# Patient Record
Sex: FEMALE | Race: WHITE | Employment: OTHER | URBAN - METROPOLITAN AREA
[De-identification: names, ages, dates, MRNs, and addresses within clinical notes are randomized per-mention and may not be internally consistent; named-entity substitution may affect disease eponyms.]

---

## 2017-05-04 PROBLEM — E87.20 LACTIC ACIDOSIS: Status: ACTIVE | Noted: 2017-05-04

## 2017-05-04 PROBLEM — I99.8 ISCHEMIC FOOT: Status: ACTIVE | Noted: 2017-05-04

## 2017-05-04 PROBLEM — D53.9 MACROCYTIC ANEMIA: Status: ACTIVE | Noted: 2017-05-04

## 2017-05-22 PROBLEM — I75.022 BLUE TOE SYNDROME OF LEFT LOWER EXTREMITY (HCC): Chronic | Status: ACTIVE | Noted: 2017-05-22

## 2017-05-29 PROBLEM — I70.229 CRITICAL LOWER LIMB ISCHEMIA (HCC): Chronic | Status: ACTIVE | Noted: 2017-05-29

## 2018-09-13 ENCOUNTER — HOSPITAL ENCOUNTER (OUTPATIENT)
Dept: ULTRASOUND IMAGING | Age: 68
Discharge: HOME | End: 2018-09-15
Payer: MEDICARE

## 2018-09-13 DIAGNOSIS — M79.661 RIGHT CALF PAIN: ICD-10-CM

## 2018-09-13 PROCEDURE — 93971 EXTREMITY STUDY: CPT

## 2018-11-02 DIAGNOSIS — Z79.01 LONG TERM CURRENT USE OF ANTICOAGULANT: Primary | ICD-10-CM

## 2018-11-05 ENCOUNTER — HOSPITAL ENCOUNTER (OUTPATIENT)
Dept: PHARMACY | Age: 68
Setting detail: THERAPIES SERIES
Discharge: HOME | End: 2018-11-05
Payer: MEDICARE

## 2018-11-05 ENCOUNTER — HOSPITAL ENCOUNTER (OUTPATIENT)
Age: 68
Discharge: HOME | End: 2018-11-05
Payer: MEDICARE

## 2018-11-05 VITALS
WEIGHT: 111.4 LBS | BODY MASS INDEX: 16.94 KG/M2 | HEART RATE: 66 BPM | DIASTOLIC BLOOD PRESSURE: 76 MMHG | SYSTOLIC BLOOD PRESSURE: 149 MMHG

## 2018-11-05 DIAGNOSIS — Z86.718 HISTORY OF DEEP VEIN THROMBOSIS: ICD-10-CM

## 2018-11-05 DIAGNOSIS — Z79.01 LONG TERM CURRENT USE OF ANTICOAGULANT: ICD-10-CM

## 2018-11-05 LAB
INR BLD: 2.3
PROTHROMBIN TIME: 25.7 SEC (ref 9.3–12.4)

## 2018-11-05 PROCEDURE — 85610 PROTHROMBIN TIME: CPT

## 2018-11-05 PROCEDURE — 99212 OFFICE O/P EST SF 10 MIN: CPT

## 2018-11-05 PROCEDURE — 36415 COLL VENOUS BLD VENIPUNCTURE: CPT

## 2018-11-05 RX ORDER — WARFARIN SODIUM 2 MG/1
2 TABLET ORAL
COMMUNITY
End: 2019-07-01 | Stop reason: SDUPTHER

## 2018-11-05 NOTE — PROGRESS NOTES
[address] [facility], [place] Patient Initial Visit    Patient Findings     Positives:   Change in alcohol use (SHE HAS A FEW DRINKS PER DAY (2-3)), Change in medications (MED REC COMPLETED), Change in diet/appetite (SHE DOES LIKE SOME HIGH VITAMIN K FOODS, DISCUSSED BEING CONSISTENT)    Negatives:   Signs/symptoms of thrombosis, Signs/symptoms of bleeding, Laboratory test error suspected, Change in health, Change in activity, Upcoming invasive procedure, Emergency department visit, Upcoming dental procedure, Missed doses, Extra doses, Hospital admission, Bruising, Other complaints         Patient  reports that she quit smoking about 13 years ago. Her smoking use included Cigarettes. She smoked 3.00 packs per day. She has never used smokeless tobacco.     Assessment/Plan: This is patient's first visit to the [place] [address] [facility] (Medication Management). The patient was provided with all standard clinic warfarin education (MOA, s/s bleeding, INR monitoring, compliance, drug/food/substance interactions, clinic procedures). Orval Dubin is on warfarin for DVT, with a goal INR of 2-3. Patient has been on warfarin since mid-October this year; previously she was on Eliquis for one month but stopped d/t cost.  Last INR was about 10 days ago (value unknown); she has been on warfarin 2 mg daily since then. INR is therapeutic at 2.3, goal 2-3. Warfarin Dose: same (2 mg daily)  Follow Up: 1 week    Patient understands dosing directions and information discussed. Dosing schedule and follow up appointment given to patient. Patient acknowledges working in consult agreement with pharmacist as referred by his/her physician.     Alisa Moss PharmD 11/5/2018 3:40 PM

## 2018-11-12 ENCOUNTER — TELEPHONE (OUTPATIENT)
Dept: PHARMACY | Age: 68
End: 2018-11-12

## 2018-11-12 DIAGNOSIS — Z86.718 HISTORY OF DEEP VEIN THROMBOSIS: ICD-10-CM

## 2018-11-19 ENCOUNTER — HOSPITAL ENCOUNTER (OUTPATIENT)
Dept: PHARMACY | Age: 68
Setting detail: THERAPIES SERIES
Discharge: HOME | End: 2018-11-19
Payer: MEDICARE

## 2018-11-19 VITALS
BODY MASS INDEX: 16.6 KG/M2 | DIASTOLIC BLOOD PRESSURE: 65 MMHG | WEIGHT: 109.2 LBS | HEART RATE: 71 BPM | SYSTOLIC BLOOD PRESSURE: 100 MMHG

## 2018-11-19 DIAGNOSIS — Z86.718 HISTORY OF DEEP VEIN THROMBOSIS: ICD-10-CM

## 2018-11-19 LAB — INTERNATIONAL NORMALIZATION RATIO, POC: 3.9

## 2018-11-19 PROCEDURE — 85610 PROTHROMBIN TIME: CPT

## 2018-11-19 PROCEDURE — 99211 OFF/OP EST MAY X REQ PHY/QHP: CPT

## 2018-11-26 LAB — INR BLD: 4.5

## 2018-11-28 ENCOUNTER — TELEPHONE (OUTPATIENT)
Dept: PHARMACY | Age: 68
End: 2018-11-28

## 2018-11-28 DIAGNOSIS — Z86.718 HISTORY OF DEEP VEIN THROMBOSIS: ICD-10-CM

## 2018-11-28 NOTE — TELEPHONE ENCOUNTER
Called Pat back and went over her dosing of Warfarin for 11-28-18 to 12-2-18. Patient understood.   Electronically signed by Anusha Rai on 11/28/2018 at 12:00 PM

## 2018-11-28 NOTE — TELEPHONE ENCOUNTER
Received a call from Teofilo Robertson stating she had been in to see her primary care physician Dr. Amber Easley, on Monday who did an INR and the result was 4.5. Dr. Amber Easley told Teofilo Robertson not to take any warfarin on Monday and to call in today for dosing of warfarin. We need to call Pat back at [phone] with dosing instructions.

## 2018-12-03 ENCOUNTER — HOSPITAL ENCOUNTER (OUTPATIENT)
Dept: PHARMACY | Age: 68
Setting detail: THERAPIES SERIES
Discharge: HOME | End: 2018-12-03
Payer: MEDICARE

## 2018-12-03 VITALS
DIASTOLIC BLOOD PRESSURE: 73 MMHG | BODY MASS INDEX: 16.48 KG/M2 | WEIGHT: 108.4 LBS | SYSTOLIC BLOOD PRESSURE: 108 MMHG | HEART RATE: 69 BPM

## 2018-12-03 DIAGNOSIS — Z86.718 HISTORY OF DEEP VEIN THROMBOSIS: ICD-10-CM

## 2018-12-03 LAB — INTERNATIONAL NORMALIZATION RATIO, POC: 2.6

## 2018-12-03 PROCEDURE — 85610 PROTHROMBIN TIME: CPT

## 2018-12-03 PROCEDURE — 99211 OFF/OP EST MAY X REQ PHY/QHP: CPT

## 2018-12-03 NOTE — PROGRESS NOTES
[facility]    Patient Findings     Positives:   Change in health (DIARRHEA NOW RESOLVED, ABOUT A WEEK AGO), Change in alcohol use (HAVING 3 ALCOHOLIC MIXED DRINKS DAILY), Change in diet/appetite (APPETITE REMAINS DECREASED)    Negatives:   Signs/symptoms of thrombosis, Signs/symptoms of bleeding, Laboratory test error suspected, Change in activity, Upcoming invasive procedure, Emergency department visit, Upcoming dental procedure, Missed doses, Extra doses, Change in medications, Hospital admission, Bruising, Other complaints    Comments:   PCP ON DEC. 21ST           Patient  reports that she quit smoking about 13 years ago. Her smoking use included Cigarettes. She smoked 3.00 packs per day. She has never used smokeless tobacco.     Assessment/Plan:  Warfarin indication: Hx of DVT      INR today is therapeutic at 2.6, goal is 2-3. Warfarin Dose: WARFARIN DOSE WAS MODIFIED OVER THE PAST WEEK (9 MG TOTAL) D/T INR OF 4.5 ON 11/26 @ PCP OFFICE; SHE WAS ON 13 MG PER WEEK WHEN INR WAS 4.5. THAT INR WAS HIGHER THAN EXPECTED MOST LIKELY D/T MULTIPLE DAYS OF DIARRHEA. I WILL HAVE HER TAKE WARFARIN 1 MG ON TUES/THUR/SAT.; 2 MG ALL OTHER DAYS (11 MG PER WEEK)    Follow Up: 2 weeks    Patient understands dosing directions and information discussed. Dosing schedule and follow up appointment given to patient. Patient acknowledges working in consult agreement with pharmacist as referred by his/her physician.     Jean Claude Valenzuela PharmD 12/3/2018 2:28 PM

## 2018-12-17 ENCOUNTER — TELEPHONE (OUTPATIENT)
Dept: PHARMACY | Age: 68
End: 2018-12-17

## 2018-12-27 ENCOUNTER — HOSPITAL ENCOUNTER (OUTPATIENT)
Dept: PHARMACY | Age: 68
Setting detail: THERAPIES SERIES
Discharge: HOME | End: 2018-12-27
Payer: MEDICARE

## 2018-12-27 VITALS — HEART RATE: 97 BPM | DIASTOLIC BLOOD PRESSURE: 84 MMHG | SYSTOLIC BLOOD PRESSURE: 136 MMHG

## 2018-12-27 DIAGNOSIS — Z86.718 HISTORY OF DEEP VEIN THROMBOSIS: ICD-10-CM

## 2018-12-27 LAB — INTERNATIONAL NORMALIZATION RATIO, POC: 2.3

## 2018-12-27 PROCEDURE — 85610 PROTHROMBIN TIME: CPT

## 2018-12-27 PROCEDURE — 99211 OFF/OP EST MAY X REQ PHY/QHP: CPT

## 2019-01-07 LAB
CHOLESTEROL, TOTAL: 117 MG/DL
CHOLESTEROL/HDL RATIO: 2.2
HDLC SERPL-MCNC: 54 MG/DL (ref 35–70)
LDL CHOLESTEROL CALCULATED: 45 MG/DL (ref 0–160)
TRIGL SERPL-MCNC: 97 MG/DL
VLDLC SERPL CALC-MCNC: 63 MG/DL

## 2019-01-10 ENCOUNTER — APPOINTMENT (OUTPATIENT)
Dept: GENERAL RADIOLOGY | Age: 69
DRG: 432 | End: 2019-01-10
Payer: MEDICARE

## 2019-01-10 ENCOUNTER — APPOINTMENT (OUTPATIENT)
Dept: CT IMAGING | Age: 69
DRG: 432 | End: 2019-01-10
Payer: MEDICARE

## 2019-01-10 ENCOUNTER — HOSPITAL ENCOUNTER (INPATIENT)
Age: 69
LOS: 8 days | Discharge: HOME HEALTH | DRG: 432 | End: 2019-01-18
Attending: EMERGENCY MEDICINE | Admitting: INTERNAL MEDICINE
Payer: MEDICARE

## 2019-01-10 DIAGNOSIS — D64.9 SYMPTOMATIC ANEMIA: Primary | ICD-10-CM

## 2019-01-10 PROBLEM — K70.30 ALCOHOLIC CIRRHOSIS (HCC): Status: ACTIVE | Noted: 2019-01-10

## 2019-01-10 PROBLEM — I73.9 PVD (PERIPHERAL VASCULAR DISEASE) WITH CLAUDICATION (HCC): Status: ACTIVE | Noted: 2019-01-10

## 2019-01-10 PROBLEM — M67.88 PERONEAL TENDINOSIS: Status: ACTIVE | Noted: 2019-01-10

## 2019-01-10 PROBLEM — I82.503 CHRONIC DEEP VEIN THROMBOSIS (DVT) OF BOTH LOWER EXTREMITIES (HCC): Status: ACTIVE | Noted: 2019-01-10

## 2019-01-10 PROBLEM — E87.1 HYPONATREMIA: Status: ACTIVE | Noted: 2019-01-10

## 2019-01-10 LAB
ABO/RH: NORMAL
ABO/RH: NORMAL
ALBUMIN SERPL-MCNC: 3.6 G/DL (ref 3.5–5.2)
ALP BLD-CCNC: 92 U/L (ref 35–104)
ALT SERPL-CCNC: 16 U/L (ref 0–32)
ANION GAP SERPL CALCULATED.3IONS-SCNC: 14 MMOL/L (ref 7–16)
ANISOCYTOSIS: ABNORMAL
ANTIBODY SCREEN: NORMAL
AST SERPL-CCNC: 55 U/L (ref 0–31)
BASOPHILS ABSOLUTE: 0.02 E9/L (ref 0–0.2)
BASOPHILS RELATIVE PERCENT: 0.4 % (ref 0–2)
BILIRUB SERPL-MCNC: 1.3 MG/DL (ref 0–1.2)
BUN BLDV-MCNC: 6 MG/DL (ref 8–23)
CALCIUM SERPL-MCNC: 8.8 MG/DL (ref 8.6–10.2)
CHLORIDE BLD-SCNC: 95 MMOL/L (ref 98–107)
CHP ED QC CHECK: YES
CO2: 23 MMOL/L (ref 22–29)
CREAT SERPL-MCNC: 0.7 MG/DL (ref 0.5–1)
EOSINOPHILS ABSOLUTE: 0.01 E9/L (ref 0.05–0.5)
EOSINOPHILS RELATIVE PERCENT: 0.2 % (ref 0–6)
ETHANOL: <10 MG/DL (ref 0–0.08)
GFR AFRICAN AMERICAN: >60
GFR NON-AFRICAN AMERICAN: >60 ML/MIN/1.73
GLUCOSE BLD-MCNC: 109 MG/DL (ref 74–99)
HCT VFR BLD CALC: 22.4 % (ref 34–48)
HEMOCCULT STL QL: NORMAL
HEMOGLOBIN: 7.8 G/DL (ref 11.5–15.5)
IMMATURE GRANULOCYTES #: 0.03 E9/L
IMMATURE GRANULOCYTES %: 0.6 % (ref 0–5)
INR BLD: 2.3
LIPASE: 55 U/L (ref 13–60)
LYMPHOCYTES ABSOLUTE: 1.33 E9/L (ref 1.5–4)
LYMPHOCYTES RELATIVE PERCENT: 27.1 % (ref 20–42)
MAGNESIUM: 1.4 MG/DL (ref 1.6–2.6)
MCH RBC QN AUTO: 39 PG (ref 26–35)
MCHC RBC AUTO-ENTMCNC: 34.8 % (ref 32–34.5)
MCV RBC AUTO: 112 FL (ref 80–99.9)
MONOCYTES ABSOLUTE: 0.3 E9/L (ref 0.1–0.95)
MONOCYTES RELATIVE PERCENT: 6.1 % (ref 2–12)
NEUTROPHILS ABSOLUTE: 3.21 E9/L (ref 1.8–7.3)
NEUTROPHILS RELATIVE PERCENT: 65.6 % (ref 43–80)
OVALOCYTES: ABNORMAL
PDW BLD-RTO: 20.7 FL (ref 11.5–15)
PLATELET # BLD: 366 E9/L (ref 130–450)
PMV BLD AUTO: 9.5 FL (ref 7–12)
POIKILOCYTES: ABNORMAL
POTASSIUM SERPL-SCNC: 3.4 MMOL/L (ref 3.5–5)
PROTHROMBIN TIME: 26 SEC (ref 9.3–12.4)
RBC # BLD: 2 E12/L (ref 3.5–5.5)
SODIUM BLD-SCNC: 132 MMOL/L (ref 132–146)
TEAR DROP CELLS: ABNORMAL
TOTAL PROTEIN: 6.1 G/DL (ref 6.4–8.3)
WBC # BLD: 4.9 E9/L (ref 4.5–11.5)

## 2019-01-10 PROCEDURE — 85025 COMPLETE CBC W/AUTO DIFF WBC: CPT

## 2019-01-10 PROCEDURE — 6370000000 HC RX 637 (ALT 250 FOR IP): Performed by: INTERNAL MEDICINE

## 2019-01-10 PROCEDURE — 2580000003 HC RX 258: Performed by: RADIOLOGY

## 2019-01-10 PROCEDURE — 83735 ASSAY OF MAGNESIUM: CPT

## 2019-01-10 PROCEDURE — 36415 COLL VENOUS BLD VENIPUNCTURE: CPT

## 2019-01-10 PROCEDURE — 6360000002 HC RX W HCPCS: Performed by: EMERGENCY MEDICINE

## 2019-01-10 PROCEDURE — 96374 THER/PROPH/DIAG INJ IV PUSH: CPT

## 2019-01-10 PROCEDURE — 86901 BLOOD TYPING SEROLOGIC RH(D): CPT

## 2019-01-10 PROCEDURE — 83690 ASSAY OF LIPASE: CPT

## 2019-01-10 PROCEDURE — 71045 X-RAY EXAM CHEST 1 VIEW: CPT

## 2019-01-10 PROCEDURE — 74177 CT ABD & PELVIS W/CONTRAST: CPT

## 2019-01-10 PROCEDURE — 86900 BLOOD TYPING SEROLOGIC ABO: CPT

## 2019-01-10 PROCEDURE — C9113 INJ PANTOPRAZOLE SODIUM, VIA: HCPCS | Performed by: EMERGENCY MEDICINE

## 2019-01-10 PROCEDURE — G0480 DRUG TEST DEF 1-7 CLASSES: HCPCS

## 2019-01-10 PROCEDURE — 6360000002 HC RX W HCPCS: Performed by: INTERNAL MEDICINE

## 2019-01-10 PROCEDURE — 99285 EMERGENCY DEPT VISIT HI MDM: CPT

## 2019-01-10 PROCEDURE — 93005 ELECTROCARDIOGRAM TRACING: CPT | Performed by: INTERNAL MEDICINE

## 2019-01-10 PROCEDURE — 6360000004 HC RX CONTRAST MEDICATION: Performed by: RADIOLOGY

## 2019-01-10 PROCEDURE — 99223 1ST HOSP IP/OBS HIGH 75: CPT | Performed by: INTERNAL MEDICINE

## 2019-01-10 PROCEDURE — 86850 RBC ANTIBODY SCREEN: CPT

## 2019-01-10 PROCEDURE — 71260 CT THORAX DX C+: CPT

## 2019-01-10 PROCEDURE — 85610 PROTHROMBIN TIME: CPT

## 2019-01-10 PROCEDURE — 80053 COMPREHEN METABOLIC PANEL: CPT

## 2019-01-10 PROCEDURE — 2580000003 HC RX 258: Performed by: EMERGENCY MEDICINE

## 2019-01-10 PROCEDURE — 2580000003 HC RX 258: Performed by: INTERNAL MEDICINE

## 2019-01-10 PROCEDURE — 1200000000 HC SEMI PRIVATE

## 2019-01-10 RX ORDER — FOLIC ACID 1 MG/1
1 TABLET ORAL DAILY
Status: DISCONTINUED | OUTPATIENT
Start: 2019-01-10 | End: 2019-01-18 | Stop reason: HOSPADM

## 2019-01-10 RX ORDER — BRIMONIDINE TARTRATE, TIMOLOL MALEATE 2; 5 MG/ML; MG/ML
1 SOLUTION/ DROPS OPHTHALMIC EVERY 12 HOURS
Status: DISCONTINUED | OUTPATIENT
Start: 2019-01-10 | End: 2019-01-10 | Stop reason: CLARIF

## 2019-01-10 RX ORDER — WARFARIN SODIUM 1 MG/1
1 TABLET ORAL DAILY
Status: DISCONTINUED | OUTPATIENT
Start: 2019-01-10 | End: 2019-01-18 | Stop reason: HOSPADM

## 2019-01-10 RX ORDER — POTASSIUM CHLORIDE 20 MEQ/1
40 TABLET, EXTENDED RELEASE ORAL DAILY
Status: DISCONTINUED | OUTPATIENT
Start: 2019-01-10 | End: 2019-01-18 | Stop reason: HOSPADM

## 2019-01-10 RX ORDER — 0.9 % SODIUM CHLORIDE 0.9 %
1000 INTRAVENOUS SOLUTION INTRAVENOUS ONCE
Status: COMPLETED | OUTPATIENT
Start: 2019-01-10 | End: 2019-01-10

## 2019-01-10 RX ORDER — THIAMINE MONONITRATE (VIT B1) 100 MG
100 TABLET ORAL DAILY
Status: DISCONTINUED | OUTPATIENT
Start: 2019-01-11 | End: 2019-01-18 | Stop reason: HOSPADM

## 2019-01-10 RX ORDER — SODIUM CHLORIDE 0.9 % (FLUSH) 0.9 %
10 SYRINGE (ML) INJECTION 2 TIMES DAILY
Status: DISCONTINUED | OUTPATIENT
Start: 2019-01-10 | End: 2019-01-18 | Stop reason: HOSPADM

## 2019-01-10 RX ORDER — WARFARIN SODIUM 1 MG/1
1 TABLET ORAL
COMMUNITY
End: 2019-01-28

## 2019-01-10 RX ORDER — ESCITALOPRAM OXALATE 10 MG/1
20 TABLET ORAL EVERY MORNING
Status: DISCONTINUED | OUTPATIENT
Start: 2019-01-11 | End: 2019-01-10

## 2019-01-10 RX ORDER — SODIUM CHLORIDE 9 MG/ML
INJECTION, SOLUTION INTRAVENOUS ONCE
Status: COMPLETED | OUTPATIENT
Start: 2019-01-10 | End: 2019-01-10

## 2019-01-10 RX ORDER — TIMOLOL MALEATE 5 MG/ML
1 SOLUTION/ DROPS OPHTHALMIC EVERY 12 HOURS
Status: DISCONTINUED | OUTPATIENT
Start: 2019-01-10 | End: 2019-01-16 | Stop reason: SDUPTHER

## 2019-01-10 RX ORDER — WARFARIN SODIUM 1 MG/1
1 TABLET ORAL
Status: DISCONTINUED | OUTPATIENT
Start: 2019-01-11 | End: 2019-01-18 | Stop reason: HOSPADM

## 2019-01-10 RX ORDER — MAGNESIUM SULFATE IN WATER 40 MG/ML
4 INJECTION, SOLUTION INTRAVENOUS ONCE
Status: COMPLETED | OUTPATIENT
Start: 2019-01-10 | End: 2019-01-11

## 2019-01-10 RX ORDER — 0.9 % SODIUM CHLORIDE 0.9 %
500 INTRAVENOUS SOLUTION INTRAVENOUS ONCE
Status: DISCONTINUED | OUTPATIENT
Start: 2019-01-11 | End: 2019-01-11

## 2019-01-10 RX ORDER — PANTOPRAZOLE SODIUM 40 MG/10ML
40 INJECTION, POWDER, LYOPHILIZED, FOR SOLUTION INTRAVENOUS ONCE
Status: COMPLETED | OUTPATIENT
Start: 2019-01-10 | End: 2019-01-10

## 2019-01-10 RX ORDER — LORAZEPAM 1 MG/1
1 TABLET ORAL
Status: DISCONTINUED | OUTPATIENT
Start: 2019-01-10 | End: 2019-01-18 | Stop reason: HOSPADM

## 2019-01-10 RX ORDER — BRIMONIDINE TARTRATE 2 MG/ML
1 SOLUTION/ DROPS OPHTHALMIC EVERY 12 HOURS
Status: DISCONTINUED | OUTPATIENT
Start: 2019-01-10 | End: 2019-01-16 | Stop reason: SDUPTHER

## 2019-01-10 RX ORDER — SIMVASTATIN 5 MG
5 TABLET ORAL NIGHTLY
Status: DISCONTINUED | OUTPATIENT
Start: 2019-01-10 | End: 2019-01-18 | Stop reason: HOSPADM

## 2019-01-10 RX ORDER — LEVOTHYROXINE SODIUM 0.03 MG/1
25 TABLET ORAL EVERY MORNING
Status: DISCONTINUED | OUTPATIENT
Start: 2019-01-11 | End: 2019-01-18 | Stop reason: HOSPADM

## 2019-01-10 RX ORDER — THIAMINE HYDROCHLORIDE 100 MG/ML
100 INJECTION, SOLUTION INTRAMUSCULAR; INTRAVENOUS ONCE
Status: COMPLETED | OUTPATIENT
Start: 2019-01-10 | End: 2019-01-10

## 2019-01-10 RX ADMIN — THIAMINE HYDROCHLORIDE 100 MG: 100 INJECTION, SOLUTION INTRAMUSCULAR; INTRAVENOUS at 23:20

## 2019-01-10 RX ADMIN — SODIUM CHLORIDE: 9 INJECTION, SOLUTION INTRAVENOUS at 22:07

## 2019-01-10 RX ADMIN — MAGNESIUM SULFATE HEPTAHYDRATE 4 G: 40 INJECTION, SOLUTION INTRAVENOUS at 23:17

## 2019-01-10 RX ADMIN — IOPAMIDOL 110 ML: 755 INJECTION, SOLUTION INTRAVENOUS at 15:49

## 2019-01-10 RX ADMIN — SODIUM CHLORIDE 1000 ML: 9 INJECTION, SOLUTION INTRAVENOUS at 12:23

## 2019-01-10 RX ADMIN — TIMOLOL MALEATE 1 DROP: 5 SOLUTION/ DROPS OPHTHALMIC at 23:17

## 2019-01-10 RX ADMIN — PANTOPRAZOLE SODIUM 40 MG: 40 INJECTION, POWDER, FOR SOLUTION INTRAVENOUS at 16:07

## 2019-01-10 RX ADMIN — WARFARIN SODIUM 1 MG: 1 TABLET ORAL at 23:17

## 2019-01-10 RX ADMIN — POTASSIUM CHLORIDE 40 MEQ: 20 TABLET, EXTENDED RELEASE ORAL at 23:17

## 2019-01-10 RX ADMIN — SIMVASTATIN 5 MG: 5 TABLET, FILM COATED ORAL at 23:17

## 2019-01-10 RX ADMIN — Medication 10 ML: at 22:07

## 2019-01-10 RX ADMIN — FOLIC ACID 1 MG: 1 TABLET ORAL at 23:20

## 2019-01-10 ASSESSMENT — PAIN DESCRIPTION - ORIENTATION: ORIENTATION: RIGHT;LEFT

## 2019-01-10 ASSESSMENT — PAIN SCALES - GENERAL
PAINLEVEL_OUTOF10: 0
PAINLEVEL_OUTOF10: 0
PAINLEVEL_OUTOF10: 7
PAINLEVEL_OUTOF10: 0

## 2019-01-10 ASSESSMENT — PAIN DESCRIPTION - LOCATION: LOCATION: LEG

## 2019-01-10 ASSESSMENT — PAIN DESCRIPTION - PAIN TYPE: TYPE: ACUTE PAIN

## 2019-01-11 ENCOUNTER — APPOINTMENT (OUTPATIENT)
Dept: INTERVENTIONAL RADIOLOGY/VASCULAR | Age: 69
DRG: 432 | End: 2019-01-11
Payer: MEDICARE

## 2019-01-11 LAB
ALBUMIN SERPL-MCNC: 2.8 G/DL (ref 3.5–5.2)
ALP BLD-CCNC: 71 U/L (ref 35–104)
ALT SERPL-CCNC: 13 U/L (ref 0–32)
ANION GAP SERPL CALCULATED.3IONS-SCNC: 10 MMOL/L (ref 7–16)
ANISOCYTOSIS: ABNORMAL
ANISOCYTOSIS: ABNORMAL
AST SERPL-CCNC: 41 U/L (ref 0–31)
BASOPHILS ABSOLUTE: 0.02 E9/L (ref 0–0.2)
BASOPHILS ABSOLUTE: 0.03 E9/L (ref 0–0.2)
BASOPHILS RELATIVE PERCENT: 0.4 % (ref 0–2)
BASOPHILS RELATIVE PERCENT: 0.7 % (ref 0–2)
BILIRUB SERPL-MCNC: 0.8 MG/DL (ref 0–1.2)
BLOOD BANK DISPENSE STATUS: NORMAL
BLOOD BANK PRODUCT CODE: NORMAL
BPU ID: NORMAL
BUN BLDV-MCNC: 4 MG/DL (ref 8–23)
CALCIUM SERPL-MCNC: 7.9 MG/DL (ref 8.6–10.2)
CHLORIDE BLD-SCNC: 105 MMOL/L (ref 98–107)
CO2: 19 MMOL/L (ref 22–29)
CREAT SERPL-MCNC: 0.7 MG/DL (ref 0.5–1)
D DIMER: 392 NG/ML DDU
DESCRIPTION BLOOD BANK: NORMAL
EKG ATRIAL RATE: 65 BPM
EKG ATRIAL RATE: 70 BPM
EKG P AXIS: 50 DEGREES
EKG P AXIS: 73 DEGREES
EKG P-R INTERVAL: 144 MS
EKG P-R INTERVAL: 154 MS
EKG Q-T INTERVAL: 496 MS
EKG Q-T INTERVAL: 500 MS
EKG QRS DURATION: 74 MS
EKG QRS DURATION: 74 MS
EKG QTC CALCULATION (BAZETT): 515 MS
EKG QTC CALCULATION (BAZETT): 540 MS
EKG R AXIS: 44 DEGREES
EKG R AXIS: 45 DEGREES
EKG T AXIS: 43 DEGREES
EKG T AXIS: 55 DEGREES
EKG VENTRICULAR RATE: 65 BPM
EKG VENTRICULAR RATE: 70 BPM
EOSINOPHILS ABSOLUTE: 0.02 E9/L (ref 0.05–0.5)
EOSINOPHILS ABSOLUTE: 0.04 E9/L (ref 0.05–0.5)
EOSINOPHILS RELATIVE PERCENT: 0.5 % (ref 0–6)
EOSINOPHILS RELATIVE PERCENT: 0.8 % (ref 0–6)
FERRITIN: 1055 NG/ML
FIBRINOGEN: 303 MG/DL (ref 225–540)
FOLATE: >20 NG/ML (ref 4.8–24.2)
GFR AFRICAN AMERICAN: >60
GFR NON-AFRICAN AMERICAN: >60 ML/MIN/1.73
GLUCOSE BLD-MCNC: 79 MG/DL (ref 74–99)
HAPTOGLOBIN: 47 MG/DL (ref 30–200)
HCT VFR BLD CALC: 19 % (ref 34–48)
HCT VFR BLD CALC: 20.7 % (ref 34–48)
HEMOGLOBIN: 6.5 G/DL (ref 11.5–15.5)
HEMOGLOBIN: 7.2 G/DL (ref 11.5–15.5)
HYPOCHROMIA: ABNORMAL
IMMATURE GRANULOCYTES #: 0.01 E9/L
IMMATURE GRANULOCYTES #: 0.02 E9/L
IMMATURE GRANULOCYTES %: 0.2 % (ref 0–5)
IMMATURE GRANULOCYTES %: 0.4 % (ref 0–5)
IMMATURE RETIC FRACT: 20.9 % (ref 3–15.9)
INR BLD: 2
IRON: 41 MCG/DL (ref 37–145)
LACTATE DEHYDROGENASE: 192 U/L (ref 135–214)
LYMPHOCYTES ABSOLUTE: 1.56 E9/L (ref 1.5–4)
LYMPHOCYTES ABSOLUTE: 2.03 E9/L (ref 1.5–4)
LYMPHOCYTES RELATIVE PERCENT: 37.1 % (ref 20–42)
LYMPHOCYTES RELATIVE PERCENT: 41.9 % (ref 20–42)
MCH RBC QN AUTO: 39.3 PG (ref 26–35)
MCH RBC QN AUTO: 39.4 PG (ref 26–35)
MCHC RBC AUTO-ENTMCNC: 33.6 % (ref 32–34.5)
MCHC RBC AUTO-ENTMCNC: 34.2 % (ref 32–34.5)
MCV RBC AUTO: 115.2 FL (ref 80–99.9)
MCV RBC AUTO: 116.9 FL (ref 80–99.9)
MONOCYTES ABSOLUTE: 0.26 E9/L (ref 0.1–0.95)
MONOCYTES ABSOLUTE: 0.31 E9/L (ref 0.1–0.95)
MONOCYTES RELATIVE PERCENT: 6.2 % (ref 2–12)
MONOCYTES RELATIVE PERCENT: 6.4 % (ref 2–12)
NEUTROPHILS ABSOLUTE: 2.33 E9/L (ref 1.8–7.3)
NEUTROPHILS ABSOLUTE: 2.43 E9/L (ref 1.8–7.3)
NEUTROPHILS RELATIVE PERCENT: 50.1 % (ref 43–80)
NEUTROPHILS RELATIVE PERCENT: 55.3 % (ref 43–80)
OSMOLALITY URINE: 418 MOSM/KG (ref 300–900)
OVALOCYTES: ABNORMAL
PATHOLOGIST REVIEW: NORMAL
PDW BLD-RTO: 21.1 FL (ref 11.5–15)
PDW BLD-RTO: 21.2 FL (ref 11.5–15)
PLATELET # BLD: 346 E9/L (ref 130–450)
PLATELET # BLD: 429 E9/L (ref 130–450)
PMV BLD AUTO: 9.4 FL (ref 7–12)
PMV BLD AUTO: 9.4 FL (ref 7–12)
POIKILOCYTES: ABNORMAL
POIKILOCYTES: ABNORMAL
POLYCHROMASIA: ABNORMAL
POLYCHROMASIA: ABNORMAL
POTASSIUM SERPL-SCNC: 3.8 MMOL/L (ref 3.5–5)
PROTHROMBIN TIME: 22.3 SEC (ref 9.3–12.4)
RBC # BLD: 1.65 E12/L (ref 3.5–5.5)
RBC # BLD: 1.83 E12/L (ref 3.5–5.5)
RETIC HGB EQUIVALENT: 46.6 PG (ref 28.2–36.6)
RETICULOCYTE ABSOLUTE COUNT: 0.07 E12/L
RETICULOCYTE COUNT PCT: 3.7 % (ref 0.4–1.9)
SODIUM BLD-SCNC: 134 MMOL/L (ref 132–146)
TEAR DROP CELLS: ABNORMAL
TEAR DROP CELLS: ABNORMAL
TOTAL PROTEIN: 4.8 G/DL (ref 6.4–8.3)
TRANSFERRIN: 119 MG/DL (ref 200–360)
TSH SERPL DL<=0.05 MIU/L-ACNC: 3.71 UIU/ML (ref 0.27–4.2)
VITAMIN B-12: 439 PG/ML (ref 211–946)
WBC # BLD: 4.2 E9/L (ref 4.5–11.5)
WBC # BLD: 4.9 E9/L (ref 4.5–11.5)

## 2019-01-11 PROCEDURE — 93010 ELECTROCARDIOGRAM REPORT: CPT | Performed by: INTERNAL MEDICINE

## 2019-01-11 PROCEDURE — 82746 ASSAY OF FOLIC ACID SERUM: CPT

## 2019-01-11 PROCEDURE — 6370000000 HC RX 637 (ALT 250 FOR IP): Performed by: CLINICAL NURSE SPECIALIST

## 2019-01-11 PROCEDURE — 36415 COLL VENOUS BLD VENIPUNCTURE: CPT

## 2019-01-11 PROCEDURE — 2580000003 HC RX 258: Performed by: INTERNAL MEDICINE

## 2019-01-11 PROCEDURE — 86022 PLATELET ANTIBODIES: CPT

## 2019-01-11 PROCEDURE — 36430 TRANSFUSION BLD/BLD COMPNT: CPT

## 2019-01-11 PROCEDURE — 93922 UPR/L XTREMITY ART 2 LEVELS: CPT

## 2019-01-11 PROCEDURE — 85025 COMPLETE CBC W/AUTO DIFF WBC: CPT

## 2019-01-11 PROCEDURE — 83615 LACTATE (LD) (LDH) ENZYME: CPT

## 2019-01-11 PROCEDURE — 82607 VITAMIN B-12: CPT

## 2019-01-11 PROCEDURE — 2580000003 HC RX 258: Performed by: RADIOLOGY

## 2019-01-11 PROCEDURE — 85610 PROTHROMBIN TIME: CPT

## 2019-01-11 PROCEDURE — 84466 ASSAY OF TRANSFERRIN: CPT

## 2019-01-11 PROCEDURE — 99221 1ST HOSP IP/OBS SF/LOW 40: CPT | Performed by: SURGERY

## 2019-01-11 PROCEDURE — 1200000000 HC SEMI PRIVATE

## 2019-01-11 PROCEDURE — 80053 COMPREHEN METABOLIC PANEL: CPT

## 2019-01-11 PROCEDURE — 85378 FIBRIN DEGRADE SEMIQUANT: CPT

## 2019-01-11 PROCEDURE — 84443 ASSAY THYROID STIM HORMONE: CPT

## 2019-01-11 PROCEDURE — 2580000003 HC RX 258: Performed by: HOSPITALIST

## 2019-01-11 PROCEDURE — 86923 COMPATIBILITY TEST ELECTRIC: CPT

## 2019-01-11 PROCEDURE — 83010 ASSAY OF HAPTOGLOBIN QUANT: CPT

## 2019-01-11 PROCEDURE — 85384 FIBRINOGEN ACTIVITY: CPT

## 2019-01-11 PROCEDURE — 82728 ASSAY OF FERRITIN: CPT

## 2019-01-11 PROCEDURE — 83540 ASSAY OF IRON: CPT

## 2019-01-11 PROCEDURE — 93005 ELECTROCARDIOGRAM TRACING: CPT | Performed by: INTERNAL MEDICINE

## 2019-01-11 PROCEDURE — 85045 AUTOMATED RETICULOCYTE COUNT: CPT

## 2019-01-11 PROCEDURE — 6370000000 HC RX 637 (ALT 250 FOR IP): Performed by: INTERNAL MEDICINE

## 2019-01-11 PROCEDURE — P9016 RBC LEUKOCYTES REDUCED: HCPCS

## 2019-01-11 PROCEDURE — 83935 ASSAY OF URINE OSMOLALITY: CPT

## 2019-01-11 PROCEDURE — 99233 SBSQ HOSP IP/OBS HIGH 50: CPT | Performed by: HOSPITALIST

## 2019-01-11 RX ORDER — 0.9 % SODIUM CHLORIDE 0.9 %
250 INTRAVENOUS SOLUTION INTRAVENOUS ONCE
Status: COMPLETED | OUTPATIENT
Start: 2019-01-11 | End: 2019-01-12

## 2019-01-11 RX ORDER — PANTOPRAZOLE SODIUM 40 MG/1
40 TABLET, DELAYED RELEASE ORAL
Status: DISCONTINUED | OUTPATIENT
Start: 2019-01-11 | End: 2019-01-18 | Stop reason: HOSPADM

## 2019-01-11 RX ORDER — 0.9 % SODIUM CHLORIDE 0.9 %
1000 INTRAVENOUS SOLUTION INTRAVENOUS ONCE
Status: COMPLETED | OUTPATIENT
Start: 2019-01-11 | End: 2019-01-11

## 2019-01-11 RX ADMIN — Medication 10 ML: at 20:36

## 2019-01-11 RX ADMIN — BRIMONIDINE TARTRATE 1 DROP: 2 SOLUTION OPHTHALMIC at 20:36

## 2019-01-11 RX ADMIN — Medication 100 MG: at 08:37

## 2019-01-11 RX ADMIN — PANTOPRAZOLE SODIUM 40 MG: 40 TABLET, DELAYED RELEASE ORAL at 18:06

## 2019-01-11 RX ADMIN — WARFARIN SODIUM 2 MG: 1 TABLET ORAL at 18:06

## 2019-01-11 RX ADMIN — FOLIC ACID 1 MG: 1 TABLET ORAL at 08:37

## 2019-01-11 RX ADMIN — LEVOTHYROXINE SODIUM 25 MCG: 25 TABLET ORAL at 08:37

## 2019-01-11 RX ADMIN — BRIMONIDINE TARTRATE 1 DROP: 2 SOLUTION OPHTHALMIC at 08:36

## 2019-01-11 RX ADMIN — SODIUM CHLORIDE 250 ML: 9 INJECTION, SOLUTION INTRAVENOUS at 19:26

## 2019-01-11 RX ADMIN — SIMVASTATIN 5 MG: 5 TABLET, FILM COATED ORAL at 20:36

## 2019-01-11 RX ADMIN — Medication 10 ML: at 08:41

## 2019-01-11 RX ADMIN — POTASSIUM CHLORIDE 40 MEQ: 20 TABLET, EXTENDED RELEASE ORAL at 08:37

## 2019-01-11 RX ADMIN — SODIUM CHLORIDE 1000 ML: 9 INJECTION, SOLUTION INTRAVENOUS at 03:52

## 2019-01-11 ASSESSMENT — PAIN SCALES - GENERAL
PAINLEVEL_OUTOF10: 0

## 2019-01-12 ENCOUNTER — ANESTHESIA (OUTPATIENT)
Dept: ENDOSCOPY | Age: 69
DRG: 432 | End: 2019-01-12
Payer: MEDICARE

## 2019-01-12 ENCOUNTER — ANESTHESIA EVENT (OUTPATIENT)
Dept: ENDOSCOPY | Age: 69
DRG: 432 | End: 2019-01-12
Payer: MEDICARE

## 2019-01-12 LAB
ALBUMIN SERPL-MCNC: 2.9 G/DL (ref 3.5–5.2)
ALP BLD-CCNC: 79 U/L (ref 35–104)
ALT SERPL-CCNC: 13 U/L (ref 0–32)
ANION GAP SERPL CALCULATED.3IONS-SCNC: 9 MMOL/L (ref 7–16)
ANISOCYTOSIS: ABNORMAL
AST SERPL-CCNC: 45 U/L (ref 0–31)
BASOPHILS ABSOLUTE: 0.03 E9/L (ref 0–0.2)
BASOPHILS RELATIVE PERCENT: 0.7 % (ref 0–2)
BILIRUB SERPL-MCNC: 1.3 MG/DL (ref 0–1.2)
BUN BLDV-MCNC: 4 MG/DL (ref 8–23)
CALCIUM SERPL-MCNC: 8.3 MG/DL (ref 8.6–10.2)
CEA: 3.1 NG/ML (ref 0–5.2)
CHLORIDE BLD-SCNC: 102 MMOL/L (ref 98–107)
CO2: 18 MMOL/L (ref 22–29)
CREAT SERPL-MCNC: 0.7 MG/DL (ref 0.5–1)
EOSINOPHILS ABSOLUTE: 0.04 E9/L (ref 0.05–0.5)
EOSINOPHILS RELATIVE PERCENT: 0.9 % (ref 0–6)
GFR AFRICAN AMERICAN: >60
GFR NON-AFRICAN AMERICAN: >60 ML/MIN/1.73
GLUCOSE BLD-MCNC: 67 MG/DL (ref 74–99)
HCT VFR BLD CALC: 26.2 % (ref 34–48)
HEMOGLOBIN: 8.7 G/DL (ref 11.5–15.5)
HYPOCHROMIA: ABNORMAL
IMMATURE GRANULOCYTES #: 0.03 E9/L
IMMATURE GRANULOCYTES %: 0.7 % (ref 0–5)
INR BLD: 2.1
LYMPHOCYTES ABSOLUTE: 1.56 E9/L (ref 1.5–4)
LYMPHOCYTES RELATIVE PERCENT: 34.2 % (ref 20–42)
MCH RBC QN AUTO: 36.1 PG (ref 26–35)
MCHC RBC AUTO-ENTMCNC: 33.2 % (ref 32–34.5)
MCV RBC AUTO: 108.7 FL (ref 80–99.9)
MONOCYTES ABSOLUTE: 0.32 E9/L (ref 0.1–0.95)
MONOCYTES RELATIVE PERCENT: 7 % (ref 2–12)
NEUTROPHILS ABSOLUTE: 2.58 E9/L (ref 1.8–7.3)
NEUTROPHILS RELATIVE PERCENT: 56.5 % (ref 43–80)
PDW BLD-RTO: 23.9 FL (ref 11.5–15)
PLATELET # BLD: 370 E9/L (ref 130–450)
PMV BLD AUTO: 9.2 FL (ref 7–12)
POIKILOCYTES: ABNORMAL
POLYCHROMASIA: ABNORMAL
POTASSIUM SERPL-SCNC: 4.4 MMOL/L (ref 3.5–5)
PROTHROMBIN TIME: 23.8 SEC (ref 9.3–12.4)
RBC # BLD: 2.41 E12/L (ref 3.5–5.5)
SODIUM BLD-SCNC: 129 MMOL/L (ref 132–146)
T4 TOTAL: 7.6 MCG/DL (ref 4.5–11.7)
TEAR DROP CELLS: ABNORMAL
TOTAL PROTEIN: 5.1 G/DL (ref 6.4–8.3)
TSH SERPL DL<=0.05 MIU/L-ACNC: 4 UIU/ML (ref 0.27–4.2)
WBC # BLD: 4.6 E9/L (ref 4.5–11.5)

## 2019-01-12 PROCEDURE — 84443 ASSAY THYROID STIM HORMONE: CPT

## 2019-01-12 PROCEDURE — 36415 COLL VENOUS BLD VENIPUNCTURE: CPT

## 2019-01-12 PROCEDURE — 86038 ANTINUCLEAR ANTIBODIES: CPT

## 2019-01-12 PROCEDURE — 6370000000 HC RX 637 (ALT 250 FOR IP): Performed by: CLINICAL NURSE SPECIALIST

## 2019-01-12 PROCEDURE — 85025 COMPLETE CBC W/AUTO DIFF WBC: CPT

## 2019-01-12 PROCEDURE — 84436 ASSAY OF TOTAL THYROXINE: CPT

## 2019-01-12 PROCEDURE — 85610 PROTHROMBIN TIME: CPT

## 2019-01-12 PROCEDURE — 1200000000 HC SEMI PRIVATE

## 2019-01-12 PROCEDURE — 86255 FLUORESCENT ANTIBODY SCREEN: CPT

## 2019-01-12 PROCEDURE — 80053 COMPREHEN METABOLIC PANEL: CPT

## 2019-01-12 PROCEDURE — 82784 ASSAY IGA/IGD/IGG/IGM EACH: CPT

## 2019-01-12 PROCEDURE — 2580000003 HC RX 258: Performed by: RADIOLOGY

## 2019-01-12 PROCEDURE — 99232 SBSQ HOSP IP/OBS MODERATE 35: CPT | Performed by: INTERNAL MEDICINE

## 2019-01-12 PROCEDURE — 80074 ACUTE HEPATITIS PANEL: CPT

## 2019-01-12 PROCEDURE — 82105 ALPHA-FETOPROTEIN SERUM: CPT

## 2019-01-12 PROCEDURE — 6370000000 HC RX 637 (ALT 250 FOR IP): Performed by: INTERNAL MEDICINE

## 2019-01-12 PROCEDURE — 82378 CARCINOEMBRYONIC ANTIGEN: CPT

## 2019-01-12 PROCEDURE — 82787 IGG 1 2 3 OR 4 EACH: CPT

## 2019-01-12 PROCEDURE — 82103 ALPHA-1-ANTITRYPSIN TOTAL: CPT

## 2019-01-12 PROCEDURE — 93005 ELECTROCARDIOGRAM TRACING: CPT | Performed by: HOSPITALIST

## 2019-01-12 RX ADMIN — PANTOPRAZOLE SODIUM 40 MG: 40 TABLET, DELAYED RELEASE ORAL at 06:27

## 2019-01-12 RX ADMIN — Medication 100 MG: at 10:15

## 2019-01-12 RX ADMIN — WARFARIN SODIUM 1 MG: 1 TABLET ORAL at 17:33

## 2019-01-12 RX ADMIN — BRIMONIDINE TARTRATE 1 DROP: 2 SOLUTION OPHTHALMIC at 10:18

## 2019-01-12 RX ADMIN — Medication 10 ML: at 10:00

## 2019-01-12 RX ADMIN — LEVOTHYROXINE SODIUM 25 MCG: 25 TABLET ORAL at 06:27

## 2019-01-12 RX ADMIN — POTASSIUM CHLORIDE 40 MEQ: 20 TABLET, EXTENDED RELEASE ORAL at 10:00

## 2019-01-12 RX ADMIN — FOLIC ACID 1 MG: 1 TABLET ORAL at 10:00

## 2019-01-12 RX ADMIN — Medication 10 ML: at 20:27

## 2019-01-12 RX ADMIN — BRIMONIDINE TARTRATE 1 DROP: 2 SOLUTION OPHTHALMIC at 20:26

## 2019-01-12 RX ADMIN — PANTOPRAZOLE SODIUM 40 MG: 40 TABLET, DELAYED RELEASE ORAL at 15:55

## 2019-01-12 RX ADMIN — SIMVASTATIN 5 MG: 5 TABLET, FILM COATED ORAL at 20:26

## 2019-01-12 ASSESSMENT — PAIN SCALES - GENERAL
PAINLEVEL_OUTOF10: 0

## 2019-01-13 LAB
ALBUMIN SERPL-MCNC: 2.6 G/DL (ref 3.5–5.2)
ALP BLD-CCNC: 78 U/L (ref 35–104)
ALT SERPL-CCNC: 14 U/L (ref 0–32)
ANION GAP SERPL CALCULATED.3IONS-SCNC: 9 MMOL/L (ref 7–16)
ANISOCYTOSIS: ABNORMAL
AST SERPL-CCNC: 41 U/L (ref 0–31)
BASOPHILS ABSOLUTE: 0.01 E9/L (ref 0–0.2)
BASOPHILS RELATIVE PERCENT: 0.2 % (ref 0–2)
BILIRUB SERPL-MCNC: 0.8 MG/DL (ref 0–1.2)
BUN BLDV-MCNC: 5 MG/DL (ref 8–23)
BURR CELLS: ABNORMAL
CALCIUM SERPL-MCNC: 8.3 MG/DL (ref 8.6–10.2)
CHLORIDE BLD-SCNC: 101 MMOL/L (ref 98–107)
CO2: 20 MMOL/L (ref 22–29)
CREAT SERPL-MCNC: 0.9 MG/DL (ref 0.5–1)
EOSINOPHILS ABSOLUTE: 0.07 E9/L (ref 0.05–0.5)
EOSINOPHILS RELATIVE PERCENT: 1.1 % (ref 0–6)
GFR AFRICAN AMERICAN: >60
GFR NON-AFRICAN AMERICAN: >60 ML/MIN/1.73
GLUCOSE BLD-MCNC: 85 MG/DL (ref 74–99)
HCT VFR BLD CALC: 25.4 % (ref 34–48)
HEMOGLOBIN: 8.7 G/DL (ref 11.5–15.5)
HYPOCHROMIA: ABNORMAL
IMMATURE GRANULOCYTES #: 0.04 E9/L
IMMATURE GRANULOCYTES %: 0.6 % (ref 0–5)
INR BLD: 2.6
IRON SATURATION: 54 % (ref 15–50)
IRON: 83 MCG/DL (ref 37–145)
LYMPHOCYTES ABSOLUTE: 2.94 E9/L (ref 1.5–4)
LYMPHOCYTES RELATIVE PERCENT: 47.3 % (ref 20–42)
MCH RBC QN AUTO: 37.3 PG (ref 26–35)
MCHC RBC AUTO-ENTMCNC: 34.3 % (ref 32–34.5)
MCV RBC AUTO: 109 FL (ref 80–99.9)
MONOCYTES ABSOLUTE: 0.47 E9/L (ref 0.1–0.95)
MONOCYTES RELATIVE PERCENT: 7.6 % (ref 2–12)
NEUTROPHILS ABSOLUTE: 2.69 E9/L (ref 1.8–7.3)
NEUTROPHILS RELATIVE PERCENT: 43.2 % (ref 43–80)
OVALOCYTES: ABNORMAL
PDW BLD-RTO: 23.7 FL (ref 11.5–15)
PLATELET # BLD: 354 E9/L (ref 130–450)
PMV BLD AUTO: 9.2 FL (ref 7–12)
POIKILOCYTES: ABNORMAL
POTASSIUM SERPL-SCNC: 4.9 MMOL/L (ref 3.5–5)
PROTHROMBIN TIME: 29 SEC (ref 9.3–12.4)
RBC # BLD: 2.33 E12/L (ref 3.5–5.5)
SODIUM BLD-SCNC: 130 MMOL/L (ref 132–146)
TEAR DROP CELLS: ABNORMAL
TOTAL IRON BINDING CAPACITY: 154 MCG/DL (ref 250–450)
TOTAL PROTEIN: 4.7 G/DL (ref 6.4–8.3)
VACUOLATED NEUTROPHILS: ABNORMAL
WBC # BLD: 6.2 E9/L (ref 4.5–11.5)

## 2019-01-13 PROCEDURE — 36415 COLL VENOUS BLD VENIPUNCTURE: CPT

## 2019-01-13 PROCEDURE — 1200000000 HC SEMI PRIVATE

## 2019-01-13 PROCEDURE — 6370000000 HC RX 637 (ALT 250 FOR IP): Performed by: INTERNAL MEDICINE

## 2019-01-13 PROCEDURE — 85610 PROTHROMBIN TIME: CPT

## 2019-01-13 PROCEDURE — 6370000000 HC RX 637 (ALT 250 FOR IP): Performed by: NURSE PRACTITIONER

## 2019-01-13 PROCEDURE — 83550 IRON BINDING TEST: CPT

## 2019-01-13 PROCEDURE — 80053 COMPREHEN METABOLIC PANEL: CPT

## 2019-01-13 PROCEDURE — 85025 COMPLETE CBC W/AUTO DIFF WBC: CPT

## 2019-01-13 PROCEDURE — 2580000003 HC RX 258: Performed by: RADIOLOGY

## 2019-01-13 PROCEDURE — 83540 ASSAY OF IRON: CPT

## 2019-01-13 PROCEDURE — 6370000000 HC RX 637 (ALT 250 FOR IP): Performed by: CLINICAL NURSE SPECIALIST

## 2019-01-13 PROCEDURE — 99232 SBSQ HOSP IP/OBS MODERATE 35: CPT | Performed by: INTERNAL MEDICINE

## 2019-01-13 RX ORDER — PHYTONADIONE 5 MG/1
5 TABLET ORAL ONCE
Status: COMPLETED | OUTPATIENT
Start: 2019-01-13 | End: 2019-01-13

## 2019-01-13 RX ADMIN — Medication 10 ML: at 21:27

## 2019-01-13 RX ADMIN — Medication 10 ML: at 23:55

## 2019-01-13 RX ADMIN — Medication 10 ML: at 08:39

## 2019-01-13 RX ADMIN — SIMVASTATIN 5 MG: 5 TABLET, FILM COATED ORAL at 21:27

## 2019-01-13 RX ADMIN — BRIMONIDINE TARTRATE 1 DROP: 2 SOLUTION OPHTHALMIC at 08:39

## 2019-01-13 RX ADMIN — FOLIC ACID 1 MG: 1 TABLET ORAL at 08:38

## 2019-01-13 RX ADMIN — TIMOLOL MALEATE 1 DROP: 5 SOLUTION/ DROPS OPHTHALMIC at 21:27

## 2019-01-13 RX ADMIN — LEVOTHYROXINE SODIUM 25 MCG: 25 TABLET ORAL at 06:18

## 2019-01-13 RX ADMIN — Medication 100 MG: at 08:38

## 2019-01-13 RX ADMIN — PANTOPRAZOLE SODIUM 40 MG: 40 TABLET, DELAYED RELEASE ORAL at 06:18

## 2019-01-13 RX ADMIN — PANTOPRAZOLE SODIUM 40 MG: 40 TABLET, DELAYED RELEASE ORAL at 18:04

## 2019-01-13 RX ADMIN — POTASSIUM CHLORIDE 40 MEQ: 20 TABLET, EXTENDED RELEASE ORAL at 08:38

## 2019-01-13 RX ADMIN — PHYTONADIONE 5 MG: 5 TABLET ORAL at 14:56

## 2019-01-13 ASSESSMENT — PAIN SCALES - GENERAL
PAINLEVEL_OUTOF10: 0
PAINLEVEL_OUTOF10: 0

## 2019-01-14 VITALS
OXYGEN SATURATION: 100 % | DIASTOLIC BLOOD PRESSURE: 58 MMHG | SYSTOLIC BLOOD PRESSURE: 105 MMHG | RESPIRATION RATE: 14 BRPM

## 2019-01-14 LAB
ALBUMIN SERPL-MCNC: 3.1 G/DL (ref 3.5–5.2)
ALP BLD-CCNC: 89 U/L (ref 35–104)
ALT SERPL-CCNC: 18 U/L (ref 0–32)
ANION GAP SERPL CALCULATED.3IONS-SCNC: 9 MMOL/L (ref 7–16)
ANISOCYTOSIS: ABNORMAL
ANTI-MITOCHONDRIAL AB, IFA: NEGATIVE
ANTI-NUCLEAR ANTIBODY (ANA): NEGATIVE
AST SERPL-CCNC: 58 U/L (ref 0–31)
BASOPHILS ABSOLUTE: 0.02 E9/L (ref 0–0.2)
BASOPHILS RELATIVE PERCENT: 0.3 % (ref 0–2)
BILIRUB SERPL-MCNC: 1.2 MG/DL (ref 0–1.2)
BUN BLDV-MCNC: 6 MG/DL (ref 8–23)
BURR CELLS: ABNORMAL
CALCIUM SERPL-MCNC: 8.7 MG/DL (ref 8.6–10.2)
CHLORIDE BLD-SCNC: 99 MMOL/L (ref 98–107)
CO2: 23 MMOL/L (ref 22–29)
CREAT SERPL-MCNC: 0.7 MG/DL (ref 0.5–1)
EKG ATRIAL RATE: 64 BPM
EKG P AXIS: 65 DEGREES
EKG P-R INTERVAL: 152 MS
EKG Q-T INTERVAL: 456 MS
EKG QRS DURATION: 76 MS
EKG QTC CALCULATION (BAZETT): 470 MS
EKG R AXIS: 30 DEGREES
EKG T AXIS: 46 DEGREES
EKG VENTRICULAR RATE: 64 BPM
EOSINOPHILS ABSOLUTE: 0.08 E9/L (ref 0.05–0.5)
EOSINOPHILS RELATIVE PERCENT: 1.3 % (ref 0–6)
GFR AFRICAN AMERICAN: >60
GFR NON-AFRICAN AMERICAN: >60 ML/MIN/1.73
GLUCOSE BLD-MCNC: 83 MG/DL (ref 74–99)
HAV IGM SER IA-ACNC: NORMAL
HCT VFR BLD CALC: 28.4 % (ref 34–48)
HEMOGLOBIN: 9.7 G/DL (ref 11.5–15.5)
HEPATITIS B CORE IGM ANTIBODY: NORMAL
HEPATITIS B SURFACE ANTIGEN INTERPRETATION: NORMAL
HEPATITIS C ANTIBODY INTERPRETATION: NORMAL
HYPOCHROMIA: ABNORMAL
IGG: 758 MG/DL (ref 700–1600)
IGM: 241 MG/DL (ref 40–230)
IMMATURE GRANULOCYTES #: 0.03 E9/L
IMMATURE GRANULOCYTES %: 0.5 % (ref 0–5)
INR BLD: 1.5
LYMPHOCYTES ABSOLUTE: 2.28 E9/L (ref 1.5–4)
LYMPHOCYTES RELATIVE PERCENT: 37.6 % (ref 20–42)
MCH RBC QN AUTO: 37.7 PG (ref 26–35)
MCHC RBC AUTO-ENTMCNC: 34.2 % (ref 32–34.5)
MCV RBC AUTO: 110.5 FL (ref 80–99.9)
MONOCYTES ABSOLUTE: 0.49 E9/L (ref 0.1–0.95)
MONOCYTES RELATIVE PERCENT: 8.1 % (ref 2–12)
NEUTROPHILS ABSOLUTE: 3.16 E9/L (ref 1.8–7.3)
NEUTROPHILS RELATIVE PERCENT: 52.2 % (ref 43–80)
OVALOCYTES: ABNORMAL
PDW BLD-RTO: 22.9 FL (ref 11.5–15)
PLATELET # BLD: 419 E9/L (ref 130–450)
PMV BLD AUTO: 9.8 FL (ref 7–12)
POIKILOCYTES: ABNORMAL
POLYCHROMASIA: ABNORMAL
POTASSIUM SERPL-SCNC: 4.7 MMOL/L (ref 3.5–5)
PROTHROMBIN TIME: 16.3 SEC (ref 9.3–12.4)
RBC # BLD: 2.57 E12/L (ref 3.5–5.5)
SCHISTOCYTES: ABNORMAL
SMOOTH MUSCLE ANTIBODY: NEGATIVE
SODIUM BLD-SCNC: 131 MMOL/L (ref 132–146)
TEAR DROP CELLS: ABNORMAL
TOTAL PROTEIN: 5.4 G/DL (ref 6.4–8.3)
VACUOLATED NEUTROPHILS: ABNORMAL
WBC # BLD: 6.1 E9/L (ref 4.5–11.5)

## 2019-01-14 PROCEDURE — 36415 COLL VENOUS BLD VENIPUNCTURE: CPT

## 2019-01-14 PROCEDURE — 6370000000 HC RX 637 (ALT 250 FOR IP): Performed by: INTERNAL MEDICINE

## 2019-01-14 PROCEDURE — 7100000010 HC PHASE II RECOVERY - FIRST 15 MIN: Performed by: INTERNAL MEDICINE

## 2019-01-14 PROCEDURE — 93010 ELECTROCARDIOGRAM REPORT: CPT | Performed by: INTERNAL MEDICINE

## 2019-01-14 PROCEDURE — 88305 TISSUE EXAM BY PATHOLOGIST: CPT

## 2019-01-14 PROCEDURE — 2580000003 HC RX 258: Performed by: NURSE ANESTHETIST, CERTIFIED REGISTERED

## 2019-01-14 PROCEDURE — 3700000000 HC ANESTHESIA ATTENDED CARE: Performed by: INTERNAL MEDICINE

## 2019-01-14 PROCEDURE — 6370000000 HC RX 637 (ALT 250 FOR IP): Performed by: CLINICAL NURSE SPECIALIST

## 2019-01-14 PROCEDURE — 3609012400 HC EGD TRANSORAL BIOPSY SINGLE/MULTIPLE: Performed by: INTERNAL MEDICINE

## 2019-01-14 PROCEDURE — 0DB98ZX EXCISION OF DUODENUM, VIA NATURAL OR ARTIFICIAL OPENING ENDOSCOPIC, DIAGNOSTIC: ICD-10-PCS | Performed by: INTERNAL MEDICINE

## 2019-01-14 PROCEDURE — 80053 COMPREHEN METABOLIC PANEL: CPT

## 2019-01-14 PROCEDURE — 85610 PROTHROMBIN TIME: CPT

## 2019-01-14 PROCEDURE — 99232 SBSQ HOSP IP/OBS MODERATE 35: CPT | Performed by: INTERNAL MEDICINE

## 2019-01-14 PROCEDURE — 87081 CULTURE SCREEN ONLY: CPT

## 2019-01-14 PROCEDURE — 1200000000 HC SEMI PRIVATE

## 2019-01-14 PROCEDURE — 0DB68ZX EXCISION OF STOMACH, VIA NATURAL OR ARTIFICIAL OPENING ENDOSCOPIC, DIAGNOSTIC: ICD-10-PCS | Performed by: INTERNAL MEDICINE

## 2019-01-14 PROCEDURE — 6360000002 HC RX W HCPCS: Performed by: NURSE ANESTHETIST, CERTIFIED REGISTERED

## 2019-01-14 PROCEDURE — 2580000003 HC RX 258: Performed by: RADIOLOGY

## 2019-01-14 PROCEDURE — 2709999900 HC NON-CHARGEABLE SUPPLY: Performed by: INTERNAL MEDICINE

## 2019-01-14 PROCEDURE — 85025 COMPLETE CBC W/AUTO DIFF WBC: CPT

## 2019-01-14 PROCEDURE — 7100000011 HC PHASE II RECOVERY - ADDTL 15 MIN: Performed by: INTERNAL MEDICINE

## 2019-01-14 RX ORDER — PROPOFOL 10 MG/ML
INJECTION, EMULSION INTRAVENOUS PRN
Status: DISCONTINUED | OUTPATIENT
Start: 2019-01-14 | End: 2019-01-14 | Stop reason: SDUPTHER

## 2019-01-14 RX ORDER — SODIUM CHLORIDE 9 MG/ML
INJECTION, SOLUTION INTRAVENOUS CONTINUOUS PRN
Status: DISCONTINUED | OUTPATIENT
Start: 2019-01-14 | End: 2019-01-14 | Stop reason: SDUPTHER

## 2019-01-14 RX ADMIN — Medication 10 ML: at 20:45

## 2019-01-14 RX ADMIN — Medication 100 MG: at 15:07

## 2019-01-14 RX ADMIN — BRIMONIDINE TARTRATE 1 DROP: 2 SOLUTION OPHTHALMIC at 10:17

## 2019-01-14 RX ADMIN — SIMVASTATIN 5 MG: 5 TABLET, FILM COATED ORAL at 20:45

## 2019-01-14 RX ADMIN — TIMOLOL MALEATE 1 DROP: 5 SOLUTION/ DROPS OPHTHALMIC at 20:45

## 2019-01-14 RX ADMIN — PROPOFOL 80 MG: 10 INJECTION, EMULSION INTRAVENOUS at 13:33

## 2019-01-14 RX ADMIN — Medication 10 ML: at 10:17

## 2019-01-14 RX ADMIN — WARFARIN SODIUM 1 MG: 1 TABLET ORAL at 18:36

## 2019-01-14 RX ADMIN — WARFARIN SODIUM 1 MG: 1 TABLET ORAL at 18:37

## 2019-01-14 RX ADMIN — SODIUM CHLORIDE: 9 INJECTION, SOLUTION INTRAVENOUS at 13:30

## 2019-01-14 RX ADMIN — FOLIC ACID 1 MG: 1 TABLET ORAL at 15:06

## 2019-01-14 RX ADMIN — LEVOTHYROXINE SODIUM 25 MCG: 25 TABLET ORAL at 06:09

## 2019-01-14 RX ADMIN — POTASSIUM CHLORIDE 40 MEQ: 20 TABLET, EXTENDED RELEASE ORAL at 15:07

## 2019-01-14 RX ADMIN — PANTOPRAZOLE SODIUM 40 MG: 40 TABLET, DELAYED RELEASE ORAL at 15:07

## 2019-01-14 ASSESSMENT — PAIN SCALES - GENERAL
PAINLEVEL_OUTOF10: 0

## 2019-01-15 LAB
AFP-TUMOR MARKER: 4 NG/ML (ref 0–9)
ALBUMIN SERPL-MCNC: 2.9 G/DL (ref 3.5–5.2)
ALP BLD-CCNC: 90 U/L (ref 35–104)
ALPHA-1 ANTITRYPSIN: 122 MG/DL (ref 90–200)
ALT SERPL-CCNC: 18 U/L (ref 0–32)
ANION GAP SERPL CALCULATED.3IONS-SCNC: 9 MMOL/L (ref 7–16)
ANISOCYTOSIS: ABNORMAL
AST SERPL-CCNC: 67 U/L (ref 0–31)
BASOPHILS ABSOLUTE: 0.02 E9/L (ref 0–0.2)
BASOPHILS RELATIVE PERCENT: 0.4 % (ref 0–2)
BILIRUB SERPL-MCNC: 1.2 MG/DL (ref 0–1.2)
BUN BLDV-MCNC: 5 MG/DL (ref 8–23)
CALCIUM SERPL-MCNC: 8.5 MG/DL (ref 8.6–10.2)
CHLORIDE BLD-SCNC: 95 MMOL/L (ref 98–107)
CLOTEST: NORMAL
CO2: 23 MMOL/L (ref 22–29)
CREAT SERPL-MCNC: 0.7 MG/DL (ref 0.5–1)
EOSINOPHILS ABSOLUTE: 0.06 E9/L (ref 0.05–0.5)
EOSINOPHILS RELATIVE PERCENT: 1.2 % (ref 0–6)
GFR AFRICAN AMERICAN: >60
GFR NON-AFRICAN AMERICAN: >60 ML/MIN/1.73
GLUCOSE BLD-MCNC: 76 MG/DL (ref 74–99)
HCT VFR BLD CALC: 26.8 % (ref 34–48)
HEMOGLOBIN: 9.1 G/DL (ref 11.5–15.5)
IMMATURE GRANULOCYTES #: 0.03 E9/L
IMMATURE GRANULOCYTES %: 0.6 % (ref 0–5)
INR BLD: 1.1
LYMPHOCYTES ABSOLUTE: 2.03 E9/L (ref 1.5–4)
LYMPHOCYTES RELATIVE PERCENT: 39.9 % (ref 20–42)
MCH RBC QN AUTO: 37.3 PG (ref 26–35)
MCHC RBC AUTO-ENTMCNC: 34 % (ref 32–34.5)
MCV RBC AUTO: 109.8 FL (ref 80–99.9)
MONOCYTES ABSOLUTE: 0.46 E9/L (ref 0.1–0.95)
MONOCYTES RELATIVE PERCENT: 9 % (ref 2–12)
NEUTROPHILS ABSOLUTE: 2.49 E9/L (ref 1.8–7.3)
NEUTROPHILS RELATIVE PERCENT: 48.9 % (ref 43–80)
PDW BLD-RTO: 22.5 FL (ref 11.5–15)
PLATELET # BLD: 362 E9/L (ref 130–450)
PMV BLD AUTO: 9.6 FL (ref 7–12)
POIKILOCYTES: ABNORMAL
POTASSIUM SERPL-SCNC: 4.8 MMOL/L (ref 3.5–5)
PROTHROMBIN TIME: 12.6 SEC (ref 9.3–12.4)
RBC # BLD: 2.44 E12/L (ref 3.5–5.5)
SCHISTOCYTES: ABNORMAL
SODIUM BLD-SCNC: 127 MMOL/L (ref 132–146)
TEAR DROP CELLS: ABNORMAL
TOTAL PROTEIN: 5.1 G/DL (ref 6.4–8.3)
WBC # BLD: 5.1 E9/L (ref 4.5–11.5)

## 2019-01-15 PROCEDURE — 36415 COLL VENOUS BLD VENIPUNCTURE: CPT

## 2019-01-15 PROCEDURE — 2580000003 HC RX 258: Performed by: RADIOLOGY

## 2019-01-15 PROCEDURE — 99232 SBSQ HOSP IP/OBS MODERATE 35: CPT | Performed by: INTERNAL MEDICINE

## 2019-01-15 PROCEDURE — 6370000000 HC RX 637 (ALT 250 FOR IP): Performed by: CLINICAL NURSE SPECIALIST

## 2019-01-15 PROCEDURE — 6360000002 HC RX W HCPCS: Performed by: INTERNAL MEDICINE

## 2019-01-15 PROCEDURE — 97161 PT EVAL LOW COMPLEX 20 MIN: CPT

## 2019-01-15 PROCEDURE — 6370000000 HC RX 637 (ALT 250 FOR IP): Performed by: INTERNAL MEDICINE

## 2019-01-15 PROCEDURE — 80053 COMPREHEN METABOLIC PANEL: CPT

## 2019-01-15 PROCEDURE — 85610 PROTHROMBIN TIME: CPT

## 2019-01-15 PROCEDURE — 1200000000 HC SEMI PRIVATE

## 2019-01-15 PROCEDURE — 97165 OT EVAL LOW COMPLEX 30 MIN: CPT

## 2019-01-15 PROCEDURE — 85025 COMPLETE CBC W/AUTO DIFF WBC: CPT

## 2019-01-15 RX ORDER — FUROSEMIDE 20 MG/1
20 TABLET ORAL DAILY
Status: DISCONTINUED | OUTPATIENT
Start: 2019-01-16 | End: 2019-01-18 | Stop reason: HOSPADM

## 2019-01-15 RX ORDER — FUROSEMIDE 10 MG/ML
20 INJECTION INTRAMUSCULAR; INTRAVENOUS ONCE
Status: COMPLETED | OUTPATIENT
Start: 2019-01-15 | End: 2019-01-15

## 2019-01-15 RX ADMIN — FOLIC ACID 1 MG: 1 TABLET ORAL at 09:25

## 2019-01-15 RX ADMIN — TIMOLOL MALEATE 1 DROP: 5 SOLUTION/ DROPS OPHTHALMIC at 09:25

## 2019-01-15 RX ADMIN — Medication 100 MG: at 09:25

## 2019-01-15 RX ADMIN — WARFARIN SODIUM 1 MG: 1 TABLET ORAL at 18:10

## 2019-01-15 RX ADMIN — Medication 10 ML: at 09:26

## 2019-01-15 RX ADMIN — POTASSIUM CHLORIDE 40 MEQ: 20 TABLET, EXTENDED RELEASE ORAL at 09:25

## 2019-01-15 RX ADMIN — Medication 10 ML: at 20:15

## 2019-01-15 RX ADMIN — FUROSEMIDE 20 MG: 10 INJECTION, SOLUTION INTRAVENOUS at 14:21

## 2019-01-15 RX ADMIN — SIMVASTATIN 5 MG: 5 TABLET, FILM COATED ORAL at 20:15

## 2019-01-15 RX ADMIN — TIMOLOL MALEATE 1 DROP: 5 SOLUTION/ DROPS OPHTHALMIC at 20:15

## 2019-01-15 RX ADMIN — LEVOTHYROXINE SODIUM 25 MCG: 25 TABLET ORAL at 06:16

## 2019-01-15 RX ADMIN — PANTOPRAZOLE SODIUM 40 MG: 40 TABLET, DELAYED RELEASE ORAL at 16:29

## 2019-01-15 RX ADMIN — PANTOPRAZOLE SODIUM 40 MG: 40 TABLET, DELAYED RELEASE ORAL at 06:15

## 2019-01-15 ASSESSMENT — PAIN SCALES - GENERAL
PAINLEVEL_OUTOF10: 0
PAINLEVEL_OUTOF10: 0

## 2019-01-16 LAB
ALBUMIN SERPL-MCNC: 2.8 G/DL (ref 3.5–5.2)
ALP BLD-CCNC: 90 U/L (ref 35–104)
ALT SERPL-CCNC: 20 U/L (ref 0–32)
ANION GAP SERPL CALCULATED.3IONS-SCNC: 11 MMOL/L (ref 7–16)
ANISOCYTOSIS: ABNORMAL
AST SERPL-CCNC: 66 U/L (ref 0–31)
BASOPHILS ABSOLUTE: 0.03 E9/L (ref 0–0.2)
BASOPHILS RELATIVE PERCENT: 0.6 % (ref 0–2)
BILIRUB SERPL-MCNC: 0.9 MG/DL (ref 0–1.2)
BUN BLDV-MCNC: 7 MG/DL (ref 8–23)
CALCIUM SERPL-MCNC: 8.5 MG/DL (ref 8.6–10.2)
CHLORIDE BLD-SCNC: 92 MMOL/L (ref 98–107)
CO2: 24 MMOL/L (ref 22–29)
CREAT SERPL-MCNC: 0.8 MG/DL (ref 0.5–1)
EOSINOPHILS ABSOLUTE: 0.04 E9/L (ref 0.05–0.5)
EOSINOPHILS RELATIVE PERCENT: 0.8 % (ref 0–6)
GFR AFRICAN AMERICAN: >60
GFR NON-AFRICAN AMERICAN: >60 ML/MIN/1.73
GLUCOSE BLD-MCNC: 91 MG/DL (ref 74–99)
HCT VFR BLD CALC: 23.5 % (ref 34–48)
HEMOGLOBIN: 8.3 G/DL (ref 11.5–15.5)
HEPARIN PF4 ANTIBODY: 0.11 OD
HYPOCHROMIA: ABNORMAL
IGG 1: 398 MG/DL (ref 240–1118)
IGG 2: 296 MG/DL (ref 124–549)
IGG 3: 75 MG/DL (ref 21–134)
IGG 4: 33 MG/DL (ref 1–123)
IMMATURE GRANULOCYTES #: 0.04 E9/L
IMMATURE GRANULOCYTES %: 0.8 % (ref 0–5)
INR BLD: 1.2
LYMPHOCYTES ABSOLUTE: 2.42 E9/L (ref 1.5–4)
LYMPHOCYTES RELATIVE PERCENT: 45.6 % (ref 20–42)
MCH RBC QN AUTO: 37.6 PG (ref 26–35)
MCHC RBC AUTO-ENTMCNC: 35.3 % (ref 32–34.5)
MCV RBC AUTO: 106.3 FL (ref 80–99.9)
MONOCYTES ABSOLUTE: 0.56 E9/L (ref 0.1–0.95)
MONOCYTES RELATIVE PERCENT: 10.5 % (ref 2–12)
NEUTROPHILS ABSOLUTE: 2.22 E9/L (ref 1.8–7.3)
NEUTROPHILS RELATIVE PERCENT: 41.7 % (ref 43–80)
PDW BLD-RTO: 21.7 FL (ref 11.5–15)
PLATELET # BLD: 270 E9/L (ref 130–450)
PMV BLD AUTO: 9.7 FL (ref 7–12)
POTASSIUM SERPL-SCNC: 4.1 MMOL/L (ref 3.5–5)
PROTHROMBIN TIME: 13.4 SEC (ref 9.3–12.4)
RBC # BLD: 2.21 E12/L (ref 3.5–5.5)
SODIUM BLD-SCNC: 127 MMOL/L (ref 132–146)
TOTAL PROTEIN: 5 G/DL (ref 6.4–8.3)
WBC # BLD: 5.3 E9/L (ref 4.5–11.5)

## 2019-01-16 PROCEDURE — 85025 COMPLETE CBC W/AUTO DIFF WBC: CPT

## 2019-01-16 PROCEDURE — 97110 THERAPEUTIC EXERCISES: CPT

## 2019-01-16 PROCEDURE — 1200000000 HC SEMI PRIVATE

## 2019-01-16 PROCEDURE — 85610 PROTHROMBIN TIME: CPT

## 2019-01-16 PROCEDURE — 6370000000 HC RX 637 (ALT 250 FOR IP): Performed by: INTERNAL MEDICINE

## 2019-01-16 PROCEDURE — 36415 COLL VENOUS BLD VENIPUNCTURE: CPT

## 2019-01-16 PROCEDURE — 6370000000 HC RX 637 (ALT 250 FOR IP): Performed by: CLINICAL NURSE SPECIALIST

## 2019-01-16 PROCEDURE — 2580000003 HC RX 258: Performed by: RADIOLOGY

## 2019-01-16 PROCEDURE — 80053 COMPREHEN METABOLIC PANEL: CPT

## 2019-01-16 PROCEDURE — 99232 SBSQ HOSP IP/OBS MODERATE 35: CPT | Performed by: INTERNAL MEDICINE

## 2019-01-16 RX ORDER — ESCITALOPRAM OXALATE 10 MG/1
20 TABLET ORAL DAILY
Status: DISCONTINUED | OUTPATIENT
Start: 2019-01-16 | End: 2019-01-18 | Stop reason: HOSPADM

## 2019-01-16 RX ORDER — TIMOLOL MALEATE 5 MG/ML
1 SOLUTION/ DROPS OPHTHALMIC EVERY 12 HOURS
Status: DISCONTINUED | OUTPATIENT
Start: 2019-01-16 | End: 2019-01-18 | Stop reason: HOSPADM

## 2019-01-16 RX ORDER — BRIMONIDINE TARTRATE 2 MG/ML
1 SOLUTION/ DROPS OPHTHALMIC EVERY 12 HOURS
Status: DISCONTINUED | OUTPATIENT
Start: 2019-01-16 | End: 2019-01-18 | Stop reason: HOSPADM

## 2019-01-16 RX ADMIN — POTASSIUM CHLORIDE 40 MEQ: 20 TABLET, EXTENDED RELEASE ORAL at 09:08

## 2019-01-16 RX ADMIN — PANTOPRAZOLE SODIUM 40 MG: 40 TABLET, DELAYED RELEASE ORAL at 16:53

## 2019-01-16 RX ADMIN — BRIMONIDINE TARTRATE 1 DROP: 2 SOLUTION OPHTHALMIC at 09:10

## 2019-01-16 RX ADMIN — ESCITALOPRAM OXALATE 20 MG: 10 TABLET, FILM COATED ORAL at 09:15

## 2019-01-16 RX ADMIN — Medication 10 ML: at 09:18

## 2019-01-16 RX ADMIN — FUROSEMIDE 20 MG: 20 TABLET ORAL at 09:08

## 2019-01-16 RX ADMIN — WARFARIN SODIUM 1 MG: 1 TABLET ORAL at 17:59

## 2019-01-16 RX ADMIN — SIMVASTATIN 5 MG: 5 TABLET, FILM COATED ORAL at 20:19

## 2019-01-16 RX ADMIN — TIMOLOL MALEATE 1 DROP: 5 SOLUTION OPHTHALMIC at 20:19

## 2019-01-16 RX ADMIN — Medication 10 ML: at 20:19

## 2019-01-16 RX ADMIN — Medication 100 MG: at 09:08

## 2019-01-16 RX ADMIN — FOLIC ACID 1 MG: 1 TABLET ORAL at 09:08

## 2019-01-16 RX ADMIN — LEVOTHYROXINE SODIUM 25 MCG: 25 TABLET ORAL at 05:50

## 2019-01-16 RX ADMIN — WARFARIN SODIUM 1 MG: 1 TABLET ORAL at 17:58

## 2019-01-16 RX ADMIN — BRIMONIDINE TARTRATE 1 DROP: 2 SOLUTION OPHTHALMIC at 20:19

## 2019-01-16 RX ADMIN — PANTOPRAZOLE SODIUM 40 MG: 40 TABLET, DELAYED RELEASE ORAL at 05:50

## 2019-01-16 ASSESSMENT — PAIN SCALES - GENERAL
PAINLEVEL_OUTOF10: 0
PAINLEVEL_OUTOF10: 0

## 2019-01-17 PROBLEM — E43 SEVERE PROTEIN-CALORIE MALNUTRITION (HCC): Chronic | Status: ACTIVE | Noted: 2019-01-17

## 2019-01-17 LAB
ABO/RH: NORMAL
ALBUMIN SERPL-MCNC: 2.7 G/DL (ref 3.5–5.2)
ALP BLD-CCNC: 80 U/L (ref 35–104)
ALT SERPL-CCNC: 19 U/L (ref 0–32)
ANION GAP SERPL CALCULATED.3IONS-SCNC: 8 MMOL/L (ref 7–16)
ANISOCYTOSIS: ABNORMAL
ANTIBODY SCREEN: NORMAL
AST SERPL-CCNC: 65 U/L (ref 0–31)
BASOPHILS ABSOLUTE: 0.02 E9/L (ref 0–0.2)
BASOPHILS RELATIVE PERCENT: 0.4 % (ref 0–2)
BILIRUB SERPL-MCNC: 0.5 MG/DL (ref 0–1.2)
BLOOD BANK DISPENSE STATUS: NORMAL
BLOOD BANK PRODUCT CODE: NORMAL
BPU ID: NORMAL
BUN BLDV-MCNC: 7 MG/DL (ref 8–23)
CALCIUM SERPL-MCNC: 8.1 MG/DL (ref 8.6–10.2)
CHLORIDE BLD-SCNC: 96 MMOL/L (ref 98–107)
CO2: 26 MMOL/L (ref 22–29)
CREAT SERPL-MCNC: 0.8 MG/DL (ref 0.5–1)
DESCRIPTION BLOOD BANK: NORMAL
EOSINOPHILS ABSOLUTE: 0.03 E9/L (ref 0.05–0.5)
EOSINOPHILS RELATIVE PERCENT: 0.6 % (ref 0–6)
GFR AFRICAN AMERICAN: >60
GFR NON-AFRICAN AMERICAN: >60 ML/MIN/1.73
GLUCOSE BLD-MCNC: 85 MG/DL (ref 74–99)
HCT VFR BLD CALC: 21.9 % (ref 34–48)
HEMOGLOBIN: 7.6 G/DL (ref 11.5–15.5)
HYPOCHROMIA: ABNORMAL
IMMATURE GRANULOCYTES #: 0.02 E9/L
IMMATURE GRANULOCYTES %: 0.4 % (ref 0–5)
INR BLD: 1.2
LYMPHOCYTES ABSOLUTE: 2.16 E9/L (ref 1.5–4)
LYMPHOCYTES RELATIVE PERCENT: 43.5 % (ref 20–42)
MCH RBC QN AUTO: 37.8 PG (ref 26–35)
MCHC RBC AUTO-ENTMCNC: 34.7 % (ref 32–34.5)
MCV RBC AUTO: 109 FL (ref 80–99.9)
MONOCYTES ABSOLUTE: 0.51 E9/L (ref 0.1–0.95)
MONOCYTES RELATIVE PERCENT: 10.3 % (ref 2–12)
NEUTROPHILS ABSOLUTE: 2.22 E9/L (ref 1.8–7.3)
NEUTROPHILS RELATIVE PERCENT: 44.8 % (ref 43–80)
OVALOCYTES: ABNORMAL
PDW BLD-RTO: 21.9 FL (ref 11.5–15)
PLATELET # BLD: 271 E9/L (ref 130–450)
PMV BLD AUTO: 9.5 FL (ref 7–12)
POIKILOCYTES: ABNORMAL
POTASSIUM SERPL-SCNC: 4.1 MMOL/L (ref 3.5–5)
PROTHROMBIN TIME: 13.6 SEC (ref 9.3–12.4)
RBC # BLD: 2.01 E12/L (ref 3.5–5.5)
SODIUM BLD-SCNC: 130 MMOL/L (ref 132–146)
TOTAL PROTEIN: 4.6 G/DL (ref 6.4–8.3)
WBC # BLD: 5 E9/L (ref 4.5–11.5)

## 2019-01-17 PROCEDURE — 80053 COMPREHEN METABOLIC PANEL: CPT

## 2019-01-17 PROCEDURE — P9016 RBC LEUKOCYTES REDUCED: HCPCS

## 2019-01-17 PROCEDURE — 86900 BLOOD TYPING SEROLOGIC ABO: CPT

## 2019-01-17 PROCEDURE — 2580000003 HC RX 258: Performed by: RADIOLOGY

## 2019-01-17 PROCEDURE — 2580000003 HC RX 258: Performed by: INTERNAL MEDICINE

## 2019-01-17 PROCEDURE — 85610 PROTHROMBIN TIME: CPT

## 2019-01-17 PROCEDURE — 1200000000 HC SEMI PRIVATE

## 2019-01-17 PROCEDURE — 85025 COMPLETE CBC W/AUTO DIFF WBC: CPT

## 2019-01-17 PROCEDURE — 36415 COLL VENOUS BLD VENIPUNCTURE: CPT

## 2019-01-17 PROCEDURE — 86923 COMPATIBILITY TEST ELECTRIC: CPT

## 2019-01-17 PROCEDURE — 86850 RBC ANTIBODY SCREEN: CPT

## 2019-01-17 PROCEDURE — 99232 SBSQ HOSP IP/OBS MODERATE 35: CPT | Performed by: INTERNAL MEDICINE

## 2019-01-17 PROCEDURE — 6370000000 HC RX 637 (ALT 250 FOR IP): Performed by: INTERNAL MEDICINE

## 2019-01-17 PROCEDURE — 86901 BLOOD TYPING SEROLOGIC RH(D): CPT

## 2019-01-17 PROCEDURE — 97110 THERAPEUTIC EXERCISES: CPT

## 2019-01-17 PROCEDURE — 6370000000 HC RX 637 (ALT 250 FOR IP): Performed by: CLINICAL NURSE SPECIALIST

## 2019-01-17 RX ORDER — 0.9 % SODIUM CHLORIDE 0.9 %
250 INTRAVENOUS SOLUTION INTRAVENOUS ONCE
Status: COMPLETED | OUTPATIENT
Start: 2019-01-17 | End: 2019-01-17

## 2019-01-17 RX ADMIN — LEVOTHYROXINE SODIUM 25 MCG: 25 TABLET ORAL at 05:56

## 2019-01-17 RX ADMIN — SIMVASTATIN 5 MG: 5 TABLET, FILM COATED ORAL at 20:11

## 2019-01-17 RX ADMIN — BRIMONIDINE TARTRATE 1 DROP: 2 SOLUTION OPHTHALMIC at 20:11

## 2019-01-17 RX ADMIN — PANTOPRAZOLE SODIUM 40 MG: 40 TABLET, DELAYED RELEASE ORAL at 16:59

## 2019-01-17 RX ADMIN — BRIMONIDINE TARTRATE 1 DROP: 2 SOLUTION OPHTHALMIC at 08:44

## 2019-01-17 RX ADMIN — FUROSEMIDE 20 MG: 20 TABLET ORAL at 08:44

## 2019-01-17 RX ADMIN — FOLIC ACID 1 MG: 1 TABLET ORAL at 08:44

## 2019-01-17 RX ADMIN — TIMOLOL MALEATE 1 DROP: 5 SOLUTION OPHTHALMIC at 20:11

## 2019-01-17 RX ADMIN — TIMOLOL MALEATE 1 DROP: 5 SOLUTION OPHTHALMIC at 08:44

## 2019-01-17 RX ADMIN — POTASSIUM CHLORIDE 40 MEQ: 20 TABLET, EXTENDED RELEASE ORAL at 08:44

## 2019-01-17 RX ADMIN — ESCITALOPRAM OXALATE 20 MG: 10 TABLET, FILM COATED ORAL at 08:44

## 2019-01-17 RX ADMIN — WARFARIN SODIUM 1 MG: 1 TABLET ORAL at 17:51

## 2019-01-17 RX ADMIN — Medication 100 MG: at 08:44

## 2019-01-17 RX ADMIN — Medication 10 ML: at 08:44

## 2019-01-17 RX ADMIN — BISACODYL 10 MG: 5 TABLET, COATED ORAL at 10:36

## 2019-01-17 RX ADMIN — SODIUM CHLORIDE 250 ML: 9 INJECTION, SOLUTION INTRAVENOUS at 14:07

## 2019-01-17 RX ADMIN — PANTOPRAZOLE SODIUM 40 MG: 40 TABLET, DELAYED RELEASE ORAL at 05:56

## 2019-01-17 RX ADMIN — Medication 10 ML: at 12:04

## 2019-01-17 RX ADMIN — Medication 10 ML: at 20:11

## 2019-01-17 ASSESSMENT — PAIN DESCRIPTION - LOCATION: LOCATION: ABDOMEN

## 2019-01-17 ASSESSMENT — PAIN DESCRIPTION - ONSET: ONSET: ON-GOING

## 2019-01-17 ASSESSMENT — PAIN DESCRIPTION - PROGRESSION: CLINICAL_PROGRESSION: NOT CHANGED

## 2019-01-17 ASSESSMENT — PAIN SCALES - GENERAL
PAINLEVEL_OUTOF10: 0
PAINLEVEL_OUTOF10: 2
PAINLEVEL_OUTOF10: 0

## 2019-01-17 ASSESSMENT — PAIN DESCRIPTION - FREQUENCY: FREQUENCY: CONTINUOUS

## 2019-01-17 ASSESSMENT — PAIN - FUNCTIONAL ASSESSMENT: PAIN_FUNCTIONAL_ASSESSMENT: ACTIVITIES ARE NOT PREVENTED

## 2019-01-17 ASSESSMENT — PAIN DESCRIPTION - PAIN TYPE: TYPE: ACUTE PAIN

## 2019-01-18 VITALS
WEIGHT: 108 LBS | HEIGHT: 69 IN | HEART RATE: 69 BPM | RESPIRATION RATE: 18 BRPM | OXYGEN SATURATION: 99 % | DIASTOLIC BLOOD PRESSURE: 78 MMHG | TEMPERATURE: 99.4 F | BODY MASS INDEX: 16 KG/M2 | SYSTOLIC BLOOD PRESSURE: 128 MMHG

## 2019-01-18 LAB
EKG ATRIAL RATE: 75 BPM
EKG P AXIS: 75 DEGREES
EKG P-R INTERVAL: 134 MS
EKG Q-T INTERVAL: 470 MS
EKG QRS DURATION: 74 MS
EKG QTC CALCULATION (BAZETT): 524 MS
EKG R AXIS: 46 DEGREES
EKG T AXIS: 40 DEGREES
EKG VENTRICULAR RATE: 75 BPM
INR BLD: 1.2
PROTHROMBIN TIME: 13.1 SEC (ref 9.3–12.4)

## 2019-01-18 PROCEDURE — 99239 HOSP IP/OBS DSCHRG MGMT >30: CPT | Performed by: INTERNAL MEDICINE

## 2019-01-18 PROCEDURE — 6370000000 HC RX 637 (ALT 250 FOR IP): Performed by: CLINICAL NURSE SPECIALIST

## 2019-01-18 PROCEDURE — 97530 THERAPEUTIC ACTIVITIES: CPT

## 2019-01-18 PROCEDURE — 36415 COLL VENOUS BLD VENIPUNCTURE: CPT

## 2019-01-18 PROCEDURE — 2580000003 HC RX 258: Performed by: RADIOLOGY

## 2019-01-18 PROCEDURE — 6370000000 HC RX 637 (ALT 250 FOR IP): Performed by: INTERNAL MEDICINE

## 2019-01-18 PROCEDURE — 6360000002 HC RX W HCPCS: Performed by: INTERNAL MEDICINE

## 2019-01-18 PROCEDURE — 85610 PROTHROMBIN TIME: CPT

## 2019-01-18 RX ORDER — FUROSEMIDE 20 MG/1
20 TABLET ORAL DAILY
Qty: 30 TABLET | Refills: 0 | Status: ON HOLD | OUTPATIENT
Start: 2019-01-19 | End: 2019-04-22 | Stop reason: HOSPADM

## 2019-01-18 RX ORDER — LANOLIN ALCOHOL/MO/W.PET/CERES
100 CREAM (GRAM) TOPICAL DAILY
Qty: 30 TABLET | Refills: 0 | Status: SHIPPED | OUTPATIENT
Start: 2019-01-19 | End: 2019-02-28

## 2019-01-18 RX ORDER — POTASSIUM CHLORIDE 20 MEQ/1
40 TABLET, EXTENDED RELEASE ORAL DAILY
Qty: 60 TABLET | Refills: 0 | Status: SHIPPED | OUTPATIENT
Start: 2019-01-19 | End: 2019-07-01 | Stop reason: SDUPTHER

## 2019-01-18 RX ORDER — FOLIC ACID 1 MG/1
1 TABLET ORAL DAILY
Qty: 30 TABLET | Refills: 0 | Status: SHIPPED | OUTPATIENT
Start: 2019-01-19 | End: 2019-02-28

## 2019-01-18 RX ADMIN — ESCITALOPRAM OXALATE 20 MG: 10 TABLET, FILM COATED ORAL at 09:02

## 2019-01-18 RX ADMIN — FUROSEMIDE 20 MG: 20 TABLET ORAL at 09:02

## 2019-01-18 RX ADMIN — LEVOTHYROXINE SODIUM 25 MCG: 25 TABLET ORAL at 06:35

## 2019-01-18 RX ADMIN — TIMOLOL MALEATE 1 DROP: 5 SOLUTION OPHTHALMIC at 09:02

## 2019-01-18 RX ADMIN — BRIMONIDINE TARTRATE 1 DROP: 2 SOLUTION OPHTHALMIC at 09:02

## 2019-01-18 RX ADMIN — POTASSIUM CHLORIDE 40 MEQ: 20 TABLET, EXTENDED RELEASE ORAL at 09:02

## 2019-01-18 RX ADMIN — ENOXAPARIN SODIUM 70 MG: 80 INJECTION SUBCUTANEOUS at 13:49

## 2019-01-18 RX ADMIN — Medication 100 MG: at 09:02

## 2019-01-18 RX ADMIN — FOLIC ACID 1 MG: 1 TABLET ORAL at 09:02

## 2019-01-18 RX ADMIN — Medication 10 ML: at 08:25

## 2019-01-18 RX ADMIN — PANTOPRAZOLE SODIUM 40 MG: 40 TABLET, DELAYED RELEASE ORAL at 06:35

## 2019-01-18 ASSESSMENT — PAIN DESCRIPTION - PAIN TYPE: TYPE: ACUTE PAIN

## 2019-01-18 ASSESSMENT — PAIN DESCRIPTION - DESCRIPTORS: DESCRIPTORS: ACHING;DISCOMFORT;SORE

## 2019-01-18 ASSESSMENT — PAIN DESCRIPTION - ONSET: ONSET: ON-GOING

## 2019-01-18 ASSESSMENT — PAIN DESCRIPTION - LOCATION: LOCATION: ABDOMEN

## 2019-01-18 ASSESSMENT — PAIN DESCRIPTION - PROGRESSION: CLINICAL_PROGRESSION: NOT CHANGED

## 2019-01-18 ASSESSMENT — PAIN DESCRIPTION - ORIENTATION: ORIENTATION: LOWER;MID

## 2019-01-18 ASSESSMENT — PAIN SCALES - GENERAL
PAINLEVEL_OUTOF10: 0
PAINLEVEL_OUTOF10: 2

## 2019-01-18 ASSESSMENT — PAIN - FUNCTIONAL ASSESSMENT: PAIN_FUNCTIONAL_ASSESSMENT: ACTIVITIES ARE NOT PREVENTED

## 2019-01-18 ASSESSMENT — PAIN DESCRIPTION - FREQUENCY: FREQUENCY: INTERMITTENT

## 2019-01-21 ENCOUNTER — HOSPITAL ENCOUNTER (OUTPATIENT)
Dept: PHARMACY | Age: 69
Setting detail: THERAPIES SERIES
Discharge: HOME | End: 2019-01-21
Payer: MEDICARE

## 2019-01-21 DIAGNOSIS — Z86.718 HISTORY OF DEEP VEIN THROMBOSIS: ICD-10-CM

## 2019-01-21 LAB — INR BLD: 1.1

## 2019-01-23 ENCOUNTER — HOSPITAL ENCOUNTER (OUTPATIENT)
Dept: PHARMACY | Age: 69
Setting detail: THERAPIES SERIES
Discharge: HOME | End: 2019-01-23
Payer: MEDICARE

## 2019-01-23 DIAGNOSIS — Z86.718 HISTORY OF DEEP VEIN THROMBOSIS: ICD-10-CM

## 2019-01-23 LAB — INR BLD: 1.1

## 2019-01-25 ENCOUNTER — HOSPITAL ENCOUNTER (OUTPATIENT)
Dept: PHARMACY | Age: 69
Setting detail: THERAPIES SERIES
Discharge: HOME | End: 2019-01-25
Payer: MEDICARE

## 2019-01-25 DIAGNOSIS — Z86.718 HISTORY OF DEEP VEIN THROMBOSIS: ICD-10-CM

## 2019-01-25 LAB — INR BLD: 1.5

## 2019-01-28 ENCOUNTER — HOSPITAL ENCOUNTER (OUTPATIENT)
Dept: PHARMACY | Age: 69
Setting detail: THERAPIES SERIES
Discharge: HOME | End: 2019-01-28
Payer: MEDICARE

## 2019-01-28 DIAGNOSIS — Z86.718 HISTORY OF DEEP VEIN THROMBOSIS: ICD-10-CM

## 2019-01-28 LAB — INR BLD: 2.2

## 2019-01-31 ENCOUNTER — HOSPITAL ENCOUNTER (OUTPATIENT)
Dept: PHARMACY | Age: 69
Setting detail: THERAPIES SERIES
Discharge: HOME | End: 2019-01-31
Payer: MEDICARE

## 2019-01-31 DIAGNOSIS — Z86.718 HISTORY OF DEEP VEIN THROMBOSIS: ICD-10-CM

## 2019-01-31 LAB — INR BLD: 2.9

## 2019-02-06 ENCOUNTER — HOSPITAL ENCOUNTER (OUTPATIENT)
Dept: PHARMACY | Age: 69
Setting detail: THERAPIES SERIES
Discharge: HOME | End: 2019-02-06
Payer: MEDICARE

## 2019-02-06 DIAGNOSIS — Z86.718 HISTORY OF DEEP VEIN THROMBOSIS: ICD-10-CM

## 2019-02-06 LAB — INR BLD: 2.1

## 2019-02-15 ENCOUNTER — HOSPITAL ENCOUNTER (OUTPATIENT)
Dept: PHARMACY | Age: 69
Setting detail: THERAPIES SERIES
Discharge: HOME | End: 2019-02-15
Payer: MEDICARE

## 2019-02-15 VITALS
SYSTOLIC BLOOD PRESSURE: 138 MMHG | BODY MASS INDEX: 14.15 KG/M2 | WEIGHT: 95.8 LBS | HEART RATE: 99 BPM | DIASTOLIC BLOOD PRESSURE: 94 MMHG

## 2019-02-15 DIAGNOSIS — Z86.718 HISTORY OF DEEP VEIN THROMBOSIS: ICD-10-CM

## 2019-02-15 LAB — INTERNATIONAL NORMALIZATION RATIO, POC: 2.6

## 2019-02-15 PROCEDURE — 99211 OFF/OP EST MAY X REQ PHY/QHP: CPT

## 2019-02-15 PROCEDURE — 85610 PROTHROMBIN TIME: CPT

## 2019-02-28 ENCOUNTER — HOSPITAL ENCOUNTER (OUTPATIENT)
Dept: PHARMACY | Age: 69
Setting detail: THERAPIES SERIES
Discharge: HOME | End: 2019-02-28
Payer: MEDICARE

## 2019-02-28 VITALS
WEIGHT: 93 LBS | DIASTOLIC BLOOD PRESSURE: 86 MMHG | BODY MASS INDEX: 13.73 KG/M2 | SYSTOLIC BLOOD PRESSURE: 132 MMHG | HEART RATE: 85 BPM

## 2019-02-28 DIAGNOSIS — Z86.718 HISTORY OF DEEP VEIN THROMBOSIS: ICD-10-CM

## 2019-02-28 LAB — INTERNATIONAL NORMALIZATION RATIO, POC: 2.8

## 2019-02-28 PROCEDURE — 99211 OFF/OP EST MAY X REQ PHY/QHP: CPT

## 2019-02-28 PROCEDURE — 85610 PROTHROMBIN TIME: CPT

## 2019-03-04 ENCOUNTER — OFFICE VISIT (OUTPATIENT)
Dept: VASCULAR SURGERY | Age: 69
End: 2019-03-04
Payer: MEDICARE

## 2019-03-04 DIAGNOSIS — I82.511 CHRONIC DEEP VEIN THROMBOSIS (DVT) OF FEMORAL VEIN OF RIGHT LOWER EXTREMITY (HCC): ICD-10-CM

## 2019-03-04 DIAGNOSIS — I73.9 PVD (PERIPHERAL VASCULAR DISEASE) WITH CLAUDICATION (HCC): Primary | ICD-10-CM

## 2019-03-04 DIAGNOSIS — I75.022 BLUE TOE SYNDROME OF LEFT LOWER EXTREMITY (HCC): ICD-10-CM

## 2019-03-04 PROCEDURE — 1123F ACP DISCUSS/DSCN MKR DOCD: CPT | Performed by: SURGERY

## 2019-03-04 PROCEDURE — G8427 DOCREV CUR MEDS BY ELIG CLIN: HCPCS | Performed by: SURGERY

## 2019-03-04 PROCEDURE — 1036F TOBACCO NON-USER: CPT | Performed by: SURGERY

## 2019-03-04 PROCEDURE — 1090F PRES/ABSN URINE INCON ASSESS: CPT | Performed by: SURGERY

## 2019-03-04 PROCEDURE — G8484 FLU IMMUNIZE NO ADMIN: HCPCS | Performed by: SURGERY

## 2019-03-04 PROCEDURE — 3017F COLORECTAL CA SCREEN DOC REV: CPT | Performed by: SURGERY

## 2019-03-04 PROCEDURE — G8598 ASA/ANTIPLAT THER USED: HCPCS | Performed by: SURGERY

## 2019-03-04 PROCEDURE — G8419 CALC BMI OUT NRM PARAM NOF/U: HCPCS | Performed by: SURGERY

## 2019-03-04 PROCEDURE — G8400 PT W/DXA NO RESULTS DOC: HCPCS | Performed by: SURGERY

## 2019-03-04 PROCEDURE — 99213 OFFICE O/P EST LOW 20 MIN: CPT | Performed by: SURGERY

## 2019-03-04 PROCEDURE — 4040F PNEUMOC VAC/ADMIN/RCVD: CPT | Performed by: SURGERY

## 2019-03-20 ENCOUNTER — HOSPITAL ENCOUNTER (OUTPATIENT)
Dept: PHARMACY | Age: 69
Setting detail: THERAPIES SERIES
Discharge: HOME | End: 2019-03-20
Payer: MEDICARE

## 2019-03-20 VITALS
WEIGHT: 97.8 LBS | HEART RATE: 77 BPM | SYSTOLIC BLOOD PRESSURE: 129 MMHG | DIASTOLIC BLOOD PRESSURE: 83 MMHG | BODY MASS INDEX: 14.44 KG/M2

## 2019-03-20 DIAGNOSIS — Z86.718 HISTORY OF DEEP VEIN THROMBOSIS: ICD-10-CM

## 2019-03-20 LAB — INR BLD: 2.2

## 2019-03-20 PROCEDURE — 99211 OFF/OP EST MAY X REQ PHY/QHP: CPT

## 2019-03-20 PROCEDURE — 85610 PROTHROMBIN TIME: CPT

## 2019-04-17 ENCOUNTER — APPOINTMENT (OUTPATIENT)
Dept: ULTRASOUND IMAGING | Age: 69
DRG: 640 | End: 2019-04-17
Payer: MEDICARE

## 2019-04-17 ENCOUNTER — APPOINTMENT (OUTPATIENT)
Dept: GENERAL RADIOLOGY | Age: 69
DRG: 640 | End: 2019-04-17
Payer: MEDICARE

## 2019-04-17 ENCOUNTER — HOSPITAL ENCOUNTER (INPATIENT)
Age: 69
LOS: 6 days | Discharge: NURSING, REHAB OR LONG-TERM CARE | DRG: 640 | End: 2019-04-23
Attending: EMERGENCY MEDICINE | Admitting: INTERNAL MEDICINE
Payer: MEDICARE

## 2019-04-17 DIAGNOSIS — R62.7 FAILURE TO THRIVE IN ADULT: Primary | ICD-10-CM

## 2019-04-17 DIAGNOSIS — E83.42 HYPOMAGNESEMIA: ICD-10-CM

## 2019-04-17 DIAGNOSIS — E87.1 HYPONATREMIA: ICD-10-CM

## 2019-04-17 DIAGNOSIS — Z65.8 SOCIAL DISCORD: ICD-10-CM

## 2019-04-17 DIAGNOSIS — E87.6 HYPOKALEMIA: ICD-10-CM

## 2019-04-17 DIAGNOSIS — R53.1 GENERALIZED WEAKNESS: ICD-10-CM

## 2019-04-17 LAB
ALBUMIN SERPL-MCNC: 2.6 G/DL (ref 3.5–5.2)
ALP BLD-CCNC: 107 U/L (ref 35–104)
ALT SERPL-CCNC: 22 U/L (ref 0–32)
ANION GAP SERPL CALCULATED.3IONS-SCNC: 13 MMOL/L (ref 7–16)
ANISOCYTOSIS: ABNORMAL
APTT: 46 SEC (ref 24.5–35.1)
AST SERPL-CCNC: 54 U/L (ref 0–31)
BACTERIA: ABNORMAL /HPF
BASOPHILS ABSOLUTE: 0.07 E9/L (ref 0–0.2)
BASOPHILS RELATIVE PERCENT: 1 % (ref 0–2)
BILIRUB SERPL-MCNC: 1.7 MG/DL (ref 0–1.2)
BILIRUBIN URINE: ABNORMAL
BLOOD, URINE: NEGATIVE
BUN BLDV-MCNC: 9 MG/DL (ref 8–23)
CALCIUM SERPL-MCNC: 7.7 MG/DL (ref 8.6–10.2)
CHLORIDE BLD-SCNC: 94 MMOL/L (ref 98–107)
CLARITY: CLEAR
CO2: 20 MMOL/L (ref 22–29)
COLOR: ABNORMAL
CREAT SERPL-MCNC: 0.9 MG/DL (ref 0.5–1)
EOSINOPHILS ABSOLUTE: 0 E9/L (ref 0.05–0.5)
EOSINOPHILS RELATIVE PERCENT: 0 % (ref 0–6)
EPITHELIAL CELLS, UA: ABNORMAL /HPF
GFR AFRICAN AMERICAN: >60
GFR NON-AFRICAN AMERICAN: >60 ML/MIN/1.73
GLUCOSE BLD-MCNC: 90 MG/DL (ref 74–99)
GLUCOSE URINE: NEGATIVE MG/DL
HCT VFR BLD CALC: 28.9 % (ref 34–48)
HEMOGLOBIN: 10.2 G/DL (ref 11.5–15.5)
INR BLD: 3.6
KETONES, URINE: NEGATIVE MG/DL
LACTIC ACID: 1.8 MMOL/L (ref 0.5–2.2)
LACTIC ACID: 2.9 MMOL/L (ref 0.5–2.2)
LEUKOCYTE ESTERASE, URINE: NEGATIVE
LYMPHOCYTES ABSOLUTE: 2.61 E9/L (ref 1.5–4)
LYMPHOCYTES RELATIVE PERCENT: 39 % (ref 20–42)
MAGNESIUM: 1.2 MG/DL (ref 1.6–2.6)
MCH RBC QN AUTO: 35.1 PG (ref 26–35)
MCHC RBC AUTO-ENTMCNC: 35.3 % (ref 32–34.5)
MCV RBC AUTO: 99.3 FL (ref 80–99.9)
METAMYELOCYTES RELATIVE PERCENT: 1 % (ref 0–1)
MONOCYTES ABSOLUTE: 0.4 E9/L (ref 0.1–0.95)
MONOCYTES RELATIVE PERCENT: 6 % (ref 2–12)
NEUTROPHILS ABSOLUTE: 3.62 E9/L (ref 1.8–7.3)
NEUTROPHILS RELATIVE PERCENT: 53 % (ref 43–80)
NITRITE, URINE: NEGATIVE
PDW BLD-RTO: 17.8 FL (ref 11.5–15)
PH UA: 6.5 (ref 5–9)
PLATELET # BLD: 94 E9/L (ref 130–450)
PLATELET CONFIRMATION: NORMAL
PMV BLD AUTO: 13.1 FL (ref 7–12)
POTASSIUM REFLEX MAGNESIUM: 3 MMOL/L (ref 3.5–5)
PRO-BNP: 1225 PG/ML (ref 0–125)
PROTEIN UA: NEGATIVE MG/DL
PROTHROMBIN TIME: 40.9 SEC (ref 9.3–12.4)
RBC # BLD: 2.91 E12/L (ref 3.5–5.5)
RBC UA: ABNORMAL /HPF (ref 0–2)
SMUDGE CELLS: ABNORMAL
SODIUM BLD-SCNC: 127 MMOL/L (ref 132–146)
SPECIFIC GRAVITY UA: <=1.005 (ref 1–1.03)
TOTAL CK: 147 U/L (ref 20–180)
TOTAL PROTEIN: 5.2 G/DL (ref 6.4–8.3)
TROPONIN: <0.01 NG/ML (ref 0–0.03)
UROBILINOGEN, URINE: >=8 E.U./DL
WBC # BLD: 6.7 E9/L (ref 4.5–11.5)
WBC UA: ABNORMAL /HPF (ref 0–5)

## 2019-04-17 PROCEDURE — 87798 DETECT AGENT NOS DNA AMP: CPT

## 2019-04-17 PROCEDURE — 87633 RESP VIRUS 12-25 TARGETS: CPT

## 2019-04-17 PROCEDURE — 87486 CHLMYD PNEUM DNA AMP PROBE: CPT

## 2019-04-17 PROCEDURE — 84484 ASSAY OF TROPONIN QUANT: CPT

## 2019-04-17 PROCEDURE — 83880 ASSAY OF NATRIURETIC PEPTIDE: CPT

## 2019-04-17 PROCEDURE — 2580000003 HC RX 258: Performed by: INTERNAL MEDICINE

## 2019-04-17 PROCEDURE — 2060000000 HC ICU INTERMEDIATE R&B

## 2019-04-17 PROCEDURE — 99285 EMERGENCY DEPT VISIT HI MDM: CPT

## 2019-04-17 PROCEDURE — 85730 THROMBOPLASTIN TIME PARTIAL: CPT

## 2019-04-17 PROCEDURE — 83605 ASSAY OF LACTIC ACID: CPT

## 2019-04-17 PROCEDURE — 80053 COMPREHEN METABOLIC PANEL: CPT

## 2019-04-17 PROCEDURE — 71045 X-RAY EXAM CHEST 1 VIEW: CPT

## 2019-04-17 PROCEDURE — 85025 COMPLETE CBC W/AUTO DIFF WBC: CPT

## 2019-04-17 PROCEDURE — 83735 ASSAY OF MAGNESIUM: CPT

## 2019-04-17 PROCEDURE — 6360000002 HC RX W HCPCS: Performed by: EMERGENCY MEDICINE

## 2019-04-17 PROCEDURE — 94761 N-INVAS EAR/PLS OXIMETRY MLT: CPT

## 2019-04-17 PROCEDURE — 2580000003 HC RX 258: Performed by: EMERGENCY MEDICINE

## 2019-04-17 PROCEDURE — 82550 ASSAY OF CK (CPK): CPT

## 2019-04-17 PROCEDURE — 93970 EXTREMITY STUDY: CPT

## 2019-04-17 PROCEDURE — 85610 PROTHROMBIN TIME: CPT

## 2019-04-17 PROCEDURE — 96365 THER/PROPH/DIAG IV INF INIT: CPT

## 2019-04-17 PROCEDURE — 87581 M.PNEUMON DNA AMP PROBE: CPT

## 2019-04-17 PROCEDURE — 6370000000 HC RX 637 (ALT 250 FOR IP): Performed by: EMERGENCY MEDICINE

## 2019-04-17 PROCEDURE — 93005 ELECTROCARDIOGRAM TRACING: CPT | Performed by: EMERGENCY MEDICINE

## 2019-04-17 PROCEDURE — 51702 INSERT TEMP BLADDER CATH: CPT

## 2019-04-17 PROCEDURE — 81001 URINALYSIS AUTO W/SCOPE: CPT

## 2019-04-17 RX ORDER — SODIUM CHLORIDE 0.9 % (FLUSH) 0.9 %
10 SYRINGE (ML) INJECTION EVERY 12 HOURS SCHEDULED
Status: DISCONTINUED | OUTPATIENT
Start: 2019-04-17 | End: 2019-04-18 | Stop reason: SDUPTHER

## 2019-04-17 RX ORDER — SODIUM CHLORIDE 0.9 % (FLUSH) 0.9 %
10 SYRINGE (ML) INJECTION PRN
Status: DISCONTINUED | OUTPATIENT
Start: 2019-04-17 | End: 2019-04-18 | Stop reason: SDUPTHER

## 2019-04-17 RX ORDER — ACETAMINOPHEN 325 MG/1
650 TABLET ORAL EVERY 4 HOURS PRN
Status: DISCONTINUED | OUTPATIENT
Start: 2019-04-17 | End: 2019-04-23 | Stop reason: HOSPADM

## 2019-04-17 RX ORDER — POTASSIUM CHLORIDE 20 MEQ/1
40 TABLET, EXTENDED RELEASE ORAL ONCE
Status: COMPLETED | OUTPATIENT
Start: 2019-04-17 | End: 2019-04-17

## 2019-04-17 RX ORDER — MAGNESIUM SULFATE IN WATER 40 MG/ML
2 INJECTION, SOLUTION INTRAVENOUS ONCE
Status: COMPLETED | OUTPATIENT
Start: 2019-04-17 | End: 2019-04-17

## 2019-04-17 RX ORDER — 0.9 % SODIUM CHLORIDE 0.9 %
1000 INTRAVENOUS SOLUTION INTRAVENOUS ONCE
Status: COMPLETED | OUTPATIENT
Start: 2019-04-17 | End: 2019-04-17

## 2019-04-17 RX ADMIN — Medication 10 ML: at 23:00

## 2019-04-17 RX ADMIN — MAGNESIUM SULFATE HEPTAHYDRATE 2 G: 40 INJECTION, SOLUTION INTRAVENOUS at 18:23

## 2019-04-17 RX ADMIN — SODIUM CHLORIDE 1000 ML: 9 INJECTION, SOLUTION INTRAVENOUS at 16:42

## 2019-04-17 RX ADMIN — POTASSIUM CHLORIDE 40 MEQ: 20 TABLET, EXTENDED RELEASE ORAL at 20:09

## 2019-04-17 ASSESSMENT — ENCOUNTER SYMPTOMS
ABDOMINAL PAIN: 0
CONSTIPATION: 0
COUGH: 0
VOMITING: 0
SORE THROAT: 0
SHORTNESS OF BREATH: 0
NAUSEA: 0
COLOR CHANGE: 0
RHINORRHEA: 1
BACK PAIN: 0
DIARRHEA: 0
BLOOD IN STOOL: 0

## 2019-04-17 NOTE — ED NOTES
Patient to [address] [facility] on cart.       Lovely Chacon RN  04/17/19 [id number]

## 2019-04-17 NOTE — ED NOTES
Bed: 17  Expected date:   Expected time:   Means of arrival:   Comments:  ems     Malik Chan RN  04/17/19 [id number]

## 2019-04-18 PROBLEM — E43 SEVERE PROTEIN-CALORIE MALNUTRITION (HCC): Chronic | Status: ACTIVE | Noted: 2019-04-18

## 2019-04-18 LAB
ALBUMIN SERPL-MCNC: 3 G/DL (ref 3.5–5.2)
ALP BLD-CCNC: 130 U/L (ref 35–104)
ALT SERPL-CCNC: 25 U/L (ref 0–32)
ANION GAP SERPL CALCULATED.3IONS-SCNC: 12 MMOL/L (ref 7–16)
AST SERPL-CCNC: 63 U/L (ref 0–31)
BASOPHILS ABSOLUTE: 0.03 E9/L (ref 0–0.2)
BASOPHILS RELATIVE PERCENT: 0.4 % (ref 0–2)
BILIRUB SERPL-MCNC: 2.2 MG/DL (ref 0–1.2)
BILIRUBIN DIRECT: 1.1 MG/DL (ref 0–0.3)
BILIRUBIN, INDIRECT: 1.1 MG/DL (ref 0–1)
BUN BLDV-MCNC: 9 MG/DL (ref 8–23)
C-REACTIVE PROTEIN: 2.4 MG/DL (ref 0–0.4)
CALCIUM SERPL-MCNC: 8.4 MG/DL (ref 8.6–10.2)
CHLORIDE BLD-SCNC: 95 MMOL/L (ref 98–107)
CO2: 24 MMOL/L (ref 22–29)
CREAT SERPL-MCNC: 0.9 MG/DL (ref 0.5–1)
EOSINOPHILS ABSOLUTE: 0.02 E9/L (ref 0.05–0.5)
EOSINOPHILS RELATIVE PERCENT: 0.3 % (ref 0–6)
FERRITIN: 1250 NG/ML
FILM ARRAY ADENOVIRUS: NORMAL
FILM ARRAY BORDETELLA PERTUSSIS: NORMAL
FILM ARRAY CHLAMYDOPHILIA PNEUMONIAE: NORMAL
FILM ARRAY CORONAVIRUS 229E: NORMAL
FILM ARRAY CORONAVIRUS HKU1: NORMAL
FILM ARRAY CORONAVIRUS NL63: NORMAL
FILM ARRAY CORONAVIRUS OC43: NORMAL
FILM ARRAY INFLUENZA A VIRUS 09H1: NORMAL
FILM ARRAY INFLUENZA A VIRUS H1: NORMAL
FILM ARRAY INFLUENZA A VIRUS H3: NORMAL
FILM ARRAY INFLUENZA A VIRUS: NORMAL
FILM ARRAY INFLUENZA B: NORMAL
FILM ARRAY METAPNEUMOVIRUS: NORMAL
FILM ARRAY MYCOPLASMA PNEUMONIAE: NORMAL
FILM ARRAY PARAINFLUENZA VIRUS 1: NORMAL
FILM ARRAY PARAINFLUENZA VIRUS 2: NORMAL
FILM ARRAY PARAINFLUENZA VIRUS 3: NORMAL
FILM ARRAY PARAINFLUENZA VIRUS 4: NORMAL
FILM ARRAY RESPIRATORY SYNCITIAL VIRUS: NORMAL
FILM ARRAY RHINOVIRUS/ENTEROVIRUS: NORMAL
FOLATE: 3.1 NG/ML (ref 4.8–24.2)
GFR AFRICAN AMERICAN: >60
GFR NON-AFRICAN AMERICAN: >60 ML/MIN/1.73
GLUCOSE BLD-MCNC: 83 MG/DL (ref 74–99)
HCT VFR BLD CALC: 31 % (ref 34–48)
HEMOGLOBIN: 10.9 G/DL (ref 11.5–15.5)
IMMATURE GRANULOCYTES #: 0.08 E9/L
IMMATURE GRANULOCYTES %: 1.1 % (ref 0–5)
INR BLD: 2.7
IRON SATURATION: 105 % (ref 15–50)
IRON: 124 MCG/DL (ref 37–145)
LYMPHOCYTES ABSOLUTE: 2.16 E9/L (ref 1.5–4)
LYMPHOCYTES RELATIVE PERCENT: 29.2 % (ref 20–42)
MAGNESIUM: 1.8 MG/DL (ref 1.6–2.6)
MCH RBC QN AUTO: 34.6 PG (ref 26–35)
MCHC RBC AUTO-ENTMCNC: 35.2 % (ref 32–34.5)
MCV RBC AUTO: 98.4 FL (ref 80–99.9)
MONOCYTES ABSOLUTE: 0.53 E9/L (ref 0.1–0.95)
MONOCYTES RELATIVE PERCENT: 7.2 % (ref 2–12)
NEUTROPHILS ABSOLUTE: 4.58 E9/L (ref 1.8–7.3)
NEUTROPHILS RELATIVE PERCENT: 61.8 % (ref 43–80)
PDW BLD-RTO: 17.7 FL (ref 11.5–15)
PLATELET # BLD: 324 E9/L (ref 130–450)
PMV BLD AUTO: 9.4 FL (ref 7–12)
POTASSIUM SERPL-SCNC: 3.7 MMOL/L (ref 3.5–5)
PROTHROMBIN TIME: 29.7 SEC (ref 9.3–12.4)
RBC # BLD: 3.15 E12/L (ref 3.5–5.5)
SEDIMENTATION RATE, ERYTHROCYTE: 14 MM/HR (ref 0–20)
SODIUM BLD-SCNC: 131 MMOL/L (ref 132–146)
T4 FREE: 1.45 NG/DL (ref 0.93–1.7)
TOTAL IRON BINDING CAPACITY: 118 MCG/DL (ref 250–450)
TOTAL PROTEIN: 6.2 G/DL (ref 6.4–8.3)
TSH SERPL DL<=0.05 MIU/L-ACNC: 2.47 UIU/ML (ref 0.27–4.2)
VITAMIN B-12: 470 PG/ML (ref 211–946)
WBC # BLD: 7.4 E9/L (ref 4.5–11.5)

## 2019-04-18 PROCEDURE — 83540 ASSAY OF IRON: CPT

## 2019-04-18 PROCEDURE — 97535 SELF CARE MNGMENT TRAINING: CPT

## 2019-04-18 PROCEDURE — 86140 C-REACTIVE PROTEIN: CPT

## 2019-04-18 PROCEDURE — 36415 COLL VENOUS BLD VENIPUNCTURE: CPT

## 2019-04-18 PROCEDURE — 85610 PROTHROMBIN TIME: CPT

## 2019-04-18 PROCEDURE — 83550 IRON BINDING TEST: CPT

## 2019-04-18 PROCEDURE — 97161 PT EVAL LOW COMPLEX 20 MIN: CPT

## 2019-04-18 PROCEDURE — 83735 ASSAY OF MAGNESIUM: CPT

## 2019-04-18 PROCEDURE — 6370000000 HC RX 637 (ALT 250 FOR IP): Performed by: INTERNAL MEDICINE

## 2019-04-18 PROCEDURE — 2060000000 HC ICU INTERMEDIATE R&B

## 2019-04-18 PROCEDURE — 80074 ACUTE HEPATITIS PANEL: CPT

## 2019-04-18 PROCEDURE — 99223 1ST HOSP IP/OBS HIGH 75: CPT | Performed by: INTERNAL MEDICINE

## 2019-04-18 PROCEDURE — 97165 OT EVAL LOW COMPLEX 30 MIN: CPT

## 2019-04-18 PROCEDURE — 85025 COMPLETE CBC W/AUTO DIFF WBC: CPT

## 2019-04-18 PROCEDURE — 80048 BASIC METABOLIC PNL TOTAL CA: CPT

## 2019-04-18 PROCEDURE — 80076 HEPATIC FUNCTION PANEL: CPT

## 2019-04-18 PROCEDURE — 82607 VITAMIN B-12: CPT

## 2019-04-18 PROCEDURE — 82746 ASSAY OF FOLIC ACID SERUM: CPT

## 2019-04-18 PROCEDURE — 82728 ASSAY OF FERRITIN: CPT

## 2019-04-18 PROCEDURE — 84439 ASSAY OF FREE THYROXINE: CPT

## 2019-04-18 PROCEDURE — 84443 ASSAY THYROID STIM HORMONE: CPT

## 2019-04-18 PROCEDURE — 2580000003 HC RX 258: Performed by: INTERNAL MEDICINE

## 2019-04-18 PROCEDURE — 97530 THERAPEUTIC ACTIVITIES: CPT

## 2019-04-18 PROCEDURE — 85651 RBC SED RATE NONAUTOMATED: CPT

## 2019-04-18 RX ORDER — FAMOTIDINE 20 MG/1
20 TABLET, FILM COATED ORAL 2 TIMES DAILY
Status: DISCONTINUED | OUTPATIENT
Start: 2019-04-18 | End: 2019-04-18 | Stop reason: DRUGHIGH

## 2019-04-18 RX ORDER — TIMOLOL MALEATE 5 MG/ML
1 SOLUTION/ DROPS OPHTHALMIC 2 TIMES DAILY
Status: DISCONTINUED | OUTPATIENT
Start: 2019-04-18 | End: 2019-04-23 | Stop reason: HOSPADM

## 2019-04-18 RX ORDER — SODIUM CHLORIDE 0.9 % (FLUSH) 0.9 %
10 SYRINGE (ML) INJECTION EVERY 12 HOURS SCHEDULED
Status: DISCONTINUED | OUTPATIENT
Start: 2019-04-18 | End: 2019-04-23 | Stop reason: HOSPADM

## 2019-04-18 RX ORDER — 0.9 % SODIUM CHLORIDE 0.9 %
250 INTRAVENOUS SOLUTION INTRAVENOUS ONCE
Status: COMPLETED | OUTPATIENT
Start: 2019-04-19 | End: 2019-04-19

## 2019-04-18 RX ORDER — ONDANSETRON 2 MG/ML
4 INJECTION INTRAMUSCULAR; INTRAVENOUS EVERY 6 HOURS PRN
Status: DISCONTINUED | OUTPATIENT
Start: 2019-04-18 | End: 2019-04-18 | Stop reason: ALTCHOICE

## 2019-04-18 RX ORDER — FAMOTIDINE 20 MG/1
20 TABLET, FILM COATED ORAL DAILY
Status: DISCONTINUED | OUTPATIENT
Start: 2019-04-18 | End: 2019-04-23 | Stop reason: HOSPADM

## 2019-04-18 RX ORDER — LEVOTHYROXINE SODIUM 0.03 MG/1
25 TABLET ORAL EVERY MORNING
Status: DISCONTINUED | OUTPATIENT
Start: 2019-04-18 | End: 2019-04-23 | Stop reason: HOSPADM

## 2019-04-18 RX ORDER — WARFARIN SODIUM 2 MG/1
2 TABLET ORAL
Status: DISCONTINUED | OUTPATIENT
Start: 2019-04-18 | End: 2019-04-18 | Stop reason: DRUGHIGH

## 2019-04-18 RX ORDER — SIMVASTATIN 5 MG
5 TABLET ORAL NIGHTLY
Status: DISCONTINUED | OUTPATIENT
Start: 2019-04-18 | End: 2019-04-23 | Stop reason: HOSPADM

## 2019-04-18 RX ORDER — SODIUM CHLORIDE 9 MG/ML
INJECTION, SOLUTION INTRAVENOUS CONTINUOUS
Status: DISCONTINUED | OUTPATIENT
Start: 2019-04-19 | End: 2019-04-20

## 2019-04-18 RX ORDER — BRIMONIDINE TARTRATE, TIMOLOL MALEATE 2; 5 MG/ML; MG/ML
1 SOLUTION/ DROPS OPHTHALMIC EVERY 12 HOURS
Status: DISCONTINUED | OUTPATIENT
Start: 2019-04-18 | End: 2019-04-18 | Stop reason: CLARIF

## 2019-04-18 RX ORDER — ESCITALOPRAM OXALATE 10 MG/1
20 TABLET ORAL EVERY MORNING
Status: DISCONTINUED | OUTPATIENT
Start: 2019-04-18 | End: 2019-04-23 | Stop reason: HOSPADM

## 2019-04-18 RX ORDER — SODIUM CHLORIDE 0.9 % (FLUSH) 0.9 %
10 SYRINGE (ML) INJECTION PRN
Status: DISCONTINUED | OUTPATIENT
Start: 2019-04-18 | End: 2019-04-23 | Stop reason: HOSPADM

## 2019-04-18 RX ORDER — WARFARIN SODIUM 2 MG/1
2 TABLET ORAL DAILY
Status: DISCONTINUED | OUTPATIENT
Start: 2019-04-18 | End: 2019-04-19

## 2019-04-18 RX ORDER — BRIMONIDINE TARTRATE 2 MG/ML
1 SOLUTION/ DROPS OPHTHALMIC 2 TIMES DAILY
Status: DISCONTINUED | OUTPATIENT
Start: 2019-04-18 | End: 2019-04-23 | Stop reason: HOSPADM

## 2019-04-18 RX ADMIN — LEVOTHYROXINE SODIUM 25 MCG: 25 TABLET ORAL at 08:52

## 2019-04-18 RX ADMIN — Medication 10 ML: at 08:52

## 2019-04-18 RX ADMIN — SIMVASTATIN 5 MG: 5 TABLET, FILM COATED ORAL at 20:54

## 2019-04-18 RX ADMIN — WARFARIN SODIUM 2 MG: 2 TABLET ORAL at 17:42

## 2019-04-18 RX ADMIN — TIMOLOL MALEATE 1 DROP: 5 SOLUTION OPHTHALMIC at 20:54

## 2019-04-18 RX ADMIN — Medication 10 ML: at 20:54

## 2019-04-18 RX ADMIN — ESCITALOPRAM OXALATE 20 MG: 10 TABLET, FILM COATED ORAL at 08:52

## 2019-04-18 RX ADMIN — FAMOTIDINE 20 MG: 20 TABLET ORAL at 08:52

## 2019-04-18 RX ADMIN — BRIMONIDINE TARTRATE 1 DROP: 2 SOLUTION OPHTHALMIC at 08:52

## 2019-04-18 RX ADMIN — BRIMONIDINE TARTRATE 1 DROP: 2 SOLUTION OPHTHALMIC at 20:54

## 2019-04-18 RX ADMIN — TIMOLOL MALEATE 1 DROP: 5 SOLUTION OPHTHALMIC at 08:52

## 2019-04-18 ASSESSMENT — PAIN SCALES - GENERAL
PAINLEVEL_OUTOF10: 0
PAINLEVEL_OUTOF10: 0

## 2019-04-18 NOTE — PROGRESS NOTES
Ezio Blanco,    Your patient is on a medication that requires a renal dose adjustment. Renal Function Assessment:    Date Body Weight IBW Adj. Body Weight Scr CrCl Dialysis status   4/18/2019 48.3 kg 66.2 kg   0.9 46 ml/min         Pharmacy has renally dose-adjusted the following medication(s):    Date Medication Original Dosing Regimen New Dosing Regimen   4/18/2019 Famotidine 20 mg twice daily 20 mg daily           These changes were made per protocol according to the [facility] Renal Function-Based Dose Adjustments Policy    *Please note this dose may need readjusted if your patient's renal function significantly improves. Please contact pharmacy with any questions regarding these changes.

## 2019-04-18 NOTE — CARE COORDINATION
Social Work/Discharge Planning:  Social Work consult noted. Met with patient and completed initial assessment. Explained Social Work role and discussed transition of care/discharge planning. Patient lives alone in a one story house with four steps to enter. PTA patient uses a front wheeled walker. Patient has a shower chair. Patient has a home health care history with [facility]. Patient is agreeable to snf if needed, but stated she does not want any home health care if needed at discharge. Patient stated her friend Brandt Pal assist with her laundry and grocery shopping. Patient requested to talk with a Psychiatrist or Psychologist regarding her depression and not eating. Patient states she does not see a Psychiatrist or Psychologist in the community.  offered to provide patient with contact information to make an initial appointment in the community and she declined information. Notified charge nurse of patient request to see a Psychiatrist.  Will continue to follow and assist with discharge planning.   Electronically signed by RUBÉN Nova on 4/18/2019 at 3:03 PM

## 2019-04-18 NOTE — ED NOTES
Assumed care of patient. Pt lying in bed in no apparent distress. No visitors at bedside.      Elizabet Lvoell RN  04/17/19 2004

## 2019-04-18 NOTE — ED NOTES
ADY faxed to 4S ; receipt confirmed with [facility] and fax confirmation.      Seun Nogueira RN  04/17/19 2019

## 2019-04-18 NOTE — PROGRESS NOTES
4/18/2019  12:15 PM      Nutrition Assessment    Type and Reason for Visit: Initial, Consult    Nutrition Recommendations:  Continue current diet and initiated Clear ONS BID    Nutrition Assessment: Pt meets criteria for severe malnutrition AEB <50% needs met >7d, subcutaneous fat/muscle wasting. Note 7% unintentional wt loss in the last year. Pt agreeable to Ensure Clear but declined other Ensure (\"I gave that to my  and can't stand the smell\")  Pt hx heavy ETOH consumption likely contributing to compromised status. Malnutrition Assessment:  · Malnutrition Status: Meets the criteria for severe malnutrition  · Context: Chronic illness  · Findings of the 6 clinical characteristics of malnutrition (Minimum of 2 out of 6 clinical characteristics is required to make the diagnosis of moderate or severe Protein Calorie Malnutrition based on AND/ASPEN Guidelines):  1. Energy Intake-Less than or equal to 50% of estimated energy requirement, Greater than or equal to 7 days    2. Weight Loss-No significant weight loss,    3. Fat Loss-Severe subcutaneous fat loss, Triceps  4. Muscle Loss-Severe muscle mass loss, Clavicles (pectoralis and deltoids), Calf (gastrocnemius)  5. Fluid Accumulation-No significant fluid accumulation,    6.   Strength-Not measured    Nutrition Risk Level: High    Nutrient Needs:  · Estimated Daily Total Kcal:  (MSJ x 1.25-1.35)  · Estimated Daily Protein (g): 60-70 (1.2-1.4 g/kg CBW)  · Estimated Daily Total Fluid (ml/day):  (1 ml/erlinda)    Nutrition Diagnosis:   · Problem: Severe malnutrition, In context of chronic illness  · Etiology: related to Insufficient energy/nutrient consumption     Signs and symptoms:  as evidenced by Diet history of poor intake, BMI, Severe loss of subcutaneous fat, Severe muscle loss, Lab values    Objective Information:  · Nutrition-Focused Physical Findings: frail/underwt appearance, weak, poor appetite but improving today per pt, +BS, -I/O  · Wound Type: (reddened buttocks)  · Current Nutrition Therapies:  · Oral Diet Orders: General(no caffeine)   · Oral Diet intake: 1-25%(PTA very poor intake-too weak to get anything-only drinking Sprite)  · Anthropometric Measures:  · Ht: 5' 9\" (175.3 cm)   · Current Body Wt: 106 lb (48.1 kg)(4/18 bedwt)  · Admission Body Wt: 103 lb (46.7 kg)(first wt this admit)  · Usual Body Wt: 111 lb (50.3 kg)(per EMR hx x 6 months)  · % Weight Change:  ,  7% loss in 6 months   · Ideal Body Wt: 145 lb (65.8 kg), % Ideal Body 73%  · BMI Classification: BMI <18.5 Underweight    Nutrition Interventions:   Continue current diet, Start ONS  Continued Inpatient Monitoring, Education Initiated, Coordination of Care    Nutrition Evaluation:   · Evaluation: Goals set   · Goals: PO at least 50-75% at meals and ONS, stable dry wt    · Monitoring: Meal Intake, Supplement Intake, Skin Integrity, I&O, Mental Status/Confusion, Weight, Pertinent Labs, Monitor Hemodynamic Status      Electronically signed by Dahlia Bailey RD, CNSC, LD on 4/18/19 at 12:15 PM    Contact Number: [phone]

## 2019-04-18 NOTE — H&P
[facility] Hospitalist Group     History and Physical      CHIEF COMPLAINT: Severe weakness for several days. History of Present Illness: The pt is a 80 y.o. female with a history of alcoholism for several years, hypothyroidism, depression and DVT last year for which she is on warfarin [address], who has been becoming progressively weaker the last few months. Pt has been trying to cut down EtOH consumption. Her last drink was on Sun 4/14. Pt says that in the last 2-3 days, she became extremely weak and was unable to get off the couch. Her meds and beverages such as Sprite were apparently within reach and she continued to take them. Because she was unable to get up, she ended up urinating while seated on the couch. Her friend found her on the couch in a filthy condition, unable to get up, and called EMS. Pt was brought to the ER. Informant(s) for H&P: Patient.     REVIEW OF SYSTEMS:  Constitutional: positive for chills and poor oral intake, weight loss, negative for fevers and sweats  Eyes: negative  Ears, nose, mouth, throat, and face: positive for sneezing, nasal congestion and sore throat and non-productive mild cough starting Sunday, 4/14/19, negative for ear drainage, earaches, epistaxis, hoarseness and tinnitus  Respiratory: negative for dyspnea on exertion, hemoptysis, pleurisy/chest pain, shortness of breath, sputum, stridor and wheezing  Cardiovascular: positive for B/L LE edema, negative for chest pain, exertional chest pressure/discomfort, irregular heart beat, near-syncope, orthopnea, palpitations, paroxysmal nocturnal dyspnea and syncope  Gastrointestinal: positive for nausea intermittently, negative for abdominal pain, constipation, diarrhea, dysphagia, melena, reflux symptoms, vomiting and blood in stool; pt denied noticing any abdominal distention  Genitourinary: positive for urinary incontinence because she had been too weak to get off the couch the last 2 days, negative for dysuria, hematuria and pyuria  Musculoskeletal: positive for myalgias along with sneezing, nasal congestion and sore throat Sunday, 4/14/19; severe progressive generalized muscle weakness over the last 2-3 months  Neurological: positive for mild tremor  Behavioral/Psych: negative for hallucinations  Endocrine: positive for cold intolerance    Past Medical History:   Diagnosis Date    Alcoholism ([facility]     heavy alcohol consumption since ~1528-1806 till early 2019    Anemia 05/2017    [facility] 5/22/2017    Cataract     Critical lower limb ischemia 5/29/2017    Depression \    DVT (deep venous thrombosis) ([facility] 09/13/2018    R leg    Glaucoma     H/O diarrhea     History of blood transfusion 05/2017    Hypothyroidism     Macular degeneration     Murmur     PONV (postoperative nausea and vomiting)     Sciatica      Past Surgical History:   Procedure Laterality Date    DENTAL SURGERY  2007    teeth extraction    UPPER GASTROINTESTINAL ENDOSCOPY N/A 1/14/2019    EGD BIOPSY performed by Jairo Love MD at [facility]       Medications Prior to Admission:    Medications Prior to Admission: warfarin (COUMADIN) 2 MG tablet, 2 mg Take as directed by Mya 34. See encounters for most recent dosing history. brimonidine-timolol (COMBIGAN) 0.2-0.5 % ophthalmic solution, Place 1 drop into both eyes every 12 hours  simvastatin (ZOCOR) 5 MG tablet, Take 1 tablet by mouth nightly  escitalopram (LEXAPRO) 20 MG tablet, Take 20 mg by mouth every morning   levothyroxine (SYNTHROID) 25 MCG tablet, Take 25 mcg by mouth every morning   furosemide (LASIX) 20 MG tablet, Take 1 tablet by mouth daily  potassium chloride (KLOR-CON M) 20 MEQ extended release tablet, Take 2 tablets by mouth daily    Allergies: Other; Pcn [penicillins]; and Erythromycin    Social History:    reports that she quit smoking about 13 years ago.  Her smoking use included cigarettes. She started smoking about 47 years ago. She smoked 3.00 packs per day. She has never used smokeless tobacco. She reports that she drinks alcohol. She reports that she does not use drugs. Cigarettes: up to 3 PPD x 34 years, quit about 13 yrs ago. EtOH: heavy consumption pattern last 5-7 years, tapered down in the last 2 months; last of EtOH consumption was around [facility]. Single. No contact with family. Pt's  has been checking on her intermittently and trying to help her when needed. Lives by herself. Minimal infrequent contact with brother. No contact with sister. 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 Family History:   family history includes Diabetes in her father and sister. Father had DM and  of MI and stroke. Mother  of uncertain cause in her early 80s. Sibs: Brother has fatty liver. Sister has DM. Children: none. PHYSICAL EXAM:    Vitals:  /77   Pulse 93   Temp 98.4 °F (36.9 °C) (Oral)   Resp 18   Ht 5' 9\" (1.753 m)   Wt 106 lb 8 oz (48.3 kg)   SpO2 98%   BMI 15.73 kg/m²     At my exam, P 84/m, normal volume, regular; Tele: SR; Resp unlabored. General condition: Middle aged slim to emaciated appearing female lying comfortably in bed in no acute distress. HEENT: Atraumatic. Pupils equal, round, reactive to light. No pallor, icterus or cataract. No conjunctival injection. No nasal congestion or discharge. No pharyngeal erythema or pus points. Faucial aperture: Mallampati 3. Dentures noted. Neck:  Supple. Normal RoM. No JVD, hepato-jugular reflux, lymphadenopathy, thyroid enlargement, tracheal deviation, bruit. Chest: Symmetric. Equal AE b/l with distant vesicular breath sounds. No rales. No rhonchi. Heart: Distant, muffled, regular heart sounds. No murmur. No rub or gallop.   Abdomen: Probable mild abdominal distention though there was no definite free fluid and no shifting dullness; non-obese; soft; non-tender; no palpable organomegaly or mass; bowel sounds present and normal. No CVAT. Extremities: There is B/L lower extremity edema 2+ to 3+, extending from the feet up to the knees. There is mild B/L calf tenderness. There is faint erythema over the dorsum of the feet on both sides. No clubbing, cyanosis. Feet dry and warm. Skin: Warm and dry, no open lesions or rash. Neuro: Pt is awake, alert and oriented to time, place and person. Speech and cognition normal. Cranial nerves intact with no focal deficits. Motor strength 5/5 in UEs and LEs b/l. BJs symmetric and normal. Light touch sense at the toes normal b/l. Breast: deferred  Rectal: deferred  Genitalia:  Deferred      LABS:  Recent Labs     04/17/19  1647   *   K 3.0*   CL 94*   CO2 20*   BUN 9   CREATININE 0.9   GLUCOSE 90   CALCIUM 7.7*   eGFR non-aa >60. AG 13.    Recent Labs     04/17/19  1647   WBC 6.7   RBC 2.91*   HGB 10.2*   HCT 28.9*   MCV 99.3   MCH 35.1*   MCHC 35.3*   RDW 17.8*   PLT 94*   MPV 13.1*   Diff: N 53, L 39, M 6, E 0, B 1%. Recent Labs     04/17/19  1647   GLUCOSE 90      Ref. Range 4/17/2019 16:47   Total CK Latest Ref Range: 20 - 180 U/L 147   Pro-BNP Latest Ref Range: 0 - 125 pg/mL 1,225 (H)   Troponin Latest Ref Range: 0.00 - 0.03 ng/mL <0.01      Ref. Range 4/17/2019 16:47 4/17/2019 19:05   Lactic Acid Latest Ref Range: 0.5 - 2.2 mmol/L 2.9 (H) 1.8      Ref. Range 4/17/2019 16:47   Albumin Latest Ref Range: 3.5 - 5.2 g/dL 2.6 (L)   Alk Phos Latest Ref Range: 35 - 104 U/L 107 (H)   ALT Latest Ref Range: 0 - 32 U/L 22   AST Latest Ref Range: 0 - 31 U/L 54 (H)   Bilirubin Latest Ref Range: 0.0 - 1.2 mg/dL 1.7 (H)   Total Protein Latest Ref Range: 6.4 - 8.3 g/dL 5.2 (L)      Ref. Range 4/17/2019 16:47   Prothrombin Time Latest Ref Range: 9.3 - 12.4 sec 40.9 (H)   INR Unknown 3.6   aPTT Latest Ref Range: 24.5 - 35.1 sec 46.0 (H)     UA:   Ref.  Range 4/17/2019 16:47   Color, UA Latest Ref Range: Straw/Yellow  LEONIDAS (A)   Clarity, UA Latest Ref Range: Clear  Clear   Glucose, UA Latest Ref Range: Negative mg/dL Negative   Bilirubin, Urine Latest Ref Range: Negative  SMALL (A)   Ketones, Urine Latest Ref Range: Negative mg/dL Negative   Specific Gravity, UA Latest Ref Range: 1.005 - 1.030  <=1.005   Blood, Urine Latest Ref Range: Negative  Negative   pH, UA Latest Ref Range: 5.0 - 9.0  6.5   Protein, UA Latest Ref Range: Negative mg/dL Negative   Urobilinogen, Urine Latest Ref Range: <2.0 E.U./dL >=8.0 (A)   Nitrite, Urine Latest Ref Range: Negative  Negative   Leukocyte Esterase, Urine Latest Ref Range: Negative  Negative   WBC, UA Latest Ref Range: 0 - 5 /HPF NONE   RBC, UA Latest Ref Range: 0 - 2 /HPF NONE   Epi Cells Latest Units: /HPF RARE   Bacteria, UA Latest Units: /HPF NONE       Radiology:   Xr Chest Portable  Result Date: 2019  Patient MRN: [id number] : 1950 Age:  80 years Gender: Female Order Date: 2019 4:30 PM Exam: XR CHEST PORTABLE Number of Images: 1 view Indication:   weakness weakness Comparison: Prior chest radiograph from 01/10/2019 is available Findings: The lungs are clear. There is hyperaeration of lungs suggesting of chronic obstructive pulmonary disease. There is no evidence of pulmonary infiltrate or pleural effusion. The pulmonary vascularity is unremarkable. The cardiac, hilar and mediastinal silhouettes are satisfactory. The cardiac shadow has a transverse lie. There is uncoiling atherosclerotic change of thoracic aorta. The bony thorax demonstrates no gross abnormality. Chronic obstructive pulmonary disease No evidence of airspace consolidation or pulmonary venous congestion.      Us Dup Lower Extremities Bilateral Venous  Result Date: 2019  Patient MRN:  [id number] : 1950 Age: 80 years Gender: Female Order Date:  2019 4:30 PM EXAM: US DUP LOWER EXTREMITIES BILATERAL VENOUS NUMBER OF IMAGES:  40 INDICATION:  swelling, pain; hx of DVT Rule out DVT swelling, pain; hx of DVT COMPARISON: None TECHNIQUE:Multiple gray scale and color Doppler images were obtained of the deep venous system of the bilateral  lower extremity. Doppler wave forms and augmentation were performed. FINDINGS: There is patency of the bilateral external iliac veins. There is normal compressibility, phasic flow pattern, and augmentation demonstrated for the bilateral common femoral, superficial femoral, and popliteal veins. There is patency of the veins in the calf. The augmentation of the right superficial femoral vein and popliteal vein and tibioperoneal trunk demonstrate flow seen. PATENT DEEP VENOUS SYSTEM OF THE BILATERAL LOWER EXTREMITY. NO EVIDENCE FOR DVT. The study is limited in the right superficial femoral vein distally popliteal vein and tibioperoneal trunk vein. EKG 4/17/2019 1828 hrs: Normal sinus rhythm, 93/m. Septal infarct , age undetermined. Abnormal ECG. When compared with ECG of 12-JAN-2019 07:56, Septal infarct is now present; ST now depressed in Lateral leads; Nonspecific T wave abnormality now evident in Inferior leads; T wave inversion now evident in Lateral leads; QT has lengthened. ASSESSMENT:      Principal Problem:   Hyponatremia  Active Problems:   Generalized weakness   Flu syndrome   Elevated lactic acid level   Hypokalemia   Hypomagnesemia   Alcohol abuse   Peripheral edema   Protein-calorie malnutrition   Borderline elevated liver enzymes - possible early stage alcoholic cirrhosis   Anemia / thrombocytopenia / pancytopenia   H/o DVT - on warfarin OAC   Hypothyroidism   Hyperlipidemia   Depression   Lower Extremity PAD      PLAN:    · Pt has sx of flu syndrome with elevated lactic acid level and hyponatremia, hypokalemia and hypomagnesemia. · Pt was given a small bolus of  mL in the ER. Before giving additional IVF, check Na level again. If hyponatremia worsens, consider Nephrology consultation.   · Hypokalemia and hypomagnesemia were also initially addressed in the ER. At this time, check K+ and Mg++ levels and consider further replacement. · Elevated lactic acid level at presentation may have been d/t recent flu syndrome though VS have been ok other than for HR in the 90s, vs chronic alcohol abuse. Lactic acid level has improved following IVF. Hold off additional IVF at present in view of hyponatremia. · F/u respiratory pathogens DFA panel test.  · F/u temp, wbc, c/s results, crp, esr. · Generalized weakness: PT / OT eval and tx. Soc Svcs to look into SNF / NH care needs after this hospitalization. · Alcohol abuse: Pt reported reduced consumption lately. Counseled to quit completely. Pt may benefit from alcohol rehab program.  · Peripheral edema: Likely d/t hypoalbuminemia - follow-up clinically. · F/u LFTs. · Anemia w/u labs - B12 / folate and d/t high RDW also check ferritin, iron, tibc. F/u Hg. · H/o DVT: Continue warfarin OAC per PT INR. · Hypothyroidism: Check TFTs to r/o contribution to hyponatremia. Continue levothyroxine. · HLD: Continue statin. · Depression: Continue escitalopram.  · LE PAD: Appears to be stable presently. · DVT prophylaxis - On warfarin OAC. · GI bleed prophylaxis - Pepcid. Pt was examined with the help of her night nurse [facility].   Please note that over 50 minutes was spent in evaluating the patient, review of records and results, discussion with staff, etc.    Electronically signed by Delia Nageotte, [address] Hospitalist Service at [facility]

## 2019-04-18 NOTE — PROGRESS NOTES
Occupational Therapy  OCCUPATIONAL THERAPY INITIAL EVALUATION      Date:2019  Patient Name: Agustina Greenberg  MRN: [id number]  : 1950  Room: [address] [place] [address]    Evaluating OT: Jabari Ruiz OTR/L [id number]    AM-PAC Daily Activity Raw Score: 15/24    Recommended Adaptive Equipment: TBD    Diagnosis: hyponatremia. Pt presents to ED from home with generalized weakness. Pt stayed on her couch for several days unable to get up covered in feces and urine. Pertinent Medical History: limb ischemia, macular degeneration, DVT   Precautions:  Falls     Home Living: Pt lives alone per EMS in deplorable condition in a single story home with 3+1 steps on entry. Bathroom setup: tub/shower     Prior Level of Function: Mod I with ADLs, friend stops in daily; completed functional mobility no AD pt reports recently has been using the [employer] where she can in her home and use of wc for community distances. Driving: No    Pain Level: no reported pain    Cognition: A&O: .     Problem solving:  Fair   Judgement/safety:  Fair     Functional Assessment:   Initial Eval Status  Date: 19 Treatment session:  Short Term Goals  Treatment frequency: 2-5x/wk PRN x1-3 wks   Feeding Set up     Grooming SBA  Set up   UB Dressing Min A  Donning/doffing hospital gown  Set up   LB Dressing Max A  Donning socks seated at EOB  Min A    Bathing Mod A  Min A   Toileting Max A  Incontinent of BM upon standing at EOB  Min A   Bed Mobility  Supine to sit: Mod A     Functional Transfers STS: Max A  Strong posterior lean with max cues to correct at EOB  Min A   Functional Mobility Mod A with [employer]  Small steps over to [employer] A during ADLs   Balance Sitting: fair    Standing: poor plus     Activity Tolerance Poor  standing olivia x5-6 min with fair  balance during self care tasks   B UE strength 3+/5  4/5     ADL comments: Pt is limited in completion of self care tasks due to weakness, fatigue, balance deficits and decreased safety awareness during functional tasks. Strength ROM Additional Info:    RUE  3+/5 WFL good  and FMC/dexterity noted during ADL tasks     LUE 3+/5 WFL good  and FMC/dexterity noted during ADL tasks         Hearing: WFL  Vision: WFL   Sensation:  No c/o numbness or tingling   Tone: WFL   Edema: none                             Comments/Treatment: Patient educated on adapted techniques for completion of ADL, safe functional transfers and mobility. Patient required cues for follow through with proper hand/foot placement, pacing, safety, and technique in bed mobility, functional transfers, functional mobility, LB dressing and post toileting care in preparation for maximum independence in all self care tasks. Eval Complexity: Low    Assessment of current deficits   Functional mobility [x]  ADLs [x] Strength [x]  Cognition []  Functional transfers  [x] IADLs [x] Safety Awareness [x]  Endurance [x]  Fine Motor Coordination [] Balance [x] Vision/perception [] Sensation []   Gross Motor Coordination [] ROM [] Delirium []                  Motor Control []    Plan of Care:   ADL retraining [x]   Equipment needs [x]   Neuromuscular re-education [x] Energy Conservation Techniques [x]  Functional Transfer training [x] Patient and/or Family Education [x]  Functional Mobility training [x]  Environmental Modifications [x]  Cognitive re-training []   Compensatory techniques for ADLs [x]  Splinting Needs []   Positioning to improve overall function [x]   Therapeutic Activity [x]  Therapeutic Exercise  [x]  Visual/Perceptual: []    Delirium prevention/treatment  []   Other:  []    Rehab Potential: Good for established goals     Patient / Family Goal: To get better. Patient and/or family were instructed on functional diagnosis, prognosis/goals and OT plan of care. Pt verbalized good understanding. Upon arrival, patient supine in bed.  At end of session, patient seated in chair with call light and phone within reach, all lines and tubes intact. Pt would benefit from continued skilled OT to increase safety and independence with completion of ADL/IADL tasks for functional independence and quality of life. Bed/chair alarm: ON.      Low Evaluation + 10 timed treatment minutes  Tx Time in: 1420  Tx Time out: 1430    Evaluation time includes thorough review of current medical information, gathering information on past medical history/social history and prior level of function, completion of standardized testing/informal observation of tasks, assessment of data, and development of POC/Goals    Torres Bryson OTR/L  [id number]

## 2019-04-18 NOTE — PLAN OF CARE
Problem: Inadequate oral food/beverage intake (NI-2.1)  Goal: Food and/or Nutrient Delivery  Continue current diet and initiated ONS to augment suboptimal PO at meals at times. Description  Individualized approach for food/nutrient provision.   Outcome: Not Met This Shift

## 2019-04-18 NOTE — PATIENT CARE CONFERENCE
[facility] Quality Flow/Interdisciplinary Rounds Progress Note        Quality Flow Rounds held on April 18, 2019    Disciplines Attending:  Bedside Nurse, ,  and Nursing Unit Leadership    Geeta Guido was admitted on 4/17/2019  3:59 PM    Anticipated Discharge Date:  Expected Discharge Date: 04/20/19    Disposition:    Ba Score:  Ba Scale Score: 18    Readmission Score:         Discussed patient goal for the day, patient clinical progression, and barriers to discharge.   The following Goal(s) of the Day/Commitment(s) have been identified:  Continue to monitor electrolytes, SS to assist with safe discharge planning      Magnolia Mejía  April 18, 2019 Adam Morelos MD   [facility]    [address] [place] [id number] [phone]                  Prescription approved per [facility] Refill Protocol.  Diane Dumont RN

## 2019-04-19 LAB
ALBUMIN SERPL-MCNC: 1.8 G/DL (ref 3.5–5.2)
ALP BLD-CCNC: 77 U/L (ref 35–104)
ALT SERPL-CCNC: 15 U/L (ref 0–32)
ANION GAP SERPL CALCULATED.3IONS-SCNC: 6 MMOL/L (ref 7–16)
AST SERPL-CCNC: 37 U/L (ref 0–31)
BASOPHILS ABSOLUTE: 0.03 E9/L (ref 0–0.2)
BASOPHILS RELATIVE PERCENT: 0.4 % (ref 0–2)
BILIRUB SERPL-MCNC: 1.2 MG/DL (ref 0–1.2)
BILIRUBIN DIRECT: 0.6 MG/DL (ref 0–0.3)
BILIRUBIN, INDIRECT: 0.6 MG/DL (ref 0–1)
BUN BLDV-MCNC: 12 MG/DL (ref 8–23)
CALCIUM SERPL-MCNC: 7.3 MG/DL (ref 8.6–10.2)
CHLORIDE BLD-SCNC: 97 MMOL/L (ref 98–107)
CO2: 21 MMOL/L (ref 22–29)
CREAT SERPL-MCNC: 0.9 MG/DL (ref 0.5–1)
EKG ATRIAL RATE: 93 BPM
EKG P AXIS: 91 DEGREES
EKG P-R INTERVAL: 120 MS
EKG Q-T INTERVAL: 428 MS
EKG QRS DURATION: 76 MS
EKG QTC CALCULATION (BAZETT): 532 MS
EKG R AXIS: 56 DEGREES
EKG T AXIS: 122 DEGREES
EKG VENTRICULAR RATE: 93 BPM
EOSINOPHILS ABSOLUTE: 0.03 E9/L (ref 0.05–0.5)
EOSINOPHILS RELATIVE PERCENT: 0.4 % (ref 0–6)
GFR AFRICAN AMERICAN: >60
GFR NON-AFRICAN AMERICAN: >60 ML/MIN/1.73
GLUCOSE BLD-MCNC: 87 MG/DL (ref 74–99)
HAV IGM SER IA-ACNC: NORMAL
HCT VFR BLD CALC: 22.6 % (ref 34–48)
HEMOGLOBIN: 8 G/DL (ref 11.5–15.5)
HEPATITIS B CORE IGM ANTIBODY: NORMAL
HEPATITIS B SURFACE ANTIGEN INTERPRETATION: NORMAL
HEPATITIS C ANTIBODY INTERPRETATION: NORMAL
IMMATURE GRANULOCYTES #: 0.04 E9/L
IMMATURE GRANULOCYTES %: 0.5 % (ref 0–5)
INR BLD: 2.8
LYMPHOCYTES ABSOLUTE: 2.76 E9/L (ref 1.5–4)
LYMPHOCYTES RELATIVE PERCENT: 37.3 % (ref 20–42)
MAGNESIUM: 1.5 MG/DL (ref 1.6–2.6)
MCH RBC QN AUTO: 34.8 PG (ref 26–35)
MCHC RBC AUTO-ENTMCNC: 35.4 % (ref 32–34.5)
MCV RBC AUTO: 98.3 FL (ref 80–99.9)
MONOCYTES ABSOLUTE: 0.5 E9/L (ref 0.1–0.95)
MONOCYTES RELATIVE PERCENT: 6.8 % (ref 2–12)
NEUTROPHILS ABSOLUTE: 4.03 E9/L (ref 1.8–7.3)
NEUTROPHILS RELATIVE PERCENT: 54.6 % (ref 43–80)
PDW BLD-RTO: 17.5 FL (ref 11.5–15)
PLATELET # BLD: 247 E9/L (ref 130–450)
PMV BLD AUTO: 9.8 FL (ref 7–12)
POTASSIUM SERPL-SCNC: 3.2 MMOL/L (ref 3.5–5)
PROTHROMBIN TIME: 31.8 SEC (ref 9.3–12.4)
RBC # BLD: 2.3 E12/L (ref 3.5–5.5)
SODIUM BLD-SCNC: 124 MMOL/L (ref 132–146)
TOTAL PROTEIN: 4.1 G/DL (ref 6.4–8.3)
WBC # BLD: 7.4 E9/L (ref 4.5–11.5)

## 2019-04-19 PROCEDURE — 6370000000 HC RX 637 (ALT 250 FOR IP): Performed by: PHYSICIAN ASSISTANT

## 2019-04-19 PROCEDURE — 99232 SBSQ HOSP IP/OBS MODERATE 35: CPT | Performed by: INTERNAL MEDICINE

## 2019-04-19 PROCEDURE — APPSS30 APP SPLIT SHARED TIME 16-30 MINUTES: Performed by: PHYSICIAN ASSISTANT

## 2019-04-19 PROCEDURE — 2060000000 HC ICU INTERMEDIATE R&B

## 2019-04-19 PROCEDURE — 6370000000 HC RX 637 (ALT 250 FOR IP): Performed by: INTERNAL MEDICINE

## 2019-04-19 PROCEDURE — 80048 BASIC METABOLIC PNL TOTAL CA: CPT

## 2019-04-19 PROCEDURE — 2580000003 HC RX 258: Performed by: INTERNAL MEDICINE

## 2019-04-19 PROCEDURE — 6360000002 HC RX W HCPCS: Performed by: PHYSICIAN ASSISTANT

## 2019-04-19 PROCEDURE — 85025 COMPLETE CBC W/AUTO DIFF WBC: CPT

## 2019-04-19 PROCEDURE — 85610 PROTHROMBIN TIME: CPT

## 2019-04-19 PROCEDURE — 80076 HEPATIC FUNCTION PANEL: CPT

## 2019-04-19 PROCEDURE — 83735 ASSAY OF MAGNESIUM: CPT

## 2019-04-19 PROCEDURE — 36415 COLL VENOUS BLD VENIPUNCTURE: CPT

## 2019-04-19 RX ORDER — POTASSIUM CHLORIDE 20 MEQ/1
40 TABLET, EXTENDED RELEASE ORAL ONCE
Status: COMPLETED | OUTPATIENT
Start: 2019-04-19 | End: 2019-04-19

## 2019-04-19 RX ORDER — WARFARIN SODIUM 2 MG/1
2 TABLET ORAL
Status: COMPLETED | OUTPATIENT
Start: 2019-04-19 | End: 2019-04-19

## 2019-04-19 RX ORDER — MAGNESIUM SULFATE IN WATER 40 MG/ML
2 INJECTION, SOLUTION INTRAVENOUS ONCE
Status: COMPLETED | OUTPATIENT
Start: 2019-04-19 | End: 2019-04-19

## 2019-04-19 RX ADMIN — Medication 10 ML: at 21:08

## 2019-04-19 RX ADMIN — ESCITALOPRAM OXALATE 20 MG: 10 TABLET, FILM COATED ORAL at 08:56

## 2019-04-19 RX ADMIN — MAGNESIUM SULFATE HEPTAHYDRATE 2 G: 40 INJECTION, SOLUTION INTRAVENOUS at 17:22

## 2019-04-19 RX ADMIN — ACETAMINOPHEN 650 MG: 325 TABLET ORAL at 10:09

## 2019-04-19 RX ADMIN — LEVOTHYROXINE SODIUM 25 MCG: 25 TABLET ORAL at 06:48

## 2019-04-19 RX ADMIN — BRIMONIDINE TARTRATE 1 DROP: 2 SOLUTION OPHTHALMIC at 21:08

## 2019-04-19 RX ADMIN — MICONAZOLE NITRATE: 2 OINTMENT TOPICAL at 21:08

## 2019-04-19 RX ADMIN — SODIUM CHLORIDE 250 ML: 9 INJECTION, SOLUTION INTRAVENOUS at 00:04

## 2019-04-19 RX ADMIN — BRIMONIDINE TARTRATE 1 DROP: 2 SOLUTION OPHTHALMIC at 08:56

## 2019-04-19 RX ADMIN — TIMOLOL MALEATE 1 DROP: 5 SOLUTION OPHTHALMIC at 08:56

## 2019-04-19 RX ADMIN — POTASSIUM CHLORIDE 40 MEQ: 20 TABLET, EXTENDED RELEASE ORAL at 17:22

## 2019-04-19 RX ADMIN — MICONAZOLE NITRATE: 2 OINTMENT TOPICAL at 14:19

## 2019-04-19 RX ADMIN — SIMVASTATIN 5 MG: 5 TABLET, FILM COATED ORAL at 21:08

## 2019-04-19 RX ADMIN — TIMOLOL MALEATE 1 DROP: 5 SOLUTION OPHTHALMIC at 21:08

## 2019-04-19 RX ADMIN — Medication 10 ML: at 08:56

## 2019-04-19 RX ADMIN — WARFARIN SODIUM 2 MG: 2 TABLET ORAL at 17:22

## 2019-04-19 RX ADMIN — FAMOTIDINE 20 MG: 20 TABLET ORAL at 08:56

## 2019-04-19 ASSESSMENT — PAIN SCALES - GENERAL
PAINLEVEL_OUTOF10: 2
PAINLEVEL_OUTOF10: 0
PAINLEVEL_OUTOF10: 0
PAINLEVEL_OUTOF10: 4

## 2019-04-19 ASSESSMENT — PAIN DESCRIPTION - ONSET: ONSET: ON-GOING

## 2019-04-19 ASSESSMENT — PAIN DESCRIPTION - PAIN TYPE: TYPE: CHRONIC PAIN

## 2019-04-19 ASSESSMENT — PAIN DESCRIPTION - PROGRESSION
CLINICAL_PROGRESSION: NOT CHANGED
CLINICAL_PROGRESSION: NOT CHANGED

## 2019-04-19 ASSESSMENT — PAIN DESCRIPTION - DESCRIPTORS: DESCRIPTORS: SHARP

## 2019-04-19 ASSESSMENT — PAIN DESCRIPTION - FREQUENCY: FREQUENCY: INTERMITTENT

## 2019-04-19 ASSESSMENT — PAIN DESCRIPTION - LOCATION: LOCATION: FOOT

## 2019-04-19 NOTE — PATIENT CARE CONFERENCE
[facility] Quality Flow/Interdisciplinary Rounds Progress Note        Quality Flow Rounds held on April 19, 2019    Disciplines Attending:  Bedside Nurse, ,  and Nursing Unit Leadership    Neymar Soto was admitted on 4/17/2019  3:59 PM    Anticipated Discharge Date:  Expected Discharge Date: 04/20/19    Disposition:    Ba Score:  Ba Scale Score: 12    Readmission Risk              Risk of Unplanned Readmission:        16           Discussed patient goal for the day, patient clinical progression, and barriers to discharge.   The following Goal(s) of the Day/Commitment(s) have been identified:  Monitor labs, intake, and output, continue IV fluids, for possible psych consult, increase activity, discharge planning      Urmila Cooper  April 19, 2019

## 2019-04-19 NOTE — PROGRESS NOTES
Diarrhea/Dehydration (Increases)  History of stroke    Other: __________________  Other:___________________     Vitamin K or Blood product  Administration Date                    TSH:    Lab Results   Component Value Date    TSH 2.470 04/18/2019        Hepatic Function Panel:                            Lab Results   Component Value Date    ALKPHOS 77 04/19/2019    ALT 15 04/19/2019    AST 37 04/19/2019    PROT 4.1 04/19/2019    BILITOT 1.2 04/19/2019    BILIDIR 0.6 04/19/2019    IBILI 0.6 04/19/2019    LABALBU 1.8 04/19/2019       Date Warfarin Dose INR Heparin or LMWH HBG/HCT PLT Comment   4/19 2 mg 2.8 X 8.0/22.6 247                                          Assessment and Plan:  · Meg Badillo is a 80 yof admitted to the hospital on 4/17 for severe protein-calore malnutrition after coming to the ED for generalized weakness  · Pt is anticoagulated on warfarin for history of DVT. She currently follows at the [facility] anticoagulation clinic. Her most recent visit was on 3/20 with an INR of 2.2, instructed to continue schedule of 2 mg daily. When she was admitted to hospital on 4/17 her INR was supratherapeutic at 3.6, and has remained therapeutic after admission: 3.7->2.7->2.8.   · Warfarin 2 mg x 1 tonight  · Daily PT/INR until the INR is stable within the therapeutic range  · Pharmacist will follow and monitor/adjust dosing as necessary    Thank you for this consult,    Fely Garay, PharmD, BCPS 4/19/2019 4:42 PM   [phone]

## 2019-04-19 NOTE — CARE COORDINATION
Social Work/Discharge Planning:  Met with patient and discussed AMPAC score indicating need for snf. Patient agreeable to snf rehab and this worker provided patient with snf choice list to review. Will continue to follow.   Electronically signed by RUBÉN Jmi on 4/19/2019 at 12:23 PM

## 2019-04-19 NOTE — PROGRESS NOTES
4/19/2019  2:07 PM      Nutrition Consult    Type and Reason for Visit: Consult(Ba Consult per Wound care and wounds)    Nutrition Recommendations: Continue current diet and Ensure Clear ONS    Nutrition Assessment:   Pt meets criteria for severe malnutrition AEB <50% needs met >7d, subcutaneous fat/muscle wasting. Note 7% unintentional wt loss in the last year. Pt agreeable to Ensure Clear but declined other Ensure (\"I gave that to my  and can't stand the smell\")  Pt hx heavy ETOH consumption likely contributing to compromised status.     Malnutrition Assessment:  · Malnutrition Status: Meets the criteria for severe malnutrition    Nutrition Risk Level   Risk Level: High    Nutrition Diagnosis:   · Problem: Severe malnutrition, In context of chronic illness  · Etiology: Insufficient energy/nutrient consumption    Signs and symptoms: Diet history of poor intake, BMI, Severe loss of subcutaneous fat, Severe muscle loss, Lab values    Nutrition Intervention:  Food and/or Delivery: Continue diet, Continue ONS  Nutrition Education/Counseling/Coordination of Care:  Continued Inpatient Monitoring, Education Initiated, Coordination of Care      Electronically signed by Alyx العلي RD, CNSC, LD on 4/19/19 at 2:07 PM    Contact Number: [phone]

## 2019-04-19 NOTE — PROGRESS NOTES
no masses or organomegaly  Extremities: no cyanosis, no clubbing and no edema      Recent Labs     04/17/19  1647 04/18/19  0752 04/19/19  0357   * 131* 124*   K 3.0* 3.7 3.2*   CL 94* 95* 97*   CO2 20* 24 21*   BUN 9 9 12   CREATININE 0.9 0.9 0.9   GLUCOSE 90 83 87   CALCIUM 7.7* 8.4* 7.3*       Recent Labs     04/17/19  1647 04/18/19  0752 04/19/19  0357   WBC 6.7 7.4 7.4   RBC 2.91* 3.15* 2.30*   HGB 10.2* 10.9* 8.0*   HCT 28.9* 31.0* 22.6*   MCV 99.3 98.4 98.3   MCH 35.1* 34.6 34.8   MCHC 35.3* 35.2* 35.4*   RDW 17.8* 17.7* 17.5*   PLT 94* 324 247   MPV 13.1* 9.4 9.8       Radiology:   US DUP LOWER EXTREMITIES BILATERAL VENOUS   Final Result   PATENT DEEP VENOUS SYSTEM OF THE BILATERAL LOWER   EXTREMITY. NO EVIDENCE FOR DVT. The study is limited in the right superficial femoral vein distally   popliteal vein and tibioperoneal trunk vein. XR CHEST PORTABLE   Final Result      Chronic obstructive pulmonary disease      No evidence of airspace consolidation or pulmonary venous congestion. Assessment:    Active Problems:    Hyponatremia    Severe protein-calorie malnutrition (HCC)  Resolved Problems:    * No resolved hospital problems. *      Plan:  1. Hypokalemia  - Will give 40mEq PO     2. Hyponatremia   - Will place on fluid restriction     3. Hypomagnesemia  - Will give 2G Mg    4. Depression   - Will consult Psych  - Dr Monica Pelletier had a lengthy discussion with pt regarding her addiction   - continue Lexapro    5. Hypothyroidism  - continue Levothyroxine    6. Hyperlipidemia   - Continue Crestor     7.  H/o DVT   - INR 2.6 today  - Will consult pharmacy for management         Electronically signed by Garrett Farmer PA-C on 4/19/2019 at 4:06 PM

## 2019-04-19 NOTE — FLOWSHEET NOTE
Inpatient Wound Care    Admit Date: 4/17/2019  3:59 PM    Reason for consult:  Buttocks    Significant history:  Admitted with hyponatremia. History includes: DVT, ischemia lower limb, glaucoma. Wound history:  POA    Findings:       04/19/19 1331   Wound 04/18/19 Buttocks Right; Inner   Date First Assessed: 04/18/19   Present on Hospital Admission: Yes  Location: Buttocks  Wound Location Orientation: Right; Inner   Wound Image    Wound Pressure Stage  2   Wound Cleansed   (bath wipe)   Wound Length (cm) 8 cm   Wound Width (cm) 12 cm   Wound Depth (cm) 0.2 cm   Wound Surface Area (cm^2) 96 cm^2   Change in Wound Size % (l*w) -4700   Wound Volume (cm^3) 19.2 cm^3   Wound Assessment Painful;Red   Drainage Amount Small   Drainage Description Serous   Odor Mild   Conchis-wound Assessment Denuded   Wound 04/19/19 Heel Right   Date First Assessed/Time First Assessed: 04/19/19 1336   Present on Hospital Admission: Yes  Location: Heel  Wound Location Orientation: Right   Wound Pressure Stage  1   Wound Length (cm) 3 cm   Wound Width (cm) 3 cm   Wound Surface Area (cm^2) 9 cm^2   Wound Assessment Red   Wound 04/19/19 Heel Left   Date First Assessed/Time First Assessed: 04/19/19 1337   Present on Hospital Admission: Yes  Location: Heel  Wound Location Orientation: Left   Wound Pressure Stage  1   Wound Length (cm) 3 cm   Wound Width (cm) 3 cm   Wound Surface Area (cm^2) 9 cm^2   Wound Assessment Red     Impression:  Stage 2 pressure injury on R buttock with diffuse open areas. . Entire area measures 8 x 12 cm. Stage 1 pressure injury R heel and stage 1 pressure injury  L heel. Fungal dermatitis in groin and gluteal crease. Interventions in place:  Pt cleansed of incontinent stool. Complains of buttock pain and juan heel pain. Pt states that she was down on her couch for 3 days. Plan: Aloe Vesta to buttocks and perineum, SOS precautions, Low air loss mattress.      Jaden Felix 4/19/2019 1:38 PM

## 2019-04-20 LAB
ANION GAP SERPL CALCULATED.3IONS-SCNC: 10 MMOL/L (ref 7–16)
BASOPHILS ABSOLUTE: 0.03 E9/L (ref 0–0.2)
BASOPHILS RELATIVE PERCENT: 0.4 % (ref 0–2)
BUN BLDV-MCNC: 11 MG/DL (ref 8–23)
CALCIUM SERPL-MCNC: 7.7 MG/DL (ref 8.6–10.2)
CHLORIDE BLD-SCNC: 96 MMOL/L (ref 98–107)
CHLORIDE URINE RANDOM: 40 MMOL/L
CO2: 20 MMOL/L (ref 22–29)
CREAT SERPL-MCNC: 0.9 MG/DL (ref 0.5–1)
CREATININE URINE: 116 MG/DL (ref 29–226)
EOSINOPHILS ABSOLUTE: 0.03 E9/L (ref 0.05–0.5)
EOSINOPHILS RELATIVE PERCENT: 0.4 % (ref 0–6)
GFR AFRICAN AMERICAN: >60
GFR NON-AFRICAN AMERICAN: >60 ML/MIN/1.73
GLUCOSE BLD-MCNC: 106 MG/DL (ref 74–99)
HCT VFR BLD CALC: 25.3 % (ref 34–48)
HEMOGLOBIN: 8.9 G/DL (ref 11.5–15.5)
IMMATURE GRANULOCYTES #: 0.05 E9/L
IMMATURE GRANULOCYTES %: 0.6 % (ref 0–5)
INR BLD: 2.8
LYMPHOCYTES ABSOLUTE: 2.11 E9/L (ref 1.5–4)
LYMPHOCYTES RELATIVE PERCENT: 25.4 % (ref 20–42)
MAGNESIUM: 1.8 MG/DL (ref 1.6–2.6)
MCH RBC QN AUTO: 35.2 PG (ref 26–35)
MCHC RBC AUTO-ENTMCNC: 35.2 % (ref 32–34.5)
MCV RBC AUTO: 100 FL (ref 80–99.9)
MONOCYTES ABSOLUTE: 0.61 E9/L (ref 0.1–0.95)
MONOCYTES RELATIVE PERCENT: 7.3 % (ref 2–12)
NEUTROPHILS ABSOLUTE: 5.47 E9/L (ref 1.8–7.3)
NEUTROPHILS RELATIVE PERCENT: 65.9 % (ref 43–80)
OSMOLALITY URINE: 416 MOSM/KG (ref 300–900)
OSMOLALITY: 264 MOSM/KG (ref 285–310)
PDW BLD-RTO: 17.8 FL (ref 11.5–15)
PLATELET # BLD: 303 E9/L (ref 130–450)
PMV BLD AUTO: 9.8 FL (ref 7–12)
POTASSIUM SERPL-SCNC: 4.2 MMOL/L (ref 3.5–5)
POTASSIUM, UR: 27.2 MMOL/L
PROTHROMBIN TIME: 32 SEC (ref 9.3–12.4)
RBC # BLD: 2.53 E12/L (ref 3.5–5.5)
SODIUM BLD-SCNC: 126 MMOL/L (ref 132–146)
SODIUM URINE: 28 MMOL/L
T4 FREE: 1.28 NG/DL (ref 0.93–1.7)
URIC ACID, SERUM: 3.5 MG/DL (ref 2.4–5.7)
WBC # BLD: 8.3 E9/L (ref 4.5–11.5)

## 2019-04-20 PROCEDURE — 84300 ASSAY OF URINE SODIUM: CPT

## 2019-04-20 PROCEDURE — 6370000000 HC RX 637 (ALT 250 FOR IP): Performed by: INTERNAL MEDICINE

## 2019-04-20 PROCEDURE — 85610 PROTHROMBIN TIME: CPT

## 2019-04-20 PROCEDURE — 36415 COLL VENOUS BLD VENIPUNCTURE: CPT

## 2019-04-20 PROCEDURE — 2060000000 HC ICU INTERMEDIATE R&B

## 2019-04-20 PROCEDURE — 6370000000 HC RX 637 (ALT 250 FOR IP): Performed by: PHYSICIAN ASSISTANT

## 2019-04-20 PROCEDURE — 83735 ASSAY OF MAGNESIUM: CPT

## 2019-04-20 PROCEDURE — 80048 BASIC METABOLIC PNL TOTAL CA: CPT

## 2019-04-20 PROCEDURE — 99232 SBSQ HOSP IP/OBS MODERATE 35: CPT | Performed by: INTERNAL MEDICINE

## 2019-04-20 PROCEDURE — 2580000003 HC RX 258: Performed by: INTERNAL MEDICINE

## 2019-04-20 PROCEDURE — 83930 ASSAY OF BLOOD OSMOLALITY: CPT

## 2019-04-20 PROCEDURE — 84439 ASSAY OF FREE THYROXINE: CPT

## 2019-04-20 PROCEDURE — 6370000000 HC RX 637 (ALT 250 FOR IP): Performed by: PSYCHIATRY & NEUROLOGY

## 2019-04-20 PROCEDURE — APPSS30 APP SPLIT SHARED TIME 16-30 MINUTES: Performed by: NURSE PRACTITIONER

## 2019-04-20 PROCEDURE — 99221 1ST HOSP IP/OBS SF/LOW 40: CPT | Performed by: PSYCHIATRY & NEUROLOGY

## 2019-04-20 PROCEDURE — 82570 ASSAY OF URINE CREATININE: CPT

## 2019-04-20 PROCEDURE — 83935 ASSAY OF URINE OSMOLALITY: CPT

## 2019-04-20 PROCEDURE — 84550 ASSAY OF BLOOD/URIC ACID: CPT

## 2019-04-20 PROCEDURE — 82436 ASSAY OF URINE CHLORIDE: CPT

## 2019-04-20 PROCEDURE — 84133 ASSAY OF URINE POTASSIUM: CPT

## 2019-04-20 PROCEDURE — 85025 COMPLETE CBC W/AUTO DIFF WBC: CPT

## 2019-04-20 RX ORDER — SODIUM CHLORIDE 1000 MG
1 TABLET, SOLUBLE MISCELLANEOUS
Status: DISCONTINUED | OUTPATIENT
Start: 2019-04-20 | End: 2019-04-23 | Stop reason: HOSPADM

## 2019-04-20 RX ORDER — MIRTAZAPINE 15 MG/1
15 TABLET, FILM COATED ORAL NIGHTLY
Status: DISCONTINUED | OUTPATIENT
Start: 2019-04-20 | End: 2019-04-23 | Stop reason: HOSPADM

## 2019-04-20 RX ORDER — WARFARIN SODIUM 2 MG/1
2 TABLET ORAL
Status: COMPLETED | OUTPATIENT
Start: 2019-04-20 | End: 2019-04-20

## 2019-04-20 RX ADMIN — ACETAMINOPHEN 650 MG: 325 TABLET ORAL at 14:39

## 2019-04-20 RX ADMIN — Medication 10 ML: at 21:39

## 2019-04-20 RX ADMIN — WARFARIN SODIUM 2 MG: 2 TABLET ORAL at 18:09

## 2019-04-20 RX ADMIN — MICONAZOLE NITRATE: 2 OINTMENT TOPICAL at 21:39

## 2019-04-20 RX ADMIN — TIMOLOL MALEATE 1 DROP: 5 SOLUTION OPHTHALMIC at 08:45

## 2019-04-20 RX ADMIN — MICONAZOLE NITRATE: 2 OINTMENT TOPICAL at 08:45

## 2019-04-20 RX ADMIN — MIRTAZAPINE 15 MG: 15 TABLET, FILM COATED ORAL at 21:39

## 2019-04-20 RX ADMIN — TIMOLOL MALEATE 1 DROP: 5 SOLUTION OPHTHALMIC at 21:39

## 2019-04-20 RX ADMIN — ACETAMINOPHEN 650 MG: 325 TABLET ORAL at 08:54

## 2019-04-20 RX ADMIN — Medication 10 ML: at 08:45

## 2019-04-20 RX ADMIN — SODIUM CHLORIDE TAB 1 GM 1 G: 1 TAB at 18:09

## 2019-04-20 RX ADMIN — LEVOTHYROXINE SODIUM 25 MCG: 25 TABLET ORAL at 04:35

## 2019-04-20 RX ADMIN — ESCITALOPRAM OXALATE 20 MG: 10 TABLET, FILM COATED ORAL at 08:44

## 2019-04-20 RX ADMIN — BRIMONIDINE TARTRATE 1 DROP: 2 SOLUTION OPHTHALMIC at 08:45

## 2019-04-20 RX ADMIN — FAMOTIDINE 20 MG: 20 TABLET ORAL at 08:44

## 2019-04-20 RX ADMIN — BRIMONIDINE TARTRATE 1 DROP: 2 SOLUTION OPHTHALMIC at 21:39

## 2019-04-20 RX ADMIN — SIMVASTATIN 5 MG: 5 TABLET, FILM COATED ORAL at 21:39

## 2019-04-20 ASSESSMENT — PAIN - FUNCTIONAL ASSESSMENT
PAIN_FUNCTIONAL_ASSESSMENT: ACTIVITIES ARE NOT PREVENTED
PAIN_FUNCTIONAL_ASSESSMENT: ACTIVITIES ARE NOT PREVENTED

## 2019-04-20 ASSESSMENT — PAIN DESCRIPTION - PAIN TYPE
TYPE: ACUTE PAIN
TYPE: ACUTE PAIN

## 2019-04-20 ASSESSMENT — PAIN DESCRIPTION - ORIENTATION
ORIENTATION: RIGHT;LEFT
ORIENTATION: RIGHT;LEFT

## 2019-04-20 ASSESSMENT — PAIN DESCRIPTION - DESCRIPTORS
DESCRIPTORS: DISCOMFORT
DESCRIPTORS: DISCOMFORT

## 2019-04-20 ASSESSMENT — PAIN SCALES - GENERAL
PAINLEVEL_OUTOF10: 6
PAINLEVEL_OUTOF10: 0
PAINLEVEL_OUTOF10: 6
PAINLEVEL_OUTOF10: 0
PAINLEVEL_OUTOF10: 0

## 2019-04-20 ASSESSMENT — PAIN DESCRIPTION - FREQUENCY
FREQUENCY: INTERMITTENT
FREQUENCY: INTERMITTENT

## 2019-04-20 ASSESSMENT — PAIN DESCRIPTION - LOCATION
LOCATION: ANKLE
LOCATION: ANKLE

## 2019-04-20 NOTE — CONSULTS
PSYCHIATRY ATTENDING CONSULT    REASON FOR CONSULT:  Depression    REQUESTING PHYSICIAN:  Dr. Braden Hills: \"I was having trouble walking. \"    HISTORY OF PRESENT ILLNESS:  David Cifuentes is a 80 y.o. female with history of alcoholism and depression who was admitted on 4/17/19 due to weakness/failure to thrive. Chart reviewed. Note patient on Lexapro 20 mg daily. Blood alcohol level was negative. Notes indicate patient will be going to nursing facility upon discharge from here. Currently patient is alert and cooperative. Lonnie Lara reports she has been depressed the last several months with poor appetite. Denies suicidal ideations. No homicidal ideations. No rianna or psychosis. Patient agreeable with Remeron augmentation. Patient downplays concerns for alcohol abuse and says she has not drank in three weeks. PAST PSYCHIATRIC HISTORY:  No admissions or suicide attempts. Has been on Lexapro for many years.     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 ([address] 5/22/2017    Cataract     Critical lower limb ischemia 5/29/2017    Depression \    D[facility]deep venous thrombosis[facility] 09/13/2018    R leg    Glaucoma     H/O diarrhea     History of blood transfusion 05/2017    Hypothyroidism     Macular degeneration     Murmur     PONV (postoperative nausea and vomiting)     Sciatica      MEDICAL ROS: General - negative, neurological - negative, ENT - negative, CV - negative, pulmonary - negative, GI - negative, dermatologic - negative, rheumatologic - negative, musculoskeletal - negative,  - negative, hematologic - negative    PAST SURGICAL HISTORY:       Procedure Laterality Date    DENTAL SURGERY  2007    teeth extraction    UPPER GASTROINTESTINAL ENDOSCOPY N/A 1/14/2019    EGD BIOPSY performed by Damir Tay MD at [address] [place] [address] TOOTH EXTRACTION       MEDICATIONS: Current Facility-Administered Medications: miconazole nitrate 2 % ointment, , Topical, BID  escitalopram (LEXAPRO) tablet 20 mg, 20 mg, Oral, QAM  levothyroxine (SYNTHROID) tablet 25 mcg, 25 mcg, Oral, QAM  simvastatin (ZOCOR) tablet 5 mg, 5 mg, Oral, Nightly  sodium chloride flush 0.9 % injection 10 mL, 10 mL, Intravenous, 2 times per day  sodium chloride flush 0.9 % injection 10 mL, 10 mL, Intravenous, PRN  magnesium hydroxide (MILK OF MAGNESIA) 400 MG/5ML suspension 30 mL, 30 mL, Oral, Daily PRN  famotidine (PEPCID) tablet 20 mg, 20 mg, Oral, Daily  warfarin (COUMADIN) daily dosing (placeholder), , Other, RX Placeholder  trimethobenzamide (TIGAN) injection 200 mg, 200 mg, Intramuscular, Q6H PRN  brimonidine (ALPHAGAN) 0.2 % ophthalmic solution 1 drop, 1 drop, Both Eyes, BID **AND** timolol (TIMOPTIC) 0.5 % ophthalmic solution 1 drop, 1 drop, Both Eyes, BID  0.9 % sodium chloride infusion, , Intravenous, Continuous  acetaminophen (TYLENOL) tablet 650 mg, 650 mg, Oral, Q4H PRN    ALLERGIES:  Other; Pcn [penicillins]; and Erythromycin    FAMILY PSYCHIATRIC HISTORY: Denies. SOCIAL HISTORY: Patient lives alone at home. She has no family around. Main support system is friend Jovany Arreola. 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 She has never been . No children. SUBSTANCE ABUSE HISTORY:  reports that she quit smoking about 13 years ago. Her smoking use included cigarettes. She started smoking about 47 years ago. She smoked 3.00 packs per day. She has never used smokeless tobacco. She reports that she drinks alcohol. She reports that she does not use drugs. VITALS:   Vitals:    04/20/19 0747   BP: 96/62   Pulse: 76   Resp: 16   Temp: 98.3 °F (36.8 °C)   SpO2: 96%     MENTAL STATUS EXAMINATION  White female appears much older than age. Very emaciated. Decreased activity, strength, tone. Gait not assessed. Fair eye contact. Mood depressed.  Affect congruent. Speech clear. Thoughts organized. Content void of SI, HI, paranoia, delusions, hallucinations. Orientation, concentration, recent and remote memory are grossly intact. Fund of knowledge fair. Language use fair. Insight and judgment fair. ASSESSMENT:  Depressive Disorder    PLAN & RECOMMENDATIONS: Add Remeron 15 mg at bedtime. No indication for psychiatric admission. OK to transfer to nursing facility from my standpoint.      Bety Estevez M.D. 4/20/2019 11:47 AM

## 2019-04-20 NOTE — PROGRESS NOTES
[facility] Progress Note    Admitting Date and Time: 4/17/2019  3:59 PM  Admit Dx: Hyponatremia [E87.1]  Hyponatremia [E87.1]    Subjective:  Patient is being followed for Hyponatremia [E87.1]  Hyponatremia [E87.1]     Patient awake, alert, sitting up in bed eating lunch in no acute distress  Reporting feeling better \"since she has been eating\"  Biggest c/o is weakness  Denies fevers or chills  Oz sob  Denies pain  Planning for [facility] at discharge- she has not given choices to social work yet       ROS: denies fever, chills, cp, sob, n/v, HA unless stated above.       mirtazapine  15 mg Oral Nightly    miconazole nitrate   Topical BID    escitalopram  20 mg Oral QAM    levothyroxine  25 mcg Oral QAM    simvastatin  5 mg Oral Nightly    sodium chloride flush  10 mL Intravenous 2 times per day    famotidine  20 mg Oral Daily    warfarin (COUMADIN) daily dosing (placeholder)   Other RX Placeholder    brimonidine  1 drop Both Eyes BID    And    timolol  1 drop Both Eyes BID       sodium chloride flush 10 mL PRN   magnesium hydroxide 30 mL Daily PRN   trimethobenzamide 200 mg Q6H PRN   acetaminophen 650 mg Q4H PRN        Objective:    BP 96/62   Pulse 76   Temp 98.3 °F (36.8 °C) (Oral)   Resp 16   Ht 5' 9\" (1.753 m)   Wt 111 lb 3.2 oz (50.4 kg)   SpO2 96%   BMI 16.42 kg/m²   General Appearance: alert and oriented to person, place and time   Skin: warm and dry  Head: normocephalic and atraumatic  Neck: neck supple and non tender without mass   Pulmonary/Chest: diminished throughout to auscultation   Cardiovascular: normal rate, normal S1 and S2 and no carotid bruits  Abdomen: soft, non-tender, non-distended, normal bowel sounds, no masses or organomegaly  Extremities: no cyanosis, no clubbing and no edema  Neurologic: speech normal       Recent Labs     04/18/19  0752 04/19/19  0357 04/20/19  0445   * 124* 126*   K 3.7 3.2* 4.2   CL 95* 97* 96*   CO2 24 21* 20*   BUN 9 12 11

## 2019-04-20 NOTE — CARE COORDINATION
Social Work/Discharge Planning:  Met with patient and confirmed she prefers [employer].  will make referral on Monday. Will continue to follow.   Electronically signed by RUBÉN Khoury on 4/20/2019 at 3:20 PM

## 2019-04-20 NOTE — DISCHARGE INSTR - COC
Continuity of Care Form    Patient Name: Elsa Aldridge   :  1950  MRN:  [id number]    Admit date:  2019  Discharge date:  19      Code Status Order: Full Code   Advance Directives:   Advance Care Flowsheet Documentation     Date/Time Healthcare Directive Type of Healthcare Directive Copy in [address] Agent's Name Healthcare Agent's Phone Number    [phone]  No, patient does not have an advance directive for healthcare treatment -- -- -- -- --          Admitting Physician:  Rockland Mortimer, DO  PCP: Veronica Renae DO    Discharging Nurse: [facility] Unit/Room#: 8327/6799-W  Discharging Unit Phone Number: [phone]    Emergency Contact:   Extended Emergency Contact Information  Primary Emergency Contact: Carolyne Hong[address] Phone: [phone]  Mobile Phone: [phone]  Relation: Brother/Sister  Secondary Emergency Contact: Katina Gipson   [address] Phone: [phone]  Mobile Phone: [phone]  Relation: Brother/Sister    Past Surgical History:  Past Surgical History:   Procedure Laterality Date    DENTAL SURGERY  2007    teeth extraction    UPPER GASTROINTESTINAL ENDOSCOPY N/A 2019    EGD BIOPSY performed by Kartik Vera MD at [address]         Immunization History: There is no immunization history on file for this patient.     Active Problems:  Patient Active Problem List   Diagnosis Code    Glaucoma H40.9    Macular degeneration H35.30    Depression F32.9    Hypothyroidism E03.9    Macrocytic anemia D53.9    Lactic acidosis E87.2    Atherosclerosis of abdominal aorta (HCC) I70.0    Blue toe syndrome of left lower extremity (HCC) I75.022    Critical lower limb ischemia I99.8    History of deep vein thrombosis Z86.718    Symptomatic anemia I93.2    Alcoholic cirrhosis (HCC) Y19.40    Peroneal tendinosis M76.70    PVD (peripheral vascular disease) with claudication (HCC) I73.9    Chronic deep vein thrombosis (DVT) of both lower extremities (HCC) I82.503    Hyponatremia E87.1    Acidosis E87.2    Hepatic cirrhosis (HCC) K74.60    Severe protein-calorie malnutrition (HCC) E43    Chronic deep vein thrombosis (DVT) of femoral vein of right lower extremity (HCC) I82.511    Failure to thrive in adult R62.7       Isolation/Infection:   Isolation          No Isolation            Nurse Assessment:  Last Vital Signs: BP (!) 104/55   Pulse 75   Temp 98.2 °F (36.8 °C) (Oral)   Resp 16   Ht 5' 9\" (1.753 m)   Wt 111 lb 3.2 oz (50.4 kg)   SpO2 97%   BMI 16.42 kg/m²     Last documented pain score (0-10 scale): Pain Level: 6  Last Weight:   Wt Readings from Last 1 Encounters:   04/20/19 111 lb 3.2 oz (50.4 kg)     Mental Status:  oriented and alert    IV Access:  - None    Nursing Mobility/ADLs:  Walking   Assisted  Transfer  Assisted  Bathing  Assisted  Dressing  Assisted  Toileting  Assisted  Feeding  Independent  Med Admin  Assisted  Med Delivery   whole    Wound Care Documentation and Therapy:  Wound 04/18/19 Buttocks Right; Inner (Active)   Wound Image   4/19/2019  1:31 PM   Wound Pressure Stage  2 4/19/2019  1:31 PM   Wound Length (cm) 8 cm 4/19/2019  1:31 PM   Wound Width (cm) 12 cm 4/19/2019  1:31 PM   Wound Depth (cm) 0.2 cm 4/19/2019  1:31 PM   Wound Surface Area (cm^2) 96 cm^2 4/19/2019  1:31 PM   Change in Wound Size % (l*w) -4700 4/19/2019  1:31 PM   Wound Volume (cm^3) 19.2 cm^3 4/19/2019  1:31 PM   Wound Assessment Red;Painful 4/20/2019  1:00 AM   Drainage Amount Small 4/19/2019  9:15 PM   Drainage Description Clear 4/19/2019  9:15 PM   Odor Strong 4/20/2019  1:00 AM   Conchis-wound Assessment Excoriated;Red 4/20/2019  1:00 AM   Number of days: 2       Wound 04/18/19 Buttocks Right (Active)   Wound Length (cm) 1 cm 4/18/2019  7:15 PM   Wound Width (cm) 0.5 cm 4/18/2019  7:15 PM   Wound Surface Area (cm^2) 0.5 cm^2 4/18/2019  7:15 PM Wound Assessment Painful;Red 4/20/2019  1:00 AM   Drainage Amount Scant 4/19/2019  9:15 PM   Drainage Description Clear 4/19/2019  9:15 PM   Odor Strong 4/20/2019  1:00 AM   Conchis-wound Assessment Red;Painful 4/20/2019  1:00 AM   Number of days: 2       Wound 04/19/19 Heel Right (Active)   Wound Pressure Stage  1 4/19/2019  1:31 PM   Wound Length (cm) 3 cm 4/19/2019  1:31 PM   Wound Width (cm) 3 cm 4/19/2019  1:31 PM   Wound Surface Area (cm^2) 9 cm^2 4/19/2019  1:31 PM   Wound Assessment Red 4/20/2019  1:00 AM   Number of days: 1       Wound 04/19/19 Heel Left (Active)   Wound Pressure Stage  1 4/19/2019  1:31 PM   Wound Length (cm) 3 cm 4/19/2019  1:31 PM   Wound Width (cm) 3 cm 4/19/2019  1:31 PM   Wound Surface Area (cm^2) 9 cm^2 4/19/2019  1:31 PM   Wound Assessment Red 4/20/2019  1:00 AM   Number of days: 1        Elimination:  Continence:   · Bowel: incontinent at times  · Bladder: incontinent at times  Urinary Catheter: None   Colostomy/Ileostomy/Ileal Conduit: No       Date of Last BM: 4/22/19    Intake/Output Summary (Last 24 hours) at 4/20/2019 1523  Last data filed at 4/20/2019 1451  Gross per 24 hour   Intake 740 ml   Output 775 ml   Net -35 ml     I/O last 3 completed shifts: In: 740 [P.O.:740]  Out: [address] [Urine:775]    Safety Concerns: At Risk for Falls    Impairments/Disabilities:      None    Nutrition Therapy:  Current Nutrition Therapy:   - Oral Diet:  General and no caffeine  - Oral Nutrition Supplement:  Frozen Oral  twice a day    Routes of Feeding: Oral  Liquids: Thin Liquids  Daily Fluid Restriction: yes - amount 1200  Last Modified Barium Swallow with Video (Video Swallowing Test): not done    Treatments at the Time of Hospital Discharge:   Respiratory Treatments:   Oxygen Therapy:  is not on home oxygen therapy.   Ventilator:    - No ventilator support    Rehab Therapies: Physical Therapy and Occupational Therapy  Weight Bearing Status/Restrictions: No weight bearing restirctions  Other Medical Equipment (for information only, NOT a DME order):  walker, bedside commode and hospital bed-- low air loss  Other Treatments: Do BMP/CBC/PO4/Mg++ q Mon and Thurs for 2 weeks and fax results [phone] with the wt and BP    Patient's personal belongings (please select all that are sent with patient):  clothing, cell phone,     RN SIGNATURE:  Electronically signed by Salome Daniels RN on 4/23/2019 at 3:06 PM      CASE MANAGEMENT/SOCIAL WORK SECTION    Inpatient Status Date:  4/17/2019    Readmission Risk Assessment Score:  Readmission Risk              Risk of Unplanned Readmission:        17           Discharging to Facility/ Agency   · Name: [facility]  · Address: [address] [place] [address]  · Phone: [phone]  · Fax: [phone]    Dialysis Facility (if applicable)   · Name:  · Address:  · Dialysis Schedule:  · Phone:  · Fax:    / signature: Electronically signed by RUBÉN Samson on 4/20/19 at 3:23 PM    PHYSICIAN SECTION    Prognosis: Good    Condition at Discharge: Stable    Rehab Potential (if transferring to Rehab): Good    Recommended Labs or Other Treatments After Discharge: INR    Physician Certification: I certify the above information and transfer of David Cifuentes  is necessary for the continuing treatment of the diagnosis listed and that she requires [facility] for less 30 days.      Update Admission H&P: No change in H&P    PHYSICIAN SIGNATURE:  Electronically signed by Claire Kohler MD on 4/23/19 at 2:47 PM

## 2019-04-20 NOTE — CONSULTS
Nephrology Consult Note  Patient's Name: Casa Briones  2:09 PM  4/20/2019    Nephrologist: Arnold Stewart    Reason for Consult:  Hyponatremia  Requesting Physician:  Dr. Jennifer Vo    Chief Complaint:   Weakness    History Obtained From:  patient and past medical records    History of Present Ilness:    Casa Briones is a 80 y.o. female with prior history of recurrent hyponatremia  As per the labs in the computer. Pt has a psychiatric Hx and is maintained as an outpt on mirtazaine and lexapro. She presented to the ED with leg weakness and was sent in by a neighbor when she was unable to get off her couch. Per the ED notes she was covered in urine and there was feces in the house. She states she has no appetite and is not eating well at home. She does drink ETOH but has not had a drink for the last 3 weeks. In the Ed Na+ 127 vini to 131 and then back to 124 and 126 today. Past Medical History:   Diagnosis Date    Alcoholism [facility])     heavy alcohol consumption since ~1321-7556 till early 2019    Anemia 05/2017    Blue toe syndrome of left lower extremity ([address] 5/22/2017    Cataract     Critical lower limb ischemia 5/29/2017    Depression \    [facility] 09/13/2018    R leg    Glaucoma     H/O diarrhea     History of blood transfusion 05/2017    Hypothyroidism     Macular degeneration     Murmur     PONV (postoperative nausea and vomiting)     Sciatica        Past Surgical History:   Procedure Laterality Date    DENTAL SURGERY  2007    teeth extraction    UPPER GASTROINTESTINAL ENDOSCOPY N/A 1/14/2019    EGD BIOPSY performed by Narayan Ewing MD at [facility] EXTRACTION         Family History   Problem Relation Age of Onset    Diabetes Father     Diabetes Sister         reports that she quit smoking about 13 years ago. Her smoking use included cigarettes. She started smoking about 47 years ago. She smoked 3.00 packs per day.  She has never used smokeless tobacco. She reports that she drinks alcohol. She reports that she does not use drugs. Allergies: Other; Pcn [penicillins]; and Erythromycin    Current Medications:      mirtazapine (REMERON) tablet 15 mg Nightly   warfarin (COUMADIN) tablet 2 mg Once   miconazole nitrate 2 % ointment BID   escitalopram (LEXAPRO) tablet 20 mg QAM   levothyroxine (SYNTHROID) tablet 25 mcg QAM   simvastatin (ZOCOR) tablet 5 mg Nightly   sodium chloride flush 0.9 % injection 10 mL 2 times per day   sodium chloride flush 0.9 % injection 10 mL PRN   magnesium hydroxide (MILK OF MAGNESIA) 400 MG/5ML suspension 30 mL Daily PRN   famotidine (PEPCID) tablet 20 mg Daily   warfarin (COUMADIN) daily dosing (placeholder) RX Placeholder   trimethobenzamide (TIGAN) injection 200 mg Q6H PRN   brimonidine (ALPHAGAN) 0.2 % ophthalmic solution 1 drop BID   And    timolol (TIMOPTIC) 0.5 % ophthalmic solution 1 drop BID   0.9 % sodium chloride infusion Continuous   acetaminophen (TYLENOL) tablet 650 mg Q4H PRN       Review of Systems:   Pertinent items are noted in HPI. Remainder of a complete review of systems is (-) other than stated in the HPI    Physical exam:   Constitutional:  Elderly cachetic female in NAD  Vitals:   VITALS:  BP 96/62   Pulse 76   Temp 98.3 °F (36.8 °C) (Oral)   Resp 16   Ht 5' 9\" (1.753 m)   Wt 111 lb 3.2 oz (50.4 kg)   SpO2 96%   BMI 16.42 kg/m²   24HR INTAKE/OUTPUT:    Intake/Output Summary (Last 24 hours) at 4/20/2019 1411  Last data filed at 4/20/2019 0747  Gross per 24 hour   Intake 380 ml   Output 800 ml   Net -420 ml     URINARY CATHETER OUTPUT (Candelario):  Urethral Catheter Non-latex 16 fr-Output (mL): 300 mL  DRAIN/TUBE OUTPUT:     VENT SETTINGS:     Additional Respiratory  Assessments  Pulse: 76  Resp: 16  SpO2: 96 %    Skin: no rash, turgor poor  Heent:  eomi, mmm, nc,at  Neck: no bruits or jvd noted  Cardiovascular: PMI not lat displaced S1, S2 without m/r/g  Respiratory: CTA B without w/r/r  Abdomen:  +bs, soft, nt, nd  Ext: (-) bilat lower extremity edema  Psychiatric: mood and affect flat, cr nr 2-12 grossly intact      Data:   Labs:  CBC:   Lab Results   Component Value Date    WBC 8.3 04/20/2019    RBC 2.53 04/20/2019    HGB 8.9 04/20/2019    HCT 25.3 04/20/2019    .0 04/20/2019    MCH 35.2 04/20/2019    MCHC 35.2 04/20/2019    RDW 17.8 04/20/2019     04/20/2019    MPV 9.8 04/20/2019     CBC with Differential:    Lab Results   Component Value Date    WBC 8.3 04/20/2019    RBC 2.53 04/20/2019    HGB 8.9 04/20/2019    HCT 25.3 04/20/2019     04/20/2019    .0 04/20/2019    MCH 35.2 04/20/2019    MCHC 35.2 04/20/2019    RDW 17.8 04/20/2019    NRBC 0.0 05/07/2017    METASPCT 1.0 04/17/2019    LYMPHOPCT 25.4 04/20/2019    MONOPCT 7.3 04/20/2019    BASOPCT 0.4 04/20/2019    MONOSABS 0.61 04/20/2019    LYMPHSABS 2.11 04/20/2019    EOSABS 0.03 04/20/2019    BASOSABS 0.03 04/20/2019     Hemoglobin/Hematocrit:    Lab Results   Component Value Date    HGB 8.9 04/20/2019    HCT 25.3 04/20/2019     CMP:    Lab Results   Component Value Date     04/20/2019    K 4.2 04/20/2019    K 3.0 04/17/2019    CL 96 04/20/2019    CO2 20 04/20/2019    BUN 11 04/20/2019    CREATININE 0.9 04/20/2019    GFRAA >60 04/20/2019    LABGLOM >60 04/20/2019    GLUCOSE 106 04/20/2019    PROT 4.1 04/19/2019    LABALBU 1.8 04/19/2019    CALCIUM 7.7 04/20/2019    BILITOT 1.2 04/19/2019    ALKPHOS 77 04/19/2019    AST 37 04/19/2019    ALT 15 04/19/2019     BMP:    Lab Results   Component Value Date     04/20/2019    K 4.2 04/20/2019    K 3.0 04/17/2019    CL 96 04/20/2019    CO2 20 04/20/2019    BUN 11 04/20/2019    LABALBU 1.8 04/19/2019    CREATININE 0.9 04/20/2019    CALCIUM 7.7 04/20/2019    GFRAA >60 04/20/2019    LABGLOM >60 04/20/2019    GLUCOSE 106 04/20/2019     Sodium:    Lab Results   Component Value Date     04/20/2019     BUN/Creatinine:    Lab Results   Component Value Date BUN 11 04/20/2019    CREATININE 0.9 04/20/2019     Hepatic Function Panel:    Lab Results   Component Value Date    ALKPHOS 77 04/19/2019    ALT 15 04/19/2019    AST 37 04/19/2019    PROT 4.1 04/19/2019    BILITOT 1.2 04/19/2019    BILIDIR 0.6 04/19/2019    IBILI 0.6 04/19/2019    LABALBU 1.8 04/19/2019     Albumin:    Lab Results   Component Value Date    LABALBU 1.8 04/19/2019     Calcium:    Lab Results   Component Value Date    CALCIUM 7.7 04/20/2019     Ionized Calcium:  No results found for: IONCA  Magnesium:    Lab Results   Component Value Date    MG 1.8 04/20/2019     Phosphorus:    Lab Results   Component Value Date    PHOS 4.7 05/05/2017     LDH:    Lab Results   Component Value Date     01/11/2019     Uric Acid:  No results found for: Diana Shaikh  PT/INR:    Lab Results   Component Value Date    PROTIME 32.0 04/20/2019    INR 2.8 04/20/2019     PTT:    Lab Results   Component Value Date    APTT 46.0 04/17/2019   [APTT}  Troponin:    Lab Results   Component Value Date    TROPONINI <0.01 04/17/2019     U/A:    Lab Results   Component Value Date    COLORU LEONIDAS 04/17/2019    PROTEINU Negative 04/17/2019    PHUR 6.5 04/17/2019    WBCUA NONE 04/17/2019    RBCUA NONE 04/17/2019    BACTERIA NONE 04/17/2019    CLARITYU Clear 04/17/2019    SPECGRAV <=1.005 04/17/2019    LEUKOCYTESUR Negative 04/17/2019    UROBILINOGEN >=8.0 04/17/2019    BILIRUBINUR SMALL 04/17/2019    BLOODU Negative 04/17/2019    GLUCOSEU Negative 04/17/2019     ABG:    Lab Results   Component Value Date    PH 7.426 05/05/2017    PCO2 34.0 05/05/2017    PO2 111.3 05/05/2017    HCO3 21.9 05/05/2017    BE -2.2 05/05/2017    O2SAT 98.0 05/05/2017     HgBA1c:  No results found for: LABA1C  Microalbumen/Creatinine ratio:  No components found for: RUCREAT  FLP:    Lab Results   Component Value Date    TRIG 142 05/05/2017    HDL 49 05/05/2017    LDLCALC 88 05/05/2017    LABVLDL 28 05/05/2017     TSH:    Lab Results   Component Value Date TSH 2.470 04/18/2019     VITAMIN B12: No components found for: B12  FOLATE:    Lab Results   Component Value Date    FOLATE 3.1 04/18/2019     Iron Saturation:  No components found for: PERCENTFE  FERRITIN:    Lab Results   Component Value Date    FERRITIN 1,250 04/18/2019     AMYLASE:  No results found for: AMYLASE  LIPASE:    Lab Results   Component Value Date    LIPASE 55 01/10/2019     Urine Toxicology:  No components found for: KULDEEP Borrero     Imaging:  CXR results:  4/17/2019 4:30 PM   Exam: XR CHEST PORTABLE   Number of Images: 1 view   Indication:   weakness    weakness    Comparison: Prior chest radiograph from 01/10/2019 is available       Findings:       The lungs are clear. There is hyperaeration of lungs suggesting of   chronic obstructive pulmonary disease. There is no evidence of   pulmonary infiltrate or pleural effusion.  The pulmonary vascularity   is unremarkable.  The cardiac, hilar and mediastinal silhouettes are   satisfactory. The cardiac shadow has a transverse lie. There is   uncoiling atherosclerotic change of thoracic aorta.  The bony thorax   demonstrates no gross abnormality.               Impression       Chronic obstructive pulmonary disease       No evidence of airspace consolidation or pulmonary venous congestion. 1/10/2019 1:45 PM   Exam: CT CHEST W CONTRAST   Number of Images: 458 views   Indication:   weight loss     Comparison: CTA 5/6/2017       Findings:        Scans are obtained from thoracic inlet through domes of diaphragms   during infusion of contrast.       Mediastinal windows demonstrate no mediastinal mass or adenopathy. No   pleural or pericardial fluid identified.       Pulmonary parenchymal windows demonstrate changes of significant COPD   with bullous changes bilateral upper lobes. No consolidating   infiltrate or mass identified. Tiny nodule noted on the previous exam   is again identified unchanged.  No new nodules are evident.           Impression   Changes of significant COPD; no acute process or interim   change         1/10/2019 1:45 PM   EXAM: CT ABDOMEN PELVIS W IV CONTRAST   NUMBER OF IMAGES \ views:  466   INDICATION:  weight loss    COMPARISON: None       TECHNIQUE: Axial computerized tomography sections of the abdomen and   pelvis with sagittal and coronal MPR reconstructions were obtained   from the top of the diaphragm to the pelvis. 110 mL Isovue-370   intravenously injected.       Low-dose CT  acquisition technique included one of following options;   1 . Automated exposure control, 2. Adjustment of MA and or KV   according to patient's size or 3. Use of iterative reconstruction.       FINDINGS:       THORACIC BASE: Lungs appear somewhat emphysematous. LIVER: There is fatty infiltration with areas of probable sparing in   the right lobe liver has a somewhat cirrhotic morphology with the left   lobe appearing more prominent than the right. BILIARY: Several gallstones present. Bile ducts normal caliber. PANCREAS: Unremarkable. SPLEEN: Unremarkable. ADRENALS:  Unremarkable. KIDNEYS:  Unremarkable. GI: No evidence of free air or bowel obstruction. The appendix is not   visualized. The right colon is relatively nondistended but does appear   rather thick walled and there is some mild stranding in the vicinity   of the hepatic flexure raising the question of underlying colitis. Consider colonoscopic correlation. PELVIS: Urinary bladder unremarkable. MSK: No acute osseous findings. OTHER: There is notable narrowing at the origin of the celiac artery   may relate to a karol defect. SMA widely patent. ANETA patent.               Impression   Findings somewhat suggestive of underlying colitis especially   involving the right colon. See discussion above. Fatty liver with   areas of sparing especially in the right lobe. Possible hepatic   cirrhosis. Gallstones.        Assessment  1-Hyponatremia

## 2019-04-20 NOTE — PROGRESS NOTES
Diarrhea/Dehydration (Increases)  History of stroke    Other: __________________  Other:___________________     Vitamin K or Blood product  Administration Date                    TSH:    Lab Results   Component Value Date    TSH 2.470 04/18/2019        Hepatic Function Panel:                            Lab Results   Component Value Date    ALKPHOS 77 04/19/2019    ALT 15 04/19/2019    AST 37 04/19/2019    PROT 4.1 04/19/2019    BILITOT 1.2 04/19/2019    BILIDIR 0.6 04/19/2019    IBILI 0.6 04/19/2019    LABALBU 1.8 04/19/2019       Date Warfarin Dose INR Heparin or LMWH HBG/HCT PLT Comment   4/19 2 mg 2.8 X 8.0/22.6 247    4/20 2 mg 2.8 X 8.9/25.3 303                                 Assessment and Plan:  · Phil Paez is a 80 yof admitted to the hospital on 4/17 for severe protein-calore malnutrition after coming to the ED for generalized weakness  · Pt is anticoagulated on warfarin for history of DVT. She currently follows at the [facility] anticoagulation clinic. Her most recent visit was on 3/20 with an INR of 2.2, instructed to continue schedule of 2 mg daily. When she was admitted to hospital on 4/17 her INR was supratherapeutic at 3.6, and has remained therapeutic after admission: 3.7->2.7->2.8.   · INR today = 2.8  · Warfarin 2 mg x 1 tonight  · Daily PT/INR until the INR is stable within the therapeutic range  · Pharmacist will follow and monitor/adjust dosing as necessary    Thank you for this consult,    Alis Womack, PharmD, BCPS 4/20/2019 1:21 PM   [phone]

## 2019-04-21 LAB
ALBUMIN SERPL-MCNC: 2.5 G/DL (ref 3.5–5.2)
ALP BLD-CCNC: 129 U/L (ref 35–104)
ALT SERPL-CCNC: 28 U/L (ref 0–32)
ANION GAP SERPL CALCULATED.3IONS-SCNC: 9 MMOL/L (ref 7–16)
AST SERPL-CCNC: 80 U/L (ref 0–31)
BILIRUB SERPL-MCNC: 0.7 MG/DL (ref 0–1.2)
BUN BLDV-MCNC: 12 MG/DL (ref 8–23)
CALCIUM IONIZED: 1.27 MMOL/L (ref 1.15–1.33)
CALCIUM SERPL-MCNC: 8.6 MG/DL (ref 8.6–10.2)
CHLORIDE BLD-SCNC: 98 MMOL/L (ref 98–107)
CO2: 23 MMOL/L (ref 22–29)
CORTISOL TOTAL: 8.09 MCG/DL (ref 2.68–18.4)
CREAT SERPL-MCNC: 1.1 MG/DL (ref 0.5–1)
GFR AFRICAN AMERICAN: 60
GFR NON-AFRICAN AMERICAN: 49 ML/MIN/1.73
GLUCOSE BLD-MCNC: 84 MG/DL (ref 74–99)
HCT VFR BLD CALC: 27.1 % (ref 34–48)
HEMOGLOBIN: 9.5 G/DL (ref 11.5–15.5)
INR BLD: 2.9
MAGNESIUM: 2 MG/DL (ref 1.6–2.6)
MCH RBC QN AUTO: 35.3 PG (ref 26–35)
MCHC RBC AUTO-ENTMCNC: 35.1 % (ref 32–34.5)
MCV RBC AUTO: 100.7 FL (ref 80–99.9)
PARATHYROID HORMONE INTACT: 18 PG/ML (ref 15–65)
PDW BLD-RTO: 17.9 FL (ref 11.5–15)
PHOSPHORUS: 3.4 MG/DL (ref 2.5–4.5)
PLATELET # BLD: 329 E9/L (ref 130–450)
PMV BLD AUTO: 9.5 FL (ref 7–12)
POTASSIUM SERPL-SCNC: 4.4 MMOL/L (ref 3.5–5)
PROTHROMBIN TIME: 33.1 SEC (ref 9.3–12.4)
RBC # BLD: 2.69 E12/L (ref 3.5–5.5)
SODIUM BLD-SCNC: 130 MMOL/L (ref 132–146)
VITAMIN D 25-HYDROXY: 29 NG/ML (ref 30–100)
WBC # BLD: 9.1 E9/L (ref 4.5–11.5)

## 2019-04-21 PROCEDURE — 82533 TOTAL CORTISOL: CPT

## 2019-04-21 PROCEDURE — 85610 PROTHROMBIN TIME: CPT

## 2019-04-21 PROCEDURE — 86334 IMMUNOFIX E-PHORESIS SERUM: CPT

## 2019-04-21 PROCEDURE — 84165 PROTEIN E-PHORESIS SERUM: CPT

## 2019-04-21 PROCEDURE — 85027 COMPLETE CBC AUTOMATED: CPT

## 2019-04-21 PROCEDURE — 36415 COLL VENOUS BLD VENIPUNCTURE: CPT

## 2019-04-21 PROCEDURE — 6370000000 HC RX 637 (ALT 250 FOR IP): Performed by: PHYSICIAN ASSISTANT

## 2019-04-21 PROCEDURE — 83735 ASSAY OF MAGNESIUM: CPT

## 2019-04-21 PROCEDURE — 6370000000 HC RX 637 (ALT 250 FOR IP): Performed by: INTERNAL MEDICINE

## 2019-04-21 PROCEDURE — 84166 PROTEIN E-PHORESIS/URINE/CSF: CPT

## 2019-04-21 PROCEDURE — 84100 ASSAY OF PHOSPHORUS: CPT

## 2019-04-21 PROCEDURE — APPSS30 APP SPLIT SHARED TIME 16-30 MINUTES: Performed by: PHYSICIAN ASSISTANT

## 2019-04-21 PROCEDURE — 80053 COMPREHEN METABOLIC PANEL: CPT

## 2019-04-21 PROCEDURE — 2580000003 HC RX 258: Performed by: INTERNAL MEDICINE

## 2019-04-21 PROCEDURE — 83970 ASSAY OF PARATHORMONE: CPT

## 2019-04-21 PROCEDURE — 6370000000 HC RX 637 (ALT 250 FOR IP): Performed by: PSYCHIATRY & NEUROLOGY

## 2019-04-21 PROCEDURE — 99232 SBSQ HOSP IP/OBS MODERATE 35: CPT | Performed by: INTERNAL MEDICINE

## 2019-04-21 PROCEDURE — 82330 ASSAY OF CALCIUM: CPT

## 2019-04-21 PROCEDURE — 82306 VITAMIN D 25 HYDROXY: CPT

## 2019-04-21 PROCEDURE — 2060000000 HC ICU INTERMEDIATE R&B

## 2019-04-21 RX ORDER — WARFARIN SODIUM 2 MG/1
2 TABLET ORAL
Status: COMPLETED | OUTPATIENT
Start: 2019-04-21 | End: 2019-04-21

## 2019-04-21 RX ADMIN — Medication 10 ML: at 08:45

## 2019-04-21 RX ADMIN — BRIMONIDINE TARTRATE 1 DROP: 2 SOLUTION OPHTHALMIC at 20:24

## 2019-04-21 RX ADMIN — TIMOLOL MALEATE 1 DROP: 5 SOLUTION OPHTHALMIC at 08:45

## 2019-04-21 RX ADMIN — BRIMONIDINE TARTRATE 1 DROP: 2 SOLUTION OPHTHALMIC at 08:45

## 2019-04-21 RX ADMIN — MIRTAZAPINE 15 MG: 15 TABLET, FILM COATED ORAL at 20:25

## 2019-04-21 RX ADMIN — VITAMIN D, TAB 1000IU (100/BT) 2000 UNITS: 25 TAB at 11:48

## 2019-04-21 RX ADMIN — SODIUM CHLORIDE TAB 1 GM 1 G: 1 TAB at 08:44

## 2019-04-21 RX ADMIN — SODIUM CHLORIDE TAB 1 GM 1 G: 1 TAB at 11:48

## 2019-04-21 RX ADMIN — MICONAZOLE NITRATE: 2 OINTMENT TOPICAL at 08:45

## 2019-04-21 RX ADMIN — SODIUM CHLORIDE TAB 1 GM 1 G: 1 TAB at 16:56

## 2019-04-21 RX ADMIN — TIMOLOL MALEATE 1 DROP: 5 SOLUTION OPHTHALMIC at 20:24

## 2019-04-21 RX ADMIN — WARFARIN SODIUM 2 MG: 2 TABLET ORAL at 16:56

## 2019-04-21 RX ADMIN — FAMOTIDINE 20 MG: 20 TABLET ORAL at 08:44

## 2019-04-21 RX ADMIN — ESCITALOPRAM OXALATE 20 MG: 10 TABLET, FILM COATED ORAL at 08:44

## 2019-04-21 RX ADMIN — SIMVASTATIN 5 MG: 5 TABLET, FILM COATED ORAL at 20:25

## 2019-04-21 RX ADMIN — MICONAZOLE NITRATE: 2 OINTMENT TOPICAL at 20:25

## 2019-04-21 RX ADMIN — ACETAMINOPHEN 650 MG: 325 TABLET ORAL at 08:44

## 2019-04-21 RX ADMIN — LEVOTHYROXINE SODIUM 25 MCG: 25 TABLET ORAL at 06:35

## 2019-04-21 RX ADMIN — Medication 10 ML: at 20:24

## 2019-04-21 ASSESSMENT — PAIN DESCRIPTION - PROGRESSION: CLINICAL_PROGRESSION: GRADUALLY WORSENING

## 2019-04-21 ASSESSMENT — PAIN SCALES - GENERAL
PAINLEVEL_OUTOF10: 0
PAINLEVEL_OUTOF10: 5
PAINLEVEL_OUTOF10: 0

## 2019-04-21 ASSESSMENT — PAIN DESCRIPTION - PAIN TYPE: TYPE: ACUTE PAIN

## 2019-04-21 ASSESSMENT — PAIN DESCRIPTION - FREQUENCY: FREQUENCY: INTERMITTENT

## 2019-04-21 ASSESSMENT — PAIN DESCRIPTION - DESCRIPTORS: DESCRIPTORS: DISCOMFORT;SHARP

## 2019-04-21 ASSESSMENT — PAIN DESCRIPTION - LOCATION: LOCATION: ANKLE

## 2019-04-21 ASSESSMENT — PAIN DESCRIPTION - ONSET: ONSET: UNABLE TO TELL

## 2019-04-21 ASSESSMENT — PAIN - FUNCTIONAL ASSESSMENT: PAIN_FUNCTIONAL_ASSESSMENT: PREVENTS OR INTERFERES SOME ACTIVE ACTIVITIES AND ADLS

## 2019-04-21 ASSESSMENT — PAIN DESCRIPTION - ORIENTATION: ORIENTATION: LEFT;RIGHT

## 2019-04-21 NOTE — PROGRESS NOTES
Dahiana Dhaliwal Hospitalist   Progress Note    Admitting Date and Time: 4/17/2019  3:59 PM  Admit Dx: Hyponatremia [E87.1]  Hyponatremia [E87.1]    Subjective:    Patient was admitted with Hyponatremia [E87.1]  Hyponatremia [E87.1]. Patient states that she is feeling OK today. She states that she has no interest in stopping drinking. She states that she is eating ok but has not been up out of bed. She does report a dry cough. She states that she has no other complaints. Per RN: no acute events overnight     ROS: denies fever, chills, cp, sob, n/v, HA unless stated above.      vitamin D  2,000 Units Oral Daily    warfarin  2 mg Oral Once    mirtazapine  15 mg Oral Nightly    sodium chloride  1 g Oral TID WC    miconazole nitrate   Topical BID    escitalopram  20 mg Oral QAM    levothyroxine  25 mcg Oral QAM    simvastatin  5 mg Oral Nightly    sodium chloride flush  10 mL Intravenous 2 times per day    famotidine  20 mg Oral Daily    warfarin (COUMADIN) daily dosing (placeholder)   Other RX Placeholder    brimonidine  1 drop Both Eyes BID    And    timolol  1 drop Both Eyes BID       sodium chloride flush 10 mL PRN   magnesium hydroxide 30 mL Daily PRN   trimethobenzamide 200 mg Q6H PRN   acetaminophen 650 mg Q4H PRN        Objective:    /71   Pulse 75   Temp 98.7 °F (37.1 °C) (Oral)   Resp 16   Ht 5' 9\" (1.753 m)   Wt 105 lb 8 oz (47.9 kg)   SpO2 96%   BMI 15.58 kg/m²   General Appearance: alert and oriented to person, place and time, in no acute distress and cachectic  Skin: warm and dry, no rash or erythema  Head: normocephalic and atraumatic  Eyes: extraocular eye movements intact and crusting present on juan eyes, no conjunctival erythema   ENT: hearing grossly normal bilaterally  Neck: neck supple and non tender without mass and no thyromegaly   Pulmonary/Chest: clear to auscultation bilaterally- no wheezes, rales or rhonchi, normal air movement, no respiratory distress  Cardiovascular: normal rate, regular rhythm, normal S1 and S2, no murmurs, no gallops and no JVD  Abdomen: soft, non-tender, non-distended, normal bowel sounds, no masses or organomegaly  Extremities: no cyanosis, no clubbing and no edema      Recent Labs     04/19/19  0357 04/20/19  0445 04/21/19  0350   * 126* 130*   K 3.2* 4.2 4.4   CL 97* 96* 98   CO2 21* 20* 23   BUN 12 11 12   CREATININE 0.9 0.9 1.1*   GLUCOSE 87 106* 84   CALCIUM 7.3* 7.7* 8.6       Recent Labs     04/19/19  0357 04/20/19 0445 04/21/19  0350   WBC 7.4 8.3 9.1   RBC 2.30* 2.53* 2.69*   HGB 8.0* 8.9* 9.5*   HCT 22.6* 25.3* 27.1*   MCV 98.3 100.0* 100.7*   MCH 34.8 35.2* 35.3*   MCHC 35.4* 35.2* 35.1*   RDW 17.5* 17.8* 17.9*    303 329   MPV 9.8 9.8 9.5         Radiology:   US DUP LOWER EXTREMITIES BILATERAL VENOUS   Final Result   PATENT DEEP VENOUS SYSTEM OF THE BILATERAL LOWER   EXTREMITY. NO EVIDENCE FOR DVT. The study is limited in the right superficial femoral vein distally   popliteal vein and tibioperoneal trunk vein. XR CHEST PORTABLE   Final Result      Chronic obstructive pulmonary disease      No evidence of airspace consolidation or pulmonary venous congestion. Assessment:    Active Problems:    Hyponatremia    Severe protein-calorie malnutrition (HCC)    Failure to thrive in adult  Resolved Problems:    * No resolved hospital problems. *      Plan:  1. Hypokalemia  - K 4.4 today, continue to monitor      2. Hyponatremia   - Improving at 130 today   - On 1200 ml fluid restriction      3. Hypomagnesemia  - Resolved, will continue to monitor      4. Depression   - Psych started Remeron, continue Lexapro     5. Hypothyroidism  - continue Levothyroxine     6. Hyperlipidemia   - Continue Crestor      7. H/o DVT   - INR 2.9 today  - Pharmacy following for dosing     8.  Macrocytic anemia   - Improved today at 9.5  - Nephrology ordered SPEP and UPEP  - await further recommendations     Dispo: Pt to go to [facility], social work will do referral on Monday      Electronically signed by Jaylin Swanson PA-C on 4/21/2019 at 1:31 PM

## 2019-04-21 NOTE — PROGRESS NOTES
Pharmacy Consultation Note  (Warfarin Dosing and Monitoring)    Initial consult date: 4/19/19  Consulting physician: Gurinder Wayne PA-C    Allergies: Other; Pcn [penicillins]; and Erythromycin    80 y.o. female    Ht Readings from Last 1 Encounters:   04/17/19 5' 9\" (1.753 m)     Wt Readings from Last 1 Encounters:   04/21/19 105 lb 8 oz (47.9 kg)         Warfarin Indication Target   INR Range Home Dose  (if applicable) Diet/Feeding Tube   (Enteral feeds, nutritional drinks and increased Vitamin K in diet can decrease INR)   DVT 2.0-3.0 2 mg daily General diet       x Home Med? Meds Increasing INR x Home Med?  Meds Decreasing INR     Allopurinol    Azathioprine     Amiodarone/Propafenone/Dronedarone   Carbamazepine     Androgens   Cholestyramine     Chemotherapy (BBW: Capecitabine)   Estrogen     Ciprofloxacin/Levofloxacin   Nafcillin/Dicloxacillin     Clarithromycin/Erythromycin/Azithromycin   Barbiturates      Fluconazole/Itraconazole/Voriconazole/Ketoconazole   Phenytoin (Variable)     Metronidazole   Rifampin     Phenytoin (Variable)   Steroids (Variable/Dose Dependent)   X Yes  Statins/Fenofibrate/Gemfibrozil   Sucralfate     Steroids (Variable/Dose Dependent)   Other:     Sulfamethoxazole/Trimethoprim        Tramadol         Other:      Comments regarding medication interactions:      x Diseases Affecting INR x Increased Bleeding Risk    CHF Exacerbation (Increases)  History GI Bleed/PUD    Liver Disease (Increases)  Chronic NSAID Use    Thyroid: Hyper (Increases)  Hypo (Decreases)  Chronic ASA/Antiplatelet Use (Clopidogrel/ Dipyridamole/Prasugrel/Ticagrelor)      Malignancy (Increases)  Abnormal Renal Function (dialysis, renal transplant, SCr ? 2.3 mg/dL)    X History of EtOH Abuse: Acute (Increases)   Chronic (Decreases) X Liver Function (cirrhosis, bilirubin >2x ULN with AST/ALT/AP >3x ULN)    Fever (Increases) X Age > 65 years    Acute infection (Increases)  Hypertension/Uncontrolled BP Diarrhea/Dehydration (Increases)  History of stroke    Other: __________________  Other:___________________     Vitamin K or Blood product  Administration Date                    TSH:    Lab Results   Component Value Date    TSH 2.470 04/18/2019        Hepatic Function Panel:                            Lab Results   Component Value Date    ALKPHOS 129 04/21/2019    ALT 28 04/21/2019    AST 80 04/21/2019    PROT 4.1 04/19/2019    BILITOT 0.7 04/21/2019    BILIDIR 0.6 04/19/2019    IBILI 0.6 04/19/2019    LABALBU 2.5 04/21/2019       Date Warfarin Dose INR Heparin or LMWH HBG/HCT PLT Comment   4/19 2 mg 2.8 X 8.0/22.6 247    4/20 2 mg 2.8 X 8.9/25.3 303    4/21 2 mg 2.9 X 9.5/27.1 329                        Assessment and Plan:  · Sol Menjivar is a 80 yof admitted to the hospital on 4/17 for severe protein-calore malnutrition after coming to the ED for generalized weakness  · Pt is anticoagulated on warfarin for history of DVT. She currently follows at the [facility]agulation clinic. Her most recent visit was on 3/20 with an INR of 2.2, instructed to continue schedule of 2 mg daily. When she was admitted to hospital on 4/17 her INR was supratherapeutic at 3.6, and has remained therapeutic after admission: 3.7->2.7->2.8.   · INR today = 2.9  · Warfarin 2 mg x 1 tonight  · Daily PT/INR until the INR is stable within the therapeutic range  · Pharmacist will follow and monitor/adjust dosing as necessary    Thank you for this consult,    Renita Tapia PharmD, BCPS 4/21/2019 11:43 AM   [phone]

## 2019-04-21 NOTE — PROGRESS NOTES
Nephrology Consult Note  Patient's Name: Shailesh Jhaveri  10:27 AM  4/21/2019    Nephrologist: Ute April    Reason for Consult:  Hyponatremia  Requesting Physician:  Dr. Mayi Clark    Chief Complaint:   Weakness    History Obtained From:  patient and past medical records    History of Present Ilness:    Shailesh Jhaveri is a 80 y.o. female with prior history of recurrent hyponatremia  As per the labs in the computer. Pt has a psychiatric Hx and is maintained as an outpt on mirtazaine and lexapro. She presented to the ED with leg weakness and was sent in by a neighbor when she was unable to get off her couch. Per the ED notes she was covered in urine and there was feces in the house. She states she has no appetite and is not eating well at home. She does drink ETOH but has not had a drink for the last 3 weeks. In the Ed Na+ 127 vini to 131 and then back to 124 and 126 today. 4/21/19: Pt awake alert and appropriate-sitting up in bed eating breakfast    Past Medical History:   Diagnosis Date    Alcoholism ([address]     heavy alcohol consumption since ~4025-0415 till early 2019    Anemia 05/2017    Blue toe syndrome of left lower extremity ([address] 5/22/2017    Cataract     Critical lower limb ischemia 5/29/2017    Depression \    DVT [facility]deep venous thrombosis) [facility]) 09/13/2018    R leg    Glaucoma     H/O diarrhea     History of blood transfusion 05/2017    Hypothyroidism     Macular degeneration     Murmur     PONV (postoperative nausea and vomiting)     Sciatica        Past Surgical History:   Procedure Laterality Date    DENTAL SURGERY  2007    teeth extraction    UPPER GASTROINTESTINAL ENDOSCOPY N/A 1/14/2019    EGD BIOPSY performed by Arnoldo Ni MD at [facility] 38 EXTRACTION         Family History   Problem Relation Age of Onset    Diabetes Father     Diabetes Sister         reports that she quit smoking about 13 years ago. Her smoking use included cigarettes.  She started smoking about 47 years ago. She smoked 3.00 packs per day. She has never used smokeless tobacco. She reports that she drinks alcohol. She reports that she does not use drugs. Allergies: Other; Pcn [penicillins]; and Erythromycin    Current Medications:      mirtazapine (REMERON) tablet 15 mg Nightly   sodium chloride tablet 1 g TID WC   miconazole nitrate 2 % ointment BID   escitalopram (LEXAPRO) tablet 20 mg QAM   levothyroxine (SYNTHROID) tablet 25 mcg QAM   simvastatin (ZOCOR) tablet 5 mg Nightly   sodium chloride flush 0.9 % injection 10 mL 2 times per day   sodium chloride flush 0.9 % injection 10 mL PRN   magnesium hydroxide (MILK OF MAGNESIA) 400 MG/5ML suspension 30 mL Daily PRN   famotidine (PEPCID) tablet 20 mg Daily   warfarin (COUMADIN) daily dosing (placeholder) RX Placeholder   trimethobenzamide (TIGAN) injection 200 mg Q6H PRN   brimonidine (ALPHAGAN) 0.2 % ophthalmic solution 1 drop BID   And    timolol (TIMOPTIC) 0.5 % ophthalmic solution 1 drop BID   acetaminophen (TYLENOL) tablet 650 mg Q4H PRN       Review of Systems:   Pertinent items are noted in HPI. Remainder of a complete review of systems is (-) other than stated in the HPI    Physical exam:   Constitutional:  Elderly cachetic female in NAD  Vitals:   VITALS:  /71   Pulse 75   Temp 98.7 °F (37.1 °C) (Oral)   Resp 16   Ht 5' 9\" (1.753 m)   Wt 105 lb 8 oz (47.9 kg)   SpO2 96%   BMI 15.58 kg/m²   24HR INTAKE/OUTPUT:      Intake/Output Summary (Last 24 hours) at 4/21/2019 1027  Last data filed at 4/21/2019 0503  Gross per 24 hour   Intake 360 ml   Output 950 ml   Net -590 ml     URINARY CATHETER OUTPUT ([place]):  Urethral Catheter Non-latex 16 fr-Output (mL): 450 mL  DRAIN/TUBE OUTPUT:     VENT SETTINGS:     Additional Respiratory  Assessments  Pulse: 75  Resp: 16  SpO2: 96 %    Skin: no rash, turgor poor  Heent:  eomi, mmm, nc,at  Neck: no bruits or jvd noted  Cardiovascular: PMI not lat displaced S1, S2 without m/r/g  Respiratory: CTA B without w/r/r  Abdomen:  +bs, soft, nt, nd  Ext: (-) bilat lower extremity edema  Psychiatric: mood and affect flat, cr nr 2-12 grossly intact      Data:   Labs:  CBC:   Lab Results   Component Value Date    WBC 9.1 04/21/2019    RBC 2.69 04/21/2019    HGB 9.5 04/21/2019    HCT 27.1 04/21/2019    .7 04/21/2019    MCH 35.3 04/21/2019    MCHC 35.1 04/21/2019    RDW 17.9 04/21/2019     04/21/2019    MPV 9.5 04/21/2019     CBC with Differential:    Lab Results   Component Value Date    WBC 9.1 04/21/2019    RBC 2.69 04/21/2019    HGB 9.5 04/21/2019    HCT 27.1 04/21/2019     04/21/2019    .7 04/21/2019    MCH 35.3 04/21/2019    MCHC 35.1 04/21/2019    RDW 17.9 04/21/2019    NRBC 0.0 05/07/2017    METASPCT 1.0 04/17/2019    LYMPHOPCT 25.4 04/20/2019    MONOPCT 7.3 04/20/2019    BASOPCT 0.4 04/20/2019    MONOSABS 0.61 04/20/2019    LYMPHSABS 2.11 04/20/2019    EOSABS 0.03 04/20/2019    BASOSABS 0.03 04/20/2019     Hemoglobin/Hematocrit:    Lab Results   Component Value Date    HGB 9.5 04/21/2019    HCT 27.1 04/21/2019     CMP:    Lab Results   Component Value Date     04/21/2019    K 4.4 04/21/2019    K 3.0 04/17/2019    CL 98 04/21/2019    CO2 23 04/21/2019    BUN 12 04/21/2019    CREATININE 1.1 04/21/2019    GFRAA 60 04/21/2019    LABGLOM 49 04/21/2019    GLUCOSE 84 04/21/2019    PROT 4.1 04/19/2019    LABALBU 2.5 04/21/2019    CALCIUM 8.6 04/21/2019    BILITOT 0.7 04/21/2019    ALKPHOS 129 04/21/2019    AST 80 04/21/2019    ALT 28 04/21/2019     BMP:    Lab Results   Component Value Date     04/21/2019    K 4.4 04/21/2019    K 3.0 04/17/2019    CL 98 04/21/2019    CO2 23 04/21/2019    BUN 12 04/21/2019    LABALBU 2.5 04/21/2019    CREATININE 1.1 04/21/2019    CALCIUM 8.6 04/21/2019    GFRAA 60 04/21/2019    LABGLOM 49 04/21/2019    GLUCOSE 84 04/21/2019     Sodium:    Lab Results   Component Value Date     04/21/2019     BUN/Creatinine:    Lab Results   Component Value Date    BUN 12 04/21/2019    CREATININE 1.1 04/21/2019     Hepatic Function Panel:    Lab Results   Component Value Date    ALKPHOS 129 04/21/2019    ALT 28 04/21/2019    AST 80 04/21/2019    PROT 4.1 04/19/2019    BILITOT 0.7 04/21/2019    BILIDIR 0.6 04/19/2019    IBILI 0.6 04/19/2019    LABALBU 2.5 04/21/2019     Albumin:    Lab Results   Component Value Date    LABALBU 2.5 04/21/2019     Calcium:    Lab Results   Component Value Date    CALCIUM 8.6 04/21/2019     Ionized Calcium:  No results found for: IONCA  Magnesium:    Lab Results   Component Value Date    MG 2.0 04/21/2019     Phosphorus:    Lab Results   Component Value Date    PHOS 3.4 04/21/2019     LDH:    Lab Results   Component Value Date     01/11/2019     Uric Acid:    Lab Results   Component Value Date    LABURIC 3.5 04/20/2019     PT/INR:    Lab Results   Component Value Date    PROTIME 33.1 04/21/2019    INR 2.9 04/21/2019     PTT:    Lab Results   Component Value Date    APTT 46.0 04/17/2019   [APTT}  Troponin:    Lab Results   Component Value Date    TROPONINI <0.01 04/17/2019     U/A:    Lab Results   Component Value Date    COLORU LEONIDAS 04/17/2019    PROTEINU Negative 04/17/2019    PHUR 6.5 04/17/2019    WBCUA NONE 04/17/2019    RBCUA NONE 04/17/2019    BACTERIA NONE 04/17/2019    CLARITYU Clear 04/17/2019    SPECGRAV <=1.005 04/17/2019    LEUKOCYTESUR Negative 04/17/2019    UROBILINOGEN >=8.0 04/17/2019    BILIRUBINUR SMALL 04/17/2019    BLOODU Negative 04/17/2019    GLUCOSEU Negative 04/17/2019     ABG:    Lab Results   Component Value Date    PH 7.426 05/05/2017    PCO2 34.0 05/05/2017    PO2 111.3 05/05/2017    HCO3 21.9 05/05/2017    BE -2.2 05/05/2017    O2SAT 98.0 05/05/2017     HgBA1c:  No results found for: LABA1C  Microalbumen/Creatinine ratio:  No components found for: RUCREAT  FLP:    Lab Results   Component Value Date    TRIG 142 05/05/2017    HDL 49 05/05/2017    LDLCALC 88 05/05/2017    LABVLDL 28 05/05/2017     TSH:    Lab Results   Component Value Date    TSH 2.470 04/18/2019     VITAMIN B12: No components found for: B12  FOLATE:    Lab Results   Component Value Date    FOLATE 3.1 04/18/2019     Iron Saturation:  No components found for: PERCENTFE  FERRITIN:    Lab Results   Component Value Date    FERRITIN 1,250 04/18/2019     AMYLASE:  No results found for: AMYLASE  LIPASE:    Lab Results   Component Value Date    LIPASE 55 01/10/2019     Urine Toxicology:  No components found for: KULDEEP Noriega     Imaging:  CXR results:  4/17/2019 4:30 PM   Exam: XR CHEST PORTABLE   Number of Images: 1 view   Indication:   weakness    weakness    Comparison: Prior chest radiograph from 01/10/2019 is available       Findings:       The lungs are clear. There is hyperaeration of lungs suggesting of   chronic obstructive pulmonary disease. There is no evidence of   pulmonary infiltrate or pleural effusion.  The pulmonary vascularity   is unremarkable.  The cardiac, hilar and mediastinal silhouettes are   satisfactory. The cardiac shadow has a transverse lie. There is   uncoiling atherosclerotic change of thoracic aorta.  The bony thorax   demonstrates no gross abnormality.               Impression       Chronic obstructive pulmonary disease       No evidence of airspace consolidation or pulmonary venous congestion. 1/10/2019 1:45 PM   Exam: CT CHEST W CONTRAST   Number of Images: 782 views   Indication:   weight loss     Comparison: CTA 5/6/2017       Findings:        Scans are obtained from thoracic inlet through domes of diaphragms   during infusion of contrast.       Mediastinal windows demonstrate no mediastinal mass or adenopathy. No   pleural or pericardial fluid identified.       Pulmonary parenchymal windows demonstrate changes of significant COPD   with bullous changes bilateral upper lobes. No consolidating   infiltrate or mass identified.  Tiny nodule noted on the previous exam   is again identified unchanged. No new nodules are evident.           Impression   Changes of significant COPD; no acute process or interim   change         1/10/2019 1:45 PM   EXAM: CT ABDOMEN PELVIS W IV CONTRAST   NUMBER OF IMAGES \ views:  237   INDICATION:  weight loss    COMPARISON: None       TECHNIQUE: Axial computerized tomography sections of the abdomen and   pelvis with sagittal and coronal MPR reconstructions were obtained   from the top of the diaphragm to the pelvis. 110 mL Isovue-370   intravenously injected.       Low-dose CT  acquisition technique included one of following options;   1 . Automated exposure control, 2. Adjustment of MA and or KV   according to patient's size or 3. Use of iterative reconstruction.       FINDINGS:       THORACIC BASE: Lungs appear somewhat emphysematous. LIVER: There is fatty infiltration with areas of probable sparing in   the right lobe liver has a somewhat cirrhotic morphology with the left   lobe appearing more prominent than the right. BILIARY: Several gallstones present. Bile ducts normal caliber. PANCREAS: Unremarkable. SPLEEN: Unremarkable. ADRENALS:  Unremarkable. KIDNEYS:  Unremarkable. GI: No evidence of free air or bowel obstruction. The appendix is not   visualized. The right colon is relatively nondistended but does appear   rather thick walled and there is some mild stranding in the vicinity   of the hepatic flexure raising the question of underlying colitis. Consider colonoscopic correlation. PELVIS: Urinary bladder unremarkable. MSK: No acute osseous findings. OTHER: There is notable narrowing at the origin of the celiac artery   may relate to a karol defect. SMA widely patent. ANETA patent.               Impression   Findings somewhat suggestive of underlying colitis especially   involving the right colon. See discussion above. Fatty liver with   areas of sparing especially in the right lobe.  Possible hepatic   cirrhosis. Gallstones.        Assessment  1-Hyponatremia in the setting of the SSRI escitalopram and now also on mirtazapine-Urine osm >serum osm with a Hiwot+28 and a FeNa <1% more consistent with decreased effective renal perfusion at this time, but given the chronicity believe there may still be a component SIADH with the SSRI-Na+ 130 this Am-follow    4-Oetxca-Sblelrkatg-folate 3.1 B12 470-with the hyponatremia await SPEP and a UPEP    3-Hypocalcemia with the Hypoalbuminemia- Ionized Ca++WNL, PO4 WNL, PTH 18 , Vit D 29-start supplement    4-Protein Calorie malnutrition on supplements      Thank you  for allowing us to participate in care of Porsche Carbajal  10:27 AM  4/21/2019

## 2019-04-22 LAB
ADDENDUM ELECTROPHORESIS URINE RANDOM: NORMAL
ALBUMIN SERPL-MCNC: 2.1 G/DL (ref 3.5–4.7)
ALPHA-1-GLOBULIN: 0.3 G/DL (ref 0.2–0.4)
ALPHA-2-GLOBULIN: 0.7 G/FL (ref 0.5–1)
ANION GAP SERPL CALCULATED.3IONS-SCNC: 8 MMOL/L (ref 7–16)
BETA GLOBULIN: 0.6 G/DL (ref 0.8–1.3)
BUN BLDV-MCNC: 13 MG/DL (ref 8–23)
CALCIUM IONIZED: 1.26 MMOL/L (ref 1.15–1.33)
CALCIUM SERPL-MCNC: 8.2 MG/DL (ref 8.6–10.2)
CHLORIDE BLD-SCNC: 101 MMOL/L (ref 98–107)
CO2: 22 MMOL/L (ref 22–29)
CREAT SERPL-MCNC: 1 MG/DL (ref 0.5–1)
ELECTROPHORESIS: ABNORMAL
GAMMA GLOBULIN: 1.2 G/DL (ref 0.7–1.6)
GFR AFRICAN AMERICAN: >60
GFR NON-AFRICAN AMERICAN: 55 ML/MIN/1.73
GLUCOSE BLD-MCNC: 78 MG/DL (ref 74–99)
HCT VFR BLD CALC: 26.3 % (ref 34–48)
HEMOGLOBIN: 9.3 G/DL (ref 11.5–15.5)
IMMUNOFIXATION URINE: NORMAL
INR BLD: 3
MAGNESIUM: 1.6 MG/DL (ref 1.6–2.6)
MCH RBC QN AUTO: 35.4 PG (ref 26–35)
MCHC RBC AUTO-ENTMCNC: 35.4 % (ref 32–34.5)
MCV RBC AUTO: 100 FL (ref 80–99.9)
PDW BLD-RTO: 18.2 FL (ref 11.5–15)
PHOSPHORUS: 3.8 MG/DL (ref 2.5–4.5)
PLATELET # BLD: 351 E9/L (ref 130–450)
PMV BLD AUTO: 9.2 FL (ref 7–12)
POTASSIUM SERPL-SCNC: 4.6 MMOL/L (ref 3.5–5)
PROTHROMBIN TIME: 33 SEC (ref 9.3–12.4)
RBC # BLD: 2.63 E12/L (ref 3.5–5.5)
SODIUM BLD-SCNC: 131 MMOL/L (ref 132–146)
TOTAL PROTEIN: 5 G/DL (ref 6.4–8.3)
WBC # BLD: 8.5 E9/L (ref 4.5–11.5)

## 2019-04-22 PROCEDURE — 6370000000 HC RX 637 (ALT 250 FOR IP): Performed by: PHYSICIAN ASSISTANT

## 2019-04-22 PROCEDURE — 2060000000 HC ICU INTERMEDIATE R&B

## 2019-04-22 PROCEDURE — 6370000000 HC RX 637 (ALT 250 FOR IP): Performed by: PSYCHIATRY & NEUROLOGY

## 2019-04-22 PROCEDURE — 80048 BASIC METABOLIC PNL TOTAL CA: CPT

## 2019-04-22 PROCEDURE — 85610 PROTHROMBIN TIME: CPT

## 2019-04-22 PROCEDURE — 6370000000 HC RX 637 (ALT 250 FOR IP): Performed by: INTERNAL MEDICINE

## 2019-04-22 PROCEDURE — 82330 ASSAY OF CALCIUM: CPT

## 2019-04-22 PROCEDURE — 97535 SELF CARE MNGMENT TRAINING: CPT

## 2019-04-22 PROCEDURE — 85027 COMPLETE CBC AUTOMATED: CPT

## 2019-04-22 PROCEDURE — 83735 ASSAY OF MAGNESIUM: CPT

## 2019-04-22 PROCEDURE — 97530 THERAPEUTIC ACTIVITIES: CPT

## 2019-04-22 PROCEDURE — 84100 ASSAY OF PHOSPHORUS: CPT

## 2019-04-22 PROCEDURE — 97110 THERAPEUTIC EXERCISES: CPT

## 2019-04-22 PROCEDURE — 36415 COLL VENOUS BLD VENIPUNCTURE: CPT

## 2019-04-22 PROCEDURE — 99233 SBSQ HOSP IP/OBS HIGH 50: CPT | Performed by: INTERNAL MEDICINE

## 2019-04-22 PROCEDURE — 2580000003 HC RX 258: Performed by: INTERNAL MEDICINE

## 2019-04-22 RX ORDER — WARFARIN SODIUM 1 MG/1
1 TABLET ORAL
Status: COMPLETED | OUTPATIENT
Start: 2019-04-22 | End: 2019-04-22

## 2019-04-22 RX ORDER — SODIUM CHLORIDE 1000 MG
1 TABLET, SOLUBLE MISCELLANEOUS
Qty: 90 TABLET | Refills: 3 | Status: SHIPPED | OUTPATIENT
Start: 2019-04-22 | End: 2019-07-08

## 2019-04-22 RX ADMIN — ACETAMINOPHEN 650 MG: 325 TABLET ORAL at 20:05

## 2019-04-22 RX ADMIN — SIMVASTATIN 5 MG: 5 TABLET, FILM COATED ORAL at 20:04

## 2019-04-22 RX ADMIN — TIMOLOL MALEATE 1 DROP: 5 SOLUTION OPHTHALMIC at 07:55

## 2019-04-22 RX ADMIN — SODIUM CHLORIDE TAB 1 GM 1 G: 1 TAB at 11:48

## 2019-04-22 RX ADMIN — LEVOTHYROXINE SODIUM 25 MCG: 25 TABLET ORAL at 06:04

## 2019-04-22 RX ADMIN — TIMOLOL MALEATE 1 DROP: 5 SOLUTION OPHTHALMIC at 20:04

## 2019-04-22 RX ADMIN — BRIMONIDINE TARTRATE 1 DROP: 2 SOLUTION OPHTHALMIC at 07:55

## 2019-04-22 RX ADMIN — Medication 10 ML: at 20:11

## 2019-04-22 RX ADMIN — BRIMONIDINE TARTRATE 1 DROP: 2 SOLUTION OPHTHALMIC at 20:04

## 2019-04-22 RX ADMIN — SODIUM CHLORIDE TAB 1 GM 1 G: 1 TAB at 16:51

## 2019-04-22 RX ADMIN — ACETAMINOPHEN 650 MG: 325 TABLET ORAL at 02:45

## 2019-04-22 RX ADMIN — SODIUM CHLORIDE TAB 1 GM 1 G: 1 TAB at 07:59

## 2019-04-22 RX ADMIN — MICONAZOLE NITRATE: 2 OINTMENT TOPICAL at 20:05

## 2019-04-22 RX ADMIN — VITAMIN D, TAB 1000IU (100/BT) 2000 UNITS: 25 TAB at 07:59

## 2019-04-22 RX ADMIN — Medication 10 ML: at 07:59

## 2019-04-22 RX ADMIN — FAMOTIDINE 20 MG: 20 TABLET ORAL at 07:59

## 2019-04-22 RX ADMIN — MICONAZOLE NITRATE: 2 OINTMENT TOPICAL at 07:55

## 2019-04-22 RX ADMIN — WARFARIN SODIUM 1 MG: 1 TABLET ORAL at 17:34

## 2019-04-22 RX ADMIN — ESCITALOPRAM OXALATE 20 MG: 10 TABLET, FILM COATED ORAL at 07:59

## 2019-04-22 RX ADMIN — MIRTAZAPINE 15 MG: 15 TABLET, FILM COATED ORAL at 20:04

## 2019-04-22 ASSESSMENT — PAIN DESCRIPTION - ORIENTATION
ORIENTATION: RIGHT;LEFT

## 2019-04-22 ASSESSMENT — PAIN SCALES - GENERAL
PAINLEVEL_OUTOF10: 7
PAINLEVEL_OUTOF10: 0
PAINLEVEL_OUTOF10: 8
PAINLEVEL_OUTOF10: 7
PAINLEVEL_OUTOF10: 6

## 2019-04-22 ASSESSMENT — PAIN DESCRIPTION - LOCATION
LOCATION: LEG

## 2019-04-22 ASSESSMENT — PAIN DESCRIPTION - PROGRESSION
CLINICAL_PROGRESSION: NOT CHANGED
CLINICAL_PROGRESSION: NOT CHANGED

## 2019-04-22 ASSESSMENT — PAIN - FUNCTIONAL ASSESSMENT
PAIN_FUNCTIONAL_ASSESSMENT: ACTIVITIES ARE NOT PREVENTED

## 2019-04-22 ASSESSMENT — PAIN DESCRIPTION - DESCRIPTORS
DESCRIPTORS: ACHING;DISCOMFORT

## 2019-04-22 ASSESSMENT — PAIN DESCRIPTION - PAIN TYPE
TYPE: ACUTE PAIN

## 2019-04-22 ASSESSMENT — PAIN DESCRIPTION - FREQUENCY
FREQUENCY: CONTINUOUS

## 2019-04-22 ASSESSMENT — PAIN DESCRIPTION - ONSET
ONSET: ON-GOING

## 2019-04-22 NOTE — PROGRESS NOTES
oriented x 3    Balance: standing max assist    Pt performed therapeutic exercise of the following: laq, ankle pumps    Patient education  Pt was educated on her dec flexibility and le flexibility ex     Patient response to education:   Pt verbalized understanding Pt demonstrated skill Pt requires further education in this area   x x x     Additional Comments:  Pt limited by her decrease rom. Pt has posterior lean, walks flat foot and not able to manage walker. Pt was left in chair with call light left by patient. Chair/bed alarm: nt    Time in: 1035  Time out: 1100    Pt is making  progress toward established Physical Therapy goals. Continue with physical therapy current plan of care. Danette Dubin, P.T. A.   License Number: [id number]

## 2019-04-22 NOTE — PROGRESS NOTES
Occupational Therapy  OT BEDSIDE TREATMENT NOTE      Date:2019  Patient Name: Leni Mccormack  MRN: [id number]  : 1950  Room: [address] [place] [address]     Evaluating OT: Armando Dahl OTR/L [id number]     AM-PAC Daily Activity Raw Score: 15     Recommended Adaptive Equipment: continue to assess      Diagnosis: hyponatremia. Pt presents to ED from home with generalized weakness. Pt stayed on her couch for several days unable to get up covered in feces and urine. Pertinent Medical History: limb ischemia, macular degeneration, DVT   Precautions:  Falls     Home Living: Pt lives alone per EMS in deplorable condition in a single story home with 3+1 steps on entry. Bathroom setup: tub/shower      Prior Level of Function: Mod I with ADLs, friend stops in daily; completed functional mobility no AD pt reports recently has been using the [employer] where she can in her home and use of wc for community distances. Driving: No     Pain Level:  Pt reported pain in ankles/feet.   Later denied pain when asked.                    Functional Assessment:    Initial Eval Status  Date: 19 Treatment session:  Short Term Goals  Treatment frequency: 2-5x/wk PRN x1-3 wks   Feeding Set up       Grooming SBA  min A comb hair  Set up   UB Dressing Min A  Donning/doffing hospital gown  min A  Set up   LB Dressing Max A  Donning socks seated at EOB  mod A  Min A    Bathing Mod A   Min A   Toileting Max A  Incontinent of BM upon standing at EOB   Min A   Bed Mobility  Supine to sit: Mod A       Functional Transfers STS: Max A  Strong posterior lean with max cues to correct at EOB Mod A sit to stand from bed and chair surface  Min A   Functional Mobility Mod A with [employer]  Small steps over to armchair  Mod A due to posterior lean   Min A during ADLs   Balance Sitting: fair     Standing: poor plus Poor stand balance      Activity Tolerance Poor Poor+   standing olivia x5-6 min with fair  balance during self care tasks   B UE strength 3+/5   4/5 Comments:  Pt with limited anterior trunk flexion tolerated while sitting in chair for LB ADL. Poor stand balance. Exercise: pt demonstrates functional use of UE's during activity    Education: transfer safety, walker safety     Other: limited tolerance for activity. Limited safety awareness. Assist for ADL and transfers. · Pt has made  progress towards set goals.    · Continue with current plan of care      Total Tx Time: [address]  BERYL/L [id number]

## 2019-04-22 NOTE — PROGRESS NOTES
4/22/2019  2:43 PM      Nutrition Assessment    Type and Reason for Visit: Reassess    Nutrition Recommendations: Continue current diet and ONS    Nutrition Assessment: Nutrition status remains compromised but is slightly improving with PO at meals improving at times. Clear ONS was changed to Frozen ONS, due to fluid restriction. Recommend continue same and note plan for discharge planning to [address] [place] [address]. Malnutrition Assessment:  · Malnutrition Status: Meets the criteria for severe malnutrition  · Context: Chronic illness  · Findings of the 6 clinical characteristics of malnutrition (Minimum of 2 out of 6 clinical characteristics is required to make the diagnosis of moderate or severe Protein Calorie Malnutrition based on AND/ASPEN Guidelines):  1. Energy Intake-Less than or equal to 50% of estimated energy requirement, Greater than or equal to 7 days    2. Weight Loss-No significant weight loss,    3. Fat Loss-Severe subcutaneous fat loss, Triceps  4. Muscle Loss-Severe muscle mass loss, Clavicles (pectoralis and deltoids), Calf (gastrocnemius)  5. Fluid Accumulation-No significant fluid accumulation,    6.  Strength-Not measured    Nutrition Risk Level:  Moderate    Nutrient Needs:  · Estimated Daily Total Kcal:  (MSJ x 1.25-1.35)  · Estimated Daily Protein (g): 60-70 (1.2-1.4 g/kg CBW)  · Estimated Daily Total Fluid (ml/day):  (1 ml/erlinda)    Nutrition Diagnosis:   · Problem: Severe malnutrition, In context of chronic illness  · Etiology: related to Insufficient energy/nutrient consumption     Signs and symptoms:  as evidenced by Diet history of poor intake, Intake 25-50%, Severe muscle loss, Severe loss of subcutaneous fat, Lab values    Objective Information:  · Nutrition-Focused Physical Findings: decreased appetite but some improvement, soft abdomen with +BS, -I/O, trace edema  · Wound Type: (per Wound Care RN-buttocks pressure ulcer almost resolved with only a small open area left)  · Current Nutrition Therapies:  · Oral Diet Orders: Fluid Restriction, General(No caffeine)   · Oral Diet intake: 26-50%(average - PO >50% some meals now)  · Oral Nutrition Supplement (ONS) Orders: Frozen Oral Supplement  · ONS intake: 26-50%  · Anthropometric Measures:  · Ht: 5' 9\" (175.3 cm)   · Current Body Wt: 105 lb (47.6 kg)(4/22 bedwt - fairly stable/down 1#)  · Admission Body Wt: 103 lb (46.7 kg)(first wt this admit)  · Usual Body Wt: 111 lb (50.3 kg)(per EMR hx x 6 months)  · % Weight Change:  ,  7% loss in 6 months   · Ideal Body Wt: 145 lb (65.8 kg), % Ideal Body 72%  · BMI Classification: BMI <18.5 Underweight    Nutrition Interventions:   Continue current diet, Continue current ONS  Continued Inpatient Monitoring, Education Initiated, Coordination of Community Care, No recommendations at this time    Nutrition Evaluation:   · Evaluation: Progressing toward goals   · Goals: PO at least 50-75% at meals/ONS, stable dry wt, promote wound healing    · Monitoring: Meal Intake, Supplement Intake, Skin Integrity, Wound Healing, I&O, Weight, Pertinent Labs, Monitor Bowel Function, Monitor Hemodynamic Status      Electronically signed by Dian Ingram RD, CNSC, LD on 4/22/19 at 2:44 PM    Contact Number: [phone]

## 2019-04-22 NOTE — CARE COORDINATION
Social Work/Discharge Planning:  Terry Freeman from [facility] initiated pre-cert. Will continue to follow and wait for pre-cert.   Electronically signed by RUBÉN Jacobo on 4/22/2019 at 1:55 PM

## 2019-04-22 NOTE — DISCHARGE SUMMARY
[facility] Physician Discharge Summary       [facility] [address]  [facility] [address] [place] [address]  [phone]    Schedule an appointment as soon as possible for a visit      Chinmay Hughes MD  [address], [address]  [address] [facility]  [phone]    Schedule an appointment as soon as possible for a visit      Melida Brannon MD  [address] [place] [address]  [phone]    Schedule an appointment as soon as possible for a visit        Activity level: as tolerated    Diet: DIET GENERAL; Daily Fluid Restriction: 1200 ml; No Caffeine  Dietary Nutrition Supplements: Frozen Oral Supplement    Dispo: [facility]     Condition on discharge: stable    Patient ID:  Diana Haskins  [id number]  80 y.o.  1950    Admit date: 4/17/2019    Discharge date and time:  4/23/2019  4:02 PM    Admission Diagnoses: Active Problems:    Hypothyroidism (acquired)    Hyponatremia    Severe protein-calorie malnutrition (HCC)    Failure to thrive in adult    Generalized weakness    Flu syndrome    Elevated lactic acid level    Hypokalemia    Hypomagnesemia    Alcohol abuse    Peripheral edema    Protein-calorie malnutrition (HCC)    Elevated liver enzymes    Hyperlipidemia  Resolved Problems:    * No resolved hospital problems. *      Discharge Diagnoses: Active Problems:    Hypothyroidism (acquired)    Hyponatremia    Severe protein-calorie malnutrition (HCC)    Failure to thrive in adult    Generalized weakness    Flu syndrome    Elevated lactic acid level    Hypokalemia    Hypomagnesemia    Alcohol abuse    Peripheral edema    Protein-calorie malnutrition (HCC)    Elevated liver enzymes    Hyperlipidemia  Resolved Problems:    * No resolved hospital problems.  *      Consults:  IP CONSULT TO SOCIAL WORK  IP CONSULT TO SOCIAL WORK  IP CONSULT TO DIETITIAN  IP CONSULT TO DIETITIAN  IP CONSULT TO PSYCHIATRY  IP CONSULT TO PHARMACY  IP CONSULT TO NEPHROLOGY      Hospital Course:   Patient Dylan Melendez is a 80 y.o. presented with generalized weakness, found to have Hyponatremia [E87.1]  Hyponatremia [E87.1]      1. Generalized weakness, thought to be due to electrolyte derangement, with hyponatremia and hypokalemia, hyponatremia improved and hypokalemia resolved, appreciate PT and OT input, the patient will be discharge MANGO stable  2. Hyponatremia, thought ot be due to psych meds, currently NA at 137, resolved. 3.  Hypokalemia resolved. 4.  Hx of depression, started on remeron by psych and continued on lexapro. 5.  Hx of hypothyroidism. Continue levothyroxine. 6.  HLD, continue on crestor. 7.  Hx of DVT, on warfarin, continue, INR is at 2.5  8. Anemia, macrocytic, hg is stable around baseline at 9.3        Discharge Exam:    General Appearance: alert and oriented to person, place and time and in no acute distress  Skin: warm and dry  Head: normocephalic and atraumatic  Eyes: pupils equal, round, and reactive to light, extraocular eye movements intact, conjunctivae normal  Neck: neck supple and non tender without mass   Pulmonary/Chest: clear to auscultation bilaterally- no wheezes, rales or rhonchi, normal air movement, no respiratory distress  Cardiovascular: normal rate, normal S1 and S2 and no carotid bruits  Abdomen: soft, non-tender, non-distended, normal bowel sounds, no masses or organomegaly  Extremities: no cyanosis, no clubbing and no edema  Neurologic: no cranial nerve deficit and speech normal        I/O last 3 completed shifts: In: 480 [P.O.:480]  Out: 250 [Urine:250]  No intake/output data recorded.       LABS:  Recent Labs     04/21/19  0350 04/22/19  0255 04/23/19  0430   * 131* 137   K 4.4 4.6 4.5   CL 98 101 105   CO2 23 22 24   BUN 12 13 14   CREATININE 1.1* 1.0 1.1*   GLUCOSE 84 78 79   CALCIUM 8.6 8.2* 8.5*       Recent Labs     04/21/19  0350 04/22/19  0255   WBC 9.1 8. 5   RBC 2.69* 2.63*   HGB 9.5* 9.3*   HCT 27.1* 26.3*   .7* 100.0*   MCH 35.3* 35.4*   MCHC 35.1* 35.4*   RDW 17.9* 18.2*    351   MPV 9.5 9.2       No results for input(s): POCGLU in the last 72 hours. Imaging:  Xr Chest Portable    Result Date: 2019  Patient MRN: [id number] : 1950 Age:  80 years Gender: Female Order Date: 2019 4:30 PM Exam: XR CHEST PORTABLE Number of Images: 1 view Indication:   weakness weakness Comparison: Prior chest radiograph from 01/10/2019 is available Findings: The lungs are clear. There is hyperaeration of lungs suggesting of chronic obstructive pulmonary disease. There is no evidence of pulmonary infiltrate or pleural effusion. The pulmonary vascularity is unremarkable. The cardiac, hilar and mediastinal silhouettes are satisfactory. The cardiac shadow has a transverse lie. There is uncoiling atherosclerotic change of thoracic aorta. The bony thorax demonstrates no gross abnormality. Chronic obstructive pulmonary disease No evidence of airspace consolidation or pulmonary venous congestion. Us Dup Lower Extremities Bilateral Venous    Result Date: 2019  Patient MRN:  [id number] : 1950 Age: 80 years Gender: Female Order Date:  2019 4:30 PM EXAM: US DUP LOWER EXTREMITIES BILATERAL VENOUS NUMBER OF IMAGES:  40 INDICATION:  swelling, pain; hx of DVT Rule out DVT swelling, pain; hx of DVT COMPARISON: None TECHNIQUE:Multiple gray scale and color Doppler images were obtained of the deep venous system of the bilateral  lower extremity. Doppler wave forms and augmentation were performed. FINDINGS: There is patency of the bilateral external iliac veins. There is normal compressibility, phasic flow pattern, and augmentation demonstrated for the bilateral common femoral, superficial femoral, and popliteal veins. There is patency of the veins in the calf.   The augmentation of the right superficial femoral vein and popliteal vein and tibioperoneal trunk demonstrate flow seen. PATENT DEEP VENOUS SYSTEM OF THE BILATERAL LOWER EXTREMITY. NO EVIDENCE FOR DVT. The study is limited in the right superficial femoral vein distally popliteal vein and tibioperoneal trunk vein. Patient Instructions:      Medication List      START taking these medications    miconazole nitrate 2 % Oint  Apply topically 2 times daily     sodium chloride 1 g tablet  Take 1 tablet by mouth 3 times daily (with meals)     vitamin D 1000 UNIT Tabs tablet  Commonly known as:  CHOLECALCIFEROL  Take 2 tablets by mouth daily        CONTINUE taking these medications    COMBIGAN 0.2-0.5 % ophthalmic solution  Generic drug:  brimonidine-timolol     escitalopram 20 MG tablet  Commonly known as:  LEXAPRO     levothyroxine 25 MCG tablet  Commonly known as:  SYNTHROID     potassium chloride 20 MEQ extended release tablet  Commonly known as:  KLOR-CON M  Take 2 tablets by mouth daily     simvastatin 5 MG tablet  Commonly known as:  ZOCOR  Take 1 tablet by mouth nightly     warfarin 2 MG tablet  Commonly known as:  COUMADIN  Take as directed. If you are unsure how to take this medication, talk to your nurse or doctor.         STOP taking these medications    furosemide 20 MG tablet  Commonly known as:  LASIX           Where to Get Your Medications      These medications were sent to [place] [address]    Phone:  [phone]   · miconazole nitrate 2 % Oint  · sodium chloride 1 g tablet  · vitamin D 1000 UNIT Tabs tablet           Note that more than 30 minutes was spent in preparing discharge papers, discussing discharge with patient, medication review, etc.    Signed:  Electronically signed by Joy Gutierrez MD on 4/23/2019 at 4:02 PM

## 2019-04-22 NOTE — CARE COORDINATION
Social Work/Discharge Planning:  Met with patient and confirmed she prefers [facility]. Referral made to liajethro Sanchez at [facility] and facility will review patient information. Will continue to follow.   Electronically signed by RUBÉN Lopez on 4/22/2019 at 9:59 AM

## 2019-04-22 NOTE — CARE COORDINATION
Social Work/Discharge Planning:  [facility] from [facility] facility can accept patient and will start pre-cert once therapy is noted. Notified patient of facility acceptance pending pre-cert. HENS completed, electronic N-17 in Epic and Ambulette form in soft chart. Will continue to follow and wait for pre-cert.  Electronically signed by RUBÉN Villalba on 4/22/2019 at 11:16 AM

## 2019-04-22 NOTE — PROGRESS NOTES
Nephrology Consult Note  Patient's Name: Don Grover  12:21 PM  4/22/2019    Nephrologist: Albin Segundo    Reason for Consult:  Hyponatremia  Requesting Physician:  Dr. Chauncey Briones    Chief Complaint:   Weakness    History Obtained From:  patient and past medical records    History of Present Ilness:    Don Grover is a 80 y.o. female with prior history of recurrent hyponatremia  As per the labs in the computer. Pt has a psychiatric Hx and is maintained as an outpt on mirtazaine and lexapro. She presented to the ED with leg weakness and was sent in by a neighbor when she was unable to get off her couch. Per the ED notes she was covered in urine and there was feces in the house. She states she has no appetite and is not eating well at home. She does drink ETOH but has not had a drink for the last 3 weeks. In the Ed Na+ 127 vini to 131 and then back to 124 and 126 today. 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 ([address] 5/22/2017    Cataract     Critical lower limb ischemia 5/29/2017    Depression \    D[facility]) 09/13/2018    R leg    Glaucoma     H/O diarrhea     History of blood transfusion 05/2017    Hypothyroidism     Macular degeneration     Murmur     PONV (postoperative nausea and vomiting)     Sciatica        Past Surgical History:   Procedure Laterality Date    DENTAL SURGERY  2007    teeth extraction    UPPER GASTROINTESTINAL ENDOSCOPY N/A 1/14/2019    EGD BIOPSY performed by Rupa Carlson MD at [facility] 38 EXTRACTION         Family History   Problem Relation Age of Onset    Diabetes Father     Diabetes Sister         reports that she quit smoking about 13 years ago. Her smoking use included cigarettes.  She started smoking about 47 years ago. She smoked 3.00 packs per day. She has never used smokeless tobacco. She reports that she drinks alcohol. She reports that she does not use drugs. Allergies: Other; Pcn [penicillins]; and Erythromycin    Current Medications:      vitamin D (CHOLECALCIFEROL) tablet 2,000 Units Daily   mirtazapine (REMERON) tablet 15 mg Nightly   sodium chloride tablet 1 g TID WC   miconazole nitrate 2 % ointment BID   escitalopram (LEXAPRO) tablet 20 mg QAM   levothyroxine (SYNTHROID) tablet 25 mcg QAM   simvastatin (ZOCOR) tablet 5 mg Nightly   sodium chloride flush 0.9 % injection 10 mL 2 times per day   sodium chloride flush 0.9 % injection 10 mL PRN   magnesium hydroxide (MILK OF MAGNESIA) 400 MG/5ML suspension 30 mL Daily PRN   famotidine (PEPCID) tablet 20 mg Daily   warfarin (COUMADIN) daily dosing (placeholder) RX Placeholder   trimethobenzamide (TIGAN) injection 200 mg Q6H PRN   brimonidine (ALPHAGAN) 0.2 % ophthalmic solution 1 drop BID   And    timolol (TIMOPTIC) 0.5 % ophthalmic solution 1 drop BID   acetaminophen (TYLENOL) tablet 650 mg Q4H PRN       Review of Systems:   Pertinent items are noted in HPI. Remainder of a complete review of systems is (-) other than stated in the HPI    Physical exam:   Constitutional:  Elderly cachetic female in NAD  Vitals:   VITALS:  BP (!) 120/55   Pulse 69   Temp 97.7 °F (36.5 °C) (Oral)   Resp 18   Ht 5' 9\" (1.753 m)   Wt 105 lb 12.8 oz (48 kg)   SpO2 95%   BMI 15.62 kg/m²   24HR INTAKE/OUTPUT:      Intake/Output Summary (Last 24 hours) at 4/22/2019 1221  Last data filed at 4/22/2019 1152  Gross per 24 hour   Intake 480 ml   Output 1375 ml   Net -895 ml     URINARY CATHETER OUTPUT ([place]):  [REMOVED] Urethral Catheter Non-latex 16 fr-Output (mL): 300 mL  DRAIN/TUBE OUTPUT:     VENT SETTINGS:     Additional Respiratory  Assessments  Pulse: 69  Resp: 18  SpO2: 95 %    Skin: no rash, turgor poor  Heent:  eomi, mmm, nc,at  Neck: no bruits or jvd noted  Cardiovascular: PMI not lat displaced S1, S2 without m/r/g  Respiratory: CTA B without w/r/r  Abdomen:  +bs, soft, nt, nd  Ext: (-) bilat lower extremity edema  Psychiatric: mood and affect flat, cr nr 2-12 grossly intact      Data:   Labs:  CBC:   Lab Results   Component Value Date    WBC 8.5 04/22/2019    RBC 2.63 04/22/2019    HGB 9.3 04/22/2019    HCT 26.3 04/22/2019    .0 04/22/2019    MCH 35.4 04/22/2019    MCHC 35.4 04/22/2019    RDW 18.2 04/22/2019     04/22/2019    MPV 9.2 04/22/2019     CBC with Differential:    Lab Results   Component Value Date    WBC 8.5 04/22/2019    RBC 2.63 04/22/2019    HGB 9.3 04/22/2019    HCT 26.3 04/22/2019     04/22/2019    .0 04/22/2019    MCH 35.4 04/22/2019    MCHC 35.4 04/22/2019    RDW 18.2 04/22/2019    NRBC 0.0 05/07/2017    METASPCT 1.0 04/17/2019    LYMPHOPCT 25.4 04/20/2019    MONOPCT 7.3 04/20/2019    BASOPCT 0.4 04/20/2019    MONOSABS 0.61 04/20/2019    LYMPHSABS 2.11 04/20/2019    EOSABS 0.03 04/20/2019    BASOSABS 0.03 04/20/2019     Hemoglobin/Hematocrit:    Lab Results   Component Value Date    HGB 9.3 04/22/2019    HCT 26.3 04/22/2019     CMP:    Lab Results   Component Value Date     04/22/2019    K 4.6 04/22/2019    K 3.0 04/17/2019     04/22/2019    CO2 22 04/22/2019    BUN 13 04/22/2019    CREATININE 1.0 04/22/2019    GFRAA >60 04/22/2019    LABGLOM 55 04/22/2019    GLUCOSE 78 04/22/2019    PROT 4.1 04/19/2019    LABALBU 2.5 04/21/2019    CALCIUM 8.2 04/22/2019    BILITOT 0.7 04/21/2019    ALKPHOS 129 04/21/2019    AST 80 04/21/2019    ALT 28 04/21/2019     BMP:    Lab Results   Component Value Date     04/22/2019    K 4.6 04/22/2019    K 3.0 04/17/2019     04/22/2019    CO2 22 04/22/2019    BUN 13 04/22/2019    LABALBU 2.5 04/21/2019    CREATININE 1.0 04/22/2019    CALCIUM 8.2 04/22/2019    GFRAA >60 04/22/2019    LABGLOM 55 04/22/2019    GLUCOSE 78 04/22/2019     Sodium:    Lab Results Component Value Date     04/22/2019     BUN/Creatinine:    Lab Results   Component Value Date    BUN 13 04/22/2019    CREATININE 1.0 04/22/2019     Hepatic Function Panel:    Lab Results   Component Value Date    ALKPHOS 129 04/21/2019    ALT 28 04/21/2019    AST 80 04/21/2019    PROT 4.1 04/19/2019    BILITOT 0.7 04/21/2019    BILIDIR 0.6 04/19/2019    IBILI 0.6 04/19/2019    LABALBU 2.5 04/21/2019     Albumin:    Lab Results   Component Value Date    LABALBU 2.5 04/21/2019     Calcium:    Lab Results   Component Value Date    CALCIUM 8.2 04/22/2019     Ionized Calcium:  No results found for: IONCA  Magnesium:    Lab Results   Component Value Date    MG 1.6 04/22/2019     Phosphorus:    Lab Results   Component Value Date    PHOS 3.8 04/22/2019     LDH:    Lab Results   Component Value Date     01/11/2019     Uric Acid:    Lab Results   Component Value Date    LABURIC 3.5 04/20/2019     PT/INR:    Lab Results   Component Value Date    PROTIME 33.0 04/22/2019    INR 3.0 04/22/2019     PTT:    Lab Results   Component Value Date    APTT 46.0 04/17/2019   [APTT}  Troponin:    Lab Results   Component Value Date    TROPONINI <0.01 04/17/2019     U/A:    Lab Results   Component Value Date    COLORU LEONIDAS 04/17/2019    PROTEINU Negative 04/17/2019    PHUR 6.5 04/17/2019    WBCUA NONE 04/17/2019    RBCUA NONE 04/17/2019    BACTERIA NONE 04/17/2019    CLARITYU Clear 04/17/2019    SPECGRAV <=1.005 04/17/2019    LEUKOCYTESUR Negative 04/17/2019    UROBILINOGEN >=8.0 04/17/2019    BILIRUBINUR SMALL 04/17/2019    BLOODU Negative 04/17/2019    GLUCOSEU Negative 04/17/2019     ABG:    Lab Results   Component Value Date    PH 7.426 05/05/2017    PCO2 34.0 05/05/2017    PO2 111.3 05/05/2017    HCO3 21.9 05/05/2017    BE -2.2 05/05/2017    O2SAT 98.0 05/05/2017     HgBA1c:  No results found for: LABA1C  Microalbumen/Creatinine ratio:  No components found for: RUCREAT  FLP:    Lab Results   Component Value Date    TRIG 142 05/05/2017    HDL 49 05/05/2017    LDLCALC 88 05/05/2017    LABVLDL 28 05/05/2017     TSH:    Lab Results   Component Value Date    TSH 2.470 04/18/2019     VITAMIN B12: No components found for: B12  FOLATE:    Lab Results   Component Value Date    FOLATE 3.1 04/18/2019     Iron Saturation:  No components found for: PERCENTFE  FERRITIN:    Lab Results   Component Value Date    FERRITIN 1,250 04/18/2019     AMYLASE:  No results found for: AMYLASE  LIPASE:    Lab Results   Component Value Date    LIPASE 55 01/10/2019     Urine Toxicology:  No components found for: Daniel MaritzaKULDEEP     Imaging:  CXR results:  4/17/2019 4:30 PM   Exam: XR CHEST PORTABLE   Number of Images: 1 view   Indication:   weakness    weakness    Comparison: Prior chest radiograph from 01/10/2019 is available       Findings:       The lungs are clear. There is hyperaeration of lungs suggesting of   chronic obstructive pulmonary disease. There is no evidence of   pulmonary infiltrate or pleural effusion.  The pulmonary vascularity   is unremarkable.  The cardiac, hilar and mediastinal silhouettes are   satisfactory. The cardiac shadow has a transverse lie. There is   uncoiling atherosclerotic change of thoracic aorta.  The bony thorax   demonstrates no gross abnormality.               Impression       Chronic obstructive pulmonary disease       No evidence of airspace consolidation or pulmonary venous congestion. 1/10/2019 1:45 PM   Exam: CT CHEST W CONTRAST   Number of Images: 775 views   Indication:   weight loss     Comparison: CTA 5/6/2017       Findings:        Scans are obtained from thoracic inlet through domes of diaphragms   during infusion of contrast.       Mediastinal windows demonstrate no mediastinal mass or adenopathy.  No   pleural or pericardial fluid identified.       Pulmonary parenchymal windows demonstrate changes of significant COPD   with bullous changes bilateral upper lobes. No consolidating   infiltrate or mass identified. Tiny nodule noted on the previous exam   is again identified unchanged. No new nodules are evident.           Impression   Changes of significant COPD; no acute process or interim   change         1/10/2019 1:45 PM   EXAM: CT ABDOMEN PELVIS W IV CONTRAST   NUMBER OF IMAGES \ views:  560   INDICATION:  weight loss    COMPARISON: None       TECHNIQUE: Axial computerized tomography sections of the abdomen and   pelvis with sagittal and coronal MPR reconstructions were obtained   from the top of the diaphragm to the pelvis. 110 mL Isovue-370   intravenously injected.       Low-dose CT  acquisition technique included one of following options;   1 . Automated exposure control, 2. Adjustment of MA and or KV   according to patient's size or 3. Use of iterative reconstruction.       FINDINGS:       THORACIC BASE: Lungs appear somewhat emphysematous. LIVER: There is fatty infiltration with areas of probable sparing in   the right lobe liver has a somewhat cirrhotic morphology with the left   lobe appearing more prominent than the right. BILIARY: Several gallstones present. Bile ducts normal caliber. PANCREAS: Unremarkable. SPLEEN: Unremarkable. ADRENALS:  Unremarkable. KIDNEYS:  Unremarkable. GI: No evidence of free air or bowel obstruction. The appendix is not   visualized. The right colon is relatively nondistended but does appear   rather thick walled and there is some mild stranding in the vicinity   of the hepatic flexure raising the question of underlying colitis. Consider colonoscopic correlation. PELVIS: Urinary bladder unremarkable. MSK: No acute osseous findings. OTHER: There is notable narrowing at the origin of the celiac artery   may relate to a karol defect. SMA widely patent. ANETA patent.               Impression   Findings somewhat suggestive of underlying colitis especially   involving the right colon. See discussion above.  Fatty liver with   areas of sparing especially in the right lobe. Possible hepatic   cirrhosis. Gallstones.        Assessment  1-Hyponatremia in the setting of the SSRI escitalopram and now also on mirtazapine-Urine osm >serum osm with a Hiwot+28 and a FeNa <1% more consistent with decreased effective renal perfusion at this time, but given the chronicity believe there may still be a component SIADH with the SSRI-Na+ 131 this Am-follow    1-Njwoti-Tivfyrfvmu-folate 3.1 B12 470-with the hyponatremia await SPEP and a UPEP    3-Hypocalcemia with the Hypoalbuminemia- Ionized Ca++WNL, PO4 WNL, PTH 18 , Vit D 29-started supplement    4-Protein Calorie malnutrition on supplements    OK for D/C from a renal perspective-will follow Na+ as an outpt  Thank you  for allowing us to participate in care of [facility]  12:21 PM  4/22/2019

## 2019-04-22 NOTE — PATIENT CARE CONFERENCE
[facility] Quality Flow/Interdisciplinary Rounds Progress Note        Quality Flow Rounds held on April 22, 2019    Disciplines Attending:  Bedside Nurse, ,  and Nursing Unit Leadership    David Barker was admitted on 4/17/2019  3:59 PM    Anticipated Discharge Date:  Expected Discharge Date: 04/20/19    Disposition:    Ba Score:  Ba Scale Score: 14    Readmission Risk              Risk of Unplanned Readmission:        19           Discussed patient goal for the day, patient clinical progression, and barriers to discharge.   The following Goal(s) of the Day/Commitment(s) have been identified:  Monitor labs, intake, and output, increase activity, referral made to [address] [place] [address] Road- discharge planning      Ena Dasilva  April 22, 2019

## 2019-04-22 NOTE — PLAN OF CARE
Problem: Inadequate oral food/beverage intake (NI-2.1)  Goal: Food and/or Nutrient Delivery  Continue current Frozen ONS and current diet  Description  Individualized approach for food/nutrient provision.   Outcome: Not Met This Shift

## 2019-04-22 NOTE — PROGRESS NOTES
Wound / ostomy dept follow-up for skin assessment. The R buttock pressure injury is almost resolved. One small 2 x 2 x 0.1 cm area is left open. Heels intact. She is for possible discharge to a rehab today. Emotional support given to Pt.

## 2019-04-22 NOTE — PROGRESS NOTES
[facility] Progress Note    Admitting Date and Time: 4/17/2019  3:59 PM  Admit Dx: Hyponatremia [E87.1]  Hyponatremia [E87.1]    Subjective:  Patient is being followed for Hyponatremia [E87.1]  Hyponatremia [E87.1]   Pt feels tired and weak      ROS: denies fever, chills, cp, sob, n/v, HA unless stated above.       vitamin D  2,000 Units Oral Daily    mirtazapine  15 mg Oral Nightly    sodium chloride  1 g Oral TID WC    miconazole nitrate   Topical BID    escitalopram  20 mg Oral QAM    levothyroxine  25 mcg Oral QAM    simvastatin  5 mg Oral Nightly    sodium chloride flush  10 mL Intravenous 2 times per day    famotidine  20 mg Oral Daily    warfarin (COUMADIN) daily dosing (placeholder)   Other RX Placeholder    brimonidine  1 drop Both Eyes BID    And    timolol  1 drop Both Eyes BID       sodium chloride flush 10 mL PRN   magnesium hydroxide 30 mL Daily PRN   trimethobenzamide 200 mg Q6H PRN   acetaminophen 650 mg Q4H PRN        Objective:    BP (!) 120/55   Pulse 69   Temp 97.7 °F (36.5 °C) (Oral)   Resp 18   Ht 5' 9\" (1.753 m)   Wt 105 lb 12.8 oz (48 kg)   SpO2 95%   BMI 15.62 kg/m²     General Appearance: alert and oriented to person, place and time and in no acute distress  Skin: warm and dry  Head: normocephalic and atraumatic  Eyes: pupils equal, round, and reactive to light, extraocular eye movements intact, conjunctivae normal  Neck: neck supple and non tender without mass   Pulmonary/Chest: clear to auscultation bilaterally- no wheezes, rales or rhonchi, normal air movement, no respiratory distress  Cardiovascular: normal rate, normal S1 and S2 and no carotid bruits  Abdomen: soft, non-tender, non-distended, normal bowel sounds, no masses or organomegaly  Extremities: no cyanosis, no clubbing and no edema  Neurologic: no cranial nerve deficit and speech normal        Recent Labs     04/20/19  0445 04/21/19  0350 04/22/19  0255   * 130* 131*   K 4.2 4.4 4.6 CL 96* 98 101   CO2 20* 23 22   BUN 11 12 13   CREATININE 0.9 1.1* 1.0   GLUCOSE 106* 84 78   CALCIUM 7.7* 8.6 8.2*       Recent Labs     04/20/19  0445 04/21/19  0350 04/22/19  0255   WBC 8.3 9.1 8.5   RBC 2.53* 2.69* 2.63*   HGB 8.9* 9.5* 9.3*   HCT 25.3* 27.1* 26.3*   .0* 100.7* 100.0*   MCH 35.2* 35.3* 35.4*   MCHC 35.2* 35.1* 35.4*   RDW 17.8* 17.9* 18.2*    329 351   MPV 9.8 9.5 9.2         Assessment:    Active Problems:    Hyponatremia    Severe protein-calorie malnutrition (HCC)    Failure to thrive in adult  Resolved Problems:    * No resolved hospital problems. *      Plan:  1. Generalized weakness, thought to be due to electrolyte derangement, with hyponatremia and hypokalemia, hyponatremia improved and hypokalemia resolved, appreciate PT and OT input, the patient will need MANGO. 2.  Hyponatremia, thought ot be due to psych meds, currently NA at 131, acceptable. 3.  Hypokalemia resolved. 4.  Hx of depression, started on remeron by psych and continued on lexapro. 5.  Hx of hypothyroidism. Continue levothyroxine. 6.  HLD, continue on crestor. 7.  Hx of DVT, on warfarin, continue, INR is at 3        NOTE: This report was transcribed using voice recognition software. Every effort was made to ensure accuracy; however, inadvertent computerized transcription errors may be present.   Electronically signed by Joy Gutierrez MD on 4/22/2019 at 10:44 AM

## 2019-04-22 NOTE — PROGRESS NOTES
Pharmacy Consultation Note  (Warfarin Dosing and Monitoring)    Initial consult date: 4/19/19  Consulting physician: Luli Lazo PA-C    Allergies: Other; Pcn [penicillins]; and Erythromycin    80 y.o. female    Ht Readings from Last 1 Encounters:   04/17/19 5' 9\" (1.753 m)     Wt Readings from Last 1 Encounters:   04/22/19 105 lb 12.8 oz (48 kg)         Warfarin Indication Target   INR [place] Dose  (if applicable) Diet/Feeding Tube   (Enteral feeds, nutritional drinks and increased Vitamin K in diet can decrease INR)   DVT 2.0-3.0 2 mg daily General diet       x Home Med? Meds Increasing INR x Home Med?  Meds Decreasing INR     Allopurinol    Azathioprine     Amiodarone/Propafenone/Dronedarone   Carbamazepine     Androgens   Cholestyramine     Chemotherapy (BBW: Capecitabine)   Estrogen     Ciprofloxacin/Levofloxacin   Nafcillin/Dicloxacillin     Clarithromycin/Erythromycin/Azithromycin   Barbiturates      Fluconazole/Itraconazole/Voriconazole/Ketoconazole   Phenytoin (Variable)     Metronidazole   Rifampin     Phenytoin (Variable)   Steroids (Variable/Dose Dependent)   X Yes  Statins/Fenofibrate/Gemfibrozil   Sucralfate     Steroids (Variable/Dose Dependent)   Other:     Sulfamethoxazole/Trimethoprim        Tramadol         Other:      Comments regarding medication interactions:      x Diseases Affecting INR x Increased Bleeding Risk    CHF Exacerbation (Increases)  History GI Bleed/PUD    Liver Disease (Increases)  Chronic NSAID Use    Thyroid: Hyper (Increases)  Hypo (Decreases)  Chronic ASA/Antiplatelet Use (Clopidogrel/ Dipyridamole/Prasugrel/Ticagrelor)      Malignancy (Increases)  Abnormal Renal Function (dialysis, renal transplant, SCr ? 2.3 mg/dL)    X History of EtOH Abuse: Acute (Increases)   Chronic (Decreases) X Liver Function (cirrhosis, bilirubin >2x ULN with AST/ALT/AP >3x ULN)    Fever (Increases) X Age > 65 years    Acute infection (Increases)  Hypertension/Uncontrolled BP Diarrhea/Dehydration (Increases)  History of stroke    Other: __________________  Other:___________________     Vitamin K or Blood product  Administration Date                    TSH:    Lab Results   Component Value Date    TSH 2.470 04/18/2019        Hepatic Function Panel:                            Lab Results   Component Value Date    ALKPHOS 129 04/21/2019    ALT 28 04/21/2019    AST 80 04/21/2019    PROT 5.0 04/21/2019    BILITOT 0.7 04/21/2019    BILIDIR 0.6 04/19/2019    IBILI 0.6 04/19/2019    LABALBU 2.1 04/21/2019    LABALBU 2.5 04/21/2019       Date Warfarin Dose INR Heparin or LMWH HBG/HCT PLT Comment   4/19 2 mg 2.8 X 8.0/22.6 247    4/20 2 mg 2.8 X 8.9/25.3 303    4/21 2 mg 2.9 X 9.5/27.1 329    4/22 1mg 3.0 -- 9.3/26.3 351               Assessment and Plan:  · Sergio Lee is a 80 yof admitted to the hospital on 4/17 for severe protein-calore malnutrition after coming to the ED for generalized weakness  · Pt is anticoagulated on warfarin for history of DVT. She currently follows at the [facility] anticoagulation clinic. Her most recent visit was on 3/20 with an INR of 2.2, instructed to continue schedule of 2 mg daily. When she was admitted to hospital on 4/17 her INR was supratherapeutic at 3.6, and has remained therapeutic after admission: 3.7->2.7->2.8.   · INR today = 3.0  · Warfarin 1 mg x 1 tonight  · Daily PT/INR until the INR is stable within the therapeutic range  · Pharmacist will follow and monitor/adjust dosing as necessary    Thank you for this consult,    Paddy Yu, PharmD 4/22/2019 5:02 PM

## 2019-04-22 NOTE — PLAN OF CARE
Problem: Risk for Impaired Skin Integrity  Goal: Tissue integrity - skin and mucous membranes  Description  Structural intactness and normal physiological function of skin and  mucous membranes.   Outcome: Met This Shift     Problem: Pain:  Goal: Pain level will decrease  Description  Pain level will decrease  Outcome: Met This Shift  Goal: Control of acute pain  Description  Control of acute pain  Outcome: Met This Shift  Goal: Control of chronic pain  Description  Control of chronic pain  Outcome: Met This Shift

## 2019-04-23 VITALS
DIASTOLIC BLOOD PRESSURE: 50 MMHG | RESPIRATION RATE: 16 BRPM | SYSTOLIC BLOOD PRESSURE: 100 MMHG | HEART RATE: 70 BPM | BODY MASS INDEX: 15.77 KG/M2 | TEMPERATURE: 97.9 F | WEIGHT: 106.5 LBS | OXYGEN SATURATION: 96 % | HEIGHT: 69 IN

## 2019-04-23 LAB
ANION GAP SERPL CALCULATED.3IONS-SCNC: 8 MMOL/L (ref 7–16)
BUN BLDV-MCNC: 14 MG/DL (ref 8–23)
CALCIUM IONIZED: 1.26 MMOL/L (ref 1.15–1.33)
CALCIUM SERPL-MCNC: 8.5 MG/DL (ref 8.6–10.2)
CHLORIDE BLD-SCNC: 105 MMOL/L (ref 98–107)
CO2: 24 MMOL/L (ref 22–29)
CREAT SERPL-MCNC: 1.1 MG/DL (ref 0.5–1)
GFR AFRICAN AMERICAN: 60
GFR NON-AFRICAN AMERICAN: 49 ML/MIN/1.73
GLUCOSE BLD-MCNC: 79 MG/DL (ref 74–99)
INR BLD: 2.5
POTASSIUM SERPL-SCNC: 4.5 MMOL/L (ref 3.5–5)
PROTHROMBIN TIME: 27.9 SEC (ref 9.3–12.4)
SODIUM BLD-SCNC: 137 MMOL/L (ref 132–146)

## 2019-04-23 PROCEDURE — 82330 ASSAY OF CALCIUM: CPT

## 2019-04-23 PROCEDURE — 99239 HOSP IP/OBS DSCHRG MGMT >30: CPT | Performed by: INTERNAL MEDICINE

## 2019-04-23 PROCEDURE — 85610 PROTHROMBIN TIME: CPT

## 2019-04-23 PROCEDURE — 6370000000 HC RX 637 (ALT 250 FOR IP): Performed by: INTERNAL MEDICINE

## 2019-04-23 PROCEDURE — 2580000003 HC RX 258: Performed by: INTERNAL MEDICINE

## 2019-04-23 PROCEDURE — 80048 BASIC METABOLIC PNL TOTAL CA: CPT

## 2019-04-23 PROCEDURE — 36415 COLL VENOUS BLD VENIPUNCTURE: CPT

## 2019-04-23 RX ORDER — WARFARIN SODIUM 2 MG/1
2 TABLET ORAL
Status: DISCONTINUED | OUTPATIENT
Start: 2019-04-23 | End: 2019-04-23 | Stop reason: HOSPADM

## 2019-04-23 RX ADMIN — Medication 10 ML: at 09:39

## 2019-04-23 RX ADMIN — MICONAZOLE NITRATE: 2 OINTMENT TOPICAL at 09:39

## 2019-04-23 RX ADMIN — ESCITALOPRAM OXALATE 20 MG: 10 TABLET, FILM COATED ORAL at 09:39

## 2019-04-23 RX ADMIN — TIMOLOL MALEATE 1 DROP: 5 SOLUTION OPHTHALMIC at 09:40

## 2019-04-23 RX ADMIN — VITAMIN D, TAB 1000IU (100/BT) 2000 UNITS: 25 TAB at 09:39

## 2019-04-23 RX ADMIN — FAMOTIDINE 20 MG: 20 TABLET ORAL at 09:39

## 2019-04-23 RX ADMIN — BRIMONIDINE TARTRATE 1 DROP: 2 SOLUTION OPHTHALMIC at 09:40

## 2019-04-23 RX ADMIN — SODIUM CHLORIDE TAB 1 GM 1 G: 1 TAB at 09:39

## 2019-04-23 RX ADMIN — SODIUM CHLORIDE TAB 1 GM 1 G: 1 TAB at 12:13

## 2019-04-23 RX ADMIN — LEVOTHYROXINE SODIUM 25 MCG: 25 TABLET ORAL at 06:20

## 2019-04-23 ASSESSMENT — PAIN SCALES - GENERAL: PAINLEVEL_OUTOF10: 0

## 2019-04-23 NOTE — PLAN OF CARE
Problem: Pain:  Goal: Pain level will decrease  Description  Pain level will decrease  Outcome: Met This Shift  Goal: Control of acute pain  Description  Control of acute pain  Outcome: Met This Shift  Goal: Control of chronic pain  Description  Control of chronic pain  Outcome: Met This Shift     Problem: Risk for Impaired Skin Integrity  Goal: Tissue integrity - skin and mucous membranes  Description  Structural intactness and normal physiological function of skin and  mucous membranes.   4/23/2019 1138 by Estee Perez RN  Outcome: Completed  4/23/2019 0132 by Enrique Morgan RN  Outcome: Met This Shift

## 2019-04-23 NOTE — PROGRESS NOTES
Nurse to nurse called to Alberto Hernandez at [facility]. Updated on patient. No further questions at this time.

## 2019-04-23 NOTE — PROGRESS NOTES
Nephrology Note  [facility]   Patient's Name: Janis Beaver  12:23 PM  4/23/2019       Reason for Consult:  Hyponatremia  Requesting Physician:  Dr. Romina Mireles    Chief Complaint:   Weakness    History Obtained From:  patient and past medical records    History of Present Ilness:    Janis Beaver is a 80 y.o. female with prior history of recurrent hyponatremia  As per the labs in the computer. Pt has a psychiatric Hx and is maintained as an outpt on mirtazaine and lexapro. She presented to the ED with leg weakness and was sent in by a neighbor when she was unable to get off her couch. Per the ED notes she was covered in urine and there was feces in the house. She states she has no appetite and is not eating well at home. She does drink ETOH but has not had a drink for the last 3 weeks. In the Ed Na+ 127 vini to 131 and then back to 124 and 126 today. 4/23/19:  Seen and examined. No chest pain, sob, abd pain. Nausea yesterday - not today. Good appetite. 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 ([address] 5/22/2017    Cataract     Critical lower limb ischemia 5/29/2017    Depression \    [facility]) 09/13/2018    R leg    Glaucoma     H/O diarrhea     History of blood transfusion 05/2017    Hypothyroidism     Macular degeneration     Murmur     PONV (postoperative nausea and vomiting)     Sciatica        Past Surgical History:   Procedure Laterality Date    DENTAL SURGERY  2007    teeth extraction    UPPER GASTROINTESTINAL ENDOSCOPY N/A 1/14/2019    EGD BIOPSY performed by Yi Padron MD at [facility] 38 EXTRACTION         Family History   Problem Relation Age of Onset    Diabetes Father     Diabetes Sister         reports that she quit smoking about 13 years ago.  Her smoking use included 04/23/2019    GLUCOSE 79 04/23/2019     Sodium:    Lab Results   Component Value Date     04/23/2019     BUN/Creatinine:    Lab Results   Component Value Date    BUN 14 04/23/2019    CREATININE 1.1 04/23/2019     Hepatic Function Panel:    Lab Results   Component Value Date    ALKPHOS 129 04/21/2019    ALT 28 04/21/2019    AST 80 04/21/2019    PROT 5.0 04/21/2019    BILITOT 0.7 04/21/2019    BILIDIR 0.6 04/19/2019    IBILI 0.6 04/19/2019    LABALBU 2.1 04/21/2019    LABALBU 2.5 04/21/2019     Albumin:    Lab Results   Component Value Date    LABALBU 2.1 04/21/2019    LABALBU 2.5 04/21/2019     Calcium:    Lab Results   Component Value Date    CALCIUM 8.5 04/23/2019     Ionized Calcium:  No results found for: IONCA  Magnesium:    Lab Results   Component Value Date    MG 1.6 04/22/2019     Phosphorus:    Lab Results   Component Value Date    PHOS 3.8 04/22/2019     LDH:    Lab Results   Component Value Date     01/11/2019     Uric Acid:    Lab Results   Component Value Date    LABURIC 3.5 04/20/2019     PT/INR:    Lab Results   Component Value Date    PROTIME 27.9 04/23/2019    INR 2.5 04/23/2019     PTT:    Lab Results   Component Value Date    APTT 46.0 04/17/2019   [APTT}  Troponin:    Lab Results   Component Value Date    TROPONINI <0.01 04/17/2019     U/A:    Lab Results   Component Value Date    COLORU LEONIDAS 04/17/2019    PROTEINU Negative 04/17/2019    PHUR 6.5 04/17/2019    WBCUA NONE 04/17/2019    RBCUA NONE 04/17/2019    BACTERIA NONE 04/17/2019    CLARITYU Clear 04/17/2019    SPECGRAV <=1.005 04/17/2019    LEUKOCYTESUR Negative 04/17/2019    UROBILINOGEN >=8.0 04/17/2019    BILIRUBINUR SMALL 04/17/2019    BLOODU Negative 04/17/2019    GLUCOSEU Negative 04/17/2019     ABG:    Lab Results   Component Value Date    PH 7.426 05/05/2017    PCO2 34.0 05/05/2017    PO2 111.3 05/05/2017    HCO3 21.9 05/05/2017    BE -2.2 05/05/2017    O2SAT 98.0 05/05/2017     HgBA1c:  No results found for: identified.       Pulmonary parenchymal windows demonstrate changes of significant COPD   with bullous changes bilateral upper lobes. No consolidating   infiltrate or mass identified. Tiny nodule noted on the previous exam   is again identified unchanged. No new nodules are evident.           Impression   Changes of significant COPD; no acute process or interim   change         1/10/2019 1:45 PM   EXAM: CT ABDOMEN PELVIS W IV CONTRAST   NUMBER OF IMAGES \ views:  859   INDICATION:  weight loss    COMPARISON: None       TECHNIQUE: Axial computerized tomography sections of the abdomen and   pelvis with sagittal and coronal MPR reconstructions were obtained   from the top of the diaphragm to the pelvis. 110 mL Isovue-370   intravenously injected.       Low-dose CT  acquisition technique included one of following options;   1 . Automated exposure control, 2. Adjustment of MA and or KV   according to patient's size or 3. Use of iterative reconstruction.       FINDINGS:       THORACIC BASE: Lungs appear somewhat emphysematous. LIVER: There is fatty infiltration with areas of probable sparing in   the right lobe liver has a somewhat cirrhotic morphology with the left   lobe appearing more prominent than the right. BILIARY: Several gallstones present. Bile ducts normal caliber. PANCREAS: Unremarkable. SPLEEN: Unremarkable. ADRENALS:  Unremarkable. KIDNEYS:  Unremarkable. GI: No evidence of free air or bowel obstruction. The appendix is not   visualized. The right colon is relatively nondistended but does appear   rather thick walled and there is some mild stranding in the vicinity   of the hepatic flexure raising the question of underlying colitis. Consider colonoscopic correlation. PELVIS: Urinary bladder unremarkable. MSK: No acute osseous findings. OTHER: There is notable narrowing at the origin of the celiac artery   may relate to a karol defect. SMA widely patent.  ANETA patent.             Impression   Findings somewhat suggestive of underlying colitis especially   involving the right colon. See discussion above. Fatty liver with   areas of sparing especially in the right lobe. Possible hepatic   cirrhosis. Gallstones.        Assessment  1-Hyponatremia in the setting of the SSRI escitalopram and now also on mirtazapine-Urine osm >serum osm with a Hiwot+28 and a FeNa <1% more consistent with decreased effective renal perfusion at this time, but given the chronicity believe there may still be a component SIADH with the SSRI    Na improved to 137  On salt tablets      5-Vaeqxu-Pvourrnrun-folate 3.1 B12 470  SPEP/UPEP without monoclonal protein    3-Hypocalcemia with the Hypoalbuminemia- Ionized Ca++WNL, PO4 WNL, PTH 18 , Vit D 29-started supplement    4-Protein Calorie malnutrition on supplements      OK for D/C from a renal perspective  Needs close follow-up of Na as outpatient      Thank you  for allowing us to participate in care of Yuly Number  12:23 PM  4/23/2019

## 2019-04-23 NOTE — PROGRESS NOTES
Pharmacy Consultation Note  (Warfarin Dosing and Monitoring)    Initial consult date: 4/19/19  Consulting physician: Bronwyn Fothergill, PA-C    Allergies: Other; Pcn [penicillins]; and Erythromycin    80 y.o. female    Ht Readings from Last 1 Encounters:   04/17/19 5' 9\" (1.753 m)     Wt Readings from Last 1 Encounters:   04/23/19 106 lb 8 oz (48.3 kg)         Warfarin Indication Target   INR Range Home Dose  (if applicable) Diet/Feeding Tube   (Enteral feeds, nutritional drinks and increased Vitamin K in diet can decrease INR)   DVT 2.0-3.0 2 mg daily General diet       x Home Med? Meds Increasing INR x Home Med?  Meds Decreasing INR     Allopurinol    Azathioprine     Amiodarone/Propafenone/Dronedarone   Carbamazepine     Androgens   Cholestyramine     Chemotherapy (BBW: Capecitabine)   Estrogen     Ciprofloxacin/Levofloxacin   Nafcillin/Dicloxacillin     Clarithromycin/Erythromycin/Azithromycin   Barbiturates      Fluconazole/Itraconazole/Voriconazole/Ketoconazole   Phenytoin (Variable)     Metronidazole   Rifampin     Phenytoin (Variable)   Steroids (Variable/Dose Dependent)   X Yes  Statins/Fenofibrate/Gemfibrozil   Sucralfate     Steroids (Variable/Dose Dependent)   Other:     Sulfamethoxazole/Trimethoprim        Tramadol         Other:      Comments regarding medication interactions:      x Diseases Affecting INR x Increased Bleeding Risk    CHF Exacerbation (Increases)  History GI Bleed/PUD    Liver Disease (Increases)  Chronic NSAID Use    Thyroid: Hyper (Increases)  Hypo (Decreases)  Chronic ASA/Antiplatelet Use (Clopidogrel/ Dipyridamole/Prasugrel/Ticagrelor)      Malignancy (Increases)  Abnormal Renal Function (dialysis, renal transplant, SCr ? 2.3 mg/dL)    X History of EtOH Abuse: Acute (Increases)   Chronic (Decreases) X Liver Function (cirrhosis, bilirubin >2x ULN with AST/ALT/AP >3x ULN)    Fever (Increases) X Age > 65 years    Acute infection (Increases)  Hypertension/Uncontrolled BP Diarrhea/Dehydration (Increases)  History of stroke    Other: __________________  Other:___________________     Vitamin K or Blood product  Administration Date                    TSH:    Lab Results   Component Value Date    TSH 2.470 04/18/2019        Hepatic Function Panel:                            Lab Results   Component Value Date    ALKPHOS 129 04/21/2019    ALT 28 04/21/2019    AST 80 04/21/2019    PROT 5.0 04/21/2019    BILITOT 0.7 04/21/2019    BILIDIR 0.6 04/19/2019    IBILI 0.6 04/19/2019    LABALBU 2.1 04/21/2019    LABALBU 2.5 04/21/2019       Date Warfarin Dose INR Heparin or LMWH HBG/HCT PLT Comment   4/19 2 mg 2.8 X 8.0/22.6 247    4/20 2 mg 2.8 X 8.9/25.3 303    4/21 2 mg 2.9 X 9.5/27.1 329    4/22 1mg 3.0 -- 9.3/26.3 351    4/23 2mg 2.5 -- -- --      Assessment and Plan:  · Tony is a 80 yof admitted to the hospital on 4/17 for severe protein-calore malnutrition after coming to the ED for generalized weakness  · Pt is anticoagulated on warfarin for history of DVT. She currently follows at the [facility] anticoagulation clinic. Her most recent visit was on 3/20 with an INR of 2.2, instructed to continue schedule of 2 mg daily. When she was admitted to hospital on 4/17 her INR was supratherapeutic at 3.6, and has remained therapeutic after admission: 3.6->2.7->2.8.   · INR today = 2.5  · Warfarin 2 mg x 1 tonight  · Daily PT/INR until the INR is stable within the therapeutic range  · Pharmacist will follow and monitor/adjust dosing as necessary    Thank you for this consult,    Charly Frazier, PharmD 4/23/2019 11:51 AM

## 2019-04-23 NOTE — PLAN OF CARE
Problem: Risk for Impaired Skin Integrity  Goal: Tissue integrity - skin and mucous membranes  Description  Structural intactness and normal physiological function of skin and  mucous membranes. 4/23/2019 0132 by Faith Bang RN  Outcome: Met This Shift  4/22/2019 1714 by Iris Nugent RN  Outcome: Met This Shift     Problem: Pain:  Goal: Pain level will decrease  Description  Pain level will decrease  4/22/2019 1714 by Iris Nugent RN  Outcome: Met This Shift  Goal: Control of acute pain  Description  Control of acute pain  4/22/2019 1714 by Iris Nugent RN  Outcome: Met This Shift  Goal: Control of chronic pain  Description  Control of chronic pain  4/22/2019 1714 by Iris Nugent RN  Outcome: Met This Shift     Problem: Inadequate oral food/beverage intake (NI-2.1)  Goal: Food and/or Nutrient Delivery  Description  Individualized approach for food/nutrient provision.   4/22/2019 1443 by Mckenna Marvin RD, CNSC, LD  Outcome: Not Met This Shift

## 2019-04-23 NOTE — CARE COORDINATION
Social Work/Discharge Planning:  Jennifer Castillo from [place] called with pre-cert approval.  Notified charge nurse and patient.   Electronically signed by RUBÉN Hoffman on 4/23/2019 at 2:45 PM

## 2019-04-25 ENCOUNTER — TELEPHONE (OUTPATIENT)
Dept: PHARMACY | Age: 69
End: 2019-04-25

## 2019-04-25 NOTE — TELEPHONE ENCOUNTER
Patient called and left message to check in with us. She missed her last appt because she was admitted to the hospital. She is now @ [facility]. If you need to contact her, please call her cell @ [phone]. She wanted to make sure we knew her latest info.  Electronically signed by Kimani Nice on 4/25/19 at 2:27 PM

## 2019-05-13 ENCOUNTER — OFFICE VISIT (OUTPATIENT)
Dept: VASCULAR SURGERY | Age: 69
End: 2019-05-13
Payer: MEDICARE

## 2019-05-13 DIAGNOSIS — I73.9 PVD (PERIPHERAL VASCULAR DISEASE) (HCC): ICD-10-CM

## 2019-05-13 DIAGNOSIS — I82.511 CHRONIC DEEP VEIN THROMBOSIS (DVT) OF FEMORAL VEIN OF RIGHT LOWER EXTREMITY (HCC): Primary | ICD-10-CM

## 2019-05-13 PROCEDURE — G8419 CALC BMI OUT NRM PARAM NOF/U: HCPCS | Performed by: SURGERY

## 2019-05-13 PROCEDURE — 99213 OFFICE O/P EST LOW 20 MIN: CPT | Performed by: SURGERY

## 2019-05-13 PROCEDURE — 1111F DSCHRG MED/CURRENT MED MERGE: CPT | Performed by: SURGERY

## 2019-05-13 PROCEDURE — G8400 PT W/DXA NO RESULTS DOC: HCPCS | Performed by: SURGERY

## 2019-05-13 PROCEDURE — G8427 DOCREV CUR MEDS BY ELIG CLIN: HCPCS | Performed by: SURGERY

## 2019-05-13 PROCEDURE — G8598 ASA/ANTIPLAT THER USED: HCPCS | Performed by: SURGERY

## 2019-05-13 PROCEDURE — 3017F COLORECTAL CA SCREEN DOC REV: CPT | Performed by: SURGERY

## 2019-05-13 PROCEDURE — 1036F TOBACCO NON-USER: CPT | Performed by: SURGERY

## 2019-05-13 PROCEDURE — 4040F PNEUMOC VAC/ADMIN/RCVD: CPT | Performed by: SURGERY

## 2019-05-13 PROCEDURE — 1090F PRES/ABSN URINE INCON ASSESS: CPT | Performed by: SURGERY

## 2019-05-13 PROCEDURE — 1123F ACP DISCUSS/DSCN MKR DOCD: CPT | Performed by: SURGERY

## 2019-05-13 RX ORDER — MIRTAZAPINE 15 MG/1
15 TABLET, FILM COATED ORAL NIGHTLY
COMMUNITY
End: 2019-07-01

## 2019-05-13 RX ORDER — FUROSEMIDE 20 MG/1
20 TABLET ORAL DAILY
COMMUNITY
End: 2019-07-08

## 2019-05-13 RX ORDER — CIPROFLOXACIN 500 MG/1
500 TABLET, FILM COATED ORAL 2 TIMES DAILY
COMMUNITY
End: 2019-07-01

## 2019-05-13 NOTE — PROGRESS NOTES
Vascular Surgery Outpatient Followup    PCP : Clifton Campuzano DO    Previous lower extremity procedures  5/30/17             Left dorsalis pedis cutdown, exposure              Left anterior tibial artery, dorsalis pedis thrombectomy with over the wire #3 ebenezer              Left lower extremity angiogram              2 mg TPA infusion into distal DP     HISTORY OF PRESENT ILLNESS:    Pt is a 80 y.o. female is who presents in fu regarding R LE DVT. She is on coumadin currently. Previously she had been placed on Eliquis after a right lower extremity DVT was noted 11 of 2018. Underlying etiology was felt to be related to stasis, decreased activity level. Since her last visit she was hospitalized for generalized weakness and hyponatremia. She was discharged to [facility] and is participating in physical therapy. However, her activity level has regressed since her hospitalization. She is only able to stand on her own. She is not able to walk currently. She denies cramping in her legs at this time. She denies rest pain or tissue loss.     Past Medical History:        Diagnosis Date    Alcoholism [facility])     heavy alcohol consumption since ~2351-3474 till early 2019    Anemia 05/2017    Blue toe syndrome of left lower extremity ([address] 5/22/2017    Cataract     Critical lower limb ischemia 5/29/2017    Depression \    DVT (deep venous thrombosis) ([facility]) 09/13/2018    R leg    Glaucoma     H/O diarrhea     History of blood transfusion 05/2017    Hypothyroidism     Macular degeneration     Murmur     PONV (postoperative nausea and vomiting)     Sciatica      Past Surgical History:        Procedure Laterality Date    DENTAL SURGERY  2007    teeth extraction    UPPER GASTROINTESTINAL ENDOSCOPY N/A 1/14/2019    EGD BIOPSY performed by Ritu Lay MD at [address]       Current Medications:   Current Outpatient Medications   Medication Sig Dispense Refill    mirtazapine (REMERON) 15 MG tablet Take 15 mg by mouth nightly      Probiotic Product (PROBIOTIC DAILY PO) Take by mouth      ciprofloxacin (CIPRO) 500 MG tablet Take 500 mg by mouth 2 times daily      furosemide (LASIX) 20 MG tablet Take 20 mg by mouth daily      vitamin D (CHOLECALCIFEROL) 1000 UNIT TABS tablet Take 2 tablets by mouth daily 60 tablet 3    sodium chloride 1 g tablet Take 1 tablet by mouth 3 times daily (with meals) 90 tablet 3    potassium chloride (KLOR-CON M) 20 MEQ extended release tablet Take 2 tablets by mouth daily 60 tablet 0    warfarin (COUMADIN) 2 MG tablet 2 mg Take as directed by Mya [facility]. See encounters for most recent dosing history.  brimonidine-timolol (COMBIGAN) 0.2-0.5 % ophthalmic solution Place 1 drop into both eyes every 12 hours      simvastatin (ZOCOR) 5 MG tablet Take 1 tablet by mouth nightly 30 tablet 0    levothyroxine (SYNTHROID) 25 MCG tablet Take 25 mcg by mouth every morning       miconazole nitrate 2 % OINT Apply topically 2 times daily 1 Tube 1    escitalopram (LEXAPRO) 20 MG tablet Take 20 mg by mouth every morning        No current facility-administered medications for this visit. Allergies:   Other; Pcn [penicillins]; and Erythromycin  Social History     Socioeconomic History    Marital status: Single     Spouse name: Not on file    Number of children: Not on file    Years of education: Not on file    Highest education level: Not on file   Occupational History    Not on file   Social Needs    Financial resource strain: Not on file    Food insecurity:     Worry: Not on file     Inability: Not on file    Transportation needs:     Medical: Not on file     Non-medical: Not on file   Tobacco Use    Smoking status: Former Smoker     Packs/day: 3.00     Types: Cigarettes     Start date: 1/10/1972     Last attempt to quit: 2005     Years since quittin.0    Smokeless tobacco: Never Used    Tobacco comment: quit 12-13 years ago   Substance and Sexual Activity    Alcohol use: Yes     Comment: every other day    Drug use: No    Sexual activity: Never   Lifestyle    Physical activity:     Days per week: Not on file     Minutes per session: Not on file    Stress: Not on file   Relationships    Social connections:     Talks on phone: Not on file     Gets together: Not on file     Attends Denominational service: Not on file     Active member of club or organization: Not on file     Attends meetings of clubs or organizations: Not on file     Relationship status: Not on file    Intimate partner violence:     Fear of current or ex partner: Not on file     Emotionally abused: Not on file     Physically abused: Not on file     Forced sexual activity: Not on file   Other Topics Concern    Not on file   Social History Narrative    Not on file     Family History   Problem Relation Age of Onset    Diabetes Father     Diabetes Sister      Labs  Lab Results   Component Value Date    WBC 8.5 04/22/2019    HGB 9.3 (L) 04/22/2019    HCT 26.3 (L) 04/22/2019     04/22/2019    PROTIME 27.9 (H) 04/23/2019    INR 2.5 04/23/2019    APTT 46.0 (H) 04/17/2019    K 4.5 04/23/2019    BUN 14 04/23/2019    CREATININE 1.1 (H) 04/23/2019     PHYSICAL EXAM:    There were no vitals taken for this visit.   CONSTITUTIONAL:   Awake, alert, cooperative  PSYCHIATRIC :  Oriented to time, place and person     Appropriate insight to disease process  EYES: Lids and lashes normal  ENT:  External ears and nose without lesions   Hearing deficits absent  NECK: Supple, symmetrical, trachea midline   Carotid bruit absent  LUNGS:  No increased work of breathing                 Clear to auscultation  CARDIOVASCULAR:  regular rate and rhythm   ABDOMEN:  soft, non-distended, non-tender  SKIN:   Normal skin color   Texture and turgor normal, no induration  EXTREMITIES:   R LE Edema absent  L LE Edema absent   R femoral  L femoral    R posterior tibial 1+ L posterior tibial 1+   R dorsalis pedis 1+ L dorsalis pedis monophasic     RADIOLOGY:  4/17/19 Right lower extremity venous duplex: Negative for evidence of DVT. R LE DVT  · Resolution of DVT on venous ultrasound   · Etiology is likely stasis  · Continue therapeutic anticoagulation until patient is more ambulatory   · We discussed concerns re falls and risk of bleeding and death if falls and hits head  · No indication for placement of IVC Filter at this time  · Elevate Bilateral LE while in bed or sittinghg wear daily - Script will be left on chart  · F/U as outpatient in 5 months to reassess  · Call if patient develops worsening of swelling or wounds  · discussed with patient pathophysiology of DVT, PE, and thrombophlebitis   · All ?s answered    A/P Assx PVD  Hx of Left anterior tibial artery, dorsalis pedis thrombectomy done for L blue toe syndrome   · She is asymptomatic from her PVD, and has no complaints of lifestyle limit claudication, rest pain, or tissue loss  · Continue medical management with statin  · Plavix is being held as she is on coumadin for treatment of dvt  · Emphasized importance of continued Tobacco cessation  · they understood that with tobacco use they are at increased risk of worsening of their pvd and increased risk of limb loss  · No indication for surgical intervention at this time  · Discussed with patient pathophysiology of pvd, claudication, tissues loss, rest pain, and potential for limb loss  · Discussed with patient symptoms of new onset claudication, tissue loss, rest pain, changes in lower extremity coolness, pain and they understood to go to ER immediately if any symptoms developed  · F/U as outpatient in 5 Months with repeat vascular studies    Pt seen and plan reviewed with Dr. Wanda Dong.      Karrie Astorga, CNP

## 2019-06-21 ENCOUNTER — TELEPHONE (OUTPATIENT)
Dept: PRIMARY CARE CLINIC | Age: 69
End: 2019-06-21

## 2019-06-21 DIAGNOSIS — E78.2 MIXED HYPERLIPIDEMIA: ICD-10-CM

## 2019-06-21 DIAGNOSIS — E11.9 DIET-CONTROLLED DIABETES MELLITUS (HCC): ICD-10-CM

## 2019-06-21 DIAGNOSIS — I10 ESSENTIAL HYPERTENSION: ICD-10-CM

## 2019-06-21 DIAGNOSIS — E87.1 HYPONATREMIA: ICD-10-CM

## 2019-06-21 DIAGNOSIS — E03.9 ACQUIRED HYPOTHYROIDISM: Primary | ICD-10-CM

## 2019-06-24 ENCOUNTER — TELEPHONE (OUTPATIENT)
Dept: PRIMARY CARE CLINIC | Age: 69
End: 2019-06-24

## 2019-06-25 ENCOUNTER — HOSPITAL ENCOUNTER (OUTPATIENT)
Age: 69
Discharge: HOME | End: 2019-06-27
Payer: MEDICARE

## 2019-06-25 DIAGNOSIS — E78.2 MIXED HYPERLIPIDEMIA: ICD-10-CM

## 2019-06-25 DIAGNOSIS — E03.9 ACQUIRED HYPOTHYROIDISM: ICD-10-CM

## 2019-06-25 DIAGNOSIS — I10 ESSENTIAL HYPERTENSION: ICD-10-CM

## 2019-06-25 DIAGNOSIS — E11.9 DIET-CONTROLLED DIABETES MELLITUS (HCC): ICD-10-CM

## 2019-06-25 DIAGNOSIS — E87.1 HYPONATREMIA: ICD-10-CM

## 2019-06-25 LAB
ALBUMIN SERPL-MCNC: 4.2 G/DL (ref 3.5–5.2)
ALP BLD-CCNC: 127 U/L (ref 35–104)
ALT SERPL-CCNC: 8 U/L (ref 0–32)
ANION GAP SERPL CALCULATED.3IONS-SCNC: 13 MMOL/L (ref 7–16)
AST SERPL-CCNC: 22 U/L (ref 0–31)
BASOPHILS ABSOLUTE: 0.04 E9/L (ref 0–0.2)
BASOPHILS RELATIVE PERCENT: 0.9 % (ref 0–2)
BILIRUB SERPL-MCNC: 0.7 MG/DL (ref 0–1.2)
BUN BLDV-MCNC: 10 MG/DL (ref 8–23)
CALCIUM SERPL-MCNC: 9.9 MG/DL (ref 8.6–10.2)
CHLORIDE BLD-SCNC: 98 MMOL/L (ref 98–107)
CHOLESTEROL, TOTAL: 185 MG/DL (ref 0–199)
CO2: 21 MMOL/L (ref 22–29)
CREAT SERPL-MCNC: 0.8 MG/DL (ref 0.5–1)
EOSINOPHILS ABSOLUTE: 0.19 E9/L (ref 0.05–0.5)
EOSINOPHILS RELATIVE PERCENT: 4.3 % (ref 0–6)
GFR AFRICAN AMERICAN: >60
GFR NON-AFRICAN AMERICAN: >60 ML/MIN/1.73
GLUCOSE BLD-MCNC: 104 MG/DL (ref 74–99)
HBA1C MFR BLD: 4.8 % (ref 4–5.6)
HCT VFR BLD CALC: 32.9 % (ref 34–48)
HDLC SERPL-MCNC: 75 MG/DL
HEMOGLOBIN: 10.7 G/DL (ref 11.5–15.5)
IMMATURE GRANULOCYTES #: 0.02 E9/L
IMMATURE GRANULOCYTES %: 0.4 % (ref 0–5)
LDL CHOLESTEROL CALCULATED: 93 MG/DL (ref 0–99)
LYMPHOCYTES ABSOLUTE: 1.67 E9/L (ref 1.5–4)
LYMPHOCYTES RELATIVE PERCENT: 37.4 % (ref 20–42)
MCH RBC QN AUTO: 33.1 PG (ref 26–35)
MCHC RBC AUTO-ENTMCNC: 32.5 % (ref 32–34.5)
MCV RBC AUTO: 101.9 FL (ref 80–99.9)
MONOCYTES ABSOLUTE: 0.34 E9/L (ref 0.1–0.95)
MONOCYTES RELATIVE PERCENT: 7.6 % (ref 2–12)
NEUTROPHILS ABSOLUTE: 2.21 E9/L (ref 1.8–7.3)
NEUTROPHILS RELATIVE PERCENT: 49.4 % (ref 43–80)
PDW BLD-RTO: 14.6 FL (ref 11.5–15)
PLATELET # BLD: 384 E9/L (ref 130–450)
PMV BLD AUTO: 9.7 FL (ref 7–12)
POTASSIUM SERPL-SCNC: 4.5 MMOL/L (ref 3.5–5)
RBC # BLD: 3.23 E12/L (ref 3.5–5.5)
SODIUM BLD-SCNC: 132 MMOL/L (ref 132–146)
T4 TOTAL: 6.3 MCG/DL (ref 4.5–11.7)
TOTAL PROTEIN: 7.8 G/DL (ref 6.4–8.3)
TRIGL SERPL-MCNC: 85 MG/DL (ref 0–149)
TSH SERPL DL<=0.05 MIU/L-ACNC: 2.16 UIU/ML (ref 0.27–4.2)
VLDLC SERPL CALC-MCNC: 17 MG/DL
WBC # BLD: 4.5 E9/L (ref 4.5–11.5)

## 2019-06-25 PROCEDURE — 36415 COLL VENOUS BLD VENIPUNCTURE: CPT

## 2019-06-25 PROCEDURE — 80061 LIPID PANEL: CPT

## 2019-06-25 PROCEDURE — 80053 COMPREHEN METABOLIC PANEL: CPT

## 2019-06-25 PROCEDURE — 83036 HEMOGLOBIN GLYCOSYLATED A1C: CPT

## 2019-06-25 PROCEDURE — 84436 ASSAY OF TOTAL THYROXINE: CPT

## 2019-06-25 PROCEDURE — 85025 COMPLETE CBC W/AUTO DIFF WBC: CPT

## 2019-06-25 PROCEDURE — 84443 ASSAY THYROID STIM HORMONE: CPT

## 2019-07-01 ENCOUNTER — TELEPHONE (OUTPATIENT)
Dept: PHARMACY | Age: 69
End: 2019-07-01

## 2019-07-01 ENCOUNTER — OFFICE VISIT (OUTPATIENT)
Dept: PRIMARY CARE CLINIC | Age: 69
End: 2019-07-01
Payer: MEDICARE

## 2019-07-01 VITALS
WEIGHT: 122 LBS | DIASTOLIC BLOOD PRESSURE: 62 MMHG | TEMPERATURE: 98.4 F | BODY MASS INDEX: 19.15 KG/M2 | HEIGHT: 67 IN | SYSTOLIC BLOOD PRESSURE: 124 MMHG

## 2019-07-01 DIAGNOSIS — Z86.718 HISTORY OF DEEP VEIN THROMBOSIS: ICD-10-CM

## 2019-07-01 DIAGNOSIS — E03.9 ACQUIRED HYPOTHYROIDISM: ICD-10-CM

## 2019-07-01 DIAGNOSIS — I82.401 ACUTE DEEP VEIN THROMBOSIS (DVT) OF RIGHT LOWER EXTREMITY, UNSPECIFIED VEIN (HCC): Primary | ICD-10-CM

## 2019-07-01 DIAGNOSIS — I10 ESSENTIAL HYPERTENSION: ICD-10-CM

## 2019-07-01 DIAGNOSIS — E78.2 MIXED HYPERLIPIDEMIA: ICD-10-CM

## 2019-07-01 LAB
INTERNATIONAL NORMALIZATION RATIO, POC: 1.8
PROTHROMBIN TIME, POC: 21.9

## 2019-07-01 PROCEDURE — 85610 PROTHROMBIN TIME: CPT | Performed by: FAMILY MEDICINE

## 2019-07-01 PROCEDURE — 99214 OFFICE O/P EST MOD 30 MIN: CPT | Performed by: FAMILY MEDICINE

## 2019-07-01 RX ORDER — SIMVASTATIN 5 MG
5 TABLET ORAL NIGHTLY
Qty: 90 TABLET | Refills: 12 | Status: SHIPPED
Start: 2019-07-01 | End: 2020-06-15 | Stop reason: SDUPTHER

## 2019-07-01 RX ORDER — LEVOTHYROXINE SODIUM 0.03 MG/1
25 TABLET ORAL EVERY MORNING
Qty: 90 TABLET | Refills: 12 | Status: SHIPPED
Start: 2019-07-01 | End: 2020-06-15 | Stop reason: SDUPTHER

## 2019-07-01 RX ORDER — WARFARIN SODIUM 2 MG/1
2 TABLET ORAL DAILY
Qty: 100 TABLET | Refills: 12 | Status: SHIPPED | OUTPATIENT
Start: 2019-07-01 | End: 2019-07-08

## 2019-07-01 RX ORDER — POTASSIUM CHLORIDE 20 MEQ/1
40 TABLET, EXTENDED RELEASE ORAL DAILY
Qty: 180 TABLET | Refills: 12 | Status: SHIPPED
Start: 2019-07-01 | End: 2020-06-15 | Stop reason: SDUPTHER

## 2019-07-01 RX ORDER — WARFARIN SODIUM 4 MG/1
4 TABLET ORAL DAILY
Qty: 90 TABLET | Refills: 12 | Status: SHIPPED | OUTPATIENT
Start: 2019-07-01 | End: 2019-07-08

## 2019-07-01 ASSESSMENT — ENCOUNTER SYMPTOMS
GASTROINTESTINAL NEGATIVE: 1
ALLERGIC/IMMUNOLOGIC NEGATIVE: 1
EYES NEGATIVE: 1
RESPIRATORY NEGATIVE: 1

## 2019-07-01 NOTE — TELEPHONE ENCOUNTER
Patient called to notify us that she has been home from [place] for about a month and has been taking warfarin 9 mg daily except that she ran out of medication either June 27 or June 28. Patient saw PCP today and INR was 1.8, goal 2-3. I told patient to take warfarin 8 mg daily and scheduled her here for 7/8/19 @ 3:45pm to check INR.   Leda Sawyer, PharmD 7/1/2019 2:47 PM

## 2019-07-01 NOTE — PROGRESS NOTES
mouth, Disp: , Rfl:     furosemide (LASIX) 20 MG tablet, Take 20 mg by mouth daily, Disp: , Rfl:     sodium chloride 1 g tablet, Take 1 tablet by mouth 3 times daily (with meals), Disp: 90 tablet, Rfl: 3    brimonidine-timolol (COMBIGAN) 0.2-0.5 % ophthalmic solution, Place 1 drop into both eyes every 12 hours, Disp: , Rfl:   Allergies   Allergen Reactions    Other      Sinus medications- unknown brand or reactions    Pcn [Penicillins]      Patient states she has no knowledge of herself having an allergy, but states everyone in her family is allergic and assumes she is too.      Erythromycin Nausea And Vomiting     Social History     Socioeconomic History    Marital status: Single     Spouse name: Not on file    Number of children: Not on file    Years of education: Not on file    Highest education level: Not on file   Occupational History    Not on file   Social Needs    Financial resource strain: Not on file    Food insecurity:     Worry: Not on file     Inability: Not on file    Transportation needs:     Medical: Not on file     Non-medical: Not on file   Tobacco Use    Smoking status: Former Smoker     Packs/day: 3.00     Types: Cigarettes     Start date: 1/10/1972     Last attempt to quit: 2005     Years since quittin.1    Smokeless tobacco: Never Used    Tobacco comment: quit 12-13 years ago   Substance and Sexual Activity    Alcohol use: Yes     Comment: every other day    Drug use: No    Sexual activity: Never   Lifestyle    Physical activity:     Days per week: Not on file     Minutes per session: Not on file    Stress: Not on file   Relationships    Social connections:     Talks on phone: Not on file     Gets together: Not on file     Attends Hoahaoism service: Not on file     Active member of club or organization: Not on file     Attends meetings of clubs or organizations: Not on file     Relationship status: Not on file    Intimate partner violence:     Fear of current or

## 2019-07-08 ENCOUNTER — HOSPITAL ENCOUNTER (OUTPATIENT)
Dept: PHARMACY | Age: 69
Setting detail: THERAPIES SERIES
Discharge: HOME | End: 2019-07-08
Payer: MEDICARE

## 2019-07-08 VITALS
SYSTOLIC BLOOD PRESSURE: 137 MMHG | WEIGHT: 123 LBS | DIASTOLIC BLOOD PRESSURE: 72 MMHG | BODY MASS INDEX: 19.26 KG/M2 | HEART RATE: 77 BPM

## 2019-07-08 DIAGNOSIS — Z86.718 HISTORY OF DEEP VEIN THROMBOSIS: ICD-10-CM

## 2019-07-08 LAB — INTERNATIONAL NORMALIZATION RATIO, POC: 6

## 2019-07-08 PROCEDURE — 85610 PROTHROMBIN TIME: CPT

## 2019-07-08 PROCEDURE — 99211 OFF/OP EST MAY X REQ PHY/QHP: CPT

## 2019-07-08 RX ORDER — DIPHENHYDRAMINE HCL 25 MG
25 CAPSULE ORAL EVERY 6 HOURS PRN
Status: ON HOLD | COMMUNITY
End: 2020-07-30

## 2019-07-08 RX ORDER — WARFARIN SODIUM 4 MG/1
TABLET ORAL
COMMUNITY
End: 2019-10-07 | Stop reason: ALTCHOICE

## 2019-07-11 ENCOUNTER — HOSPITAL ENCOUNTER (OUTPATIENT)
Dept: PHARMACY | Age: 69
Setting detail: THERAPIES SERIES
Discharge: HOME | End: 2019-07-11
Payer: MEDICARE

## 2019-07-11 VITALS — SYSTOLIC BLOOD PRESSURE: 134 MMHG | HEART RATE: 90 BPM | DIASTOLIC BLOOD PRESSURE: 82 MMHG

## 2019-07-11 DIAGNOSIS — Z86.718 HISTORY OF DEEP VEIN THROMBOSIS: ICD-10-CM

## 2019-07-11 LAB — INTERNATIONAL NORMALIZATION RATIO, POC: 2.6

## 2019-07-11 PROCEDURE — 99211 OFF/OP EST MAY X REQ PHY/QHP: CPT

## 2019-07-11 PROCEDURE — 85610 PROTHROMBIN TIME: CPT

## 2019-07-11 NOTE — PROGRESS NOTES
[id number] [facility]    Patient Findings     Positives:   Change in medications (ADVISED HER TO NOTIFY PCP ABOUT DISCONTINUING LASIX AND STILL ON POTASSIUM; SHE IS ALSO UNSURE IF SHE SHOULD BE ON VITMAIN D AND REMERON (PREVIOUSLY ON LEXAPRO))    Negatives:   Signs/symptoms of thrombosis, Signs/symptoms of bleeding, Laboratory test error suspected, Change in health (ADVISED HER TO NOTIFY PCP ABOUT DISCONTINUING LASIX AND STILL ON POTASSIUM; SHE IS ALSO UNSURE IF SHE SHOULD BE ON VITMAIN D AND REMERON (PREVIOUSLY ON LEXAPRO), Change in alcohol use (3 BEERS DAILY), Change in activity, Upcoming invasive procedure, Emergency department visit, Upcoming dental procedure, Missed doses, Extra doses, Change in diet/appetite, Hospital admission, Bruising, Other complaints         Patient  reports that she quit smoking about 14 years ago. Her smoking use included cigarettes. She started smoking about 47 years ago. She smoked 3.00 packs per day. She has never used smokeless tobacco.     Assessment/Plan:  Warfarin indication: DVT prevention (hx of DVT; hx of emboli in L foot)       INR today is therapeutic at 2.6, goal is 2-3. SHE HELD WARFARIN THE PAST 3 DAYS D/T INR OF 6 ON 7/8. Warfarin Dose: DECREASE WEEKLY DOSE FROM THE 8 MG DAILY TO 6 MG DAILY (25% DECREASE)    Follow Up: 6 days    Patient understands dosing directions and information discussed. Dosing schedule and follow up appointment given to patient. Patient acknowledges working in consult agreement with pharmacist as referred by his/her physician.     Diony Cardenas PharmD 7/11/2019 3:26 PM

## 2019-07-15 ENCOUNTER — OFFICE VISIT (OUTPATIENT)
Dept: PRIMARY CARE CLINIC | Age: 69
End: 2019-07-15
Payer: MEDICARE

## 2019-07-15 ENCOUNTER — HOSPITAL ENCOUNTER (OUTPATIENT)
Age: 69
Discharge: HOME | End: 2019-07-17
Payer: MEDICARE

## 2019-07-15 VITALS
SYSTOLIC BLOOD PRESSURE: 135 MMHG | WEIGHT: 124 LBS | BODY MASS INDEX: 19.46 KG/M2 | DIASTOLIC BLOOD PRESSURE: 82 MMHG | HEIGHT: 67 IN

## 2019-07-15 DIAGNOSIS — E87.1 HYPONATREMIA: ICD-10-CM

## 2019-07-15 DIAGNOSIS — I10 ESSENTIAL HYPERTENSION: ICD-10-CM

## 2019-07-15 DIAGNOSIS — E55.9 VITAMIN D DEFICIENCY: ICD-10-CM

## 2019-07-15 DIAGNOSIS — F41.9 ANXIETY: ICD-10-CM

## 2019-07-15 DIAGNOSIS — I82.401 ACUTE DEEP VEIN THROMBOSIS (DVT) OF RIGHT LOWER EXTREMITY, UNSPECIFIED VEIN (HCC): Primary | ICD-10-CM

## 2019-07-15 DIAGNOSIS — E03.9 ACQUIRED HYPOTHYROIDISM: ICD-10-CM

## 2019-07-15 DIAGNOSIS — K70.30 ALCOHOLIC CIRRHOSIS OF LIVER WITHOUT ASCITES (HCC): ICD-10-CM

## 2019-07-15 DIAGNOSIS — N39.41 URGE INCONTINENCE OF URINE: ICD-10-CM

## 2019-07-15 LAB
ALBUMIN SERPL-MCNC: 4.4 G/DL (ref 3.5–5.2)
ALP BLD-CCNC: 144 U/L (ref 35–104)
ALT SERPL-CCNC: 12 U/L (ref 0–32)
ANION GAP SERPL CALCULATED.3IONS-SCNC: 16 MMOL/L (ref 7–16)
AST SERPL-CCNC: 28 U/L (ref 0–31)
BASOPHILS ABSOLUTE: 0.04 E9/L (ref 0–0.2)
BASOPHILS RELATIVE PERCENT: 0.7 % (ref 0–2)
BILIRUB SERPL-MCNC: 0.5 MG/DL (ref 0–1.2)
BUN BLDV-MCNC: 9 MG/DL (ref 8–23)
CALCIUM SERPL-MCNC: 10 MG/DL (ref 8.6–10.2)
CHLORIDE BLD-SCNC: 96 MMOL/L (ref 98–107)
CO2: 21 MMOL/L (ref 22–29)
CREAT SERPL-MCNC: 0.8 MG/DL (ref 0.5–1)
EOSINOPHILS ABSOLUTE: 0.19 E9/L (ref 0.05–0.5)
EOSINOPHILS RELATIVE PERCENT: 3.4 % (ref 0–6)
GFR AFRICAN AMERICAN: >60
GFR NON-AFRICAN AMERICAN: >60 ML/MIN/1.73
GLUCOSE BLD-MCNC: 101 MG/DL (ref 74–99)
HCT VFR BLD CALC: 34.4 % (ref 34–48)
HEMOGLOBIN: 11.3 G/DL (ref 11.5–15.5)
IMMATURE GRANULOCYTES #: 0.02 E9/L
IMMATURE GRANULOCYTES %: 0.4 % (ref 0–5)
INTERNATIONAL NORMALIZATION RATIO, POC: 4.1
LYMPHOCYTES ABSOLUTE: 1.68 E9/L (ref 1.5–4)
LYMPHOCYTES RELATIVE PERCENT: 29.8 % (ref 20–42)
MAGNESIUM: 1.8 MG/DL (ref 1.6–2.6)
MCH RBC QN AUTO: 32.2 PG (ref 26–35)
MCHC RBC AUTO-ENTMCNC: 32.8 % (ref 32–34.5)
MCV RBC AUTO: 98 FL (ref 80–99.9)
MONOCYTES ABSOLUTE: 0.36 E9/L (ref 0.1–0.95)
MONOCYTES RELATIVE PERCENT: 6.4 % (ref 2–12)
NEUTROPHILS ABSOLUTE: 3.34 E9/L (ref 1.8–7.3)
NEUTROPHILS RELATIVE PERCENT: 59.3 % (ref 43–80)
PDW BLD-RTO: 14.9 FL (ref 11.5–15)
PLATELET # BLD: 380 E9/L (ref 130–450)
PMV BLD AUTO: 9.6 FL (ref 7–12)
POTASSIUM SERPL-SCNC: 4.9 MMOL/L (ref 3.5–5)
PROTHROMBIN TIME, POC: 49.3
RBC # BLD: 3.51 E12/L (ref 3.5–5.5)
SODIUM BLD-SCNC: 133 MMOL/L (ref 132–146)
T4 TOTAL: 6.4 MCG/DL (ref 4.5–11.7)
TOTAL PROTEIN: 8 G/DL (ref 6.4–8.3)
TSH SERPL DL<=0.05 MIU/L-ACNC: 4.02 UIU/ML (ref 0.27–4.2)
VITAMIN D 25-HYDROXY: 32 NG/ML (ref 30–100)
WBC # BLD: 5.6 E9/L (ref 4.5–11.5)

## 2019-07-15 PROCEDURE — 99214 OFFICE O/P EST MOD 30 MIN: CPT | Performed by: FAMILY MEDICINE

## 2019-07-15 PROCEDURE — 82306 VITAMIN D 25 HYDROXY: CPT

## 2019-07-15 PROCEDURE — 85025 COMPLETE CBC W/AUTO DIFF WBC: CPT

## 2019-07-15 PROCEDURE — 36415 COLL VENOUS BLD VENIPUNCTURE: CPT

## 2019-07-15 PROCEDURE — 80053 COMPREHEN METABOLIC PANEL: CPT

## 2019-07-15 PROCEDURE — 84436 ASSAY OF TOTAL THYROXINE: CPT

## 2019-07-15 PROCEDURE — 85610 PROTHROMBIN TIME: CPT | Performed by: FAMILY MEDICINE

## 2019-07-15 PROCEDURE — 83735 ASSAY OF MAGNESIUM: CPT

## 2019-07-15 PROCEDURE — 84443 ASSAY THYROID STIM HORMONE: CPT

## 2019-07-15 RX ORDER — ESCITALOPRAM OXALATE 20 MG/1
20 TABLET ORAL DAILY
Qty: 90 TABLET | Refills: 12 | Status: SHIPPED
Start: 2019-07-15 | End: 2020-06-15 | Stop reason: SDUPTHER

## 2019-07-15 ASSESSMENT — ENCOUNTER SYMPTOMS
RESPIRATORY NEGATIVE: 1
EYES NEGATIVE: 1
GASTROINTESTINAL NEGATIVE: 1
ALLERGIC/IMMUNOLOGIC NEGATIVE: 1

## 2019-07-15 NOTE — PROGRESS NOTES
well-developed and well-nourished. HENT:   Head: Normocephalic. Eyes: Pupils are equal, round, and reactive to light. EOM are normal.   Neck: Normal range of motion. Cardiovascular: Normal rate and regular rhythm. Pulmonary/Chest: Effort normal and breath sounds normal.   Abdominal: Soft. Musculoskeletal: Normal range of motion. Neurological: She is alert and oriented to person, place, and time. Skin: Skin is warm. Psychiatric: She has a normal mood and affect. Her behavior is normal.   Vitals reviewed. [facility] was seen today for labs only. Diagnoses and all orders for this visit:    Acute deep vein thrombosis (DVT) of right lower extremity, unspecified vein (HCC)  -     POCT INR    Vitamin D deficiency  -     Vitamin D 25 Hydroxy; Future    Essential hypertension  -     CBC Auto Differential; Future  -     Comprehensive Metabolic Panel; Future    Hyponatremia  -     CBC Auto Differential; Future  -     Comprehensive Metabolic Panel; Future    Alcoholic cirrhosis of liver without ascites (HCC)  -     Magnesium; Future  -     Vitamin D 25 Hydroxy; Future    Acquired hypothyroidism  -     TSH without Reflex; Future  -     T4; Future        Comments: cont  k fo rnow      Lab today    May need  D         Diet  exer  Cont with coumadin clinic   None today then  5 mg  Till thur A great deal of time spent reviewing medications, diet, exercise, social issues. Also reviewing the chart before entering the room with patient and finishing charting after leaving patient's room. More than half of that time was spent face to face with the patient in counseling and coordinating care. Follow Up: Return in about 3 weeks (around 8/5/2019).      Seen by:  Nicholas Cisneros DO

## 2019-07-17 ENCOUNTER — HOSPITAL ENCOUNTER (OUTPATIENT)
Dept: PHARMACY | Age: 69
Setting detail: THERAPIES SERIES
Discharge: HOME | End: 2019-07-17
Payer: MEDICARE

## 2019-07-17 VITALS
HEART RATE: 62 BPM | WEIGHT: 124 LBS | BODY MASS INDEX: 19.42 KG/M2 | DIASTOLIC BLOOD PRESSURE: 74 MMHG | SYSTOLIC BLOOD PRESSURE: 173 MMHG

## 2019-07-17 DIAGNOSIS — Z86.718 HISTORY OF DEEP VEIN THROMBOSIS: ICD-10-CM

## 2019-07-17 LAB — INTERNATIONAL NORMALIZATION RATIO, POC: 2.6

## 2019-07-17 PROCEDURE — 85610 PROTHROMBIN TIME: CPT

## 2019-07-24 ENCOUNTER — HOSPITAL ENCOUNTER (OUTPATIENT)
Dept: PHARMACY | Age: 69
Setting detail: THERAPIES SERIES
Discharge: HOME | End: 2019-07-24
Payer: MEDICARE

## 2019-07-24 VITALS
SYSTOLIC BLOOD PRESSURE: 151 MMHG | WEIGHT: 126 LBS | HEART RATE: 65 BPM | DIASTOLIC BLOOD PRESSURE: 68 MMHG | BODY MASS INDEX: 19.73 KG/M2

## 2019-07-24 DIAGNOSIS — Z86.718 HISTORY OF DEEP VEIN THROMBOSIS: ICD-10-CM

## 2019-07-24 LAB — INTERNATIONAL NORMALIZATION RATIO, POC: 2.1

## 2019-07-24 PROCEDURE — 85610 PROTHROMBIN TIME: CPT

## 2019-07-24 NOTE — PROGRESS NOTES
[id number] [facility]ticoagulation [facility]    Patient Findings     Positives:   Change in health (SHE LOST HER BALANCE AND FELL ONTO HARDWOOD FLOOR LAST NIGHT. HER ARMS BROKE THE FALL, SHE DID BUMP HER HEAD AS WELL. SHE DENIES VISION CHANGES, HEADACHE, OR NAUSEA/VOMITING. ADVISED HER TO GO TO ER IMMEDIATELY AFTER ANY HEAD INJURY.), Bruising (CUTS AND BRUISING ON HER ARMS FROM THE FALL)    Negatives:   Signs/symptoms of thrombosis, Signs/symptoms of bleeding, Laboratory test error suspected, Change in alcohol use (3 BEERS DAILY), Change in activity, Upcoming invasive procedure, Emergency department visit, Upcoming dental procedure, Missed doses, Extra doses, Change in medications, Change in diet/appetite, Hospital admission, Other complaints    Comments:   PCP AUG. 5         Patient  reports that she quit smoking about 14 years ago. Her smoking use included cigarettes. She started smoking about 47 years ago. She smoked 3.00 packs per day. She has never used smokeless tobacco.     Assessment/Plan:  Warfarin indication: DVT prevention (hx of DVT; hx of emboli in L foot)        INR today is therapeutic at 2.1, goal is 2-3. Warfarin Dose: same (4 mg daily)    Follow Up: 1 week    Patient understands dosing directions and information discussed. Dosing schedule and follow up appointment given to patient. Patient acknowledges working in consult agreement with pharmacist as referred by his/her physician.     Jimbo Chase PharmD 7/24/2019 2:14 PM

## 2019-07-26 ENCOUNTER — TELEPHONE (OUTPATIENT)
Dept: PRIMARY CARE CLINIC | Age: 69
End: 2019-07-26

## 2019-08-01 ENCOUNTER — HOSPITAL ENCOUNTER (OUTPATIENT)
Dept: PHARMACY | Age: 69
Setting detail: THERAPIES SERIES
Discharge: HOME | End: 2019-08-01
Payer: MEDICARE

## 2019-08-01 VITALS
BODY MASS INDEX: 19.01 KG/M2 | WEIGHT: 121.4 LBS | HEART RATE: 64 BPM | SYSTOLIC BLOOD PRESSURE: 133 MMHG | DIASTOLIC BLOOD PRESSURE: 71 MMHG

## 2019-08-01 DIAGNOSIS — Z86.718 HISTORY OF DEEP VEIN THROMBOSIS: ICD-10-CM

## 2019-08-01 LAB — INTERNATIONAL NORMALIZATION RATIO, POC: 3.9

## 2019-08-01 PROCEDURE — 85610 PROTHROMBIN TIME: CPT

## 2019-08-01 PROCEDURE — 99211 OFF/OP EST MAY X REQ PHY/QHP: CPT

## 2019-08-05 ENCOUNTER — OFFICE VISIT (OUTPATIENT)
Dept: PRIMARY CARE CLINIC | Age: 69
End: 2019-08-05
Payer: MEDICARE

## 2019-08-05 VITALS
WEIGHT: 123 LBS | BODY MASS INDEX: 18.64 KG/M2 | TEMPERATURE: 97.3 F | DIASTOLIC BLOOD PRESSURE: 82 MMHG | HEIGHT: 68 IN | SYSTOLIC BLOOD PRESSURE: 134 MMHG

## 2019-08-05 DIAGNOSIS — Z86.718 HISTORY OF DEEP VEIN THROMBOSIS: ICD-10-CM

## 2019-08-05 DIAGNOSIS — I73.9 PVD (PERIPHERAL VASCULAR DISEASE) WITH CLAUDICATION (HCC): Primary | ICD-10-CM

## 2019-08-05 DIAGNOSIS — I82.593 CHRONIC DEEP VEIN THROMBOSIS (DVT) OF OTHER VEIN OF BOTH LOWER EXTREMITIES (HCC): ICD-10-CM

## 2019-08-05 LAB
INTERNATIONAL NORMALIZATION RATIO, POC: 2.4
PROTHROMBIN TIME, POC: 29

## 2019-08-05 PROCEDURE — 85610 PROTHROMBIN TIME: CPT | Performed by: FAMILY MEDICINE

## 2019-08-05 PROCEDURE — 99213 OFFICE O/P EST LOW 20 MIN: CPT | Performed by: FAMILY MEDICINE

## 2019-08-05 ASSESSMENT — ENCOUNTER SYMPTOMS
RESPIRATORY NEGATIVE: 1
GASTROINTESTINAL NEGATIVE: 1
EYES NEGATIVE: 1

## 2019-08-14 ENCOUNTER — HOSPITAL ENCOUNTER (OUTPATIENT)
Dept: PHARMACY | Age: 69
Setting detail: THERAPIES SERIES
Discharge: HOME | End: 2019-08-14
Payer: MEDICARE

## 2019-08-14 VITALS
SYSTOLIC BLOOD PRESSURE: 166 MMHG | HEART RATE: 64 BPM | DIASTOLIC BLOOD PRESSURE: 82 MMHG | WEIGHT: 123 LBS | BODY MASS INDEX: 18.7 KG/M2

## 2019-08-14 DIAGNOSIS — Z86.718 HISTORY OF DEEP VEIN THROMBOSIS: ICD-10-CM

## 2019-08-14 LAB — INTERNATIONAL NORMALIZATION RATIO, POC: 3.2

## 2019-08-14 PROCEDURE — 99211 OFF/OP EST MAY X REQ PHY/QHP: CPT

## 2019-08-14 PROCEDURE — 85610 PROTHROMBIN TIME: CPT

## 2019-08-27 PROBLEM — E87.20 LACTIC ACIDOSIS: Status: RESOLVED | Noted: 2017-05-04 | Resolved: 2019-08-27

## 2019-08-27 PROBLEM — I75.022 BLUE TOE SYNDROME OF LEFT LOWER EXTREMITY (HCC): Chronic | Status: RESOLVED | Noted: 2017-05-22 | Resolved: 2019-08-27

## 2019-08-27 PROBLEM — Z86.718 HISTORY OF DEEP VEIN THROMBOSIS: Status: RESOLVED | Noted: 2018-11-05 | Resolved: 2019-08-27

## 2019-08-27 PROBLEM — I70.229 CRITICAL LOWER LIMB ISCHEMIA (HCC): Chronic | Status: RESOLVED | Noted: 2017-05-29 | Resolved: 2019-08-27

## 2019-08-27 PROBLEM — E03.9 ACQUIRED HYPOTHYROIDISM: Status: RESOLVED | Noted: 2019-07-01 | Resolved: 2019-08-27

## 2019-08-27 PROBLEM — D64.9 SYMPTOMATIC ANEMIA: Status: RESOLVED | Noted: 2019-01-10 | Resolved: 2019-08-27

## 2019-08-27 PROBLEM — I82.401 ACUTE DEEP VEIN THROMBOSIS (DVT) OF RIGHT LOWER EXTREMITY (HCC): Status: RESOLVED | Noted: 2019-07-01 | Resolved: 2019-08-27

## 2019-08-27 PROBLEM — E87.1 HYPONATREMIA: Status: RESOLVED | Noted: 2019-01-10 | Resolved: 2019-08-27

## 2019-08-27 PROBLEM — I82.503 CHRONIC DEEP VEIN THROMBOSIS (DVT) OF BOTH LOWER EXTREMITIES (HCC): Status: RESOLVED | Noted: 2019-01-10 | Resolved: 2019-08-27

## 2019-08-28 ENCOUNTER — HOSPITAL ENCOUNTER (OUTPATIENT)
Dept: PHARMACY | Age: 69
Setting detail: THERAPIES SERIES
Discharge: HOME | End: 2019-08-28
Payer: MEDICARE

## 2019-08-28 VITALS
WEIGHT: 126.2 LBS | BODY MASS INDEX: 19.19 KG/M2 | HEART RATE: 71 BPM | DIASTOLIC BLOOD PRESSURE: 78 MMHG | SYSTOLIC BLOOD PRESSURE: 134 MMHG

## 2019-08-28 LAB — INTERNATIONAL NORMALIZATION RATIO, POC: 3.7

## 2019-08-28 PROCEDURE — 85610 PROTHROMBIN TIME: CPT

## 2019-09-05 ENCOUNTER — OFFICE VISIT (OUTPATIENT)
Dept: PRIMARY CARE CLINIC | Age: 69
End: 2019-09-05
Payer: MEDICARE

## 2019-09-05 VITALS
DIASTOLIC BLOOD PRESSURE: 72 MMHG | SYSTOLIC BLOOD PRESSURE: 162 MMHG | BODY MASS INDEX: 18.64 KG/M2 | WEIGHT: 123 LBS | HEIGHT: 68 IN | TEMPERATURE: 97.6 F

## 2019-09-05 DIAGNOSIS — I73.9 PVD (PERIPHERAL VASCULAR DISEASE) WITH CLAUDICATION (HCC): Primary | ICD-10-CM

## 2019-09-05 DIAGNOSIS — I82.511 CHRONIC DEEP VEIN THROMBOSIS (DVT) OF FEMORAL VEIN OF RIGHT LOWER EXTREMITY (HCC): Chronic | ICD-10-CM

## 2019-09-05 DIAGNOSIS — I70.0 ATHEROSCLEROSIS OF ABDOMINAL AORTA (HCC): Chronic | ICD-10-CM

## 2019-09-05 DIAGNOSIS — I10 ESSENTIAL HYPERTENSION: ICD-10-CM

## 2019-09-05 PROBLEM — E55.9 VITAMIN D DEFICIENCY: Chronic | Status: ACTIVE | Noted: 2019-07-15

## 2019-09-05 PROBLEM — K70.30 ALCOHOLIC CIRRHOSIS (HCC): Chronic | Status: ACTIVE | Noted: 2019-01-10

## 2019-09-05 PROBLEM — D53.9 MACROCYTIC ANEMIA: Chronic | Status: ACTIVE | Noted: 2017-05-04

## 2019-09-05 PROBLEM — E78.2 MIXED HYPERLIPIDEMIA: Chronic | Status: ACTIVE | Noted: 2019-07-01

## 2019-09-05 PROBLEM — N39.41 URGE INCONTINENCE OF URINE: Chronic | Status: ACTIVE | Noted: 2019-07-15

## 2019-09-05 PROBLEM — M67.88 PERONEAL TENDINOSIS: Status: RESOLVED | Noted: 2019-01-10 | Resolved: 2019-09-05

## 2019-09-05 LAB
INTERNATIONAL NORMALIZATION RATIO, POC: 1.6
PROTHROMBIN TIME, POC: 19.5

## 2019-09-05 PROCEDURE — 85610 PROTHROMBIN TIME: CPT | Performed by: FAMILY MEDICINE

## 2019-09-05 PROCEDURE — 99214 OFFICE O/P EST MOD 30 MIN: CPT | Performed by: FAMILY MEDICINE

## 2019-09-05 ASSESSMENT — ENCOUNTER SYMPTOMS
RESPIRATORY NEGATIVE: 1
GASTROINTESTINAL NEGATIVE: 1
ALLERGIC/IMMUNOLOGIC NEGATIVE: 1
EYES NEGATIVE: 1

## 2019-09-05 NOTE — PROGRESS NOTES
19  Name: Haleigh Carr    : 1950    Sex: female    Age: 80 y.o. Subjective:  Chief Complaint: Patient is here for onemo ck  Re couamdin and   fluuid issues and sodium     Feel ok  No  Cp or sob        Seeing couamdin clinic     lwo today  No  Cp  Saw aposutoliuis  And  Now on myrbeteq and better        Sees  Coumadin clinic      9-11   Sees   Renal  Next week       pk  Vas next month      Review of Systems   Constitutional: Negative. HENT: Negative. Eyes: Negative. Respiratory: Negative. Cardiovascular: Negative. Gastrointestinal: Negative. Endocrine: Negative. Genitourinary: Negative. Musculoskeletal: Positive for arthralgias. Legs weak   Skin: Negative. Allergic/Immunologic: Negative. Neurological: Negative. Hematological: Negative. Psychiatric/Behavioral: Negative. Current Outpatient Medications:     Mirabegron ER (MYRBETRIQ) 25 MG TB24, Take 25 mg by mouth daily, Disp: , Rfl:     escitalopram (LEXAPRO) 20 MG tablet, Take 1 tablet by mouth daily, Disp: 90 tablet, Rfl: 12    diphenhydrAMINE (BENADRYL) 25 MG capsule, Take 25 mg by mouth every 6 hours as needed for Itching, Disp: , Rfl:     warfarin (COUMADIN) 4 MG tablet, Take as directed by [facility]. See encounters for most recent dosing history. , Disp: , Rfl:     Multiple Vitamins-Minerals (PRESERVISION AREDS PO), Take 1 tablet by mouth daily, Disp: , Rfl:     simvastatin (ZOCOR) 5 MG tablet, Take 1 tablet by mouth nightly, Disp: 90 tablet, Rfl: 12    potassium chloride (KLOR-CON M) 20 MEQ extended release tablet, Take 2 tablets by mouth daily (Patient taking differently: Take 20 mEq by mouth daily ), Disp: 180 tablet, Rfl: 12    levothyroxine (SYNTHROID) 25 MCG tablet, Take 1 tablet by mouth every morning, Disp: 90 tablet, Rfl: 12    brimonidine-timolol (COMBIGAN) 0.2-0.5 % ophthalmic solution, Place 1 drop into both eyes every 12 hours, Disp: , 123 lb (55.8 kg)   Height: 5' 8\" (1.727 m)       Objective:    Physical Exam   Constitutional: She is oriented to person, place, and time. She appears well-developed and well-nourished. HENT:   Head: Normocephalic. Eyes: Pupils are equal, round, and reactive to light. EOM are normal.   Neck: Normal range of motion. Cardiovascular: Normal rate and regular rhythm. Pulmonary/Chest: Effort normal and breath sounds normal.   Abdominal: Soft. Musculoskeletal:   Weak legs  Marked stiffness   Neurological: She is alert and oriented to person, place, and time. Skin: Skin is warm. Psychiatric: She has a normal mood and affect. Her behavior is normal.   Vitals reviewed. Jorje Tolentino was seen today for office visit for anticoagulation management. Diagnoses and all orders for this visit:    PVD (peripheral vascular disease) with claudication ([address]  -     POCT INR    Essential hypertension    Atherosclerosis of abdominal aorta (HCC)    Chronic deep vein thrombosis (DVT) of femoral vein of right lower extremity (HCC)        Comments:    Ck with renal about  bp   Diet exer  Hm isuses        Ck lab from renal         asdvied pt for wekane ss legs and pt refuses    A great deal of time spent reviewing medications, diet, exercise, social issues. Also reviewing the chart before entering the room with patient and finishing charting after leaving patient's room. More than half of that time was spent face to face with the patient in counseling and coordinating care. Follow Up: Return in about 2 months (around 11/5/2019).      Seen by:  Janis Edwards DO

## 2019-09-11 ENCOUNTER — HOSPITAL ENCOUNTER (OUTPATIENT)
Dept: PHARMACY | Age: 69
Setting detail: THERAPIES SERIES
Discharge: HOME | End: 2019-09-11
Payer: MEDICARE

## 2019-09-11 VITALS
DIASTOLIC BLOOD PRESSURE: 81 MMHG | WEIGHT: 122 LBS | BODY MASS INDEX: 18.55 KG/M2 | HEART RATE: 68 BPM | SYSTOLIC BLOOD PRESSURE: 172 MMHG

## 2019-09-11 LAB — INTERNATIONAL NORMALIZATION RATIO, POC: 3

## 2019-09-11 PROCEDURE — 85610 PROTHROMBIN TIME: CPT

## 2019-09-11 PROCEDURE — 99211 OFF/OP EST MAY X REQ PHY/QHP: CPT

## 2019-09-11 NOTE — PROGRESS NOTES
[id number] [facility] Anticoagulation Clinic    Patient Findings     Positives:   Change in alcohol use (CONTINUES TO HAVE 2-3 MIXED VODKA COCKTAILS DAILY), Change in activity (SHE MAY START SOME PHYSICAL THERAPY), Extra doses (HER INR WAS CHECKED ON 9/5 AT DR. SCHMITZ'S OFFICE. IT WAS 1.6. HE GAVE ADDITIONAL 2 MG OF WARFARIN ON 9/6. )    Negatives:   Signs/symptoms of thrombosis, Signs/symptoms of bleeding, Laboratory test error suspected, Change in health, Upcoming invasive procedure, Emergency department visit, Upcoming dental procedure, Missed doses (HER INR WAS CHECKED ON 9/5 AT DR. SCHMITZ'S OFFICE. IT WAS 1.6. HE GAVE ADDITIONAL 2 MG OF WARFARIN ON 9/6. ), Change in medications, Change in diet/appetite, Hospital admission, Bruising, Other complaints    Comments:   OCT. 7TH - DR. HOWE AND US         Patient  reports that she quit smoking about 14 years ago. Her smoking use included cigarettes. She started smoking about 47 years ago. She smoked 3.00 packs per day. She has never used smokeless tobacco.     Assessment/Plan:  Warfarin indication: DVT prevention (hx of DVT; hx of emboli in L foot)        INR today is therapeutic at 3, goal is 2-3. Warfarin Dose: same (2 mg every M/W/F; 4 mg all other days) THIS IS THE DOSE SHE TOOK FOR PAST WEEK    Follow Up: 2 weeks    Patient understands dosing directions and information discussed. Dosing schedule and follow up appointment given to patient. Patient acknowledges working in consult agreement with pharmacist as referred by his/her physician.     Jennifer Leonard PharmD 9/11/2019 1:58 PM

## 2019-09-26 ENCOUNTER — HOSPITAL ENCOUNTER (OUTPATIENT)
Dept: PHARMACY | Age: 69
Setting detail: THERAPIES SERIES
Discharge: HOME | End: 2019-09-26
Payer: MEDICARE

## 2019-09-26 VITALS
SYSTOLIC BLOOD PRESSURE: 138 MMHG | WEIGHT: 123.6 LBS | BODY MASS INDEX: 18.79 KG/M2 | HEART RATE: 80 BPM | DIASTOLIC BLOOD PRESSURE: 80 MMHG

## 2019-09-26 LAB — INTERNATIONAL NORMALIZATION RATIO, POC: 2.5

## 2019-09-26 PROCEDURE — 99211 OFF/OP EST MAY X REQ PHY/QHP: CPT

## 2019-09-26 PROCEDURE — 85610 PROTHROMBIN TIME: CPT

## 2019-09-26 NOTE — PROGRESS NOTES
[id number] [facility] Anticoagulation Clinic    Patient Findings     Positives:   Change in health (SHE HAD A FALL SINCE LAST VISIT; NO HEAD INJURY), Change in medications (MYRBETRIQ INCREASED FROM 25 MG TO 50 MG DAILY)    Negatives:   Signs/symptoms of thrombosis, Signs/symptoms of bleeding, Laboratory test error suspected, Change in alcohol use, Change in activity, Upcoming invasive procedure, Emergency department visit, Upcoming dental procedure, Missed doses, Extra doses, Change in diet/appetite, Hospital admission, Bruising, Other complaints    Comments:   OCT. 7TH - DR. HOWE AND US         Patient  reports that she quit smoking about 14 years ago. Her smoking use included cigarettes. She started smoking about 47 years ago. She smoked 3.00 packs per day. She has never used smokeless tobacco.     Assessment/Plan:  Warfarin indication: DVT prevention (hx of DVT; hx of emboli in L foot)        INR today is therapeutic at 2.5, goal is 2-3. Warfarin Dose: same (2 mg every M/W/F; 4 mg all other days)    Follow Up: 2 weeks    Patient understands dosing directions and information discussed. Dosing schedule and follow up appointment given to patient. Patient acknowledges working in consult agreement with pharmacist as referred by his/her physician.     Dani Ferguson PharmD 9/26/2019 1:38 PM

## 2019-10-07 ENCOUNTER — OFFICE VISIT (OUTPATIENT)
Dept: VASCULAR SURGERY | Age: 69
End: 2019-10-07
Payer: MEDICARE

## 2019-10-07 ENCOUNTER — TELEPHONE (OUTPATIENT)
Dept: VASCULAR SURGERY | Age: 69
End: 2019-10-07

## 2019-10-07 DIAGNOSIS — I82.511 CHRONIC DEEP VEIN THROMBOSIS (DVT) OF FEMORAL VEIN OF RIGHT LOWER EXTREMITY (HCC): ICD-10-CM

## 2019-10-07 DIAGNOSIS — I73.9 PVD (PERIPHERAL VASCULAR DISEASE) WITH CLAUDICATION (HCC): Primary | ICD-10-CM

## 2019-10-07 PROCEDURE — G8400 PT W/DXA NO RESULTS DOC: HCPCS | Performed by: NURSE PRACTITIONER

## 2019-10-07 PROCEDURE — 1036F TOBACCO NON-USER: CPT | Performed by: NURSE PRACTITIONER

## 2019-10-07 PROCEDURE — 4040F PNEUMOC VAC/ADMIN/RCVD: CPT | Performed by: NURSE PRACTITIONER

## 2019-10-07 PROCEDURE — 1123F ACP DISCUSS/DSCN MKR DOCD: CPT | Performed by: NURSE PRACTITIONER

## 2019-10-07 PROCEDURE — 99213 OFFICE O/P EST LOW 20 MIN: CPT | Performed by: NURSE PRACTITIONER

## 2019-10-07 PROCEDURE — G8598 ASA/ANTIPLAT THER USED: HCPCS | Performed by: NURSE PRACTITIONER

## 2019-10-07 PROCEDURE — 3017F COLORECTAL CA SCREEN DOC REV: CPT | Performed by: NURSE PRACTITIONER

## 2019-10-07 PROCEDURE — G8420 CALC BMI NORM PARAMETERS: HCPCS | Performed by: NURSE PRACTITIONER

## 2019-10-07 PROCEDURE — G8428 CUR MEDS NOT DOCUMENT: HCPCS | Performed by: NURSE PRACTITIONER

## 2019-10-07 PROCEDURE — G8484 FLU IMMUNIZE NO ADMIN: HCPCS | Performed by: NURSE PRACTITIONER

## 2019-10-07 PROCEDURE — 1090F PRES/ABSN URINE INCON ASSESS: CPT | Performed by: NURSE PRACTITIONER

## 2019-10-07 RX ORDER — CLOPIDOGREL BISULFATE 75 MG/1
75 TABLET ORAL DAILY
Qty: 30 TABLET | Refills: 3 | Status: SHIPPED
Start: 2019-10-10 | End: 2020-02-06 | Stop reason: SDUPTHER

## 2019-10-17 RX ORDER — LATANOPROST 50 UG/ML
SOLUTION/ DROPS OPHTHALMIC
COMMUNITY
Start: 2013-07-12

## 2019-10-17 RX ORDER — WARFARIN SODIUM 2 MG/1
TABLET ORAL
Status: ON HOLD | COMMUNITY
Start: 2018-09-17 | End: 2020-07-30

## 2019-10-17 RX ORDER — LISINOPRIL 10 MG/1
TABLET ORAL
Status: ON HOLD | COMMUNITY
Start: 2017-05-11 | End: 2020-08-03 | Stop reason: HOSPADM

## 2019-10-17 RX ORDER — CHOLECALCIFEROL (VITAMIN D3) 1250 MCG
CAPSULE ORAL
Status: ON HOLD | COMMUNITY
Start: 2017-05-04 | End: 2020-09-25 | Stop reason: HOSPADM

## 2019-11-07 ENCOUNTER — OFFICE VISIT (OUTPATIENT)
Dept: PRIMARY CARE CLINIC | Age: 69
End: 2019-11-07
Payer: MEDICARE

## 2019-11-07 ENCOUNTER — HOSPITAL ENCOUNTER (OUTPATIENT)
Age: 69
Discharge: HOME | End: 2019-11-09
Payer: MEDICARE

## 2019-11-07 VITALS
SYSTOLIC BLOOD PRESSURE: 127 MMHG | HEIGHT: 68 IN | BODY MASS INDEX: 19.55 KG/M2 | DIASTOLIC BLOOD PRESSURE: 78 MMHG | TEMPERATURE: 97.8 F | WEIGHT: 129 LBS

## 2019-11-07 DIAGNOSIS — E03.9 ACQUIRED HYPOTHYROIDISM: ICD-10-CM

## 2019-11-07 DIAGNOSIS — F41.9 ANXIETY: ICD-10-CM

## 2019-11-07 DIAGNOSIS — M81.0 AGE-RELATED OSTEOPOROSIS WITHOUT CURRENT PATHOLOGICAL FRACTURE: ICD-10-CM

## 2019-11-07 DIAGNOSIS — E78.2 MIXED HYPERLIPIDEMIA: ICD-10-CM

## 2019-11-07 DIAGNOSIS — I10 ESSENTIAL HYPERTENSION: ICD-10-CM

## 2019-11-07 DIAGNOSIS — Z23 NEED FOR PROPHYLACTIC VACCINATION AGAINST STREPTOCOCCUS PNEUMONIAE (PNEUMOCOCCUS): ICD-10-CM

## 2019-11-07 DIAGNOSIS — E78.2 MIXED HYPERLIPIDEMIA: Primary | ICD-10-CM

## 2019-11-07 DIAGNOSIS — Z12.39 SCREENING FOR MALIGNANT NEOPLASM OF BREAST: ICD-10-CM

## 2019-11-07 LAB
ALBUMIN SERPL-MCNC: 4.9 G/DL (ref 3.5–5.2)
ALP BLD-CCNC: 133 U/L (ref 35–104)
ALT SERPL-CCNC: 14 U/L (ref 0–32)
ANION GAP SERPL CALCULATED.3IONS-SCNC: 19 MMOL/L (ref 7–16)
ANISOCYTOSIS: ABNORMAL
AST SERPL-CCNC: 40 U/L (ref 0–31)
BASOPHILS ABSOLUTE: 0.05 E9/L (ref 0–0.2)
BASOPHILS RELATIVE PERCENT: 0.9 % (ref 0–2)
BILIRUB SERPL-MCNC: 0.9 MG/DL (ref 0–1.2)
BILIRUBIN URINE: NEGATIVE
BLOOD, URINE: NEGATIVE
BUN BLDV-MCNC: 6 MG/DL (ref 8–23)
CALCIUM SERPL-MCNC: 9.5 MG/DL (ref 8.6–10.2)
CHLORIDE BLD-SCNC: 89 MMOL/L (ref 98–107)
CHOLESTEROL, TOTAL: 153 MG/DL (ref 0–199)
CLARITY: CLEAR
CO2: 20 MMOL/L (ref 22–29)
COLOR: YELLOW
CREAT SERPL-MCNC: 0.8 MG/DL (ref 0.5–1)
EOSINOPHILS ABSOLUTE: 0.19 E9/L (ref 0.05–0.5)
EOSINOPHILS RELATIVE PERCENT: 3.5 % (ref 0–6)
GFR AFRICAN AMERICAN: >60
GFR NON-AFRICAN AMERICAN: >60 ML/MIN/1.73
GLUCOSE BLD-MCNC: 103 MG/DL (ref 74–99)
GLUCOSE URINE: NEGATIVE MG/DL
HCT VFR BLD CALC: 31.1 % (ref 34–48)
HDLC SERPL-MCNC: 69 MG/DL
HEMOGLOBIN: 10.8 G/DL (ref 11.5–15.5)
KETONES, URINE: ABNORMAL MG/DL
LDL CHOLESTEROL CALCULATED: 53 MG/DL (ref 0–99)
LEUKOCYTE ESTERASE, URINE: NEGATIVE
LYMPHOCYTES ABSOLUTE: 1.48 E9/L (ref 1.5–4)
LYMPHOCYTES RELATIVE PERCENT: 28.1 % (ref 20–42)
MCH RBC QN AUTO: 38.7 PG (ref 26–35)
MCHC RBC AUTO-ENTMCNC: 34.7 % (ref 32–34.5)
MCV RBC AUTO: 111.5 FL (ref 80–99.9)
MONOCYTES ABSOLUTE: 0.26 E9/L (ref 0.1–0.95)
MONOCYTES RELATIVE PERCENT: 5.3 % (ref 2–12)
NEUTROPHILS ABSOLUTE: 3.29 E9/L (ref 1.8–7.3)
NEUTROPHILS RELATIVE PERCENT: 62.3 % (ref 43–80)
NITRITE, URINE: NEGATIVE
PDW BLD-RTO: 17 FL (ref 11.5–15)
PH UA: 6 (ref 5–9)
PLATELET # BLD: 397 E9/L (ref 130–450)
PMV BLD AUTO: 9.3 FL (ref 7–12)
POTASSIUM SERPL-SCNC: 5.6 MMOL/L (ref 3.5–5)
PROTEIN UA: NEGATIVE MG/DL
RBC # BLD: 2.79 E12/L (ref 3.5–5.5)
SODIUM BLD-SCNC: 128 MMOL/L (ref 132–146)
SPECIFIC GRAVITY UA: 1.01 (ref 1–1.03)
T4 TOTAL: 6 MCG/DL (ref 4.5–11.7)
TOTAL PROTEIN: 7.8 G/DL (ref 6.4–8.3)
TRIGL SERPL-MCNC: 156 MG/DL (ref 0–149)
TSH SERPL DL<=0.05 MIU/L-ACNC: 2.2 UIU/ML (ref 0.27–4.2)
UROBILINOGEN, URINE: 1 E.U./DL
VITAMIN D 25-HYDROXY: 37 NG/ML (ref 30–100)
VLDLC SERPL CALC-MCNC: 31 MG/DL
WBC # BLD: 5.3 E9/L (ref 4.5–11.5)

## 2019-11-07 PROCEDURE — G0009 ADMIN PNEUMOCOCCAL VACCINE: HCPCS | Performed by: FAMILY MEDICINE

## 2019-11-07 PROCEDURE — 4040F PNEUMOC VAC/ADMIN/RCVD: CPT | Performed by: FAMILY MEDICINE

## 2019-11-07 PROCEDURE — G8420 CALC BMI NORM PARAMETERS: HCPCS | Performed by: FAMILY MEDICINE

## 2019-11-07 PROCEDURE — 84443 ASSAY THYROID STIM HORMONE: CPT

## 2019-11-07 PROCEDURE — 84436 ASSAY OF TOTAL THYROXINE: CPT

## 2019-11-07 PROCEDURE — 82306 VITAMIN D 25 HYDROXY: CPT

## 2019-11-07 PROCEDURE — G8427 DOCREV CUR MEDS BY ELIG CLIN: HCPCS | Performed by: FAMILY MEDICINE

## 2019-11-07 PROCEDURE — 3017F COLORECTAL CA SCREEN DOC REV: CPT | Performed by: FAMILY MEDICINE

## 2019-11-07 PROCEDURE — 85025 COMPLETE CBC W/AUTO DIFF WBC: CPT

## 2019-11-07 PROCEDURE — 1123F ACP DISCUSS/DSCN MKR DOCD: CPT | Performed by: FAMILY MEDICINE

## 2019-11-07 PROCEDURE — G8400 PT W/DXA NO RESULTS DOC: HCPCS | Performed by: FAMILY MEDICINE

## 2019-11-07 PROCEDURE — 1090F PRES/ABSN URINE INCON ASSESS: CPT | Performed by: FAMILY MEDICINE

## 2019-11-07 PROCEDURE — 80061 LIPID PANEL: CPT

## 2019-11-07 PROCEDURE — G8598 ASA/ANTIPLAT THER USED: HCPCS | Performed by: FAMILY MEDICINE

## 2019-11-07 PROCEDURE — 90732 PPSV23 VACC 2 YRS+ SUBQ/IM: CPT | Performed by: FAMILY MEDICINE

## 2019-11-07 PROCEDURE — 80053 COMPREHEN METABOLIC PANEL: CPT

## 2019-11-07 PROCEDURE — 1036F TOBACCO NON-USER: CPT | Performed by: FAMILY MEDICINE

## 2019-11-07 PROCEDURE — 81003 URINALYSIS AUTO W/O SCOPE: CPT

## 2019-11-07 PROCEDURE — 36415 COLL VENOUS BLD VENIPUNCTURE: CPT

## 2019-11-07 PROCEDURE — G8484 FLU IMMUNIZE NO ADMIN: HCPCS | Performed by: FAMILY MEDICINE

## 2019-11-07 PROCEDURE — 99214 OFFICE O/P EST MOD 30 MIN: CPT | Performed by: FAMILY MEDICINE

## 2019-11-07 ASSESSMENT — ENCOUNTER SYMPTOMS
EYES NEGATIVE: 1
ALLERGIC/IMMUNOLOGIC NEGATIVE: 1
GASTROINTESTINAL NEGATIVE: 1
SHORTNESS OF BREATH: 0

## 2019-11-08 PROBLEM — I82.511 CHRONIC DEEP VEIN THROMBOSIS (DVT) OF FEMORAL VEIN OF RIGHT LOWER EXTREMITY (HCC): Chronic | Status: RESOLVED | Noted: 2019-03-04 | Resolved: 2019-11-08

## 2019-11-08 PROBLEM — E43 SEVERE PROTEIN-CALORIE MALNUTRITION (HCC): Chronic | Status: RESOLVED | Noted: 2019-04-18 | Resolved: 2019-11-08

## 2019-11-08 PROBLEM — F41.9 ANXIETY: Status: ACTIVE | Noted: 2019-11-08

## 2019-11-08 ASSESSMENT — PATIENT HEALTH QUESTIONNAIRE - PHQ9
2. FEELING DOWN, DEPRESSED OR HOPELESS: 0
1. LITTLE INTEREST OR PLEASURE IN DOING THINGS: 0
SUM OF ALL RESPONSES TO PHQ QUESTIONS 1-9: 0
SUM OF ALL RESPONSES TO PHQ QUESTIONS 1-9: 0
SUM OF ALL RESPONSES TO PHQ9 QUESTIONS 1 & 2: 0

## 2019-12-03 ENCOUNTER — ANTI-COAG VISIT (OUTPATIENT)
Dept: PRIMARY CARE CLINIC | Age: 69
End: 2019-12-03

## 2020-01-14 ENCOUNTER — HOSPITAL ENCOUNTER (OUTPATIENT)
Dept: MAMMOGRAPHY | Age: 70
Discharge: HOME | End: 2020-01-16
Payer: MEDICARE

## 2020-01-14 PROCEDURE — 77063 BREAST TOMOSYNTHESIS BI: CPT

## 2020-01-15 ENCOUNTER — TELEPHONE (OUTPATIENT)
Dept: PRIMARY CARE CLINIC | Age: 70
End: 2020-01-15

## 2020-02-06 ENCOUNTER — HOSPITAL ENCOUNTER (OUTPATIENT)
Age: 70
Discharge: HOME | End: 2020-02-08
Payer: MEDICARE

## 2020-02-06 ENCOUNTER — OFFICE VISIT (OUTPATIENT)
Dept: PRIMARY CARE CLINIC | Age: 70
End: 2020-02-06
Payer: MEDICARE

## 2020-02-06 VITALS
BODY MASS INDEX: 19.7 KG/M2 | TEMPERATURE: 98.3 F | DIASTOLIC BLOOD PRESSURE: 75 MMHG | WEIGHT: 130 LBS | HEIGHT: 68 IN | SYSTOLIC BLOOD PRESSURE: 125 MMHG

## 2020-02-06 LAB
ALBUMIN SERPL-MCNC: 4.4 G/DL (ref 3.5–5.2)
ALP BLD-CCNC: 167 U/L (ref 35–104)
ALT SERPL-CCNC: 15 U/L (ref 0–32)
ANION GAP SERPL CALCULATED.3IONS-SCNC: 19 MMOL/L (ref 7–16)
ANISOCYTOSIS: ABNORMAL
AST SERPL-CCNC: 37 U/L (ref 0–31)
ATYPICAL LYMPHOCYTE RELATIVE PERCENT: 0.9 % (ref 0–4)
BASOPHILS ABSOLUTE: 0 E9/L (ref 0–0.2)
BASOPHILS RELATIVE PERCENT: 0.3 % (ref 0–2)
BILIRUB SERPL-MCNC: 1.2 MG/DL (ref 0–1.2)
BUN BLDV-MCNC: 7 MG/DL (ref 8–23)
CALCIUM SERPL-MCNC: 9.2 MG/DL (ref 8.6–10.2)
CHLORIDE BLD-SCNC: 98 MMOL/L (ref 98–107)
CHOLESTEROL, TOTAL: 131 MG/DL (ref 0–199)
CO2: 19 MMOL/L (ref 22–29)
CREAT SERPL-MCNC: 1 MG/DL (ref 0.5–1)
EOSINOPHILS ABSOLUTE: 0 E9/L (ref 0.05–0.5)
EOSINOPHILS RELATIVE PERCENT: 0.4 % (ref 0–6)
GFR AFRICAN AMERICAN: >60
GFR NON-AFRICAN AMERICAN: 55 ML/MIN/1.73
GLUCOSE BLD-MCNC: 140 MG/DL (ref 74–99)
HCT VFR BLD CALC: 25.2 % (ref 34–48)
HDLC SERPL-MCNC: 58 MG/DL
HEMOGLOBIN: 8.3 G/DL (ref 11.5–15.5)
LDL CHOLESTEROL CALCULATED: 56 MG/DL (ref 0–99)
LYMPHOCYTES ABSOLUTE: 1.17 E9/L (ref 1.5–4)
LYMPHOCYTES RELATIVE PERCENT: 16.5 % (ref 20–42)
MCH RBC QN AUTO: 41.5 PG (ref 26–35)
MCHC RBC AUTO-ENTMCNC: 32.9 % (ref 32–34.5)
MCV RBC AUTO: 126 FL (ref 80–99.9)
MONOCYTES ABSOLUTE: 0.34 E9/L (ref 0.1–0.95)
MONOCYTES RELATIVE PERCENT: 5.2 % (ref 2–12)
NEUTROPHILS ABSOLUTE: 5.31 E9/L (ref 1.8–7.3)
NEUTROPHILS RELATIVE PERCENT: 77.4 % (ref 43–80)
PDW BLD-RTO: 15.7 FL (ref 11.5–15)
PLATELET # BLD: 444 E9/L (ref 130–450)
PMV BLD AUTO: 9.4 FL (ref 7–12)
POLYCHROMASIA: ABNORMAL
POTASSIUM SERPL-SCNC: 4.7 MMOL/L (ref 3.5–5)
RBC # BLD: 2 E12/L (ref 3.5–5.5)
SODIUM BLD-SCNC: 136 MMOL/L (ref 132–146)
T4 TOTAL: 6.2 MCG/DL (ref 4.5–11.7)
TOTAL PROTEIN: 7.2 G/DL (ref 6.4–8.3)
TRIGL SERPL-MCNC: 83 MG/DL (ref 0–149)
TSH SERPL DL<=0.05 MIU/L-ACNC: 3.36 UIU/ML (ref 0.27–4.2)
VLDLC SERPL CALC-MCNC: 17 MG/DL
WBC # BLD: 6.9 E9/L (ref 4.5–11.5)

## 2020-02-06 PROCEDURE — 80061 LIPID PANEL: CPT

## 2020-02-06 PROCEDURE — 36415 COLL VENOUS BLD VENIPUNCTURE: CPT

## 2020-02-06 PROCEDURE — G8484 FLU IMMUNIZE NO ADMIN: HCPCS | Performed by: FAMILY MEDICINE

## 2020-02-06 PROCEDURE — G8427 DOCREV CUR MEDS BY ELIG CLIN: HCPCS | Performed by: FAMILY MEDICINE

## 2020-02-06 PROCEDURE — 1090F PRES/ABSN URINE INCON ASSESS: CPT | Performed by: FAMILY MEDICINE

## 2020-02-06 PROCEDURE — 80053 COMPREHEN METABOLIC PANEL: CPT

## 2020-02-06 PROCEDURE — G8400 PT W/DXA NO RESULTS DOC: HCPCS | Performed by: FAMILY MEDICINE

## 2020-02-06 PROCEDURE — 3017F COLORECTAL CA SCREEN DOC REV: CPT | Performed by: FAMILY MEDICINE

## 2020-02-06 PROCEDURE — 1123F ACP DISCUSS/DSCN MKR DOCD: CPT | Performed by: FAMILY MEDICINE

## 2020-02-06 PROCEDURE — 1036F TOBACCO NON-USER: CPT | Performed by: FAMILY MEDICINE

## 2020-02-06 PROCEDURE — 84443 ASSAY THYROID STIM HORMONE: CPT

## 2020-02-06 PROCEDURE — G8420 CALC BMI NORM PARAMETERS: HCPCS | Performed by: FAMILY MEDICINE

## 2020-02-06 PROCEDURE — 4040F PNEUMOC VAC/ADMIN/RCVD: CPT | Performed by: FAMILY MEDICINE

## 2020-02-06 PROCEDURE — 84436 ASSAY OF TOTAL THYROXINE: CPT

## 2020-02-06 PROCEDURE — 85025 COMPLETE CBC W/AUTO DIFF WBC: CPT

## 2020-02-06 PROCEDURE — 99214 OFFICE O/P EST MOD 30 MIN: CPT | Performed by: FAMILY MEDICINE

## 2020-02-06 RX ORDER — CLOPIDOGREL BISULFATE 75 MG/1
75 TABLET ORAL DAILY
Qty: 30 TABLET | Refills: 3 | Status: SHIPPED
Start: 2020-02-06 | End: 2020-06-15 | Stop reason: SDUPTHER

## 2020-02-06 ASSESSMENT — PATIENT HEALTH QUESTIONNAIRE - PHQ9
1. LITTLE INTEREST OR PLEASURE IN DOING THINGS: 0
SUM OF ALL RESPONSES TO PHQ QUESTIONS 1-9: 0
SUM OF ALL RESPONSES TO PHQ QUESTIONS 1-9: 0
SUM OF ALL RESPONSES TO PHQ9 QUESTIONS 1 & 2: 0
2. FEELING DOWN, DEPRESSED OR HOPELESS: 0

## 2020-02-06 NOTE — PROGRESS NOTES
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---MAD AT [employer] [facility] [employer]    DEPRESSION----TURNING POINT    MOM  AT 89 9-11    SISTER WITH MI AT 77    [address] [facility] 1950 Page #2    [facility] DEGENERATION---DR BALDERAS    NEUROPATHY LEGS DR Elvis Douglas     ANEMIA     CELLULITIS LEFT FOOT WITH PROBABLE PVD    MUKESH POLY NEUROPATHY ON EMD DR Leal Daniels     VIT D DEFIC     VIT B 15 DEFIC     ADMIT  WITH ISCHEMIA LEFT FOOT-----ATHERSOCOLEROIS---- ANEMIA DUE TO    ETOH----SEEN BY DR TEE----PK----DAMICO---ECHO DONE AND NEG    OR DR SMITH---17------- LEFT LEG EMBOLECTOMY AND MUKESH VAS DIS    ECHO NEG  DUE TO MURMUR    HYPONATREMIA     PT DC NEURONTIN     PT DC PLETAL ON OWN  DUE TO DIAR    PT CHG FROM VODKA TO BEER 10-17     DM 10-17    DVT R LEG ---SEES DR SMITH ON     EVAL DR SAM PAST AND DX ITH MEGALOBLASITC ANEMIA IN PAST      Past Medical History:   Diagnosis Date    Alcoholism ([address]     heavy alcohol consumption since ~3719-5114 till early     Anemia 2017    Blue toe syndrome of left lower extremity ([address] 2017    Cataract     Chronic deep vein thrombosis (DVT) of femoral vein of right lower extremity ([address] 3/4/2019    Critical lower limb ischemia 2017    Depression \    DVT (deep venous thrombosis) (HCC) 2018    R leg    Glaucoma     History of blood transfusion 2017    Hyperlipidemia     Hypertension     Hypothyroidism     Liver disease     Macular degeneration     Peripheral edema     Sciatica     Severe protein-calorie malnutrition ([address] 2019     Family History   Problem Relation Age of Onset    Diabetes Father    Marco Perez Ck bp weekly home    Follow Up: Return in about 3 months (around 5/6/2020).      Seen by:  Cira Pereyra DO

## 2020-02-10 ENCOUNTER — TELEPHONE (OUTPATIENT)
Dept: PRIMARY CARE CLINIC | Age: 70
End: 2020-02-10

## 2020-06-03 ENCOUNTER — TELEPHONE (OUTPATIENT)
Dept: PRIMARY CARE CLINIC | Age: 70
End: 2020-06-03

## 2020-06-03 ENCOUNTER — HOSPITAL ENCOUNTER (OUTPATIENT)
Age: 70
Discharge: HOME | End: 2020-06-05
Payer: MEDICARE

## 2020-06-03 LAB
ALBUMIN SERPL-MCNC: 4.3 G/DL (ref 3.5–5.2)
ALP BLD-CCNC: 124 U/L (ref 35–104)
ALT SERPL-CCNC: 11 U/L (ref 0–32)
ANION GAP SERPL CALCULATED.3IONS-SCNC: 17 MMOL/L (ref 7–16)
AST SERPL-CCNC: 37 U/L (ref 0–31)
BASOPHILS ABSOLUTE: 0.03 E9/L (ref 0–0.2)
BASOPHILS RELATIVE PERCENT: 0.6 % (ref 0–2)
BILIRUB SERPL-MCNC: 0.8 MG/DL (ref 0–1.2)
BUN BLDV-MCNC: 6 MG/DL (ref 8–23)
CALCIUM SERPL-MCNC: 9.7 MG/DL (ref 8.6–10.2)
CHLORIDE BLD-SCNC: 90 MMOL/L (ref 98–107)
CHOLESTEROL, TOTAL: 143 MG/DL (ref 0–199)
CO2: 27 MMOL/L (ref 22–29)
CREAT SERPL-MCNC: 0.7 MG/DL (ref 0.5–1)
EOSINOPHILS ABSOLUTE: 0.04 E9/L (ref 0.05–0.5)
EOSINOPHILS RELATIVE PERCENT: 0.8 % (ref 0–6)
GFR AFRICAN AMERICAN: >60
GFR NON-AFRICAN AMERICAN: >60 ML/MIN/1.73
GLUCOSE BLD-MCNC: 96 MG/DL (ref 74–99)
HBA1C MFR BLD: 5.1 % (ref 4–5.6)
HCT VFR BLD CALC: 41.1 % (ref 34–48)
HDLC SERPL-MCNC: 65 MG/DL
HEMOGLOBIN: 13.8 G/DL (ref 11.5–15.5)
IMMATURE GRANULOCYTES #: 0.02 E9/L
IMMATURE GRANULOCYTES %: 0.4 % (ref 0–5)
INR BLD: 1
IRON: 181 MCG/DL (ref 37–145)
LDL CHOLESTEROL CALCULATED: 64 MG/DL (ref 0–99)
LYMPHOCYTES ABSOLUTE: 1.56 E9/L (ref 1.5–4)
LYMPHOCYTES RELATIVE PERCENT: 31 % (ref 20–42)
MCH RBC QN AUTO: 32.7 PG (ref 26–35)
MCHC RBC AUTO-ENTMCNC: 33.6 % (ref 32–34.5)
MCV RBC AUTO: 97.4 FL (ref 80–99.9)
MONOCYTES ABSOLUTE: 0.3 E9/L (ref 0.1–0.95)
MONOCYTES RELATIVE PERCENT: 6 % (ref 2–12)
NEUTROPHILS ABSOLUTE: 3.08 E9/L (ref 1.8–7.3)
NEUTROPHILS RELATIVE PERCENT: 61.2 % (ref 43–80)
PDW BLD-RTO: 15.2 FL (ref 11.5–15)
PLATELET # BLD: 326 E9/L (ref 130–450)
PMV BLD AUTO: 10.2 FL (ref 7–12)
POTASSIUM SERPL-SCNC: 5 MMOL/L (ref 3.5–5)
PROTHROMBIN TIME: 11.3 SEC (ref 9.3–12.4)
RBC # BLD: 4.22 E12/L (ref 3.5–5.5)
SODIUM BLD-SCNC: 134 MMOL/L (ref 132–146)
T4 TOTAL: 6.5 MCG/DL (ref 4.5–11.7)
TOTAL PROTEIN: 7.5 G/DL (ref 6.4–8.3)
TRIGL SERPL-MCNC: 72 MG/DL (ref 0–149)
TSH SERPL DL<=0.05 MIU/L-ACNC: 1.55 UIU/ML (ref 0.27–4.2)
URIC ACID, SERUM: 4.6 MG/DL (ref 2.4–5.7)
VITAMIN D 25-HYDROXY: 33 NG/ML (ref 30–100)
VLDLC SERPL CALC-MCNC: 14 MG/DL
WBC # BLD: 5 E9/L (ref 4.5–11.5)

## 2020-06-03 PROCEDURE — 82306 VITAMIN D 25 HYDROXY: CPT

## 2020-06-03 PROCEDURE — 83036 HEMOGLOBIN GLYCOSYLATED A1C: CPT

## 2020-06-03 PROCEDURE — 85025 COMPLETE CBC W/AUTO DIFF WBC: CPT

## 2020-06-03 PROCEDURE — 80053 COMPREHEN METABOLIC PANEL: CPT

## 2020-06-03 PROCEDURE — 84443 ASSAY THYROID STIM HORMONE: CPT

## 2020-06-03 PROCEDURE — 84550 ASSAY OF BLOOD/URIC ACID: CPT

## 2020-06-03 PROCEDURE — 83540 ASSAY OF IRON: CPT

## 2020-06-03 PROCEDURE — 36415 COLL VENOUS BLD VENIPUNCTURE: CPT

## 2020-06-03 PROCEDURE — 80061 LIPID PANEL: CPT

## 2020-06-03 PROCEDURE — 85610 PROTHROMBIN TIME: CPT

## 2020-06-03 PROCEDURE — 84436 ASSAY OF TOTAL THYROXINE: CPT

## 2020-06-09 ENCOUNTER — OFFICE VISIT (OUTPATIENT)
Dept: PRIMARY CARE CLINIC | Age: 70
End: 2020-06-09
Payer: MEDICARE

## 2020-06-09 VITALS
SYSTOLIC BLOOD PRESSURE: 122 MMHG | WEIGHT: 117 LBS | TEMPERATURE: 98.2 F | BODY MASS INDEX: 17.79 KG/M2 | DIASTOLIC BLOOD PRESSURE: 62 MMHG

## 2020-06-09 PROCEDURE — 99214 OFFICE O/P EST MOD 30 MIN: CPT | Performed by: FAMILY MEDICINE

## 2020-06-09 PROCEDURE — G8428 CUR MEDS NOT DOCUMENT: HCPCS | Performed by: FAMILY MEDICINE

## 2020-06-09 PROCEDURE — 4040F PNEUMOC VAC/ADMIN/RCVD: CPT | Performed by: FAMILY MEDICINE

## 2020-06-09 PROCEDURE — G8400 PT W/DXA NO RESULTS DOC: HCPCS | Performed by: FAMILY MEDICINE

## 2020-06-09 PROCEDURE — 1036F TOBACCO NON-USER: CPT | Performed by: FAMILY MEDICINE

## 2020-06-09 PROCEDURE — G8419 CALC BMI OUT NRM PARAM NOF/U: HCPCS | Performed by: FAMILY MEDICINE

## 2020-06-09 PROCEDURE — 1090F PRES/ABSN URINE INCON ASSESS: CPT | Performed by: FAMILY MEDICINE

## 2020-06-09 PROCEDURE — 3017F COLORECTAL CA SCREEN DOC REV: CPT | Performed by: FAMILY MEDICINE

## 2020-06-09 PROCEDURE — 1123F ACP DISCUSS/DSCN MKR DOCD: CPT | Performed by: FAMILY MEDICINE

## 2020-06-09 ASSESSMENT — PATIENT HEALTH QUESTIONNAIRE - PHQ9
1. LITTLE INTEREST OR PLEASURE IN DOING THINGS: 0
SUM OF ALL RESPONSES TO PHQ QUESTIONS 1-9: 0
SUM OF ALL RESPONSES TO PHQ QUESTIONS 1-9: 0
2. FEELING DOWN, DEPRESSED OR HOPELESS: 0
SUM OF ALL RESPONSES TO PHQ9 QUESTIONS 1 & 2: 0

## 2020-06-09 ASSESSMENT — ENCOUNTER SYMPTOMS
ALLERGIC/IMMUNOLOGIC NEGATIVE: 1
EYES NEGATIVE: 1
GASTROINTESTINAL NEGATIVE: 1
RESPIRATORY NEGATIVE: 1

## 2020-06-09 NOTE — PROGRESS NOTES
20  Name: Jun Lopez    : 1950    Sex: female    Age: 77 y.o. Subjective:  Chief Complaint: Patient is here for 3 mock  Re anemia and wt loss     Saw onc at [facility] office and just saw back and said to take b 12     We do nto have lette and [address] will call their office for it     Lab with [facility]    Still drinking etoh hevy and daily  Wt down  13 lbs  Poor appetitie        Review of Systems   Constitutional: Negative. HENT: Negative. Eyes: Negative. Respiratory: Negative. Cardiovascular: Negative. Gastrointestinal: Negative. Endocrine: Negative. Genitourinary: Negative. Musculoskeletal: Negative. Skin: Negative. Allergic/Immunologic: Negative. Neurological: Negative. Hematological: Negative. Psychiatric/Behavioral: Negative.           Current Outpatient Medications:     clopidogrel (PLAVIX) 75 MG tablet, Take 1 tablet by mouth daily, Disp: 30 tablet, Rfl: 3    lisinopril (PRINIVIL;ZESTRIL) 10 MG tablet, Take by mouth, Disp: , Rfl:     warfarin (COUMADIN) 2 MG tablet, , Disp: , Rfl:     Handicap Placard MISC, by NOT APPLICABLE route, Disp: , Rfl:     latanoprost (XALATAN) 0.005 % ophthalmic solution, , Disp: , Rfl:     Cholecalciferol (VITAMIN D3) 85219 units CAPS, , Disp: , Rfl:     mirabegron (MYRBETRIQ) 50 MG TB24, Take 25 mg by mouth daily , Disp: , Rfl:     escitalopram (LEXAPRO) 20 MG tablet, Take 1 tablet by mouth daily, Disp: 90 tablet, Rfl: 12    diphenhydrAMINE (BENADRYL) 25 MG capsule, Take 25 mg by mouth every 6 hours as needed for Itching, Disp: , Rfl:     Multiple Vitamins-Minerals (PRESERVISION AREDS PO), Take 1 tablet by mouth daily, Disp: , Rfl:     simvastatin (ZOCOR) 5 MG tablet, Take 1 tablet by mouth nightly, Disp: 90 tablet, Rfl: 12    potassium chloride (KLOR-CON M) 20 MEQ extended release tablet, Take 2 tablets by mouth daily (Patient taking differently: Take 20 mEq by mouth daily ), Disp: 180 tablet, Rfl: 12  

## 2020-06-15 RX ORDER — SIMVASTATIN 5 MG
5 TABLET ORAL NIGHTLY
Qty: 90 TABLET | Refills: 3 | Status: SHIPPED | OUTPATIENT
Start: 2020-06-15

## 2020-06-15 RX ORDER — ESCITALOPRAM OXALATE 20 MG/1
20 TABLET ORAL DAILY
Qty: 90 TABLET | Refills: 3 | Status: SHIPPED | OUTPATIENT
Start: 2020-06-15

## 2020-06-15 RX ORDER — CLOPIDOGREL BISULFATE 75 MG/1
75 TABLET ORAL DAILY
Qty: 90 TABLET | Refills: 3 | Status: SHIPPED | OUTPATIENT
Start: 2020-06-15

## 2020-06-15 RX ORDER — LEVOTHYROXINE SODIUM 0.03 MG/1
25 TABLET ORAL EVERY MORNING
Qty: 90 TABLET | Refills: 3 | Status: SHIPPED | OUTPATIENT
Start: 2020-06-15

## 2020-06-15 RX ORDER — POTASSIUM CHLORIDE 20 MEQ/1
20 TABLET, EXTENDED RELEASE ORAL DAILY
Qty: 90 TABLET | Refills: 3 | Status: ON HOLD
Start: 2020-06-15 | End: 2020-10-13 | Stop reason: HOSPADM

## 2020-06-22 ENCOUNTER — OFFICE VISIT (OUTPATIENT)
Dept: VASCULAR SURGERY | Age: 70
End: 2020-06-22
Payer: MEDICARE

## 2020-06-22 ENCOUNTER — HOSPITAL ENCOUNTER (OUTPATIENT)
Dept: CARDIOLOGY | Age: 70
Discharge: HOME | End: 2020-06-22
Payer: MEDICARE

## 2020-06-22 VITALS — BODY MASS INDEX: 17.73 KG/M2 | RESPIRATION RATE: 16 BRPM | WEIGHT: 117 LBS | HEIGHT: 68 IN

## 2020-06-22 PROCEDURE — 99213 OFFICE O/P EST LOW 20 MIN: CPT | Performed by: PHYSICIAN ASSISTANT

## 2020-06-22 PROCEDURE — 1123F ACP DISCUSS/DSCN MKR DOCD: CPT | Performed by: SURGERY

## 2020-06-22 PROCEDURE — 1090F PRES/ABSN URINE INCON ASSESS: CPT | Performed by: SURGERY

## 2020-06-22 PROCEDURE — 3017F COLORECTAL CA SCREEN DOC REV: CPT | Performed by: SURGERY

## 2020-06-22 PROCEDURE — G8400 PT W/DXA NO RESULTS DOC: HCPCS | Performed by: SURGERY

## 2020-06-22 PROCEDURE — 4040F PNEUMOC VAC/ADMIN/RCVD: CPT | Performed by: SURGERY

## 2020-06-22 PROCEDURE — 1036F TOBACCO NON-USER: CPT | Performed by: SURGERY

## 2020-06-22 PROCEDURE — 93923 UPR/LXTR ART STDY 3+ LVLS: CPT

## 2020-06-22 PROCEDURE — G8419 CALC BMI OUT NRM PARAM NOF/U: HCPCS | Performed by: SURGERY

## 2020-06-22 PROCEDURE — G8428 CUR MEDS NOT DOCUMENT: HCPCS | Performed by: SURGERY

## 2020-06-22 NOTE — PROGRESS NOTES
Vascular Surgery Outpatient Followup    PCP : Nic Claros 117, DO    Previous lower extremity procedures  5/30/17             Left dorsalis pedis cutdown, exposure              Left anterior tibial artery, dorsalis pedis thrombectomy with over the wire #3 ebenezer              Left lower extremity angiogram              2 mg TPA infusion into distal DP     HISTORY OF PRESENT ILLNESS:    Pt is a 77 y.o. female is who presents in fu regarding PVD and R LE DVT. She remains off anticoagulation. Previously she had been placed on Eliquis after a right lower extremity DVT was noted 11 of 2018. Underlying etiology was felt to be related to stasis, decreased activity level. She completed her course of treatment, but stayed on the medication due to inactivity. The patient is much doing much better. She is back at home and ambulates well with a walker. She remains more active than she was prior to her DVT diagnosis. Her most prominent symptoms is DM neuropathy which causes her balance to be off. She has chronic numbness/tingling to feet and denies any acute changes in this. She denies any symptoms of claudication, rest pain, or tissue loss.      Past Medical History:        Diagnosis Date    Alcoholism ([address]     heavy alcohol consumption since ~4784-5608 till early 2019    Anemia 05/2017    Blue toe syndrome of left lower extremity ([address] 5/22/2017    Cataract     Chronic deep vein thrombosis (DVT) of femoral vein of right lower extremity ([address] 3/4/2019    Critical lower limb ischemia 5/29/2017    Depression \    DVT (deep venous thrombosis) ([address] 09/13/2018    R leg    Glaucoma     History of blood transfusion 05/2017    Hyperlipidemia     Hypertension     Hypothyroidism     Liver disease     Macular degeneration     Peripheral edema     Sciatica     Severe protein-calorie malnutrition ([address] 4/18/2019     Past Surgical History:        Procedure Laterality Date    DENTAL SURGERY  2007    teeth Former Smoker     Packs/day: 3.00     Types: Cigarettes     Start date: 1/10/1972     Last attempt to quit: 2005     Years since quitting: 15.1    Smokeless tobacco: Never Used    Tobacco comment: quit 12-13 years ago   Substance and Sexual Activity    Alcohol use: Yes     Comment: every other day    Drug use: No    Sexual activity: Never   Lifestyle    Physical activity     Days per week: Not on file     Minutes per session: Not on file    Stress: Not on file   Relationships    Social connections     Talks on phone: Not on file     Gets together: Not on file     Attends Restorationism service: Not on file     Active member of club or organization: Not on file     Attends meetings of clubs or organizations: Not on file     Relationship status: Not on file    Intimate partner violence     Fear of current or ex partner: Not on file     Emotionally abused: Not on file     Physically abused: Not on file     Forced sexual activity: Not on file   Other Topics Concern    Not on file   Social History Narrative        SMOKER---QUIT     MENOPAUSE    HYPOTHYROID    FATIGUE    ETOH    FOLIC ACID DEFIC    POSS CUSHINGS SYNDROME    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  [employer] DEGREE---MAD AT [employer]    DEPRESSION----TURNING POINT    MOM  AT 81 9-11    SISTER WITH MI AT 77    [address] [facility] 1950 Page #2    [employer] DEGENERATION---DR BALDERAS    NEUROPATHY LEGS DR Isabel Alicia     ANEMIA     CELLULITIS LEFT FOOT WITH PROBABLE PVD    MUKESH POLY NEUROPATHY ON EMD DR ROSARIO     VIT D DEFIC     VIT B 12 DEFIC     ADMIT  WITH ISCHEMIA LEFT FOOT-----ATHERSOCOLEROIS---- ANEMIA DUE TO    ETOH----SEEN BY DR TEE----SARAH----MARGAUX---ECHO DONE AND NEG    OR DR SMITH---17------- LEFT LEG EMBOLECTOMY AND MUKESH VAS DIS    ECHO NEG  DUE TO MURMUR    HYPONATREMIA     PT DC NEURONTIN     PT DC PLETAL ON OWN  DUE TO DIAR    PT CHG FROM VODKA TO BEER 10-17

## 2020-07-30 ENCOUNTER — APPOINTMENT (OUTPATIENT)
Dept: CT IMAGING | Age: 70
End: 2020-07-30
Payer: MEDICARE

## 2020-07-30 ENCOUNTER — HOSPITAL ENCOUNTER (OUTPATIENT)
Age: 70
Setting detail: OBSERVATION
Discharge: NURSING, REHAB OR LONG-TERM CARE | End: 2020-08-03
Attending: EMERGENCY MEDICINE | Admitting: INTERNAL MEDICINE
Payer: MEDICARE

## 2020-07-30 ENCOUNTER — APPOINTMENT (OUTPATIENT)
Dept: GENERAL RADIOLOGY | Age: 70
End: 2020-07-30
Payer: MEDICARE

## 2020-07-30 PROBLEM — R53.83 FATIGUE: Status: ACTIVE | Noted: 2020-07-30

## 2020-07-30 PROBLEM — E03.9 ACQUIRED HYPOTHYROIDISM: Chronic | Status: ACTIVE | Noted: 2019-07-01

## 2020-07-30 PROBLEM — R53.1 WEAKNESS: Status: ACTIVE | Noted: 2020-07-30

## 2020-07-30 LAB
ALBUMIN SERPL-MCNC: 4.5 G/DL (ref 3.5–5.2)
ALP BLD-CCNC: 93 U/L (ref 35–104)
ALT SERPL-CCNC: 18 U/L (ref 0–32)
AMMONIA: 15 UMOL/L (ref 11–51)
ANION GAP SERPL CALCULATED.3IONS-SCNC: 25 MMOL/L (ref 7–16)
AST SERPL-CCNC: 43 U/L (ref 0–31)
BACTERIA: ABNORMAL /HPF
BILIRUB SERPL-MCNC: 1.1 MG/DL (ref 0–1.2)
BILIRUBIN URINE: ABNORMAL
BLOOD, URINE: NEGATIVE
BUN BLDV-MCNC: 14 MG/DL (ref 8–23)
CALCIUM SERPL-MCNC: 10.2 MG/DL (ref 8.6–10.2)
CHLORIDE BLD-SCNC: 99 MMOL/L (ref 98–107)
CLARITY: CLEAR
CO2: 16 MMOL/L (ref 22–29)
COLOR: YELLOW
CREAT SERPL-MCNC: 0.5 MG/DL (ref 0.5–1)
FOLATE: 2.2 NG/ML (ref 4.8–24.2)
GFR AFRICAN AMERICAN: >60
GFR NON-AFRICAN AMERICAN: >60 ML/MIN/1.73
GLUCOSE BLD-MCNC: 103 MG/DL (ref 74–99)
GLUCOSE URINE: NEGATIVE MG/DL
HCT VFR BLD CALC: 36.1 % (ref 34–48)
HEMOGLOBIN: 12.7 G/DL (ref 11.5–15.5)
INR BLD: 1.1
KETONES, URINE: >=80 MG/DL
LACTIC ACID, SEPSIS: 1.1 MMOL/L (ref 0.5–1.9)
LEUKOCYTE ESTERASE, URINE: NEGATIVE
LIPASE: 43 U/L (ref 13–60)
MCH RBC QN AUTO: 35 PG (ref 26–35)
MCHC RBC AUTO-ENTMCNC: 35.2 % (ref 32–34.5)
MCV RBC AUTO: 99.4 FL (ref 80–99.9)
MUCUS: PRESENT /LPF
NITRITE, URINE: NEGATIVE
PDW BLD-RTO: 15.6 FL (ref 11.5–15)
PH UA: 6 (ref 5–9)
PLATELET # BLD: 336 E9/L (ref 130–450)
PMV BLD AUTO: 8.8 FL (ref 7–12)
POTASSIUM SERPL-SCNC: 3.5 MMOL/L (ref 3.5–5)
PROTEIN UA: ABNORMAL MG/DL
PROTHROMBIN TIME: 12 SEC (ref 9.3–12.4)
RBC # BLD: 3.63 E12/L (ref 3.5–5.5)
RBC UA: ABNORMAL /HPF (ref 0–2)
SODIUM BLD-SCNC: 140 MMOL/L (ref 132–146)
SPECIFIC GRAVITY UA: >=1.03 (ref 1–1.03)
T4 FREE: 1.16 NG/DL (ref 0.93–1.7)
TOTAL CK: 40 U/L (ref 20–180)
TOTAL PROTEIN: 7.6 G/DL (ref 6.4–8.3)
TROPONIN: <0.01 NG/ML (ref 0–0.03)
TSH SERPL DL<=0.05 MIU/L-ACNC: 2.59 UIU/ML (ref 0.27–4.2)
UROBILINOGEN, URINE: 4 E.U./DL
VITAMIN B-12: >2000 PG/ML (ref 211–946)
WBC # BLD: 10.2 E9/L (ref 4.5–11.5)
WBC UA: ABNORMAL /HPF (ref 0–5)

## 2020-07-30 PROCEDURE — 2580000003 HC RX 258: Performed by: EMERGENCY MEDICINE

## 2020-07-30 PROCEDURE — 81001 URINALYSIS AUTO W/SCOPE: CPT

## 2020-07-30 PROCEDURE — 71045 X-RAY EXAM CHEST 1 VIEW: CPT

## 2020-07-30 PROCEDURE — 80053 COMPREHEN METABOLIC PANEL: CPT

## 2020-07-30 PROCEDURE — 84484 ASSAY OF TROPONIN QUANT: CPT

## 2020-07-30 PROCEDURE — 82140 ASSAY OF AMMONIA: CPT

## 2020-07-30 PROCEDURE — 82550 ASSAY OF CK (CPK): CPT

## 2020-07-30 PROCEDURE — 85027 COMPLETE CBC AUTOMATED: CPT

## 2020-07-30 PROCEDURE — 87040 BLOOD CULTURE FOR BACTERIA: CPT

## 2020-07-30 PROCEDURE — G0378 HOSPITAL OBSERVATION PER HR: HCPCS

## 2020-07-30 PROCEDURE — 82746 ASSAY OF FOLIC ACID SERUM: CPT

## 2020-07-30 PROCEDURE — 93005 ELECTROCARDIOGRAM TRACING: CPT | Performed by: EMERGENCY MEDICINE

## 2020-07-30 PROCEDURE — 82607 VITAMIN B-12: CPT

## 2020-07-30 PROCEDURE — 70450 CT HEAD/BRAIN W/O DYE: CPT

## 2020-07-30 PROCEDURE — 99285 EMERGENCY DEPT VISIT HI MDM: CPT

## 2020-07-30 PROCEDURE — 85610 PROTHROMBIN TIME: CPT

## 2020-07-30 PROCEDURE — 84439 ASSAY OF FREE THYROXINE: CPT

## 2020-07-30 PROCEDURE — 83605 ASSAY OF LACTIC ACID: CPT

## 2020-07-30 PROCEDURE — 87088 URINE BACTERIA CULTURE: CPT

## 2020-07-30 PROCEDURE — 2500000003 HC RX 250 WO HCPCS: Performed by: EMERGENCY MEDICINE

## 2020-07-30 PROCEDURE — 83690 ASSAY OF LIPASE: CPT

## 2020-07-30 PROCEDURE — 84443 ASSAY THYROID STIM HORMONE: CPT

## 2020-07-30 PROCEDURE — 96374 THER/PROPH/DIAG INJ IV PUSH: CPT

## 2020-07-30 RX ORDER — LABETALOL HYDROCHLORIDE 5 MG/ML
10 INJECTION, SOLUTION INTRAVENOUS ONCE
Status: COMPLETED | OUTPATIENT
Start: 2020-07-30 | End: 2020-07-30

## 2020-07-30 RX ORDER — POTASSIUM CHLORIDE 20 MEQ/1
20 TABLET, EXTENDED RELEASE ORAL DAILY
Status: DISCONTINUED | OUTPATIENT
Start: 2020-07-31 | End: 2020-08-03 | Stop reason: HOSPADM

## 2020-07-30 RX ORDER — TIMOLOL MALEATE 5 MG/ML
1 SOLUTION/ DROPS OPHTHALMIC 2 TIMES DAILY
Status: DISCONTINUED | OUTPATIENT
Start: 2020-07-31 | End: 2020-08-03 | Stop reason: HOSPADM

## 2020-07-30 RX ORDER — CLOPIDOGREL BISULFATE 75 MG/1
75 TABLET ORAL DAILY
Status: DISCONTINUED | OUTPATIENT
Start: 2020-07-31 | End: 2020-08-03 | Stop reason: HOSPADM

## 2020-07-30 RX ORDER — SODIUM CHLORIDE 9 MG/ML
INJECTION, SOLUTION INTRAVENOUS CONTINUOUS
Status: DISCONTINUED | OUTPATIENT
Start: 2020-07-31 | End: 2020-08-01

## 2020-07-30 RX ORDER — ACETAMINOPHEN 650 MG/1
650 SUPPOSITORY RECTAL EVERY 6 HOURS PRN
Status: DISCONTINUED | OUTPATIENT
Start: 2020-07-30 | End: 2020-08-03 | Stop reason: HOSPADM

## 2020-07-30 RX ORDER — BRIMONIDINE TARTRATE, TIMOLOL MALEATE 2; 5 MG/ML; MG/ML
1 SOLUTION/ DROPS OPHTHALMIC EVERY 12 HOURS
Status: DISCONTINUED | OUTPATIENT
Start: 2020-07-31 | End: 2020-07-30 | Stop reason: CLARIF

## 2020-07-30 RX ORDER — PROMETHAZINE HYDROCHLORIDE 25 MG/1
12.5 TABLET ORAL EVERY 6 HOURS PRN
Status: DISCONTINUED | OUTPATIENT
Start: 2020-07-30 | End: 2020-08-03 | Stop reason: HOSPADM

## 2020-07-30 RX ORDER — ONDANSETRON 2 MG/ML
4 INJECTION INTRAMUSCULAR; INTRAVENOUS EVERY 6 HOURS PRN
Status: DISCONTINUED | OUTPATIENT
Start: 2020-07-30 | End: 2020-08-03 | Stop reason: HOSPADM

## 2020-07-30 RX ORDER — BRIMONIDINE TARTRATE 2 MG/ML
1 SOLUTION/ DROPS OPHTHALMIC 2 TIMES DAILY
Status: DISCONTINUED | OUTPATIENT
Start: 2020-07-31 | End: 2020-08-03 | Stop reason: HOSPADM

## 2020-07-30 RX ORDER — POLYETHYLENE GLYCOL 3350 17 G/17G
17 POWDER, FOR SOLUTION ORAL DAILY PRN
Status: DISCONTINUED | OUTPATIENT
Start: 2020-07-30 | End: 2020-08-03 | Stop reason: HOSPADM

## 2020-07-30 RX ORDER — SODIUM CHLORIDE 0.9 % (FLUSH) 0.9 %
10 SYRINGE (ML) INJECTION PRN
Status: DISCONTINUED | OUTPATIENT
Start: 2020-07-30 | End: 2020-08-03 | Stop reason: HOSPADM

## 2020-07-30 RX ORDER — ACETAMINOPHEN 325 MG/1
650 TABLET ORAL EVERY 6 HOURS PRN
Status: DISCONTINUED | OUTPATIENT
Start: 2020-07-30 | End: 2020-08-03 | Stop reason: HOSPADM

## 2020-07-30 RX ORDER — SODIUM CHLORIDE 0.9 % (FLUSH) 0.9 %
10 SYRINGE (ML) INJECTION EVERY 12 HOURS SCHEDULED
Status: DISCONTINUED | OUTPATIENT
Start: 2020-07-31 | End: 2020-08-03 | Stop reason: HOSPADM

## 2020-07-30 RX ORDER — LEVOTHYROXINE SODIUM 0.03 MG/1
25 TABLET ORAL
Status: DISCONTINUED | OUTPATIENT
Start: 2020-07-31 | End: 2020-08-03 | Stop reason: HOSPADM

## 2020-07-30 RX ORDER — 0.9 % SODIUM CHLORIDE 0.9 %
1000 INTRAVENOUS SOLUTION INTRAVENOUS ONCE
Status: COMPLETED | OUTPATIENT
Start: 2020-07-30 | End: 2020-07-30

## 2020-07-30 RX ORDER — LISINOPRIL 10 MG/1
10 TABLET ORAL DAILY
Status: DISCONTINUED | OUTPATIENT
Start: 2020-07-31 | End: 2020-08-03

## 2020-07-30 RX ADMIN — LABETALOL HYDROCHLORIDE 10 MG: 5 INJECTION INTRAVENOUS at 23:05

## 2020-07-30 RX ADMIN — SODIUM CHLORIDE 1000 ML: 9 INJECTION, SOLUTION INTRAVENOUS at 21:21

## 2020-07-30 NOTE — ED PROVIDER NOTES
26-year-old female brought in by paramedics after she was not seen by her neighbors for about 5 days. Reportedly the patient fell 5 days ago in her apartment and has been in bed since that time. Upon arrival she is awake but kind of confused. She does follow simple commands. EMS was concerned about strokelike symptoms, patient does have generalized weakness with a little bit more weakness on the left relative to the right side. She does not provide any additional history, chart review shows that she is on Coumadin for blood thinner. Patient is unkempt, is chronically ill-appearing, is very thin and frail appearing. If this were a fall that occurred probably suddenly, she is been unable to get out of bed for several days, associated with weakness, it was about 5 days ago, unknown severity. Family History   Problem Relation Age of Onset    Diabetes Father     Diabetes Sister      Past Surgical History:   Procedure Laterality Date    DENTAL SURGERY  2007    teeth extraction    UPPER GASTROINTESTINAL ENDOSCOPY N/A 1/14/2019    EGD BIOPSY performed by Ba Sommer MD at [facility] 38 EXTRACTION         Review of Systems   Constitutional: Positive for fatigue. Neurological: Positive for weakness. All other systems reviewed and are negative. Physical Exam  Constitutional:       General: She is not in acute distress. Appearance: She is well-developed. Comments: Chronically ill-appearing  Cachectic   HENT:      Head: Normocephalic and atraumatic. Eyes:      Conjunctiva/sclera: Conjunctivae normal.      Pupils: Pupils are equal, round, and reactive to light. Neck:      Musculoskeletal: Normal range of motion. Thyroid: No thyromegaly. Cardiovascular:      Rate and Rhythm: Normal rate and regular rhythm. Pulmonary:      Effort: Pulmonary effort is normal. No respiratory distress. Breath sounds: Normal breath sounds.    Abdominal:      General: There is no distension. Palpations: Abdomen is soft. Tenderness: There is no abdominal tenderness. There is no guarding or rebound. Musculoskeletal: Normal range of motion. General: No tenderness. Skin:     General: Skin is warm and dry. Findings: No erythema. Neurological:      Mental Status: She is alert and oriented to person, place, and time. Cranial Nerves: No cranial nerve deficit. Coordination: Coordination normal.          Procedures     [facility]     ED Course as of Jul 30 2121 Thu Jul 30, 2020 2119 Venous Access Procedure Note  Indication: nursing staff unable to obtain access and MD skill needed    Procedure: The patient was placed in the appropriate position and the skin over the puncture site was prepped with Chloraprep. Intravenous access was obtained in the left external jugular vein and the site was secured appropriately. Procedure was preformed under live ultrasound for guidance. The patient tolerated the procedure well. Complications:                                                                             took two attempts          [SO]      ED Course User Index  [SO] Christine Morrison DO      ED Course as of Jul 30 2122 Thu Jul 30, 2020 2119 Venous Access Procedure Note  Indication: nursing staff unable to obtain access and MD skill needed    Procedure: The patient was placed in the appropriate position and the skin over the puncture site was prepped with Chloraprep. Intravenous access was obtained in the left external jugular vein and the site was secured appropriately. Procedure was preformed under live ultrasound for guidance. The patient tolerated the procedure well.     Complications:                                                                             took two attempts          [SO]      ED Course User Index  [SO] Christine Morrison DO       --------------------------------------------- PAST HISTORY ---------------------------------------------  Past Medical History:  has a past medical history of Alcoholism ([facility] Anemia, Blue toe syndrome of left lower extremity ([facility] Cataract, Chronic deep vein thrombosis (DVT) of femoral vein of right lower extremity ([facility] Critical lower limb ischemia, Depression, DVT (deep venous thrombosis) (H[facility]), Glaucoma, History of blood transfusion, Hyperlipidemia, Hypertension, Hypothyroidism, Liver disease, Macular degeneration, Peripheral edema, Sciatica, and Severe protein-calorie malnutrition ([facility] Past Surgical History:  has a past surgical history that includes Dental surgery (2007); [place] tooth extraction; and Upper gastrointestinal endoscopy (N/A, 1/14/2019). Social History:  reports that she quit smoking about 15 years ago. Her smoking use included cigarettes. She started smoking about 48 years ago. She smoked 3.00 packs per day. She has never used smokeless tobacco. She reports current alcohol use. She reports that she does not use drugs. Family History: family history includes Diabetes in her father and sister. The patients home medications have been reviewed. Allergies: Levofloxacin;  Other; Pcn [penicillins]; and Erythromycin    -------------------------------------------------- RESULTS -------------------------------------------------    LABS:  Results for orders placed or performed during the hospital encounter of 07/30/20   CBC   Result Value Ref Range    WBC 10.2 4.5 - 11.5 E9/L    RBC 3.63 3.50 - 5.50 E12/L    Hemoglobin 12.7 11.5 - 15.5 g/dL    Hematocrit 36.1 34.0 - 48.0 %    MCV 99.4 80.0 - 99.9 fL    MCH 35.0 26.0 - 35.0 pg    MCHC 35.2 (H) 32.0 - 34.5 %    RDW 15.6 (H) 11.5 - 15.0 fL    Platelets 502 857 - 356 E9/L    MPV 8.8 7.0 - 12.0 fL   Comprehensive metabolic panel   Result Value Ref Range    Sodium 140 132 - 146 mmol/L    Potassium 3.5 3.5 - 5.0 mmol/L    Chloride 99 98 - 107 mmol/L    CO2 16 (L) 22 - 29 mmol/L    Anion Gap 25 (H) 7 - 16 mmol/L    Glucose 103 (H) 74 - 99 mg/dL    BUN 14 8 - 23 mg/dL    CREATININE 0.5 0.5 - 1.0 mg/dL    GFR Non-African American >60 >=60 mL/min/1.73    GFR African American >60     Calcium 10.2 8.6 - 10.2 mg/dL    Total Protein 7.6 6.4 - 8.3 g/dL    Alb 4.5 3.5 - 5.2 g/dL    Total Bilirubin 1.1 0.0 - 1.2 mg/dL    Alkaline Phosphatase 93 35 - 104 U/L    ALT 18 0 - 32 U/L    AST 43 (H) 0 - 31 U/L   Troponin   Result Value Ref Range    Troponin <0.01 0.00 - 0.03 ng/mL   CK   Result Value Ref Range    Total CK 40 20 - 180 U/L   Lactate, Sepsis   Result Value Ref Range    Lactic Acid, Sepsis 1.1 0.5 - 1.9 mmol/L   Protime-INR   Result Value Ref Range    Protime 12.0 9.3 - 12.4 sec    INR 1.1    Lipase   Result Value Ref Range    Lipase 43 13 - 60 U/L   TSH WITHOUT REFLEX   Result Value Ref Range    TSH 2.590 0.270 - 4.200 uIU/mL   T4, Free   Result Value Ref Range    T4 Free 1.16 0.93 - 1.70 ng/dL       RADIOLOGY:  CT HEAD WO CONTRAST   Final Result      No evidence of acute intracranial hemorrhage or edema. XR CHEST PORTABLE    (Results Pending)       ------------------------- NURSING NOTES AND VITALS REVIEWED ---------------------------  Date / Time Roomed:  7/30/2020  6:10 PM  ED Bed Assignment:  11/11    The nursing notes within the ED encounter and vital signs as below have been reviewed.      Patient Vitals for the past 24 hrs:   BP Temp Temp src Pulse Resp SpO2   07/30/20 2121 (!) 182/114 -- -- 108 16 98 %   07/30/20 1822 (!) 143/119 97 °F (36.1 °C) Temporal 97 16 100 %       Oxygen Saturation Interpretation: Normal    ------------------------------------------ PROGRESS NOTES ------------------------------------------  Re-evaluation(s):    Patients symptoms show no change  Repeat physical examination is not changed    Counseling:  I have spoken with the patient and discussed todays results, in addition to providing specific details for the plan of care and counseling regarding the diagnosis and prognosis. Their questions are answered at this time and they are agreeable with the plan of admission.    --------------------------------- ADDITIONAL PROVIDER NOTES ---------------------------------  Consultations:   Spoke with Dr. Smiley Michael. Discussed case. They will admit the patient. This patient's ED course included: a personal history and physicial examination and re-evaluation prior to disposition    This patient has remained hemodynamically stable during their ED course. Diagnosis:  1. Cachectic ([address]    2. Fall in home, initial encounter        Disposition:  Patient's disposition: Admit to telemetry  Patient's condition is stable.            Lonnie Tavarez DO  07/30/20 2129

## 2020-07-31 LAB
ALBUMIN SERPL-MCNC: 3.7 G/DL (ref 3.5–5.2)
ALP BLD-CCNC: 72 U/L (ref 35–104)
ALT SERPL-CCNC: 13 U/L (ref 0–32)
ANION GAP SERPL CALCULATED.3IONS-SCNC: 19 MMOL/L (ref 7–16)
AST SERPL-CCNC: 34 U/L (ref 0–31)
BILIRUB SERPL-MCNC: 1 MG/DL (ref 0–1.2)
BUN BLDV-MCNC: 13 MG/DL (ref 8–23)
CALCIUM SERPL-MCNC: 9 MG/DL (ref 8.6–10.2)
CHLORIDE BLD-SCNC: 100 MMOL/L (ref 98–107)
CO2: 17 MMOL/L (ref 22–29)
CREAT SERPL-MCNC: 0.5 MG/DL (ref 0.5–1)
GFR AFRICAN AMERICAN: >60
GFR NON-AFRICAN AMERICAN: >60 ML/MIN/1.73
GLUCOSE BLD-MCNC: 79 MG/DL (ref 74–99)
HCT VFR BLD CALC: 30.2 % (ref 34–48)
HEMOGLOBIN: 10.6 G/DL (ref 11.5–15.5)
LACTIC ACID, SEPSIS: 0.9 MMOL/L (ref 0.5–1.9)
MCH RBC QN AUTO: 35.3 PG (ref 26–35)
MCHC RBC AUTO-ENTMCNC: 35.1 % (ref 32–34.5)
MCV RBC AUTO: 100.7 FL (ref 80–99.9)
PDW BLD-RTO: 15.7 FL (ref 11.5–15)
PLATELET # BLD: 232 E9/L (ref 130–450)
PMV BLD AUTO: 9.9 FL (ref 7–12)
POTASSIUM SERPL-SCNC: 3.7 MMOL/L (ref 3.5–5)
RBC # BLD: 3 E12/L (ref 3.5–5.5)
SODIUM BLD-SCNC: 136 MMOL/L (ref 132–146)
TOTAL PROTEIN: 6.2 G/DL (ref 6.4–8.3)
WBC # BLD: 9.7 E9/L (ref 4.5–11.5)

## 2020-07-31 PROCEDURE — 97165 OT EVAL LOW COMPLEX 30 MIN: CPT

## 2020-07-31 PROCEDURE — 97161 PT EVAL LOW COMPLEX 20 MIN: CPT | Performed by: PHYSICAL THERAPIST

## 2020-07-31 PROCEDURE — 85027 COMPLETE CBC AUTOMATED: CPT

## 2020-07-31 PROCEDURE — 97530 THERAPEUTIC ACTIVITIES: CPT

## 2020-07-31 PROCEDURE — 2580000003 HC RX 258: Performed by: INTERNAL MEDICINE

## 2020-07-31 PROCEDURE — 97530 THERAPEUTIC ACTIVITIES: CPT | Performed by: PHYSICAL THERAPIST

## 2020-07-31 PROCEDURE — 6370000000 HC RX 637 (ALT 250 FOR IP): Performed by: INTERNAL MEDICINE

## 2020-07-31 PROCEDURE — 6360000002 HC RX W HCPCS: Performed by: INTERNAL MEDICINE

## 2020-07-31 PROCEDURE — G0378 HOSPITAL OBSERVATION PER HR: HCPCS

## 2020-07-31 PROCEDURE — 83605 ASSAY OF LACTIC ACID: CPT

## 2020-07-31 PROCEDURE — 36415 COLL VENOUS BLD VENIPUNCTURE: CPT

## 2020-07-31 PROCEDURE — 80053 COMPREHEN METABOLIC PANEL: CPT

## 2020-07-31 PROCEDURE — 96375 TX/PRO/DX INJ NEW DRUG ADDON: CPT

## 2020-07-31 RX ORDER — SULFACETAMIDE SODIUM 100 MG/ML
2 SOLUTION/ DROPS OPHTHALMIC 4 TIMES DAILY
Status: DISCONTINUED | OUTPATIENT
Start: 2020-07-31 | End: 2020-08-03 | Stop reason: HOSPADM

## 2020-07-31 RX ADMIN — BRIMONIDINE TARTRATE 1 DROP: 2 SOLUTION OPHTHALMIC at 01:15

## 2020-07-31 RX ADMIN — ONDANSETRON HYDROCHLORIDE 4 MG: 2 INJECTION, SOLUTION INTRAMUSCULAR; INTRAVENOUS at 20:15

## 2020-07-31 RX ADMIN — BRIMONIDINE TARTRATE 1 DROP: 2 SOLUTION OPHTHALMIC at 20:15

## 2020-07-31 RX ADMIN — TIMOLOL MALEATE 1 DROP: 5 SOLUTION/ DROPS OPHTHALMIC at 01:15

## 2020-07-31 RX ADMIN — SULFACETAMIDE SODIUM 2 DROP: 100 SOLUTION OPHTHALMIC at 20:15

## 2020-07-31 RX ADMIN — SODIUM CHLORIDE: 9 INJECTION, SOLUTION INTRAVENOUS at 18:50

## 2020-07-31 RX ADMIN — POTASSIUM CHLORIDE 20 MEQ: 1500 TABLET, EXTENDED RELEASE ORAL at 09:06

## 2020-07-31 RX ADMIN — TIMOLOL MALEATE 1 DROP: 5 SOLUTION/ DROPS OPHTHALMIC at 20:15

## 2020-07-31 RX ADMIN — BRIMONIDINE TARTRATE 1 DROP: 2 SOLUTION OPHTHALMIC at 09:09

## 2020-07-31 RX ADMIN — LEVOTHYROXINE SODIUM 25 MCG: 0.03 TABLET ORAL at 06:59

## 2020-07-31 RX ADMIN — LISINOPRIL 10 MG: 10 TABLET ORAL at 01:15

## 2020-07-31 RX ADMIN — SODIUM CHLORIDE: 9 INJECTION, SOLUTION INTRAVENOUS at 00:51

## 2020-07-31 RX ADMIN — TIMOLOL MALEATE 1 DROP: 5 SOLUTION/ DROPS OPHTHALMIC at 09:09

## 2020-07-31 RX ADMIN — SULFACETAMIDE SODIUM 2 DROP: 100 SOLUTION OPHTHALMIC at 16:00

## 2020-07-31 RX ADMIN — CLOPIDOGREL 75 MG: 75 TABLET, FILM COATED ORAL at 09:05

## 2020-07-31 ASSESSMENT — PAIN SCALES - GENERAL
PAINLEVEL_OUTOF10: 0

## 2020-07-31 NOTE — PROGRESS NOTES
Physical Therapy    Physical Therapy Initial Assessment     Name: Cristopher Ortiz  : 1950  MRN: [id number]    Referring Provider:  Kiarra Palacios DO    Date of Service: 2020    Evaluating PT:  Jaye Bergeron, PT, DPT [id number]      Room #:  7432/7923-D  Diagnosis:  Fatigue, unspecified  PMHx/PSHx:  Sciatica, glaucoma, macular degeneration, depression, hypothyroidism, cataract, DVT, HTN, EOTH  Procedure/Surgery:  None this admission  Precautions:  Falls, bed alarm, cognition  Equipment Needs:  TBD    SUBJECTIVE:    Pt lives alone in a single story house with 3-4 stairs to enter and B rails. Bed is on first floor and bath is on first floor. Pt ambulated with no AD prior to admission. Pt reports she has a rollator to uador herself\" and describes use of rollator to assist with floor transfer after falls. OBJECTIVE:   Initial Evaluation  Date: 20 Treatment Short Term/ Long Term   Goals   AM-PAC 6 Clicks      Was pt agreeable to Eval/treatment? yes     Does pt have pain? 9/10 B inner arms     Bed Mobility  Rolling: NT  Supine to sit: Max A  Sit to supine: Max A  Scooting: Max A to EOB  Rolling: Supervision  Supine to sit: Supervision  Sit to supine: Supervision  Scooting: Supervision   Transfers Sit to stand: Mod A x2  Stand to sit: Mod A x2  Stand pivot: NT  Sit to stand: Min A  Stand to sit: Min A  Stand pivot: Min A with AAD   Ambulation    3 feet side stepping with FWW  with Max A x2  >50 feet with AAD with Supervision   Stair negotiation: ascended and descended  NT  4 steps with B rail Supervision   ROM BUE:  WNL  BLE:  WNL     Strength BUE:  Mild functioanl weakness  BLE:  Mild functional weakness     Balance Sitting EOB:  Min A  Dynamic Standing:   Max A x2 with FWW  Sitting EOB: Supervision  Dynamic Standing:  Min A with AAD     Pt is A & O x 3; however, appears very confused with conversation and has difficulty consistently following single step cues  Sensation:  Numbness/tingling to B hands and B feet  Edema:  unremarkable    Patient education  Pt educated on role of therapy, safety with mobility    Patient response to education:   Pt verbalized understanding Pt demonstrated skill Pt requires further education in this area   yes yes yes     ASSESSMENT:    Comments:  Pt resting semi-supine upon arrival, agreeable to PT eval. Pt requires verbal and tactile cues to initiate mobility throughout session. Pt requires hands on assist to facilitate use of extremities and complete trunk lift for supine>sit. Pt reporting increased dizziness with upright sitting that decreased but persistent nausea throughout session. Pt completed side steps towards [employer] for improved positioning with manual assist for weight shifting, cues for each step, and assist for walker management. Pt maintained forward flexed posture with anterior weight shift requiring assistance to prevent anterior LOB during all standing. Pt required assist for return to supine as noted above. Pt with all needs in reach and bed alarm set. She will require continued skilled PT services to improve independence with all functional mobility, gait training, activity tolerance, and safety awareness education. Treatment:  Patient practiced and was instructed in the following treatment:     Bed mobility: verbal/tactile cues for improved sequencing and technique; hands on assist to complete task as noted above.  Transfer training: verbal/tactile cues for improved hand placement and safety; hands on assist to complete as noted above.  Gait training: side stepping towards HOB, manual facilitation of weight shifting, walker progression and cues for each step.  Therapeutic activities: sitting EOB x10 minutes, cues for upright posture and sitting balance, cues for breathing and gaze stabilization to decrease dizziness. Pt's/ family goals   1.  To return home independently    Patient and or family understand(s) diagnosis, prognosis, and plan of care.  yes    PLAN:    PT care will be provided in accordance with the objectives noted above. Exercises and functional mobility practice will be used as well as appropriate assistive devices or modalities to obtain goals. Patient and family education will also be administered as needed. Frequency of treatments: 2-5x/week x 1-2 weeks. Time in  0930  Time out  0948    Total Treatment Time  10 minutes     Evaluation Time includes thorough review of current medical information, gathering information on past medical history/social history and prior level of function, completion of standardized testing/informal observation of tasks, assessment of data and education on plan of care and goals.     CPT codes:  [x] Low Complexity PT evaluation 13228  [] Moderate Complexity PT evaluation 88216  [] High Complexity PT evaluation 39245  [] PT Re-evaluation 28477  [] Gait training 76873 0 minutes  [] Manual therapy 25208 0 minutes  [x] Therapeutic activities 13178 10 minutes  [] Therapeutic exercises 37907 0 minutes  [] Neuromuscular reeducation 87004 0 minutes     Tonio Covarrubias, PT, DPT  [id number]

## 2020-07-31 NOTE — PROGRESS NOTES
OCCUPATIONAL THERAPY INITIAL EVALUATION      Date:2020  Patient Name: Pasha Iqbal  MRN: [id number]  : 1950  Room: [address] [place] [address]-A      Evaluating [facility], OTR/L #[id number]    AM-PAC Daily Activity Raw Score:   Recommended Adaptive Equipment: TBD     Diagnosis: Fatigue, unspecified type [R53.83]   Pt presented to ED on 20 following falls - laid in bed for ~5 days prior to admission. Referring physician: Rod Larson DO     Pertinent Medical History: alcoholism, anemia, blue toe syndrome L LE, cataract, chronic DVT, glaucoma, HTN, macular degeneration, peripheral edema, sciatica    Precautions:  Falls, bed alarm, cognition     Home Living: Pt lives alone in 1 floor home. 3-4 DANNIE, 2 handrails   Bathroom setup: tub/shower with shower chair   Equipment owned: w/w, rollator    Prior Level of Function: independent with ADLs , independent with IADLs; ambulated independently w/o AD inside home, w/w in community. Pt verbalized occasionally utilizes rollator \"brace herself or to pull self up when falls\"  Driving: yes - friend also assists  Pt provided PLOF/home setup - pt is a questionable historian    Pain Level: Pt c/o 9/10 B arm pain this session ; reinforced pain management strategies  Pt also c/o nausea during functional tasks - basin and cold washcloth provided. Cognition: A&O: 3/4; Follows 1 step directions inconsistently - verbal cues required to initiate, sequence and attend to tasks.  Confusion noted during functional tasks  Latent response   Memory:  fair -   Sequencing:  fair -   Problem solving:  poor   Judgement/safety:  poor     Functional Assessment:   Initial Eval Status  Date: 20 Treatment Status  Date: Short Term Goals/LTG  Treatment frequency: 1-4x/wk   Feeding Minimal Assist   Modified [place]    Grooming Minimal Assist   Modified [place]    UB Dressing Moderate Assist   Stand by Assist    LB Dressing Dependent   Moderate Assist    Bathing Maximal Assist Moderate Assist    Toileting Dependent   Moderate Assist    Bed Mobility   Supine to sit: Maximal Assist   Sit to supine: Maximal Assist   Rolling: Minimal Assist   Supine to sit: Minimal Assist   Sit to supine: Minimal Assist    Functional Transfers Mod A x2  Min A   Functional Mobility Max A x2 w/ w/w  Lateral sidesteps to HOB  Mod A   Balance Sitting: Min A  Standing: Mod><Max A x2 w/ w/w     Activity Tolerance Poor+  Limited by c/o nausea  Fair   Visual/  Perceptual Glasses: yes  Visual field test: WFL                Hand dominance: R   Strength ROM Additional Info:    RUE  Distal: 3+/5   Proximal: 3-/5 WFL   good  and fair FMC/dexterity noted during ADL tasks       LUE Distal: 3+/5   Proximal: 3-/5 WFL   good  and fair FMC/dexterity noted during ADL tasks       Hearing: WFL   Sensation: c/o numbness/tingling B hands and feet  Tone: WFL   Edema: none noted                   Treatment: Upon arrival, patient lying in bed. Pt agreeable to OT session this date. Facilitation of bed mobility (supine><sit EOB, scooting w/ education/vc's on body mechanics and sequencing. Pt c/o dizziness upon sitting - extensive rest breaks facilitated), unsupported sitting balance, functional transfers (sit><stand EOB w/ education/cues for sequencing, attention and safety/hand placement), standing tolerance tasks (addressing posture (flexed trunk), forward gaze, balance and activity tolerance) and lateral sidesteps to [facility] with w/w (cuing on posture, w/w management, sequencing, attention and safety. Increased time/effort required-confusion and delayed response noted). Therapist facilitated self-care retraining: UB/LB self-care tasks (simulated gown, socks) and grooming task while educating pt on modified techniques and energy conservation techniques. Skilled monitoring of HR, O2 sats and pts response to treatment.      At end of session, patient lying in bed (HOB elevated, bed alarm on) with call light and phone within reach, Ther Ex  [id number]     Manual Therapy [address] [phone]   ADL/Home Mgt [phone] 0   Neuro Re-ed 01567     Group Therapy      Orthotic manage/training  70438     Non-Billable Time     Total Timed Treatment 12 [address] [facility] [address], OTR/L #[id number]

## 2020-07-31 NOTE — H&P
[address]                  [address] [facility]  [facility]                              HISTORY AND PHYSICAL    PATIENT NAME: Marion Banks                   :        1950  MED REC NO:   [id number]                            ROOM:       8210  ACCOUNT NO:   [de-identified]                           ADMIT DATE: 2020  PROVIDER:     Debbie Hernandez DO    CHIEF COMPLAINT:  Weakness, fatigue. HISTORY OF PRESENT ILLNESS:  The patient is a 41-year-old   female who was found at home. She fell and she had been in bed for  several days. She was seen in the emergency room and assigned  observation status for further evaluation and treatment. PAST MEDICAL HISTORY:  Hypothyroidism, alcohol abuse, depression,  hypertension, glaucoma, macular degeneration, hyperlipidemia, peripheral  vascular disease, DVT of lower extremity treated. PAST SURGICAL HISTORY:  Teeth extraction. MEDICATIONS PRIOR TO ADMISSION:  Plavix, Lexapro, Synthroid, potassium  chloride, Zocor, Prinivil, Xalatan eyedrops, vitamin D, Myrbetriq,  PreserVision vitamin, Combigan eyedrops. SOCIAL HISTORY:  The patient quit tobacco.  She has continued alcohol  use. REVIEW OF SYSTEMS:  Remarkable for above-stated chief complaint. ALLERGIES:  To LEVOFLOXACIN, PENICILLIN, ERYTHROMYCIN, and SINUS  MEDICATIONS. PHYSICAL EXAMINATION:  GENERAL APPEARANCE:  Reveals a 41-year-old  female who is  alert, cooperative and a good historian. VITAL SIGNS:  On admission, temperature 97 degrees, pulse 97,  respirations 16, blood pressure 143/119. HEENT:  Head:  Normocephalic, atraumatic. Eyes:  Pupils equal and  reactive to light. Extraocular muscles intact. Fundi not well  visualized. Nose:  No obstruction, polyp or discharge noted. Mouth:   Mucosa without lesion. Teeth edentulous. Pharynx:  Noninjected without  exudate. NECK:  Supple. No JVD. No thyromegaly.   No carotid bruits. HEART:  Regular rate and rhythm without murmur. LUNGS:  Clear to auscultation bilaterally. ABDOMEN:  Positive bowel sounds. Soft, nontender. No rebound, no  guarding, no hepatosplenomegaly. No masses. BACK:  With increased thoracic kyphosis. EXTREMITIES:  Without edema. LYMPH NODES:  No adenopathy noted. SKIN:  Without rash or lesion. IMPRESSION:  Weakness, fatigue, hypothyroidism, history of alcohol  abuse, depression, hypertension, glaucoma, macular degeneration,  hyperlipidemia, peripheral vascular disease, history of DVT lower  extremity treated. PLAN:  Assign the patient observation status. PT/OT eval.  Social  Services for discharge planning. Discharge plan, home versus ECF as per  the patient's progress.         Jean Claude Sabillon DO    D: 07/31/2020 8:02:13       T: 07/31/2020 8:11:46     [email or web address]  Job#: [id number]     Doc#: [id number]    CC:

## 2020-08-01 PROBLEM — E55.9 VITAMIN D DEFICIENCY: Chronic | Status: RESOLVED | Noted: 2019-07-15 | Resolved: 2020-08-01

## 2020-08-01 PROBLEM — R53.83 FATIGUE: Status: RESOLVED | Noted: 2020-07-30 | Resolved: 2020-08-01

## 2020-08-01 PROBLEM — R53.1 WEAKNESS: Status: RESOLVED | Noted: 2020-07-30 | Resolved: 2020-08-01

## 2020-08-01 PROBLEM — N39.41 URGE INCONTINENCE OF URINE: Chronic | Status: RESOLVED | Noted: 2019-07-15 | Resolved: 2020-08-01

## 2020-08-01 PROBLEM — R53.1 WEAKNESS: Status: ACTIVE | Noted: 2020-08-01

## 2020-08-01 PROBLEM — F41.9 ANXIETY: Status: RESOLVED | Noted: 2019-11-08 | Resolved: 2020-08-01

## 2020-08-01 LAB
EKG ATRIAL RATE: 92 BPM
EKG P AXIS: 80 DEGREES
EKG P-R INTERVAL: 134 MS
EKG Q-T INTERVAL: 384 MS
EKG QRS DURATION: 76 MS
EKG QTC CALCULATION (BAZETT): 474 MS
EKG R AXIS: 55 DEGREES
EKG T AXIS: 83 DEGREES
EKG VENTRICULAR RATE: 92 BPM

## 2020-08-01 PROCEDURE — 6360000002 HC RX W HCPCS: Performed by: INTERNAL MEDICINE

## 2020-08-01 PROCEDURE — 96376 TX/PRO/DX INJ SAME DRUG ADON: CPT

## 2020-08-01 PROCEDURE — 2580000003 HC RX 258: Performed by: INTERNAL MEDICINE

## 2020-08-01 PROCEDURE — 6370000000 HC RX 637 (ALT 250 FOR IP): Performed by: INTERNAL MEDICINE

## 2020-08-01 PROCEDURE — 96372 THER/PROPH/DIAG INJ SC/IM: CPT

## 2020-08-01 PROCEDURE — G0378 HOSPITAL OBSERVATION PER HR: HCPCS

## 2020-08-01 RX ORDER — TETRAHYDROZOLINE HCL 0.05 %
1 DROPS OPHTHALMIC (EYE) 3 TIMES DAILY
Status: DISCONTINUED | OUTPATIENT
Start: 2020-08-01 | End: 2020-08-03 | Stop reason: HOSPADM

## 2020-08-01 RX ADMIN — ACETAMINOPHEN 650 MG: 325 TABLET, FILM COATED ORAL at 14:36

## 2020-08-01 RX ADMIN — SULFACETAMIDE SODIUM 2 DROP: 100 SOLUTION OPHTHALMIC at 14:32

## 2020-08-01 RX ADMIN — Medication 10 ML: at 23:17

## 2020-08-01 RX ADMIN — ACETAMINOPHEN 650 MG: 325 TABLET, FILM COATED ORAL at 23:25

## 2020-08-01 RX ADMIN — ONDANSETRON HYDROCHLORIDE 4 MG: 2 INJECTION, SOLUTION INTRAMUSCULAR; INTRAVENOUS at 23:25

## 2020-08-01 RX ADMIN — LISINOPRIL 10 MG: 10 TABLET ORAL at 09:49

## 2020-08-01 RX ADMIN — BRIMONIDINE TARTRATE 1 DROP: 2 SOLUTION OPHTHALMIC at 09:50

## 2020-08-01 RX ADMIN — TETRAHYDROZOLINE HCL 1 DROP: 0.05 SOLUTION/ DROPS OPHTHALMIC at 23:16

## 2020-08-01 RX ADMIN — TIMOLOL MALEATE 1 DROP: 5 SOLUTION/ DROPS OPHTHALMIC at 09:50

## 2020-08-01 RX ADMIN — BRIMONIDINE TARTRATE 1 DROP: 2 SOLUTION OPHTHALMIC at 23:12

## 2020-08-01 RX ADMIN — SULFACETAMIDE SODIUM 2 DROP: 100 SOLUTION OPHTHALMIC at 09:50

## 2020-08-01 RX ADMIN — POTASSIUM CHLORIDE 20 MEQ: 1500 TABLET, EXTENDED RELEASE ORAL at 09:49

## 2020-08-01 RX ADMIN — CLOPIDOGREL 75 MG: 75 TABLET, FILM COATED ORAL at 09:49

## 2020-08-01 RX ADMIN — LEVOTHYROXINE SODIUM 25 MCG: 0.03 TABLET ORAL at 06:43

## 2020-08-01 RX ADMIN — TIMOLOL MALEATE 1 DROP: 5 SOLUTION/ DROPS OPHTHALMIC at 23:14

## 2020-08-01 RX ADMIN — TETRAHYDROZOLINE HCL 1 DROP: 0.05 SOLUTION/ DROPS OPHTHALMIC at 14:32

## 2020-08-01 RX ADMIN — SULFACETAMIDE SODIUM 2 DROP: 100 SOLUTION OPHTHALMIC at 20:19

## 2020-08-01 RX ADMIN — ENOXAPARIN SODIUM 40 MG: 40 INJECTION SUBCUTANEOUS at 09:49

## 2020-08-01 RX ADMIN — ACETAMINOPHEN 650 MG: 325 TABLET, FILM COATED ORAL at 08:12

## 2020-08-01 RX ADMIN — SULFACETAMIDE SODIUM 2 DROP: 100 SOLUTION OPHTHALMIC at 23:15

## 2020-08-01 ASSESSMENT — PAIN SCALES - GENERAL
PAINLEVEL_OUTOF10: 0
PAINLEVEL_OUTOF10: 8
PAINLEVEL_OUTOF10: 8
PAINLEVEL_OUTOF10: 10
PAINLEVEL_OUTOF10: 8

## 2020-08-01 ASSESSMENT — PAIN DESCRIPTION - ONSET: ONSET: ON-GOING

## 2020-08-01 ASSESSMENT — PAIN DESCRIPTION - ORIENTATION: ORIENTATION: RIGHT

## 2020-08-01 ASSESSMENT — PAIN - FUNCTIONAL ASSESSMENT: PAIN_FUNCTIONAL_ASSESSMENT: ACTIVITIES ARE NOT PREVENTED

## 2020-08-01 ASSESSMENT — PAIN DESCRIPTION - PAIN TYPE: TYPE: ACUTE PAIN

## 2020-08-01 ASSESSMENT — PAIN DESCRIPTION - DESCRIPTORS: DESCRIPTORS: CONSTANT;ACHING

## 2020-08-01 ASSESSMENT — PAIN DESCRIPTION - FREQUENCY: FREQUENCY: CONTINUOUS

## 2020-08-01 ASSESSMENT — PAIN DESCRIPTION - PROGRESSION: CLINICAL_PROGRESSION: NOT CHANGED

## 2020-08-01 ASSESSMENT — PAIN DESCRIPTION - LOCATION: LOCATION: NECK

## 2020-08-01 NOTE — DISCHARGE INSTR - COC
(for information only, NOT a DME order):  wheelchair and walker  Other Treatments: ***    Patient's personal belongings (please select all that are sent with patient):  ALL WITH PATIENT    RN SIGNATURE:  Electronically signed by Theodora Art RN on 8/3/20 at 5:05 PM EDT    CASE MANAGEMENT/SOCIAL WORK SECTION    Inpatient Status Date: ***    Readmission Risk Assessment Score:  Readmission Risk              Risk of Unplanned Readmission:        0           Discharging to Facility/ Agency   · Name:   · Address:  · Phone:  · Fax:    Dialysis Facility (if applicable)   · Name:  · Address:  · Dialysis Schedule:  · Phone:  · Fax:    / signature: {Esignature:[id number]}    PHYSICIAN SECTION    Prognosis: {Prognosis:6363712257}    Condition at Discharge: [address] [place] [address] Patient Condition:[id number]}    Rehab Potential (if transferring to Rehab): {Prognosis:3011030388}    Recommended Labs or Other Treatments After Discharge: ***    Physician Certification: I certify the above information and transfer of Rosibel Rose  is necessary for the continuing treatment of the diagnosis listed and that she requires {Admit to Appropriate Level of Care:29431} for {GREATER/LESS:521720149} 30 days. Update Admission H&P: {CHP DME Changes in ELXSI:212811814}    PHYSICIAN SIGNATURE: Electronically signed by Monty Anderson MD on 8/1/2020 at 10:00 AM    Follow up with dr Ganesh Griffin in 1 week.

## 2020-08-01 NOTE — DISCHARGE SUMMARY
Physician Discharge Summary     Patient ID:  Olinda Dougherty  [id number]  77 y.o.  1950    Admit date: 7/30/2020    Discharge date and time:  8/1/2020    Admission Diagnoses:   Patient Active Problem List   Diagnosis    Glaucoma    Macular degeneration    Depression    Macrocytic anemia    [facility]    PVD (peripheral vascular disease) with claudication ([facility]    Alcohol abuse    Mixed hyperlipidemia    Acquired hypothyroidism    Essential hypertension    Weakness       Discharge Diagnoses: as above    Consults: none    Procedures: see chart    Hospital Course: patient was admitted with weakness. She was found to be severely physically deconditioned. She was seen by Pt/Ot and rehab was recommended.       Discharge Exam:  See progress note from today    Condition:  stable    Disposition: rehab    Patient Instructions:   Current Discharge Medication List      CONTINUE these medications which have NOT CHANGED    Details   clopidogrel (PLAVIX) 75 MG tablet Take 1 tablet by mouth daily  Qty: 90 tablet, Refills: 3      escitalopram (LEXAPRO) 20 MG tablet Take 1 tablet by mouth daily  Qty: 90 tablet, Refills: 3    Associated Diagnoses: Anxiety      levothyroxine (SYNTHROID) 25 MCG tablet Take 1 tablet by mouth every morning  Qty: 90 tablet, Refills: 3    Associated Diagnoses: Acquired hypothyroidism      potassium chloride (KLOR-CON M) 20 MEQ extended release tablet Take 1 tablet by mouth daily  Qty: 90 tablet, Refills: 3    Associated Diagnoses: Essential hypertension      simvastatin (ZOCOR) 5 MG tablet Take 1 tablet by mouth nightly  Qty: 90 tablet, Refills: 3    Associated Diagnoses: Mixed hyperlipidemia      lisinopril (PRINIVIL;ZESTRIL) 10 MG tablet Take by mouth      Handicap Placard MISC by NOT APPLICABLE route      latanoprost (XALATAN) 0.005 % ophthalmic solution       Cholecalciferol (VITAMIN D3) 41534 units CAPS       mirabegron (MYRBETRIQ) 50 MG TB24 Take 25 mg by mouth daily Multiple Vitamins-Minerals (PRESERVISION AREDS PO) Take 1 tablet by mouth daily      brimonidine-timolol (COMBIGAN) 0.2-0.5 % ophthalmic solution Place 1 drop into both eyes every 12 hours         STOP taking these medications       warfarin (COUMADIN) 2 MG tablet Comments:   Reason for Stopping:         diphenhydrAMINE (BENADRYL) 25 MG capsule Comments:   Reason for Stopping:             Activity: activity as tolerated  Diet: low fat, low cholesterol diet    Follow up with dr Blank Robertson in 1 week.       Note that over 30 minutes was spent in preparing discharge papers, discussing discharge with patient, medication review, etc.    Signed:  Earl Funk    8/1/2020  10:01 AM

## 2020-08-01 NOTE — PROGRESS NOTES
Dr brenden tovar covering dr Hernández Failing: The patient is awake and alert. No problems overnight. Denies chest pain, angina, and dyspnea. Denies abdominal pain. Tolerating diet. No nausea or vomiting. Objective:    BP (!) 176/74   Pulse 64   Temp 96.9 °F (36.1 °C) (Temporal)   Resp 16   Ht 5' 8.11\" (1.73 m)   Wt 112 lb 1.6 oz (50.8 kg)   SpO2 99%   BMI 16.99 kg/m²     Current medications that patient is taking have been reviewed. Heart:  RRR, no murmurs, gallops, or rubs.   Lungs:  CTA bilaterally, no wheeze, rales or rhonchi  Abd: bowel sounds present, soft, nontender, nondistended, no masses  Extrem:  No cyanosis or edema    CBC with Differential:    Lab Results   Component Value Date    WBC 9.7 07/31/2020    RBC 3.00 07/31/2020    HGB 10.6 07/31/2020    HCT 30.2 07/31/2020     07/31/2020    .7 07/31/2020    MCH 35.3 07/31/2020    MCHC 35.1 07/31/2020    RDW 15.7 07/31/2020    NRBC 0.0 05/07/2017    METASPCT 1.0 04/17/2019    LYMPHOPCT 31.0 06/03/2020    MONOPCT 6.0 06/03/2020    BASOPCT 0.6 06/03/2020    MONOSABS 0.30 06/03/2020    LYMPHSABS 1.56 06/03/2020    EOSABS 0.04 06/03/2020    BASOSABS 0.03 06/03/2020     CMP:    Lab Results   Component Value Date     07/31/2020    K 3.7 07/31/2020    K 3.0 04/17/2019     07/31/2020    CO2 17 07/31/2020    BUN 13 07/31/2020    CREATININE 0.5 07/31/2020    GFRAA >60 07/31/2020    LABGLOM >60 07/31/2020    GLUCOSE 79 07/31/2020    PROT 6.2 07/31/2020    LABALBU 3.7 07/31/2020    CALCIUM 9.0 07/31/2020    BILITOT 1.0 07/31/2020    ALKPHOS 72 07/31/2020    AST 34 07/31/2020    ALT 13 07/31/2020     BMP:    Lab Results   Component Value Date     07/31/2020    K 3.7 07/31/2020    K 3.0 04/17/2019     07/31/2020    CO2 17 07/31/2020    BUN 13 07/31/2020    LABALBU 3.7 07/31/2020    CREATININE 0.5 07/31/2020    CALCIUM 9.0 07/31/2020    GFRAA >60 07/31/2020    LABGLOM >60 07/31/2020    GLUCOSE 79 07/31/2020 Magnesium:    Lab Results   Component Value Date    MG 1.8 07/15/2019     Phosphorus:    Lab Results   Component Value Date    PHOS 3.8 04/22/2019     PT/INR:    Lab Results   Component Value Date    PROTIME 12.0 07/30/2020    PROTIME 19.5 09/05/2019    INR 1.1 07/30/2020     PTT:    Lab Results   Component Value Date    APTT 46.0 04/17/2019   [APTT}     Assessment:    Patient Active Problem List   Diagnosis    Glaucoma    Macular degeneration    Depression    Macrocytic anemia    Alcoholic cirrhosis (HCC)    PVD (peripheral vascular disease) with claudication (HCC)    Alcohol abuse    Mixed hyperlipidemia    Acquired hypothyroidism    Essential hypertension    Weakness       Plan:  Stable. Stable. Continue psychiatric medications. Hgb stable. Stable. Stable. Discussed cessation. Continue synthroid. Blood pressure ok, continue current medications  Secondary to physical deconditioning. Pt/Ot evaluations for discharge planning. Ok to discharge once social aspects of medicine finalized.     Yousuf Ponce    9:58 AM  8/1/2020

## 2020-08-02 LAB — URINE CULTURE, ROUTINE: NORMAL

## 2020-08-02 PROCEDURE — 6360000002 HC RX W HCPCS: Performed by: INTERNAL MEDICINE

## 2020-08-02 PROCEDURE — G0378 HOSPITAL OBSERVATION PER HR: HCPCS

## 2020-08-02 PROCEDURE — 96372 THER/PROPH/DIAG INJ SC/IM: CPT

## 2020-08-02 PROCEDURE — 6370000000 HC RX 637 (ALT 250 FOR IP): Performed by: INTERNAL MEDICINE

## 2020-08-02 PROCEDURE — 2580000003 HC RX 258: Performed by: INTERNAL MEDICINE

## 2020-08-02 RX ADMIN — TIMOLOL MALEATE 1 DROP: 5 SOLUTION/ DROPS OPHTHALMIC at 08:46

## 2020-08-02 RX ADMIN — Medication 10 ML: at 08:46

## 2020-08-02 RX ADMIN — Medication 10 ML: at 21:00

## 2020-08-02 RX ADMIN — LISINOPRIL 10 MG: 10 TABLET ORAL at 08:46

## 2020-08-02 RX ADMIN — TETRAHYDROZOLINE HCL 1 DROP: 0.05 SOLUTION/ DROPS OPHTHALMIC at 08:47

## 2020-08-02 RX ADMIN — LEVOTHYROXINE SODIUM 25 MCG: 0.03 TABLET ORAL at 07:40

## 2020-08-02 RX ADMIN — SULFACETAMIDE SODIUM 2 DROP: 100 SOLUTION OPHTHALMIC at 15:32

## 2020-08-02 RX ADMIN — SULFACETAMIDE SODIUM 2 DROP: 100 SOLUTION OPHTHALMIC at 21:30

## 2020-08-02 RX ADMIN — CLOPIDOGREL 75 MG: 75 TABLET, FILM COATED ORAL at 08:46

## 2020-08-02 RX ADMIN — TETRAHYDROZOLINE HCL 1 DROP: 0.05 SOLUTION/ DROPS OPHTHALMIC at 20:56

## 2020-08-02 RX ADMIN — BRIMONIDINE TARTRATE 1 DROP: 2 SOLUTION OPHTHALMIC at 08:47

## 2020-08-02 RX ADMIN — SULFACETAMIDE SODIUM 2 DROP: 100 SOLUTION OPHTHALMIC at 20:55

## 2020-08-02 RX ADMIN — BRIMONIDINE TARTRATE 1 DROP: 2 SOLUTION OPHTHALMIC at 20:53

## 2020-08-02 RX ADMIN — SULFACETAMIDE SODIUM 2 DROP: 100 SOLUTION OPHTHALMIC at 08:47

## 2020-08-02 RX ADMIN — POTASSIUM CHLORIDE 20 MEQ: 1500 TABLET, EXTENDED RELEASE ORAL at 08:46

## 2020-08-02 RX ADMIN — TIMOLOL MALEATE 1 DROP: 5 SOLUTION/ DROPS OPHTHALMIC at 20:55

## 2020-08-02 RX ADMIN — ENOXAPARIN SODIUM 40 MG: 40 INJECTION SUBCUTANEOUS at 08:46

## 2020-08-02 RX ADMIN — TETRAHYDROZOLINE HCL 1 DROP: 0.05 SOLUTION/ DROPS OPHTHALMIC at 15:31

## 2020-08-02 RX ADMIN — ACETAMINOPHEN 650 MG: 325 TABLET, FILM COATED ORAL at 08:46

## 2020-08-02 ASSESSMENT — PAIN DESCRIPTION - ONSET
ONSET: ON-GOING
ONSET: ON-GOING

## 2020-08-02 ASSESSMENT — PAIN DESCRIPTION - PROGRESSION
CLINICAL_PROGRESSION: NOT CHANGED
CLINICAL_PROGRESSION: NOT CHANGED

## 2020-08-02 ASSESSMENT — PAIN DESCRIPTION - LOCATION
LOCATION: NECK
LOCATION: NECK

## 2020-08-02 ASSESSMENT — PAIN DESCRIPTION - FREQUENCY
FREQUENCY: CONTINUOUS
FREQUENCY: CONTINUOUS

## 2020-08-02 ASSESSMENT — PAIN DESCRIPTION - ORIENTATION
ORIENTATION: LEFT;RIGHT
ORIENTATION: RIGHT

## 2020-08-02 ASSESSMENT — PAIN DESCRIPTION - DESCRIPTORS
DESCRIPTORS: CONSTANT;ACHING
DESCRIPTORS: CONSTANT;ACHING

## 2020-08-02 ASSESSMENT — PAIN - FUNCTIONAL ASSESSMENT
PAIN_FUNCTIONAL_ASSESSMENT: ACTIVITIES ARE NOT PREVENTED
PAIN_FUNCTIONAL_ASSESSMENT: PREVENTS OR INTERFERES SOME ACTIVE ACTIVITIES AND ADLS

## 2020-08-02 ASSESSMENT — PAIN SCALES - GENERAL
PAINLEVEL_OUTOF10: 0
PAINLEVEL_OUTOF10: 8
PAINLEVEL_OUTOF10: 8
PAINLEVEL_OUTOF10: 7

## 2020-08-02 ASSESSMENT — PAIN DESCRIPTION - PAIN TYPE
TYPE: ACUTE PAIN
TYPE: ACUTE PAIN

## 2020-08-02 NOTE — PROGRESS NOTES
Dr brenden tovar covering dr Harley Solid: The patient is awake and alert. No problems overnight. Denies chest pain, angina, and dyspnea. Denies abdominal pain. Tolerating diet. No nausea or vomiting. Objective:    BP (!) 160/90 Comment: Manually taken  Pulse 75   Temp 97.1 °F (36.2 °C) (Temporal)   Resp 18   Ht 5' 8.11\" (1.73 m)   Wt 112 lb 1.6 oz (50.8 kg)   SpO2 98%   BMI 16.99 kg/m²     Current medications that patient is taking have been reviewed. Heart:  RRR, no murmurs, gallops, or rubs.   Lungs:  CTA bilaterally, no wheeze, rales or rhonchi  Abd: bowel sounds present, soft, nontender, nondistended, no masses  Extrem:  No cyanosis or edema    CBC with Differential:    Lab Results   Component Value Date    WBC 9.7 07/31/2020    RBC 3.00 07/31/2020    HGB 10.6 07/31/2020    HCT 30.2 07/31/2020     07/31/2020    .7 07/31/2020    MCH 35.3 07/31/2020    MCHC 35.1 07/31/2020    RDW 15.7 07/31/2020    NRBC 0.0 05/07/2017    METASPCT 1.0 04/17/2019    LYMPHOPCT 31.0 06/03/2020    MONOPCT 6.0 06/03/2020    BASOPCT 0.6 06/03/2020    MONOSABS 0.30 06/03/2020    LYMPHSABS 1.56 06/03/2020    EOSABS 0.04 06/03/2020    BASOSABS 0.03 06/03/2020     CMP:    Lab Results   Component Value Date     07/31/2020    K 3.7 07/31/2020    K 3.0 04/17/2019     07/31/2020    CO2 17 07/31/2020    BUN 13 07/31/2020    CREATININE 0.5 07/31/2020    GFRAA >60 07/31/2020    LABGLOM >60 07/31/2020    GLUCOSE 79 07/31/2020    PROT 6.2 07/31/2020    LABALBU 3.7 07/31/2020    CALCIUM 9.0 07/31/2020    BILITOT 1.0 07/31/2020    ALKPHOS 72 07/31/2020    AST 34 07/31/2020    ALT 13 07/31/2020     BMP:    Lab Results   Component Value Date     07/31/2020    K 3.7 07/31/2020    K 3.0 04/17/2019     07/31/2020    CO2 17 07/31/2020    BUN 13 07/31/2020    LABALBU 3.7 07/31/2020    CREATININE 0.5 07/31/2020    CALCIUM 9.0 07/31/2020    GFRAA >60 07/31/2020    LABGLOM >60 07/31/2020 GLUCOSE 79 07/31/2020     Magnesium:    Lab Results   Component Value Date    MG 1.8 07/15/2019     Phosphorus:    Lab Results   Component Value Date    PHOS 3.8 04/22/2019     PT/INR:    Lab Results   Component Value Date    PROTIME 12.0 07/30/2020    PROTIME 19.5 09/05/2019    INR 1.1 07/30/2020     PTT:    Lab Results   Component Value Date    APTT 46.0 04/17/2019   [APTT}     Assessment:    Patient Active Problem List   Diagnosis    Glaucoma    Macular degeneration    Depression    Macrocytic anemia    Alcoholic cirrhosis (HCC)    PVD (peripheral vascular disease) with claudication (HCC)    Alcohol abuse    Mixed hyperlipidemia    Acquired hypothyroidism    Essential hypertension    Weakness       Plan:  Stable. Stable. Continue psychiatric medications. Hgb stable. Stable. Stable. Discussed cessation. Continue synthroid. Blood pressure ok, continue current medications  Secondary to physical deconditioning. Pt/Ot evaluations for discharge planning. Ok to discharge once social aspects of medicine finalized.     Valerio Petit    8:30 AM  8/2/2020

## 2020-08-03 VITALS
OXYGEN SATURATION: 99 % | HEIGHT: 68 IN | SYSTOLIC BLOOD PRESSURE: 174 MMHG | WEIGHT: 112.1 LBS | DIASTOLIC BLOOD PRESSURE: 82 MMHG | RESPIRATION RATE: 18 BRPM | HEART RATE: 77 BPM | TEMPERATURE: 97.7 F | BODY MASS INDEX: 16.99 KG/M2

## 2020-08-03 LAB — SARS-COV-2, NAAT: NOT DETECTED

## 2020-08-03 PROCEDURE — 6360000002 HC RX W HCPCS: Performed by: INTERNAL MEDICINE

## 2020-08-03 PROCEDURE — 97535 SELF CARE MNGMENT TRAINING: CPT

## 2020-08-03 PROCEDURE — 97530 THERAPEUTIC ACTIVITIES: CPT

## 2020-08-03 PROCEDURE — 6370000000 HC RX 637 (ALT 250 FOR IP): Performed by: INTERNAL MEDICINE

## 2020-08-03 PROCEDURE — G0378 HOSPITAL OBSERVATION PER HR: HCPCS

## 2020-08-03 PROCEDURE — U0002 COVID-19 LAB TEST NON-CDC: HCPCS

## 2020-08-03 PROCEDURE — 2580000003 HC RX 258: Performed by: INTERNAL MEDICINE

## 2020-08-03 PROCEDURE — 96372 THER/PROPH/DIAG INJ SC/IM: CPT

## 2020-08-03 RX ORDER — LISINOPRIL 20 MG/1
20 TABLET ORAL DAILY
Qty: 30 TABLET | Refills: 3 | Status: ON HOLD
Start: 2020-08-03 | End: 2020-09-25 | Stop reason: HOSPADM

## 2020-08-03 RX ORDER — LISINOPRIL 20 MG/1
20 TABLET ORAL DAILY
Status: DISCONTINUED | OUTPATIENT
Start: 2020-08-03 | End: 2020-08-03 | Stop reason: HOSPADM

## 2020-08-03 RX ORDER — FOLIC ACID 1 MG/1
1 TABLET ORAL DAILY
Status: DISCONTINUED | OUTPATIENT
Start: 2020-08-03 | End: 2020-08-03 | Stop reason: HOSPADM

## 2020-08-03 RX ORDER — FOLIC ACID 1 MG/1
1 TABLET ORAL DAILY
Qty: 30 TABLET | Refills: 3
Start: 2020-08-03

## 2020-08-03 RX ORDER — ACETAMINOPHEN 325 MG/1
650 TABLET ORAL EVERY 6 HOURS PRN
Qty: 120 TABLET | Refills: 3 | Status: ON HOLD | COMMUNITY
Start: 2020-08-03 | End: 2020-10-13 | Stop reason: HOSPADM

## 2020-08-03 RX ADMIN — TETRAHYDROZOLINE HCL 1 DROP: 0.05 SOLUTION/ DROPS OPHTHALMIC at 09:39

## 2020-08-03 RX ADMIN — BRIMONIDINE TARTRATE 1 DROP: 2 SOLUTION OPHTHALMIC at 09:39

## 2020-08-03 RX ADMIN — LEVOTHYROXINE SODIUM 25 MCG: 0.03 TABLET ORAL at 05:51

## 2020-08-03 RX ADMIN — ENOXAPARIN SODIUM 40 MG: 40 INJECTION SUBCUTANEOUS at 09:38

## 2020-08-03 RX ADMIN — LISINOPRIL 20 MG: 10 TABLET ORAL at 09:38

## 2020-08-03 RX ADMIN — SULFACETAMIDE SODIUM 2 DROP: 100 SOLUTION OPHTHALMIC at 09:39

## 2020-08-03 RX ADMIN — TIMOLOL MALEATE 1 DROP: 5 SOLUTION/ DROPS OPHTHALMIC at 09:39

## 2020-08-03 RX ADMIN — FOLIC ACID 1 MG: 1 TABLET ORAL at 09:38

## 2020-08-03 RX ADMIN — CLOPIDOGREL 75 MG: 75 TABLET, FILM COATED ORAL at 09:38

## 2020-08-03 RX ADMIN — Medication 10 ML: at 09:39

## 2020-08-03 RX ADMIN — PROMETHAZINE HYDROCHLORIDE 12.5 MG: 25 TABLET ORAL at 09:44

## 2020-08-03 ASSESSMENT — PAIN DESCRIPTION - PROGRESSION
CLINICAL_PROGRESSION: NOT CHANGED
CLINICAL_PROGRESSION: NOT CHANGED

## 2020-08-03 ASSESSMENT — PAIN SCALES - GENERAL: PAINLEVEL_OUTOF10: 0

## 2020-08-03 NOTE — CARE COORDINATION
8/3 Update CM Note: patient has been accepted at [facility] for rehab. Prior auth started on Friday by liaison. Pass/envelope completed and placed in soft chart. Will follow up on prior auth today.  Heather Crenshaw

## 2020-08-03 NOTE — CARE COORDINATION
8/3 Update CM Note: Authorization completed for patient to go skilled to [facility]. [facility] [address] pending.  Hector Edouard

## 2020-08-03 NOTE — PROGRESS NOTES
A<>Dep Sitting EOB: Supervision  Dynamic Standing:  Min A with AAD     Pt is A & O x 4  Sensation:  Numbness/tingling to B hands and B feet  Edema:  unremarkable    Patient education  Pt educated on role of therapy, safety with mobility, posture and benefits of therapy    Patient response to education:   Pt verbalized understanding Pt demonstrated skill Pt requires further education in this area   yes yes yes     ASSESSMENT:    Comments:  Pt received supine in bed and was agreeable to treatment this date. Pt presents with severe global weakness and fatigue. Pt able to initiate all activity/mobility, but was unable to complete without assist.  Severe trunk flexion at EOB due to core weakness that pt was unable to correct. Pt required constant Mod A for trunk while sitting EOB. Pt sat EOB for ~8 minutes. STS completed 2x with BLE blocked due to B feet sliding. Pt unable to achieve full erect posture without her trunk resting against PT and total assist for B hip extension. Pt unable to side step or maintain standing. Pt returned to supine and was situated with bed in chair position to improve upright tolerance, increase interaction with the environment, and pillows placed to promote skin and joint integrity. Treatment:  Patient practiced and was instructed in the following treatment:     Bed mobility: verbal/tactile cues for improved sequencing and technique; hands on assist to complete task as noted above.  Transfer training: verbal/tactile cues for improved hand placement and safety; hands on assist to complete as noted above.  Therapeutic activities: sitting EOB ~8 minutes minutes, cues and assist for upright posture and sitting balance. PLAN:    Patient is making poor progress towards goals. Will continue with current POC.       Time in  12:01  Time out  12:26    Total Treatment Time  25 minutes     CPT codes:  [] Low Complexity PT evaluation 77454  [] Moderate Complexity PT evaluation [id number]  [] High Complexity PT evaluation [id number]  [] PT Re-evaluation [id number]  [] Gait training 20916 0 minutes  [] Manual therapy 11628 0 minutes  [x] Therapeutic activities 85697 25 minutes  [] Therapeutic exercises 76872 0 minutes  [] Neuromuscular reeducation 00416 0 minutes     Montana Turk, PT, DPT  License [id number]

## 2020-08-03 NOTE — PROGRESS NOTES
Assist  Min A  With simple grooming tasks, upright in bed Modified Wolf    UB Dressing Moderate Assist   Mod A  Upright in bed, due to Mod anterior lean once seated at EOB Stand by Assist    LB Dressing Dependent   Max A  Pt able to doff 1 sock while tall sitting in bed then became too fatigued to kaia sock, or lack of participation  Moderate Assist    Bathing Maximal Assist Max A  Simulated   Moderate Assist    Toileting Dependent   Dep Moderate Assist    Bed Mobility   Supine to sit: Maximal Assist   Sit to supine: Maximal Assist   Max A  Supine < > sit Rolling: Minimal Assist   Supine to sit: Minimal Assist   Sit to supine: Minimal Assist    Functional Transfers Mod A x2  Dep  Unable to stand fully upright, appeared to give minimal effort attempt x 2 Min A   Functional Mobility Max A x2 w/ w/w  Lateral sidesteps to HOB  NT Mod A   Balance Sitting: Min A  Standing: Mod><Max A x2 w/ w/w  Sitting: Mod A  Anterior lean   Standing: Dep  Partial stand, able to clear buttock but pt appeared to give minimal effort      Activity Tolerance Poor+  Limited by c/o nausea  Fair-  Seated at EOB 10 minutes Fair   Visual/  Perceptual Glasses: yes  Visual field test: WFL            Education:  Pt was educated through out treatment regarding proper technique & safety with bed mobility, functional transfers & ADL compensatory strategies to ease tasks to improve safety & prevent falls and allow pt to return home safely. Comments: Upon arrival pt was in bed & agreeable to therapy. At end of session pt was upright in bed, pillows under B UE supported to ease self ADL & feeding tasks, all lines and tubes intact & call light within reach. · Pt has made slow progress towards set goals.    · Continue with current plan of care    Treatment Time In: 11:35           Treatment Time Out: 12:00            Treatment Charges: Mins Units   Ther Ex  31770     Manual Therapy [address] [place] Activities 38160 15 1   ADL/Home Mgt 08308 10 1   Neuro Re-ed 98869     Group Therapy      Orthotic manage/training  34603     Non-Billable Time     Total Timed Treatment 25 2       Shantel ZUÑIGA  [address] [place] [address] Drive, [address] [place] [address]

## 2020-08-03 NOTE — CARE COORDINATION
8/3 UPdate CM Note: Rehab notified of need for updated therapy notes for insurance to complete authorization for patient to go to [facility]. Spoke with Shaila Negro at rehab to have updates completed.  MYRON ALVAREZ

## 2020-08-03 NOTE — PROGRESS NOTES
Nutrient Delivery:  Continue Current Diet, Start Oral Nutrition Supplement(Ensure BID)  Nutrition Education/Counseling:  Education not indicated   Coordination of Nutrition Care:  Continued Inpatient Monitoring    Goals:  Consume >75% meals/ONS       Nutrition Monitoring and Evaluation:   Food/Nutrient Intake Outcomes:  Food and Nutrient Intake, Supplement Intake  Physical Signs/Symptoms Outcomes:  Biochemical Data, GI Status, Fluid Status or Edema, Nutrition Focused Physical Findings, Skin, Weight     Discharge Planning:     Too soon to determine     Electronically signed by Ludwig Loaiza MS, RD, LD on 8/3/20 at 2:02 PM EDT    Contact: [phone]

## 2020-08-03 NOTE — PROGRESS NOTES
[facility]    Subjective:  Pt alert conversive olivia diet      Current Facility-Administered Medications:     lisinopril (PRINIVIL;ZESTRIL) tablet 20 mg, 20 mg, Oral, Daily, Lynn Alexis DO    folic acid (FOLVITE) tablet 1 mg, 1 mg, Oral, Daily, Lynn Alexis DO    tetrahydrozoline 0.05 % ophthalmic solution 1 drop, 1 drop, Both Eyes, TID, Valerio Petit MD, 1 drop at 08/02/20 2056    sulfacetamide (BLEPH-10) 10 % ophthalmic solution 2 drop, 2 drop, Both Eyes, 4x Daily, Lynn Alexis DO, 2 drop at 08/02/20 2130    clopidogrel (PLAVIX) tablet 75 mg, 75 mg, Oral, Daily, Lynn Alexis DO, 75 mg at 08/02/20 0846    levothyroxine (SYNTHROID) tablet 25 mcg, 25 mcg, Oral, QAM AC, Lynn Alexis DO, 25 mcg at 08/03/20 0551    sodium chloride flush 0.9 % injection 10 mL, 10 mL, Intravenous, 2 times per day, Jose M Cabrera DO, 10 mL at 08/02/20 2100    sodium chloride flush 0.9 % injection 10 mL, 10 mL, Intravenous, PRN, Lynn Alexis DO    acetaminophen (TYLENOL) tablet 650 mg, 650 mg, Oral, Q6H PRN, 650 mg at 08/02/20 0846 **OR** acetaminophen (TYLENOL) suppository 650 mg, 650 mg, Rectal, Q6H PRN, Lynn Alexis DO    polyethylene glycol (GLYCOLAX) packet 17 g, 17 g, Oral, Daily PRN, Lynn Alexis DO    promethazine (PHENERGAN) tablet 12.5 mg, 12.5 mg, Oral, Q6H PRN **OR** ondansetron (ZOFRAN) injection 4 mg, 4 mg, Intravenous, Q6H PRN, Lynn Alexis DO, 4 mg at 08/01/20 1725    enoxaparin (LOVENOX) injection 40 mg, 40 mg, Subcutaneous, Daily, Lynn Alexis DO, 40 mg at 08/02/20 0846    potassium chloride (KLOR-CON M) extended release tablet 20 mEq, 20 mEq, Oral, Daily, Lynn Alexis DO, 20 mEq at 08/02/20 0846    brimonidine (ALPHAGAN) 0.2 % ophthalmic solution 1 drop, 1 drop, Both Eyes, BID, 1 drop at 08/02/20 2053 **AND** timolol (TIMOPTIC) 0.5 % ophthalmic solution 1 drop, 1 drop, Both Eyes, BID, Lynn Alexis DO, 1 drop at 08/02/20 2055    Objective:    BP (!) 174/82   Pulse 77   Temp 97.7 °F (36.5 °C) (Temporal)   Resp 18   Ht 5' 8.11\" (1.73 m)   Wt 112 lb 1.6 oz (50.8 kg)   SpO2 99%   BMI 16.99 kg/m²     Heart:  Reg  Lungs:  CTA bilaterally, no wheeze, rales or rhonchi  Abd: bowel sounds present, nontender, nondistended, no masses  Extrem:  No clubbing, cyanosis, or edema    CBC with Differential:    Lab Results   Component Value Date    WBC 9.7 07/31/2020    RBC 3.00 07/31/2020    HGB 10.6 07/31/2020    HCT 30.2 07/31/2020     07/31/2020    .7 07/31/2020    MCH 35.3 07/31/2020    MCHC 35.1 07/31/2020    RDW 15.7 07/31/2020    NRBC 0.0 05/07/2017    METASPCT 1.0 04/17/2019    LYMPHOPCT 31.0 06/03/2020    MONOPCT 6.0 06/03/2020    BASOPCT 0.6 06/03/2020    MONOSABS 0.30 06/03/2020    LYMPHSABS 1.56 06/03/2020    EOSABS 0.04 06/03/2020    BASOSABS 0.03 06/03/2020     CMP:    Lab Results   Component Value Date     07/31/2020    K 3.7 07/31/2020    K 3.0 04/17/2019     07/31/2020    CO2 17 07/31/2020    BUN 13 07/31/2020    CREATININE 0.5 07/31/2020    GFRAA >60 07/31/2020    LABGLOM >60 07/31/2020    GLUCOSE 79 07/31/2020    PROT 6.2 07/31/2020    LABALBU 3.7 07/31/2020    CALCIUM 9.0 07/31/2020    BILITOT 1.0 07/31/2020    ALKPHOS 72 07/31/2020    AST 34 07/31/2020    ALT 13 07/31/2020     Warfarin PT/INR:    Lab Results   Component Value Date    INR 1.1 07/30/2020    INR 1.0 06/03/2020    INR 2.5 09/26/2019    PROTIME 12.0 07/30/2020    PROTIME 11.3 06/03/2020    PROTIME 19.5 09/05/2019       Assessment:    Principal Problem:    Weakness  Active Problems:    Macrocytic anemia    Glaucoma    Macular degeneration    Depression    Alcoholic cirrhosis (HCC)    PVD (peripheral vascular disease) with claudication (HCC)    Alcohol abuse    Mixed hyperlipidemia    Acquired hypothyroidism    Essential hypertension  Resolved Problems:    Fatigue    Weakness      Plan:  Saad planning increase activity        Marquise Mckeon

## 2020-08-04 ENCOUNTER — TELEPHONE (OUTPATIENT)
Dept: PRIMARY CARE CLINIC | Age: 70
End: 2020-08-04

## 2020-08-04 LAB
BLOOD CULTURE, ROUTINE: NORMAL
CULTURE, BLOOD 2: NORMAL

## 2020-09-19 ENCOUNTER — APPOINTMENT (OUTPATIENT)
Dept: GENERAL RADIOLOGY | Age: 70
DRG: 871 | End: 2020-09-19
Payer: MEDICARE

## 2020-09-19 ENCOUNTER — APPOINTMENT (OUTPATIENT)
Dept: CT IMAGING | Age: 70
DRG: 871 | End: 2020-09-19
Payer: MEDICARE

## 2020-09-19 ENCOUNTER — HOSPITAL ENCOUNTER (INPATIENT)
Age: 70
LOS: 6 days | Discharge: NURSING, REHAB OR LONG-TERM CARE | DRG: 871 | End: 2020-09-25
Attending: EMERGENCY MEDICINE | Admitting: INTERNAL MEDICINE
Payer: MEDICARE

## 2020-09-19 PROBLEM — N17.9 AKI (ACUTE KIDNEY INJURY) (HCC): Status: ACTIVE | Noted: 2020-09-19

## 2020-09-19 PROBLEM — D72.829 LEUKOCYTOSIS: Status: ACTIVE | Noted: 2020-09-19

## 2020-09-19 PROBLEM — D64.9 ANEMIA: Status: ACTIVE | Noted: 2020-09-19

## 2020-09-19 PROBLEM — Z87.19 HISTORY OF CIRRHOSIS: Status: ACTIVE | Noted: 2019-01-10

## 2020-09-19 PROBLEM — K52.9 ACUTE COLITIS: Status: ACTIVE | Noted: 2020-09-19

## 2020-09-19 PROBLEM — A41.4 SEPSIS DUE TO ANAEROBIC BACTERIA (HCC): Status: ACTIVE | Noted: 2020-09-19

## 2020-09-19 PROBLEM — R74.8 ELEVATED ALKALINE PHOSPHATASE LEVEL: Status: ACTIVE | Noted: 2020-09-19

## 2020-09-19 LAB
ALBUMIN SERPL-MCNC: 3.5 G/DL (ref 3.5–5.2)
ALP BLD-CCNC: 122 U/L (ref 35–104)
ALT SERPL-CCNC: 23 U/L (ref 0–32)
AMMONIA: 34 UMOL/L (ref 11–51)
ANION GAP SERPL CALCULATED.3IONS-SCNC: 16 MMOL/L (ref 7–16)
AST SERPL-CCNC: 27 U/L (ref 0–31)
BACTERIA: ABNORMAL /HPF
BASOPHILS ABSOLUTE: 0 E9/L (ref 0–0.2)
BASOPHILS RELATIVE PERCENT: 0.2 % (ref 0–2)
BILIRUB SERPL-MCNC: 1.2 MG/DL (ref 0–1.2)
BILIRUBIN URINE: ABNORMAL
BLOOD, URINE: ABNORMAL
BUN BLDV-MCNC: 34 MG/DL (ref 8–23)
CALCIUM SERPL-MCNC: 9.4 MG/DL (ref 8.6–10.2)
CHLORIDE BLD-SCNC: 98 MMOL/L (ref 98–107)
CLARITY: CLEAR
CO2: 19 MMOL/L (ref 22–29)
COLOR: YELLOW
CREAT SERPL-MCNC: 1.4 MG/DL (ref 0.5–1)
EKG ATRIAL RATE: 103 BPM
EKG P AXIS: 75 DEGREES
EKG P-R INTERVAL: 134 MS
EKG Q-T INTERVAL: 364 MS
EKG QRS DURATION: 72 MS
EKG QTC CALCULATION (BAZETT): 476 MS
EKG R AXIS: 18 DEGREES
EKG T AXIS: 66 DEGREES
EKG VENTRICULAR RATE: 103 BPM
EOSINOPHILS ABSOLUTE: 0 E9/L (ref 0.05–0.5)
EOSINOPHILS RELATIVE PERCENT: 0.1 % (ref 0–6)
EPITHELIAL CELLS, UA: ABNORMAL /HPF
GFR AFRICAN AMERICAN: 45
GFR NON-AFRICAN AMERICAN: 37 ML/MIN/1.73
GLUCOSE BLD-MCNC: 148 MG/DL (ref 74–99)
GLUCOSE URINE: NEGATIVE MG/DL
HCT VFR BLD CALC: 24.7 % (ref 34–48)
HEMOGLOBIN: 8.1 G/DL (ref 11.5–15.5)
INR BLD: 1.6
KETONES, URINE: NEGATIVE MG/DL
LACTIC ACID, SEPSIS: 1.8 MMOL/L (ref 0.5–1.9)
LACTIC ACID, SEPSIS: 3.2 MMOL/L (ref 0.5–1.9)
LEUKOCYTE ESTERASE, URINE: ABNORMAL
LIPASE: 10 U/L (ref 13–60)
LYMPHOCYTES ABSOLUTE: 1.3 E9/L (ref 1.5–4)
LYMPHOCYTES RELATIVE PERCENT: 3.4 % (ref 20–42)
MCH RBC QN AUTO: 32.3 PG (ref 26–35)
MCHC RBC AUTO-ENTMCNC: 32.8 % (ref 32–34.5)
MCV RBC AUTO: 98.4 FL (ref 80–99.9)
METAMYELOCYTES RELATIVE PERCENT: 2.6 % (ref 0–1)
MONOCYTES ABSOLUTE: 2.16 E9/L (ref 0.1–0.95)
MONOCYTES RELATIVE PERCENT: 5.2 % (ref 2–12)
NEUTROPHILS ABSOLUTE: 39.4 E9/L (ref 1.8–7.3)
NEUTROPHILS RELATIVE PERCENT: 88.8 % (ref 43–80)
NITRITE, URINE: NEGATIVE
PDW BLD-RTO: 13.6 FL (ref 11.5–15)
PH UA: 6 (ref 5–9)
PLATELET # BLD: 411 E9/L (ref 130–450)
PMV BLD AUTO: 11.7 FL (ref 7–12)
POLYCHROMASIA: ABNORMAL
POTASSIUM REFLEX MAGNESIUM: 4.1 MMOL/L (ref 3.5–5)
PROTEIN UA: 100 MG/DL
PROTHROMBIN TIME: 17.8 SEC (ref 9.3–12.4)
RBC # BLD: 2.51 E12/L (ref 3.5–5.5)
RBC UA: ABNORMAL /HPF (ref 0–2)
RENAL EPITHELIAL, UA: ABNORMAL /HPF
SARS-COV-2, NAAT: NOT DETECTED
SODIUM BLD-SCNC: 133 MMOL/L (ref 132–146)
SPECIFIC GRAVITY UA: 1.01 (ref 1–1.03)
TOTAL PROTEIN: 7.2 G/DL (ref 6.4–8.3)
TROPONIN: 0.01 NG/ML (ref 0–0.03)
UROBILINOGEN, URINE: 0.2 E.U./DL
VACUOLATED NEUTROPHILS: ABNORMAL
WBC # BLD: 43.3 E9/L (ref 4.5–11.5)
WBC UA: ABNORMAL /HPF (ref 0–5)

## 2020-09-19 PROCEDURE — 85610 PROTHROMBIN TIME: CPT

## 2020-09-19 PROCEDURE — 71045 X-RAY EXAM CHEST 1 VIEW: CPT

## 2020-09-19 PROCEDURE — 2500000003 HC RX 250 WO HCPCS: Performed by: STUDENT IN AN ORGANIZED HEALTH CARE EDUCATION/TRAINING PROGRAM

## 2020-09-19 PROCEDURE — 6360000002 HC RX W HCPCS: Performed by: NURSE PRACTITIONER

## 2020-09-19 PROCEDURE — 6360000004 HC RX CONTRAST MEDICATION: Performed by: RADIOLOGY

## 2020-09-19 PROCEDURE — 99285 EMERGENCY DEPT VISIT HI MDM: CPT

## 2020-09-19 PROCEDURE — 6360000002 HC RX W HCPCS: Performed by: STUDENT IN AN ORGANIZED HEALTH CARE EDUCATION/TRAINING PROGRAM

## 2020-09-19 PROCEDURE — 2580000003 HC RX 258: Performed by: STUDENT IN AN ORGANIZED HEALTH CARE EDUCATION/TRAINING PROGRAM

## 2020-09-19 PROCEDURE — 6370000000 HC RX 637 (ALT 250 FOR IP): Performed by: STUDENT IN AN ORGANIZED HEALTH CARE EDUCATION/TRAINING PROGRAM

## 2020-09-19 PROCEDURE — 93005 ELECTROCARDIOGRAM TRACING: CPT | Performed by: STUDENT IN AN ORGANIZED HEALTH CARE EDUCATION/TRAINING PROGRAM

## 2020-09-19 PROCEDURE — 2060000000 HC ICU INTERMEDIATE R&B

## 2020-09-19 PROCEDURE — 82140 ASSAY OF AMMONIA: CPT

## 2020-09-19 PROCEDURE — 96368 THER/DIAG CONCURRENT INF: CPT

## 2020-09-19 PROCEDURE — 74177 CT ABD & PELVIS W/CONTRAST: CPT

## 2020-09-19 PROCEDURE — 70450 CT HEAD/BRAIN W/O DYE: CPT

## 2020-09-19 PROCEDURE — 87088 URINE BACTERIA CULTURE: CPT

## 2020-09-19 PROCEDURE — 84484 ASSAY OF TROPONIN QUANT: CPT

## 2020-09-19 PROCEDURE — 81001 URINALYSIS AUTO W/SCOPE: CPT

## 2020-09-19 PROCEDURE — 87040 BLOOD CULTURE FOR BACTERIA: CPT

## 2020-09-19 PROCEDURE — 80053 COMPREHEN METABOLIC PANEL: CPT

## 2020-09-19 PROCEDURE — 2580000003 HC RX 258: Performed by: RADIOLOGY

## 2020-09-19 PROCEDURE — 2580000003 HC RX 258: Performed by: NURSE PRACTITIONER

## 2020-09-19 PROCEDURE — 96365 THER/PROPH/DIAG IV INF INIT: CPT

## 2020-09-19 PROCEDURE — 83605 ASSAY OF LACTIC ACID: CPT

## 2020-09-19 PROCEDURE — U0002 COVID-19 LAB TEST NON-CDC: HCPCS

## 2020-09-19 PROCEDURE — 6370000000 HC RX 637 (ALT 250 FOR IP): Performed by: NURSE PRACTITIONER

## 2020-09-19 PROCEDURE — 96366 THER/PROPH/DIAG IV INF ADDON: CPT

## 2020-09-19 PROCEDURE — 83690 ASSAY OF LIPASE: CPT

## 2020-09-19 PROCEDURE — 85025 COMPLETE CBC W/AUTO DIFF WBC: CPT

## 2020-09-19 PROCEDURE — 93010 ELECTROCARDIOGRAM REPORT: CPT | Performed by: INTERNAL MEDICINE

## 2020-09-19 RX ORDER — SODIUM CHLORIDE 0.9 % (FLUSH) 0.9 %
10 SYRINGE (ML) INJECTION EVERY 12 HOURS SCHEDULED
Status: DISCONTINUED | OUTPATIENT
Start: 2020-09-19 | End: 2020-09-25 | Stop reason: HOSPADM

## 2020-09-19 RX ORDER — LEVOTHYROXINE SODIUM 0.03 MG/1
25 TABLET ORAL EVERY MORNING
Status: DISCONTINUED | OUTPATIENT
Start: 2020-09-20 | End: 2020-09-25 | Stop reason: HOSPADM

## 2020-09-19 RX ORDER — BRIMONIDINE TARTRATE 2 MG/ML
1 SOLUTION/ DROPS OPHTHALMIC 2 TIMES DAILY
Status: DISCONTINUED | OUTPATIENT
Start: 2020-09-19 | End: 2020-09-25 | Stop reason: HOSPADM

## 2020-09-19 RX ORDER — ACETAMINOPHEN 650 MG/1
650 SUPPOSITORY RECTAL ONCE
Status: COMPLETED | OUTPATIENT
Start: 2020-09-19 | End: 2020-09-19

## 2020-09-19 RX ORDER — LATANOPROST 50 UG/ML
1 SOLUTION/ DROPS OPHTHALMIC NIGHTLY
Status: DISCONTINUED | OUTPATIENT
Start: 2020-09-19 | End: 2020-09-25 | Stop reason: HOSPADM

## 2020-09-19 RX ORDER — TIMOLOL MALEATE 5 MG/ML
1 SOLUTION/ DROPS OPHTHALMIC 2 TIMES DAILY
Status: DISCONTINUED | OUTPATIENT
Start: 2020-09-19 | End: 2020-09-25 | Stop reason: HOSPADM

## 2020-09-19 RX ORDER — BACLOFEN 10 MG/1
10 TABLET ORAL 2 TIMES DAILY
COMMUNITY

## 2020-09-19 RX ORDER — SODIUM CHLORIDE 0.9 % (FLUSH) 0.9 %
10 SYRINGE (ML) INJECTION ONCE
Status: COMPLETED | OUTPATIENT
Start: 2020-09-19 | End: 2020-09-19

## 2020-09-19 RX ORDER — FOLIC ACID 1 MG/1
1 TABLET ORAL DAILY
Status: DISCONTINUED | OUTPATIENT
Start: 2020-09-20 | End: 2020-09-25 | Stop reason: HOSPADM

## 2020-09-19 RX ORDER — VITS A,C,E/LUTEIN/MINERALS 300MCG-200
1 TABLET ORAL DAILY
Status: DISCONTINUED | OUTPATIENT
Start: 2020-09-20 | End: 2020-09-25 | Stop reason: HOSPADM

## 2020-09-19 RX ORDER — CLOPIDOGREL BISULFATE 75 MG/1
75 TABLET ORAL DAILY
Status: DISCONTINUED | OUTPATIENT
Start: 2020-09-20 | End: 2020-09-25 | Stop reason: HOSPADM

## 2020-09-19 RX ORDER — POLYETHYLENE GLYCOL 3350 17 G/17G
17 POWDER, FOR SOLUTION ORAL DAILY PRN
Status: DISCONTINUED | OUTPATIENT
Start: 2020-09-19 | End: 2020-09-25 | Stop reason: HOSPADM

## 2020-09-19 RX ORDER — PROMETHAZINE HYDROCHLORIDE 25 MG/1
12.5 TABLET ORAL EVERY 6 HOURS PRN
Status: DISCONTINUED | OUTPATIENT
Start: 2020-09-19 | End: 2020-09-19

## 2020-09-19 RX ORDER — LORATADINE 10 MG/1
10 CAPSULE, LIQUID FILLED ORAL DAILY
Status: ON HOLD | COMMUNITY
End: 2020-09-25 | Stop reason: HOSPADM

## 2020-09-19 RX ORDER — ATORVASTATIN CALCIUM 10 MG/1
5 TABLET, FILM COATED ORAL NIGHTLY
Status: DISCONTINUED | OUTPATIENT
Start: 2020-09-19 | End: 2020-09-25 | Stop reason: HOSPADM

## 2020-09-19 RX ORDER — HEPARIN SODIUM 10000 [USP'U]/ML
5000 INJECTION, SOLUTION INTRAVENOUS; SUBCUTANEOUS EVERY 8 HOURS
Status: DISCONTINUED | OUTPATIENT
Start: 2020-09-19 | End: 2020-09-25 | Stop reason: HOSPADM

## 2020-09-19 RX ORDER — SODIUM CHLORIDE 9 MG/ML
INJECTION, SOLUTION INTRAVENOUS CONTINUOUS
Status: DISCONTINUED | OUTPATIENT
Start: 2020-09-19 | End: 2020-09-22

## 2020-09-19 RX ORDER — POTASSIUM CHLORIDE 20 MEQ/1
20 TABLET, EXTENDED RELEASE ORAL DAILY
Status: DISCONTINUED | OUTPATIENT
Start: 2020-09-20 | End: 2020-09-25 | Stop reason: HOSPADM

## 2020-09-19 RX ORDER — 0.9 % SODIUM CHLORIDE 0.9 %
1000 INTRAVENOUS SOLUTION INTRAVENOUS ONCE
Status: COMPLETED | OUTPATIENT
Start: 2020-09-19 | End: 2020-09-19

## 2020-09-19 RX ORDER — LATANOPROST 50 UG/ML
1 SOLUTION/ DROPS OPHTHALMIC DAILY
Status: DISCONTINUED | OUTPATIENT
Start: 2020-09-19 | End: 2020-09-19

## 2020-09-19 RX ORDER — BRIMONIDINE TARTRATE, TIMOLOL MALEATE 2; 5 MG/ML; MG/ML
1 SOLUTION/ DROPS OPHTHALMIC EVERY 12 HOURS
Status: DISCONTINUED | OUTPATIENT
Start: 2020-09-19 | End: 2020-09-19 | Stop reason: CLARIF

## 2020-09-19 RX ORDER — ONDANSETRON 2 MG/ML
4 INJECTION INTRAMUSCULAR; INTRAVENOUS EVERY 6 HOURS PRN
Status: DISCONTINUED | OUTPATIENT
Start: 2020-09-19 | End: 2020-09-19

## 2020-09-19 RX ORDER — ACETAMINOPHEN 650 MG/1
650 SUPPOSITORY RECTAL EVERY 6 HOURS PRN
Status: DISCONTINUED | OUTPATIENT
Start: 2020-09-19 | End: 2020-09-25 | Stop reason: HOSPADM

## 2020-09-19 RX ORDER — ACETAMINOPHEN 325 MG/1
650 TABLET ORAL EVERY 6 HOURS PRN
Status: DISCONTINUED | OUTPATIENT
Start: 2020-09-19 | End: 2020-09-25 | Stop reason: HOSPADM

## 2020-09-19 RX ORDER — BACLOFEN 10 MG/1
10 TABLET ORAL 2 TIMES DAILY
Status: DISCONTINUED | OUTPATIENT
Start: 2020-09-19 | End: 2020-09-25 | Stop reason: HOSPADM

## 2020-09-19 RX ORDER — SODIUM CHLORIDE 0.9 % (FLUSH) 0.9 %
10 SYRINGE (ML) INJECTION PRN
Status: DISCONTINUED | OUTPATIENT
Start: 2020-09-19 | End: 2020-09-25 | Stop reason: HOSPADM

## 2020-09-19 RX ORDER — TRAMADOL HYDROCHLORIDE 50 MG/1
50 TABLET ORAL EVERY 12 HOURS PRN
Status: DISCONTINUED | OUTPATIENT
Start: 2020-09-19 | End: 2020-09-24

## 2020-09-19 RX ORDER — TRAMADOL HYDROCHLORIDE 50 MG/1
50 TABLET ORAL EVERY 12 HOURS PRN
Status: ON HOLD | COMMUNITY
End: 2020-09-25 | Stop reason: HOSPADM

## 2020-09-19 RX ORDER — CETIRIZINE HYDROCHLORIDE 10 MG/1
10 TABLET ORAL DAILY
Status: DISCONTINUED | OUTPATIENT
Start: 2020-09-20 | End: 2020-09-24

## 2020-09-19 RX ORDER — ESCITALOPRAM OXALATE 10 MG/1
20 TABLET ORAL DAILY
Status: DISCONTINUED | OUTPATIENT
Start: 2020-09-20 | End: 2020-09-25 | Stop reason: HOSPADM

## 2020-09-19 RX ADMIN — TIMOLOL MALEATE 1 DROP: 5 SOLUTION OPHTHALMIC at 22:21

## 2020-09-19 RX ADMIN — IOPAMIDOL 110 ML: 755 INJECTION, SOLUTION INTRAVENOUS at 14:23

## 2020-09-19 RX ADMIN — CEFEPIME 2 G: 2 INJECTION, POWDER, FOR SOLUTION INTRAVENOUS at 12:16

## 2020-09-19 RX ADMIN — SODIUM CHLORIDE 1000 ML: 9 INJECTION, SOLUTION INTRAVENOUS at 12:09

## 2020-09-19 RX ADMIN — SODIUM CHLORIDE: 9 INJECTION, SOLUTION INTRAVENOUS at 17:31

## 2020-09-19 RX ADMIN — BRIMONIDINE TARTRATE 1 DROP: 2 SOLUTION/ DROPS OPHTHALMIC at 22:20

## 2020-09-19 RX ADMIN — Medication 10 ML: at 14:23

## 2020-09-19 RX ADMIN — VANCOMYCIN HYDROCHLORIDE 1000 MG: 1 INJECTION, POWDER, LYOPHILIZED, FOR SOLUTION INTRAVENOUS at 13:28

## 2020-09-19 RX ADMIN — SODIUM CHLORIDE 1000 ML: 9 INJECTION, SOLUTION INTRAVENOUS at 13:23

## 2020-09-19 RX ADMIN — HEPARIN SODIUM 5000 UNITS: 10000 INJECTION INTRAVENOUS; SUBCUTANEOUS at 18:42

## 2020-09-19 RX ADMIN — METRONIDAZOLE 500 MG: 500 INJECTION, SOLUTION INTRAVENOUS at 17:31

## 2020-09-19 RX ADMIN — LATANOPROST 1 DROP: 50 SOLUTION OPHTHALMIC at 22:20

## 2020-09-19 RX ADMIN — ACETAMINOPHEN 650 MG: 650 SUPPOSITORY RECTAL at 12:08

## 2020-09-19 ASSESSMENT — ENCOUNTER SYMPTOMS
VOMITING: 0
NAUSEA: 0
ABDOMINAL PAIN: 0
COUGH: 0

## 2020-09-19 ASSESSMENT — PAIN SCALES - WONG BAKER
WONGBAKER_NUMERICALRESPONSE: 0
WONGBAKER_NUMERICALRESPONSE: 0

## 2020-09-19 ASSESSMENT — PAIN SCALES - GENERAL: PAINLEVEL_OUTOF10: 0

## 2020-09-19 NOTE — PROGRESS NOTES
Patient currently awake and alert but does not follow commands. Patient stated her name was \"Pat\" but could not verbalize anything else. Patient does not follow commands when asked.

## 2020-09-19 NOTE — PROGRESS NOTES
Pharmacy Consultation Note  (Antibiotic Dosing and Monitoring)    Initial consult date: 20  Consulting physician: ARMEN Roach CNP   Drug(s): Vancomycin  Indication: Septicemia      Ht Readings from Last 1 Encounters:   20 5' 8\" (1.727 m)       Age/Gender IBW DW  Allergy Information   77 y.o.  female  50.8 kg  Levofloxacin; Other; Pcn [penicillins]; and Erythromycin               Date  WBC BUN/sCr UOP (mL/kg/hr) Drug/Dose Time   Given Level(s)   (Time) Comments      43.3 34/1.4  Vancomycin 1000 mg IV Once 1328       (#2)            (#3)            (#4)            (#5)            (#6)            Other Antibiotics/Antifungals/Antivirals Start Date Stop Date Comments   Cefepime 2g IVPB      Metronidazole 500 mg Once                      Estimated CrCL: 30 mL/min    No intake or output data in the 24 hours ending 20 1705    Temp max: Temp (24hrs), Av.4 °F (37.4 °C), Min:98.2 °F (36.8 °C), Max:100.9 °F (38.3 °C)      Cultures:    Culture Date Result    Blood Culture   Pending   Blood Culture 2   Pending   Urine Culture    Pending            Assessment:  · Consulted by ARMEN Roach CNP  to dose/monitor vancomycin  · CT Abdomen and Pelvic, colitis (ascending colon, rectum and to anus), adynamic ileus  · Febrile 37.8C  · WBC: 43 ()  · Goal trough level:  15-20 mcg/mL      Plan:  · Check Vancomycin level tomorrow at noon, dose Vancomycin based on level  · Monitor renal function  · Pharmacist will follow and monitor/adjust dosing as necessary    Thank you for this consult, please page with questions.     Darcel Opitz PharmD Candidate 2020 5:05 PM

## 2020-09-19 NOTE — ED PROVIDER NOTES
disease, Macular degeneration, Peripheral edema, Sciatica, and Severe protein-calorie malnutrition ([address] Past Surgical History:  has a past surgical history that includes Dental surgery (2007); [place] tooth extraction; and Upper gastrointestinal endoscopy (N/A, 1/14/2019). Social History:  reports that she quit smoking about 15 years ago. Her smoking use included cigarettes. She started smoking about 48 years ago. She smoked 3.00 packs per day. She has never used smokeless tobacco. She reports current alcohol use. She reports that she does not use drugs. Family History: family history includes Diabetes in her father and sister. . Unless otherwise noted, family history is non contributory    The patients home medications have been reviewed. Allergies: Levofloxacin; Other; Pcn [penicillins]; and Erythromycin    ---------------------------------------------------PHYSICAL EXAM--------------------------------------  Physical Exam  Constitutional:       Appearance: She is ill-appearing. Comments: cachectic    HENT:      Head: Normocephalic and atraumatic. Right Ear: Tympanic membrane normal.      Left Ear: Tympanic membrane normal.      Nose: Nose normal.      Mouth/Throat:      Mouth: Mucous membranes are dry. Pharynx: Oropharynx is clear. Eyes:      General: No scleral icterus. Conjunctiva/sclera: Conjunctivae normal.   Neck:      Musculoskeletal: Neck supple. No neck rigidity. Cardiovascular:      Rate and Rhythm: Regular rhythm. Tachycardia present. Pulses: Normal pulses. Pulmonary:      Effort: Pulmonary effort is normal.      Breath sounds: Normal breath sounds. No rales. Abdominal:      General: Bowel sounds are normal.   Musculoskeletal:         General: No swelling. Right lower leg: No edema. Left lower leg: No edema. Skin:     General: Skin is warm. Findings: No rash.    Neurological:      Deep Tendon Reflexes: Reflexes normal.      Comments: Limited exam due to acuity of  Patient's condition       ------------------------------------------------- RESULTS -------------------------------------------------  I have personally reviewed all laboratory and imaging results for this patient. Results are listed below.      LABS: (Lab results interpreted by me)  Results for orders placed or performed during the hospital encounter of 09/19/20   CBC auto differential   Result Value Ref Range    WBC 43.3 (H) 4.5 - 11.5 E9/L    RBC 2.51 (L) 3.50 - 5.50 E12/L    Hemoglobin 8.1 (L) 11.5 - 15.5 g/dL    Hematocrit 24.7 (L) 34.0 - 48.0 %    MCV 98.4 80.0 - 99.9 fL    MCH 32.3 26.0 - 35.0 pg    MCHC 32.8 32.0 - 34.5 %    RDW 13.6 11.5 - 15.0 fL    Platelets 781 896 - 355 E9/L    MPV 11.7 7.0 - 12.0 fL    Neutrophils % 88.8 (H) 43.0 - 80.0 %    Lymphocytes % 3.4 (L) 20.0 - 42.0 %    Monocytes % 5.2 2.0 - 12.0 %    Eosinophils % 0.1 0.0 - 6.0 %    Basophils % 0.2 0.0 - 2.0 %    Neutrophils Absolute 39.40 (H) 1.80 - 7.30 E9/L    Lymphocytes Absolute 1.30 (L) 1.50 - 4.00 E9/L    Monocytes Absolute 2.16 (H) 0.10 - 0.95 E9/L    Eosinophils Absolute 0.00 (L) 0.05 - 0.50 E9/L    Basophils Absolute 0.00 0.00 - 0.20 E9/L    Metamyelocytes Relative 2.6 (H) 0.0 - 1.0 %    Vacuolated Neutrophils 2+     Polychromasia 2+    Comprehensive Metabolic Panel w/ Reflex to MG   Result Value Ref Range    Sodium 133 132 - 146 mmol/L    Potassium reflex Magnesium 4.1 3.5 - 5.0 mmol/L    Chloride 98 98 - 107 mmol/L    CO2 19 (L) 22 - 29 mmol/L    Anion Gap 16 7 - 16 mmol/L    Glucose 148 (H) 74 - 99 mg/dL    BUN 34 (H) 8 - 23 mg/dL    CREATININE 1.4 (H) 0.5 - 1.0 mg/dL    GFR Non-African American 37 >=60 mL/min/1.73    GFR African American 45     Calcium 9.4 8.6 - 10.2 mg/dL    Total Protein 7.2 6.4 - 8.3 g/dL    Alb 3.5 3.5 - 5.2 g/dL    Total Bilirubin 1.2 0.0 - 1.2 mg/dL    Alkaline Phosphatase 122 (H) 35 - 104 U/L    ALT 23 0 - 32 U/L    AST 27 0 - 31 U/L   Lactate, Sepsis   Result Value Ref Range Lactic Acid, Sepsis 3.2 (H) 0.5 - 1.9 mmol/L   Lactate, Sepsis   Result Value Ref Range    Lactic Acid, Sepsis 1.8 0.5 - 1.9 mmol/L   Troponin   Result Value Ref Range    Troponin 0.01 0.00 - 0.03 ng/mL   Lipase   Result Value Ref Range    Lipase 10 (L) 13 - 60 U/L   Protime-INR   Result Value Ref Range    Protime 17.8 (H) 9.3 - 12.4 sec    INR 1.6    Ammonia   Result Value Ref Range    Ammonia 34.0 11.0 - 51.0 umol/L   COVID-19   Result Value Ref Range    SARS-CoV-2, NAAT Not Detected Not Detected   Urinalysis   Result Value Ref Range    Color, UA Yellow Straw/Yellow    Clarity, UA Clear Clear    Glucose, Ur Negative Negative mg/dL    Bilirubin Urine SMALL (A) Negative    Ketones, Urine Negative Negative mg/dL    Specific Gravity, UA 1.015 1.005 - 1.030    Blood, Urine TRACE-INTACT Negative    pH, UA 6.0 5.0 - 9.0    Protein,  (A) Negative mg/dL    Urobilinogen, Urine 0.2 <2.0 E.U./dL    Nitrite, Urine Negative Negative    Leukocyte Esterase, Urine SMALL (A) Negative   Microscopic Urinalysis   Result Value Ref Range    WBC, UA 2-5 0 - 5 /HPF    RBC, UA 5-10 (A) 0 - 2 /HPF    Epithelial Cells, UA MODERATE /HPF    Renal Epithelial, UA NONE SEEN /HPF    Bacteria, UA MODERATE (A) None Seen /HPF   EKG 12 lead   Result Value Ref Range    Ventricular Rate 103 BPM    Atrial Rate 103 BPM    P-R Interval 134 ms    QRS Duration 72 ms    Q-T Interval 364 ms    QTc Calculation (Bazett) 476 ms    P Axis 75 degrees    R Axis 18 degrees    T Axis 66 degrees   ,       RADIOLOGY:  Interpreted by Radiologist unless otherwise specified  CT ABDOMEN PELVIS W IV CONTRAST Additional Contrast? None   Final Result      Colitis mostly involving the ascending colon, rectum and extending to   the anus. Adynamic ileus      Coronary calcification suggesting of coronary artery disease. The Candelario catheter is noted to be coiled up in the urinary bladder   which needs to be repositioned.                CT HEAD WO CONTRAST   Final sepsis protocol and order basic lab. ER work-up showed leukocytosis(WBC of 43.3K), H/H (8.1/24.7), lactic acidosis(3.2), DIVINA(creatinine 1.4). Patient was given IV 20 mg/kg vancomycin, cefepime 2 g once, 500 mg Flagyl and 2 liters IVF boluses. CT head is unremarkable. CXR no infiltrate. U/A showed moderate bacteria, 5-10 RBC, +lecocutes esterase). Patient was reevaluated and she is more alert and awake. Repeat lactic acid resulted normal.  CT abdomen revealed colitis mostly involving the ascending colon, rectum and extending to the anus with adynamic ileus. Decision was made to admit for sepsis most likely due to acute colitis. This patient's ED course included: a personal history and physicial examination, re-evaluation prior to disposition, multiple bedside re-evaluations, IV medications and continuous pulse oximetry    This patient has remained unchanged and been closely monitored during their ED course. Consultations:  IM for admission        --------------------------------- IMPRESSION AND DISPOSITION ---------------------------------    IMPRESSION  1. Septicemia ([facility]    2. DIVINA (acute kidney injury) ([facility]    3. Colitis, acute        DISPOSITION  Disposition: Admit to telemetry  Patient condition is stable      NOTE: This report was transcribed using voice recognition software.  Every effort was made to ensure accuracy; however, inadvertent computerized transcription errors may be present     Tunde Conley MD  Resident  09/19/20 6284

## 2020-09-20 LAB
ALBUMIN SERPL-MCNC: 2.6 G/DL (ref 3.5–5.2)
ALP BLD-CCNC: 110 U/L (ref 35–104)
ALT SERPL-CCNC: 26 U/L (ref 0–32)
ANION GAP SERPL CALCULATED.3IONS-SCNC: 14 MMOL/L (ref 7–16)
ANISOCYTOSIS: ABNORMAL
AST SERPL-CCNC: 42 U/L (ref 0–31)
BASOPHILS ABSOLUTE: 0 E9/L (ref 0–0.2)
BASOPHILS RELATIVE PERCENT: 0.3 % (ref 0–2)
BILIRUB SERPL-MCNC: 0.9 MG/DL (ref 0–1.2)
BUN BLDV-MCNC: 30 MG/DL (ref 8–23)
BURR CELLS: ABNORMAL
CALCIUM SERPL-MCNC: 8.4 MG/DL (ref 8.6–10.2)
CEA: 6.5 NG/ML (ref 0–5.2)
CHLORIDE BLD-SCNC: 107 MMOL/L (ref 98–107)
CO2: 16 MMOL/L (ref 22–29)
CREAT SERPL-MCNC: 0.9 MG/DL (ref 0.5–1)
EOSINOPHILS ABSOLUTE: 0 E9/L (ref 0.05–0.5)
EOSINOPHILS RELATIVE PERCENT: 0.2 % (ref 0–6)
GFR AFRICAN AMERICAN: >60
GFR NON-AFRICAN AMERICAN: >60 ML/MIN/1.73
GLUCOSE BLD-MCNC: 115 MG/DL (ref 74–99)
HCT VFR BLD CALC: 25.7 % (ref 34–48)
HEMOGLOBIN: 8.4 G/DL (ref 11.5–15.5)
LYMPHOCYTES ABSOLUTE: 0.87 E9/L (ref 1.5–4)
LYMPHOCYTES RELATIVE PERCENT: 2.6 % (ref 20–42)
MAGNESIUM: 1.6 MG/DL (ref 1.6–2.6)
MCH RBC QN AUTO: 31.7 PG (ref 26–35)
MCHC RBC AUTO-ENTMCNC: 32.7 % (ref 32–34.5)
MCV RBC AUTO: 97 FL (ref 80–99.9)
MONOCYTES ABSOLUTE: 1.45 E9/L (ref 0.1–0.95)
MONOCYTES RELATIVE PERCENT: 5.2 % (ref 2–12)
MYELOCYTE PERCENT: 0.9 % (ref 0–0)
NEUTROPHILS ABSOLUTE: 26.68 E9/L (ref 1.8–7.3)
NEUTROPHILS RELATIVE PERCENT: 91.3 % (ref 43–80)
OVALOCYTES: ABNORMAL
PDW BLD-RTO: 13.8 FL (ref 11.5–15)
PLATELET # BLD: 333 E9/L (ref 130–450)
PMV BLD AUTO: 11.9 FL (ref 7–12)
POIKILOCYTES: ABNORMAL
POLYCHROMASIA: ABNORMAL
POTASSIUM REFLEX MAGNESIUM: 3.3 MMOL/L (ref 3.5–5)
RBC # BLD: 2.65 E12/L (ref 3.5–5.5)
SODIUM BLD-SCNC: 137 MMOL/L (ref 132–146)
TOTAL PROTEIN: 5.8 G/DL (ref 6.4–8.3)
TOXIC GRANULATION: ABNORMAL
URINE CULTURE, ROUTINE: NORMAL
VACUOLATED NEUTROPHILS: ABNORMAL
VANCOMYCIN TROUGH: 6.7 MCG/ML (ref 5–16)
WBC # BLD: 29 E9/L (ref 4.5–11.5)

## 2020-09-20 PROCEDURE — 86703 HIV-1/HIV-2 1 RESULT ANTBDY: CPT

## 2020-09-20 PROCEDURE — 80202 ASSAY OF VANCOMYCIN: CPT

## 2020-09-20 PROCEDURE — 83735 ASSAY OF MAGNESIUM: CPT

## 2020-09-20 PROCEDURE — 2060000000 HC ICU INTERMEDIATE R&B

## 2020-09-20 PROCEDURE — 2500000003 HC RX 250 WO HCPCS: Performed by: NURSE PRACTITIONER

## 2020-09-20 PROCEDURE — 36415 COLL VENOUS BLD VENIPUNCTURE: CPT

## 2020-09-20 PROCEDURE — 6370000000 HC RX 637 (ALT 250 FOR IP): Performed by: NURSE PRACTITIONER

## 2020-09-20 PROCEDURE — 82378 CARCINOEMBRYONIC ANTIGEN: CPT

## 2020-09-20 PROCEDURE — 2580000003 HC RX 258: Performed by: INTERNAL MEDICINE

## 2020-09-20 PROCEDURE — 80053 COMPREHEN METABOLIC PANEL: CPT

## 2020-09-20 PROCEDURE — 6360000002 HC RX W HCPCS: Performed by: NURSE PRACTITIONER

## 2020-09-20 PROCEDURE — 6360000002 HC RX W HCPCS: Performed by: INTERNAL MEDICINE

## 2020-09-20 PROCEDURE — 2580000003 HC RX 258: Performed by: NURSE PRACTITIONER

## 2020-09-20 PROCEDURE — 85025 COMPLETE CBC W/AUTO DIFF WBC: CPT

## 2020-09-20 RX ORDER — MORPHINE SULFATE 2 MG/ML
2 INJECTION, SOLUTION INTRAMUSCULAR; INTRAVENOUS EVERY 4 HOURS PRN
Status: DISCONTINUED | OUTPATIENT
Start: 2020-09-20 | End: 2020-09-24

## 2020-09-20 RX ADMIN — BACLOFEN 10 MG: 10 TABLET ORAL at 20:22

## 2020-09-20 RX ADMIN — METRONIDAZOLE 500 MG: 500 INJECTION, SOLUTION INTRAVENOUS at 09:31

## 2020-09-20 RX ADMIN — HEPARIN SODIUM 5000 UNITS: 10000 INJECTION INTRAVENOUS; SUBCUTANEOUS at 17:50

## 2020-09-20 RX ADMIN — HEPARIN SODIUM 5000 UNITS: 10000 INJECTION INTRAVENOUS; SUBCUTANEOUS at 02:39

## 2020-09-20 RX ADMIN — BRIMONIDINE TARTRATE 1 DROP: 2 SOLUTION/ DROPS OPHTHALMIC at 09:31

## 2020-09-20 RX ADMIN — MORPHINE SULFATE 2 MG: 2 INJECTION, SOLUTION INTRAMUSCULAR; INTRAVENOUS at 16:01

## 2020-09-20 RX ADMIN — TIMOLOL MALEATE 1 DROP: 5 SOLUTION OPHTHALMIC at 20:22

## 2020-09-20 RX ADMIN — LATANOPROST 1 DROP: 50 SOLUTION OPHTHALMIC at 20:45

## 2020-09-20 RX ADMIN — ATORVASTATIN CALCIUM 5 MG: 10 TABLET, FILM COATED ORAL at 20:22

## 2020-09-20 RX ADMIN — HEPARIN SODIUM 5000 UNITS: 10000 INJECTION INTRAVENOUS; SUBCUTANEOUS at 09:31

## 2020-09-20 RX ADMIN — CEFTRIAXONE SODIUM 1 G: 1 INJECTION, POWDER, FOR SOLUTION INTRAMUSCULAR; INTRAVENOUS at 11:42

## 2020-09-20 RX ADMIN — TIMOLOL MALEATE 1 DROP: 5 SOLUTION OPHTHALMIC at 09:31

## 2020-09-20 RX ADMIN — METRONIDAZOLE 500 MG: 500 INJECTION, SOLUTION INTRAVENOUS at 17:50

## 2020-09-20 RX ADMIN — METRONIDAZOLE 500 MG: 500 INJECTION, SOLUTION INTRAVENOUS at 01:44

## 2020-09-20 RX ADMIN — SODIUM CHLORIDE, PRESERVATIVE FREE 10 ML: 5 INJECTION INTRAVENOUS at 16:01

## 2020-09-20 RX ADMIN — BRIMONIDINE TARTRATE 1 DROP: 2 SOLUTION/ DROPS OPHTHALMIC at 20:22

## 2020-09-20 ASSESSMENT — PAIN SCALES - GENERAL
PAINLEVEL_OUTOF10: 0
PAINLEVEL_OUTOF10: 5
PAINLEVEL_OUTOF10: 0

## 2020-09-20 ASSESSMENT — PAIN SCALES - WONG BAKER
WONGBAKER_NUMERICALRESPONSE: 0

## 2020-09-20 NOTE — CONSULTS
Department of Internal Medicine  Infectious Diseases   Consult Note      Reason for Consult:  Colitis, leukocytosis     Requesting Physician: Dr Pradhan Poag:                This is a 77 yrs old female with hx of alcohol abuse , HTN , deconditioning , nursing home resident presented to the ER with lethargy , and leukocytosis X 2 days . Pt is non verbal and non communicating . There had been no fever, respiratory distress .   WBC was 43 K, lactic acid 3.2 ,CT abdomen and pelvis showed colitis ( ascending colon, rectum and anus )   She was given cefepime, flagyl and vancomycin       Past Medical History:    Past Medical History:   Diagnosis Date    Alcoholism ([address]     heavy alcohol consumption since ~4886-0391 till early 2019    Anemia 05/2017    Blue toe syndrome of left lower extremity ([address] 5/22/2017    Cataract     Chronic deep vein thrombosis (DVT) of femoral vein of right lower extremity ([address] 3/4/2019    Critical lower limb ischemia 5/29/2017    Depression \    DVT (deep venous thrombosis) ([address] 09/13/2018    R leg    Glaucoma     History of blood transfusion 05/2017    Hyperlipidemia     Hypertension     Hypothyroidism     Liver disease     Macular degeneration     Peripheral edema     Sciatica     Severe protein-calorie malnutrition ([address] 4/18/2019         Past Surgical History:      Past Surgical History:   Procedure Laterality Date    DENTAL SURGERY  2007    teeth extraction    UPPER GASTROINTESTINAL ENDOSCOPY N/A 1/14/2019    EGD BIOPSY performed by Jasmin Gary MD at [address]           Current Medications:      Current Facility-Administered Medications   Medication Dose Route Frequency Provider Last Rate Last Dose    sodium chloride flush 0.9 % injection 10 mL  10 mL Intravenous 2 times per day April ARMEN King - CNP        sodium chloride flush 0.9 % injection 10 mL  10 mL Intravenous PRN April ARMEN King CNP        acetaminophen (TYLENOL) tablet 650 mg  650 mg Oral Q6H PRN April ARMEN King CNP        Or    acetaminophen (TYLENOL) suppository 650 mg  650 mg Rectal Q6H PRN April ARMEN King CNP        polyethylene glycol (GLYCOLAX) packet 17 g  17 g Oral Daily PRN April ARMEN King CNP        heparin (porcine) injection 5,000 Units  5,000 Units Subcutaneous [facility] April ARMEN King CNP   5,000 Units at 09/20/20 0239    metronidazole (FLAGYL) 500 mg in NaCl 100 mL IVPB premix  500 mg Intravenous Q8H April ARMEN King CNP   Stopped at 09/20/20 8496    cefepime (MAXIPIME) 2 g IVPB extended (mini-bag)  2 g Intravenous Q24H April ARMEN King CNP        0.9 % sodium chloride infusion   Intravenous Continuous April ARMEN King CNP 75 mL/hr at 09/19/20 2000      baclofen (LIORESAL) tablet 10 mg  10 mg Oral BID April ARMEN King CNP        clopidogrel (PLAVIX) tablet 75 mg  75 mg Oral Daily April ARMEN King CNP        escitalopram (LEXAPRO) tablet 20 mg  20 mg Oral Daily April ARMEN King CNP        folic acid (FOLVITE) tablet 1 mg  1 mg Oral Daily April ARMEN King CNP        levothyroxine (SYNTHROID) tablet 25 mcg  25 mcg Oral QAM April ARMEN King CNP        cetirizine (ZYRTEC) tablet 10 mg  10 mg Oral Daily April ARMEN King CNP        antioxidant multivitamin (OCUVITE) tablet  1 tablet Oral Daily April ARMEN King CNP        potassium chloride (KLOR-CON M) extended release tablet 20 mEq  20 mEq Oral Daily April ARMEN King CNP        atorvastatin (LIPITOR) tablet 5 mg  5 mg Oral Nightly April ARMEN King CNP        traMADol Deryl Salen) tablet 50 mg  50 mg Oral Q12H PRN April Christine, APRN - CNP        trimethobenzamide [place]) injection 200 mg  200 mg Intramuscular Q6H PRN April [place], APRN - CNP        brimonidine (ALPHAGAN) 0.2 % ophthalmic solution 1 drop  1 drop Both Eyes BID April [place], APRN - CNP   1 drop at 09/19/20 2220    And    timolol (TIMOPTIC) 0.5 % ophthalmic solution 1 drop  1 drop Both Eyes BID April SoniaAshwin, APRN - CNP   1 drop at 09/19/20 2221    vancomycin (VANCOCIN) intermittent dosing (placeholder)   Other RX Placeholder April [place], APRN - CNP        latanoprost (XALATAN) 0.005 % ophthalmic solution 1 drop  1 drop Both Eyes Nightly April SoniaAshwin, APRN - CNP   1 drop at 09/19/20 2220       Allergies:  Levofloxacin;  Other; Pcn [penicillins]; and Erythromycin    Social History:      Social History     Socioeconomic History    Marital status: Single     Spouse name: Not on file    Number of children: Not on file    Years of education: Not on file    Highest education level: Not on file   Occupational History    Not on file   Social Needs    Financial resource strain: Not on file    Food insecurity     Worry: Not on file     Inability: Not on file    Transportation needs     Medical: Not on file     Non-medical: Not on file   Tobacco Use    Smoking status: Former Smoker     Packs/day: 3.00     Types: Cigarettes     Start date: 1/10/1972     Last attempt to quit: 5/4/2005     Years since quitting: 15.3    Smokeless tobacco: Never Used    Tobacco comment: quit 12-13 years ago   Substance and Sexual Activity    Alcohol use: Yes     Comment: every other day    Drug use: No    Sexual activity: Never   Lifestyle    Physical activity     Days per week: Not on file     Minutes per session: Not on file    Stress: Not on file   Relationships    Social connections     Talks on phone: Not on file     Gets together: Not on file     Attends Zoroastrian service: Not on file     Active member of club or organization: Not on file     Attends meetings of clubs or organizations: Not on file     Relationship Nose:   No nasal drainage   Throat:   Mucosa dry    Neck:   Supple, no lymphadenopathy   Back:     no CVA tenderness   Lungs:     Clear to auscultation bilaterally    Heart:    Regular rate and rhythm, no murmur   Abdomen:     Soft,? tender, bowel sounds present    Extremities:   No edema, no cyanosis   Pulses:   Dorsalis pedis palpable    Skin:   no rashes or lesions     CBC with Differential:      Lab Results   Component Value Date    WBC 29.0 09/20/2020    RBC 2.65 09/20/2020    HGB 8.4 09/20/2020    HCT 25.7 09/20/2020     09/20/2020    MCV 97.0 09/20/2020    MCH 31.7 09/20/2020    MCHC 32.7 09/20/2020    RDW 13.8 09/20/2020    NRBC 0.0 05/07/2017    METASPCT 2.6 09/19/2020    LYMPHOPCT 2.6 09/20/2020    MONOPCT 5.2 09/20/2020    MYELOPCT 0.9 09/20/2020    BASOPCT 0.3 09/20/2020    MONOSABS 1.45 09/20/2020    LYMPHSABS 0.87 09/20/2020    EOSABS 0.00 09/20/2020    BASOSABS 0.00 09/20/2020       CMP     Lab Results   Component Value Date     09/20/2020    K 3.3 09/20/2020     09/20/2020    CO2 16 09/20/2020    BUN 30 09/20/2020    CREATININE 0.9 09/20/2020    GFRAA >60 09/20/2020    LABGLOM >60 09/20/2020    GLUCOSE 115 09/20/2020    PROT 5.8 09/20/2020    LABALBU 2.6 09/20/2020    CALCIUM 8.4 09/20/2020    BILITOT 0.9 09/20/2020    ALKPHOS 110 09/20/2020    AST 42 09/20/2020    ALT 26 09/20/2020         Hepatic Function Panel:    Lab Results   Component Value Date    ALKPHOS 110 09/20/2020    ALT 26 09/20/2020    AST 42 09/20/2020    PROT 5.8 09/20/2020    BILITOT 0.9 09/20/2020    BILIDIR 0.6 04/19/2019    IBILI 0.6 04/19/2019    LABALBU 2.6 09/20/2020       PT/INR:    Lab Results   Component Value Date    PROTIME 17.8 09/19/2020    PROTIME 19.5 09/05/2019    INR 1.6 09/19/2020       TSH:    Lab Results   Component Value Date    TSH 2.590 07/30/2020       U/A:    Lab Results   Component Value Date    COLORU Yellow 09/19/2020    PHUR 6.0 09/19/2020    WBCUA 2-5 09/19/2020    RBCUA 5-10 09/19/2020    MUCUS Present 07/30/2020    BACTERIA MODERATE 09/19/2020    CLARITYU Clear 09/19/2020    SPECGRAV 1.015 09/19/2020    LEUKOCYTESUR SMALL 09/19/2020    UROBILINOGEN 0.2 09/19/2020    BILIRUBINUR SMALL 09/19/2020    BLOODU TRACE-INTACT 09/19/2020    GLUCOSEU Negative 09/19/2020       ABG:  No results found for: NIS2KCB, BEART, D6JRGDNK, PHART, THGBART, NTZ0SGN, PO2ART, CGD8GTE    MICROBIOLOGY:    Blood culture - pending     Urine Culture - pending       Radiology :    CT scan of abdomen and pelvis -  Impression:            Colitis mostly involving the ascending colon, rectum and extending to   the anus. Adynamic ileus     Coronary calcification suggesting of coronary artery disease. The Candelario catheter is noted to be coiled up in the urinary bladder   which needs to be repositioned. IMPRESSION:     1. Colitis ( CT scan )   2. Leukocytosis - trending down   3. Lactic acidosis - improved     RECOMMENDATIONS:      1. Rocephin 1 gram IV q 24 hrs and Flagyl 500 mg IV q 8 hrs   2. Await blood and urine cx  3.  HIV test, CEA level , stool for C diff toxin ( if loose stool )     Thank you Dr Mary Villagomez for the consult

## 2020-09-20 NOTE — PROGRESS NOTES
Pharmacy was consulted to dose/monitor vancomycin which has now been discontinued. Clinical Pharmacy will sign off at this time.     Philipp Echeverria, Antonella 9/20/2020 12:18 PM  Pharmacy Clinical Specialist, Emergency Medicine  [phone]

## 2020-09-20 NOTE — H&P
Chestine Phoenix, M.D. History and Physical      CHIEF COMPLAINT: Pain    Reason for Admission: Pancolitis    History Obtained From: EMR    HISTORY OF PRESENT ILLNESS:      The patient is a 77 y.o. female of Otoniel Tidwell DO with significant past medical history of liver cirrhosis peripheral vascular disease, hypertension/hyperlipidemia/ hypothyroidism who presents with complaints of abnormal labs at nursing facility. Patient was noted to have low hemoglobin and leukocytosis and therefore was brought in for further evaluation. CT abdomen revealed pancolitis in the ascending colon cecum down to the rectum. Patient was initiated on broad-spectrum antibiotics, pancultured and admitted for evaluation and treatment. On my eval she does not really respond to any questions. There has been watery diarrhea. On my eval , awake , but does not verbalize.  Appears very uncomfortable. ,   BP (!) 162/74   Pulse 91   Temp 100.1 °F (37.8 °C) (Temporal)   Resp 20   Wt 125 lb 6.4 oz (56.9 kg)   SpO2 98%   BMI 19.07 kg/m²   CT abdomen pelvis: Colitis involving ascending colon, rectum and extending to the anus with adynamic ileus  White count on admission 43,000  H&H 8/24  CEA 6.4  Past Medical History:        Diagnosis Date    Alcoholism ([address]     heavy alcohol consumption since ~9560-9366 till early 2019    Anemia 05/2017    Blue toe syndrome of left lower extremity ([address] 5/22/2017    Cataract     Chronic deep vein thrombosis (DVT) of femoral vein of right lower extremity ([address] 3/4/2019    Critical lower limb ischemia 5/29/2017    Depression \    DVT (deep venous thrombosis) ([address] 09/13/2018    R leg    Glaucoma     History of blood transfusion 05/2017    Hyperlipidemia     Hypertension     Hypothyroidism     Liver disease     Macular degeneration     Peripheral edema     Sciatica     Severe protein-calorie malnutrition ([address] 4/18/2019     Past Surgical History: Procedure Laterality Date    DENTAL SURGERY  2007    teeth extraction    UPPER GASTROINTESTINAL ENDOSCOPY N/A 1/14/2019    EGD BIOPSY performed by Yoni Huynh MD at [facility] EXTRACTION           Medications Prior to Admission:    Medications Prior to Admission: baclofen (LIORESAL) 10 MG tablet, Take 10 mg by mouth 2 times daily  traMADol (ULTRAM) 50 MG tablet, Take 50 mg by mouth every 12 hours as needed for Pain.   loratadine (CLARITIN) 10 MG capsule, Take 10 mg by mouth daily  acetaminophen (TYLENOL) 325 MG tablet, Take 2 tablets by mouth every 6 hours as needed for Pain  lisinopril (PRINIVIL;ZESTRIL) 20 MG tablet, Take 1 tablet by mouth daily  folic acid (FOLVITE) 1 MG tablet, Take 1 tablet by mouth daily  clopidogrel (PLAVIX) 75 MG tablet, Take 1 tablet by mouth daily  escitalopram (LEXAPRO) 20 MG tablet, Take 1 tablet by mouth daily  levothyroxine (SYNTHROID) 25 MCG tablet, Take 1 tablet by mouth every morning  potassium chloride (KLOR-CON M) 20 MEQ extended release tablet, Take 1 tablet by mouth daily  simvastatin (ZOCOR) 5 MG tablet, Take 1 tablet by mouth nightly  latanoprost (XALATAN) 0.005 % ophthalmic solution,   brimonidine-timolol (COMBIGAN) 0.2-0.5 % ophthalmic solution, Place 1 drop into both eyes every 12 hours  Handicap Placard MISC, by NOT APPLICABLE route  Cholecalciferol (VITAMIN D3) 27977 units CAPS,   [DISCONTINUED] mirabegron (MYRBETRIQ) 50 MG TB24, Take 25 mg by mouth daily   Multiple Vitamins-Minerals (PRESERVISION AREDS PO), Take 1 tablet by mouth daily    Allergies:  Levofloxacin; Other; Pcn [penicillins]; and Erythromycin    Social History:   TOBACCO:   reports that she quit smoking about 15 years ago. Her smoking use included cigarettes. She started smoking about 48 years ago. She smoked 3.00 packs per day. She has never used smokeless tobacco.  ETOH:   reports current alcohol use.   MARITAL STATUS:    OCCUPATION:      Family History:       Problem Relation Age of Onset    Diabetes Father     Diabetes Sister        REVIEW OF SYSTEMS:    General ROS: Unable to obtain from patient  Hematological and Lymphatic ROS: negative  Endocrine ROS: negative  Respiratory ROS: no cough, shortness of breath, or wheezing  Cardiovascular ROS: no chest pain or dyspnea on exertion  Gastrointestinal ROS: no abdominal pain, change in bowel habits, or black or bloody stools  Genito-Urinary ROS: no dysuria, trouble voiding, or hematuria  Neurological ROS: no TIA or stroke symptoms  negative    Vitals:  BP (!) 162/74   Pulse 91   Temp 100.1 °F (37.8 °C) (Temporal)   Resp 20   Wt 125 lb 6.4 oz (56.9 kg)   SpO2 98%   BMI 19.07 kg/m²     PHYSICAL EXAM:  General:  Awake, alert, oriented X 3. Well developed, well nourished, well groomed. No apparent distress. HEENT:  Normocephalic, atraumatic. Pupils equal, round, reactive to light. No scleral icterus. No conjunctival injection. Normal lips, teeth, and gums. No nasal discharge. Neck:  Supple  Heart:  RRR, no murmurs, gallops, rubs, carotid upstroke normal, no carotid bruits  Lungs:  CTA bilaterally, bilat symmetrical expansion, no wheeze, rales, or rhonchi  Abdomen: Bowel sounds present, soft, nontender, no masses, no organomegaly, no peritoneal signs  Extremities:  No clubbing, cyanosis, or edema  Skin:  Warm and dry, no open lesions or rash  Neuro:  Cranial nerves 2-12 intact, no focal deficits  Breast: deferred  Rectal: deferred  Genitalia:  deferred      DATA:     Recent Labs     09/19/20 1145 09/20/20  0455   WBC 43.3* 29.0*   HGB 8.1* 8.4*    333     Recent Labs     09/19/20 1145 09/20/20  0455    137   K 4.1 3.3*   BUN 34* 30*   CREATININE 1.4* 0.9     Recent Labs     09/19/20 1145 09/20/20  0455   PROT 7.2 5.8*   INR 1.6  --      Recent Labs     09/19/20 1145 09/20/20  0455   AST 27 42*   ALT 23 26   ALKPHOS 122* 110*   BILITOT 1.2 0.9     No results for input(s): BNP in the last 72 hours.   Recent Labs 09/19/20  1145   TROPONINI 0.01       ASSESSMENT:      Principal Problem:    Sepsis due to anaerobic bacteria (HCC)  Active Problems:    Glaucoma    Macular degeneration    Depression    History of cirrhosis    PVD (peripheral vascular disease) with claudication (HCC)    Mixed hyperlipidemia    Acquired hypothyroidism    Essential hypertension    Acute colitis    Leukocytosis    DIVINA (acute kidney injury) ([facility]    Elevated alkaline phosphatase level    Anemia  Resolved Problems:    * No resolved hospital problems.  *        PLAN:    Admit to telemetry for evaluation of pancolitis  Fluids, serial lactate, supportive care  Pain control with morphine   Rocephin and Flagyl  Await pan culture results  Assess for colon CA with cea   c diff toxin , stool studies   culturelle   Discussed with Dr. Wilfredo Reed      DVT prophylaxis  PT OT  Discharge plan      Serenity Dia MD  9/20/2020  3:21 PM

## 2020-09-21 PROBLEM — Z87.19 HISTORY OF CIRRHOSIS: Chronic | Status: ACTIVE | Noted: 2019-01-10

## 2020-09-21 LAB
ANION GAP SERPL CALCULATED.3IONS-SCNC: 15 MMOL/L (ref 7–16)
ANISOCYTOSIS: ABNORMAL
BASOPHILS ABSOLUTE: 0 E9/L (ref 0–0.2)
BASOPHILS RELATIVE PERCENT: 0.2 % (ref 0–2)
BUN BLDV-MCNC: 29 MG/DL (ref 8–23)
BURR CELLS: ABNORMAL
CALCIUM SERPL-MCNC: 8.1 MG/DL (ref 8.6–10.2)
CHLORIDE BLD-SCNC: 112 MMOL/L (ref 98–107)
CO2: 15 MMOL/L (ref 22–29)
CREAT SERPL-MCNC: 0.8 MG/DL (ref 0.5–1)
EOSINOPHILS ABSOLUTE: 0 E9/L (ref 0.05–0.5)
EOSINOPHILS RELATIVE PERCENT: 0 % (ref 0–6)
GFR AFRICAN AMERICAN: >60
GFR NON-AFRICAN AMERICAN: >60 ML/MIN/1.73
GLUCOSE BLD-MCNC: 125 MG/DL (ref 74–99)
HCT VFR BLD CALC: 25.4 % (ref 34–48)
HEMOGLOBIN: 8.1 G/DL (ref 11.5–15.5)
HIV-1 AND HIV-2 ANTIBODIES: NORMAL
INR BLD: 1.5
LYMPHOCYTES ABSOLUTE: 0.86 E9/L (ref 1.5–4)
LYMPHOCYTES RELATIVE PERCENT: 2.6 % (ref 20–42)
MCH RBC QN AUTO: 31.3 PG (ref 26–35)
MCHC RBC AUTO-ENTMCNC: 31.9 % (ref 32–34.5)
MCV RBC AUTO: 98.1 FL (ref 80–99.9)
MONOCYTES ABSOLUTE: 0.28 E9/L (ref 0.1–0.95)
MONOCYTES RELATIVE PERCENT: 0.9 % (ref 2–12)
NEUTROPHILS ABSOLUTE: 27.65 E9/L (ref 1.8–7.3)
NEUTROPHILS RELATIVE PERCENT: 96.5 % (ref 43–80)
OVALOCYTES: ABNORMAL
PDW BLD-RTO: 13.7 FL (ref 11.5–15)
PLATELET # BLD: 361 E9/L (ref 130–450)
PMV BLD AUTO: 11.9 FL (ref 7–12)
POIKILOCYTES: ABNORMAL
POLYCHROMASIA: ABNORMAL
POTASSIUM SERPL-SCNC: 2.9 MMOL/L (ref 3.5–5)
PROTHROMBIN TIME: 17 SEC (ref 9.3–12.4)
RBC # BLD: 2.59 E12/L (ref 3.5–5.5)
SODIUM BLD-SCNC: 142 MMOL/L (ref 132–146)
TOXIC GRANULATION: ABNORMAL
VACUOLATED NEUTROPHILS: ABNORMAL
WBC # BLD: 28.5 E9/L (ref 4.5–11.5)

## 2020-09-21 PROCEDURE — 2500000003 HC RX 250 WO HCPCS: Performed by: NURSE PRACTITIONER

## 2020-09-21 PROCEDURE — 85025 COMPLETE CBC W/AUTO DIFF WBC: CPT

## 2020-09-21 PROCEDURE — 82105 ALPHA-FETOPROTEIN SERUM: CPT

## 2020-09-21 PROCEDURE — 6360000002 HC RX W HCPCS: Performed by: NURSE PRACTITIONER

## 2020-09-21 PROCEDURE — 99221 1ST HOSP IP/OBS SF/LOW 40: CPT | Performed by: SURGERY

## 2020-09-21 PROCEDURE — 6370000000 HC RX 637 (ALT 250 FOR IP): Performed by: NURSE PRACTITIONER

## 2020-09-21 PROCEDURE — 87077 CULTURE AEROBIC IDENTIFY: CPT

## 2020-09-21 PROCEDURE — 36415 COLL VENOUS BLD VENIPUNCTURE: CPT

## 2020-09-21 PROCEDURE — 87449 NOS EACH ORGANISM AG IA: CPT

## 2020-09-21 PROCEDURE — 87045 FECES CULTURE AEROBIC BACT: CPT

## 2020-09-21 PROCEDURE — 6370000000 HC RX 637 (ALT 250 FOR IP): Performed by: INTERNAL MEDICINE

## 2020-09-21 PROCEDURE — 6360000002 HC RX W HCPCS: Performed by: INTERNAL MEDICINE

## 2020-09-21 PROCEDURE — 2060000000 HC ICU INTERMEDIATE R&B

## 2020-09-21 PROCEDURE — 87324 CLOSTRIDIUM AG IA: CPT

## 2020-09-21 PROCEDURE — 85610 PROTHROMBIN TIME: CPT

## 2020-09-21 PROCEDURE — 97535 SELF CARE MNGMENT TRAINING: CPT

## 2020-09-21 PROCEDURE — 97530 THERAPEUTIC ACTIVITIES: CPT

## 2020-09-21 PROCEDURE — 2580000003 HC RX 258: Performed by: INTERNAL MEDICINE

## 2020-09-21 PROCEDURE — 80048 BASIC METABOLIC PNL TOTAL CA: CPT

## 2020-09-21 PROCEDURE — 97166 OT EVAL MOD COMPLEX 45 MIN: CPT

## 2020-09-21 PROCEDURE — 97161 PT EVAL LOW COMPLEX 20 MIN: CPT

## 2020-09-21 RX ORDER — MAGNESIUM SULFATE 1 G/100ML
1 INJECTION INTRAVENOUS ONCE
Status: COMPLETED | OUTPATIENT
Start: 2020-09-21 | End: 2020-09-21

## 2020-09-21 RX ORDER — POTASSIUM CHLORIDE 20 MEQ/1
40 TABLET, EXTENDED RELEASE ORAL EVERY 4 HOURS
Status: COMPLETED | OUTPATIENT
Start: 2020-09-21 | End: 2020-09-21

## 2020-09-21 RX ADMIN — BACLOFEN 10 MG: 10 TABLET ORAL at 20:06

## 2020-09-21 RX ADMIN — FOLIC ACID 1 MG: 1 TABLET ORAL at 09:54

## 2020-09-21 RX ADMIN — POTASSIUM CHLORIDE 40 MEQ: 1500 TABLET, EXTENDED RELEASE ORAL at 17:08

## 2020-09-21 RX ADMIN — METRONIDAZOLE 500 MG: 500 INJECTION, SOLUTION INTRAVENOUS at 09:25

## 2020-09-21 RX ADMIN — HEPARIN SODIUM 5000 UNITS: 10000 INJECTION INTRAVENOUS; SUBCUTANEOUS at 01:09

## 2020-09-21 RX ADMIN — ATORVASTATIN CALCIUM 5 MG: 10 TABLET, FILM COATED ORAL at 20:07

## 2020-09-21 RX ADMIN — POTASSIUM CHLORIDE 20 MEQ: 20 TABLET, EXTENDED RELEASE ORAL at 09:43

## 2020-09-21 RX ADMIN — HEPARIN SODIUM 5000 UNITS: 10000 INJECTION INTRAVENOUS; SUBCUTANEOUS at 09:25

## 2020-09-21 RX ADMIN — TIMOLOL MALEATE 1 DROP: 5 SOLUTION OPHTHALMIC at 09:44

## 2020-09-21 RX ADMIN — HEPARIN SODIUM 5000 UNITS: 10000 INJECTION INTRAVENOUS; SUBCUTANEOUS at 17:09

## 2020-09-21 RX ADMIN — BRIMONIDINE TARTRATE 1 DROP: 2 SOLUTION/ DROPS OPHTHALMIC at 09:44

## 2020-09-21 RX ADMIN — CETIRIZINE HYDROCHLORIDE 10 MG: 10 TABLET, FILM COATED ORAL at 09:26

## 2020-09-21 RX ADMIN — CLOPIDOGREL 75 MG: 75 TABLET, FILM COATED ORAL at 09:26

## 2020-09-21 RX ADMIN — BRIMONIDINE TARTRATE 1 DROP: 2 SOLUTION/ DROPS OPHTHALMIC at 20:06

## 2020-09-21 RX ADMIN — CEFTRIAXONE SODIUM 1 G: 1 INJECTION, POWDER, FOR SOLUTION INTRAMUSCULAR; INTRAVENOUS at 10:30

## 2020-09-21 RX ADMIN — METRONIDAZOLE 500 MG: 500 INJECTION, SOLUTION INTRAVENOUS at 23:42

## 2020-09-21 RX ADMIN — POTASSIUM CHLORIDE 40 MEQ: 1500 TABLET, EXTENDED RELEASE ORAL at 12:10

## 2020-09-21 RX ADMIN — ESCITALOPRAM 20 MG: 10 TABLET, FILM COATED ORAL at 09:26

## 2020-09-21 RX ADMIN — MORPHINE SULFATE 2 MG: 2 INJECTION, SOLUTION INTRAMUSCULAR; INTRAVENOUS at 09:34

## 2020-09-21 RX ADMIN — LATANOPROST 1 DROP: 50 SOLUTION OPHTHALMIC at 20:06

## 2020-09-21 RX ADMIN — TIMOLOL MALEATE 1 DROP: 5 SOLUTION OPHTHALMIC at 20:06

## 2020-09-21 RX ADMIN — MAGNESIUM SULFATE HEPTAHYDRATE 1 G: 1 INJECTION, SOLUTION INTRAVENOUS at 12:08

## 2020-09-21 RX ADMIN — METRONIDAZOLE 500 MG: 500 INJECTION, SOLUTION INTRAVENOUS at 01:09

## 2020-09-21 RX ADMIN — METRONIDAZOLE 500 MG: 500 INJECTION, SOLUTION INTRAVENOUS at 17:09

## 2020-09-21 RX ADMIN — BACLOFEN 10 MG: 10 TABLET ORAL at 09:26

## 2020-09-21 RX ADMIN — LEVOTHYROXINE SODIUM 25 MCG: 0.03 TABLET ORAL at 06:30

## 2020-09-21 ASSESSMENT — PAIN SCALES - GENERAL
PAINLEVEL_OUTOF10: 0
PAINLEVEL_OUTOF10: 9
PAINLEVEL_OUTOF10: 3

## 2020-09-21 NOTE — CONSULTS
HEPATOBILIARY AND PANCREATIC SURGERY  CONSULT NOTE  9/21/2020    Physician Consulted: Dr. Richard Sena  Reason for Consult: colitis, cirrhosis  Referring Physician: Dr. Karel LIN  Nasrin Tineo is a 77 y.o. female who presents from [facility] for evaluation of abnormal labs, AMS, and fever. PMH HTN, cirrhosis 2/2 alcoholism, PVD, chronic anemia, deconditioning. Per ED note patient's hemoglobin was low, 8.1; historically in the last year appears her baseline is 8-10. She has been having numerous watery loose stools the past few days per nursing but stool studies are pending. Presently is alert and able to answer questions despite being a poor historian. ID is following and she is on rocephin, flagyl. Presently afebrile, vital signs stable. Admits to diffuse abdominal pain. Denies nausea or vomiting. She is tolerating clears but requires much help from nursing to eat. Appears to be more alert than on initial presentation. WBCs 43K on admission, now  28.5k. LFTs wnl, Tbili 0.9,  LA 1.8  CT a/p showing pancolitis. CEA 6.5, was 3.1 Jan 2019. AFP 4 in Jan 2019    Unable to calculate MELD as no INR    EGD in 1/2019 by Dr. Sienna Meléndez showed gastritis, evidence of GERD, no varices  Hepatitis studies 4/2019 nonreactive    Reports she has quit drinking in past year  No prior abdominal surgeries.   States has had cscope in past but unsure when    Past Medical History:   Diagnosis Date    Alcoholism [facility])     heavy alcohol consumption since ~[id number] till early 2019    Anemia 05/2017    Blue toe syndrome of left lower extremity ([address] 5/22/2017    Cataract     Chronic deep vein thrombosis (DVT) of femoral vein of right lower extremity ([address] 3/4/2019    Critical lower limb ischemia 5/29/2017    Depression \    DVT (deep venous thrombosis) ([address] 09/13/2018    R leg    Glaucoma     History of blood transfusion 05/2017    Hyperlipidemia     Hypertension     Hypothyroidism     Liver disease     Macular degeneration     Peripheral edema     Sciatica     Severe protein-calorie malnutrition ([facility] [address] 4/18/2019       Past Surgical History:   Procedure Laterality Date    DENTAL SURGERY  2007    teeth extraction    UPPER GASTROINTESTINAL ENDOSCOPY N/A 1/14/2019    EGD BIOPSY performed by Wendy Pimentel MD at [place] [address] EXTRACTION         Medications Prior to Admission    Prior to Admission medications    Medication Sig Start Date End Date Taking? Authorizing Provider   baclofen (LIORESAL) 10 MG tablet Take 10 mg by mouth 2 times daily   Yes Historical Provider, MD   traMADol (ULTRAM) 50 MG tablet Take 50 mg by mouth every 12 hours as needed for Pain.     Yes Historical Provider, MD   loratadine (CLARITIN) 10 MG capsule Take 10 mg by mouth daily   Yes Historical Provider, MD   acetaminophen (TYLENOL) 325 MG tablet Take 2 tablets by mouth every 6 hours as needed for Pain 8/3/20  Yes Rod Larson, DO   lisinopril (PRINIVIL;ZESTRIL) 20 MG tablet Take 1 tablet by mouth daily 8/3/20  Yes Rod Larson, DO   folic acid (FOLVITE) 1 MG tablet Take 1 tablet by mouth daily 8/3/20  Yes Rodtamica Larson, DO   clopidogrel (PLAVIX) 75 MG tablet Take 1 tablet by mouth daily 6/15/20  Yes Sandor Hilario, DO   escitalopram (LEXAPRO) 20 MG tablet Take 1 tablet by mouth daily 6/15/20  Yes Sandor Hilario, DO   levothyroxine (SYNTHROID) 25 MCG tablet Take 1 tablet by mouth every morning 6/15/20  Yes Sandor Hilario, DO   potassium chloride (KLOR-CON M) 20 MEQ extended release tablet Take 1 tablet by mouth daily 6/15/20  Yes Sandor Hilario, DO   simvastatin (ZOCOR) 5 MG tablet Take 1 tablet by mouth nightly 6/15/20  Yes Sandor Hilario, DO   latanoprost (XALATAN) 0.005 % ophthalmic solution  7/12/13  Yes Historical Provider, MD   brimonidine-timolol (COMBIGAN) 0.2-0.5 % ophthalmic solution Place 1 drop into both eyes every 12 hours   Yes Historical Provider, MD   Handicap Placard MISC by NOT APPLICABLE    K 2.9*   *   CO2 15*   BUN 29*   CREATININE 0.8   CALCIUM 8.1*     Liver Function  Recent Labs     20  1145 20  0455   LIPASE 10*  --    BILITOT 1.2 0.9   AST 27 42*   ALT 23 26   ALKPHOS 122* 110*   PROT 7.2 5.8*   LABALBU 3.5 2.6*     No results for input(s): LACTATE in the last 72 hours. Recent Labs     20  1145   INR 1.6       RADIOLOGY    Ct Head Wo Contrast    Result Date: 2020  Patient MRN: [id number] : 1950 Age:  77 years Gender: Female Order Date: 2020 1:06 PM Exam: CT HEAD WO CONTRAST Number of Images: 146 views Indication:   altered mental status altered mental status Comparison: Prior study from 2020 is available. Technique: Sequential axial CT of the head was obtained from the base of the skull to the vertex without IV contrast.  Radiation Output: CTDIvol 55.61 (mGy); DLP 1042.19 (mGy-cm) Findings: The study demonstrates the fourth ventricle to be midline. The posterior fossa appears to be normal. There is global atrophy with periventricular ischemic white matter changes. There is no mass, mass effect or midline shift. The ventricles, sulci and cisterns are normal in appearance for patient's age. The gray-white matter differentiation is preserved throughout. There is no acute intracranial hemorrhage. No extra-axial fluid collection is identified. The bony calvarium is intact. The visualized portion of the paranasal sinuses and the mastoid air cells are clear. There is no focal extracranial soft tissue swelling. NO ACUTE INTRACRANIAL PROCESS Unchanged from prior study. Ct Abdomen Pelvis W Iv Contrast Additional Contrast? None    Result Date: 2020  Patient MRN: [id number] : 1950 Age:  77 years Gender: Female Order Date: 2020 2:13 PM Exam: CT ABDOMEN PELVIS W IV CONTRAST Number of Images: 943 views Indication:   septicemia septicemia Comparison: None. Technique:  The sequential axial CT of the scan of the abdomen and pelvis was obtained from base of the diaphragm to the pubic symphysis with multiplanar reformat. The study was obtained with intravenous contrast. Radiation Output: CTDIvol 13.65 (mGy); .31 (mGy-cm) Finding: The lung bases are clear. The heart and pericardium appear normal. There is coronary calcification suggesting of coronary artery disease. The liver demonstrates no evidence of parenchymal lesions or intrahepatic biliary dilatation. The gallbladder is well distended without radiopaque stones. The spleen, pancreas and adrenal glands do not show any appreciable abnormality. Both kidneys have normal nephrogram with normal excretion of contrast. There is no stone, mass or hydronephrosis. There is dilution of small bowel fluid filled. There is fecal stasis suggesting of constipation. The colon demonstrates thickened mucosa mostly in the left colon extending to the rectum. This finding is suggesting of colitis. No evidence of intestinal obstruction is seen. The appendix is not visualized; however, no evidence of appendicitis is seen. No retroperitoneal or mesenteric lymphadenopathy is detected. The abdominal aorta and iliac arteries demonstrate diffuse atherosclerotic changes with calcified plaque with no evidence of aneurysmal dilatation. The osseous structures of the abdomen and pelvis appear to be normal. The contrast study demonstrates no enhancing lesion identified. The urinary bladder appears to be normal. There is Candelario catheter in the urinary bladder which is coiled up in the lumen. There is no mass or debris seen. There is no mass or free fluid seen in the pelvic structure. Colitis mostly involving the ascending colon, rectum and extending to the anus. Adynamic ileus Coronary calcification suggesting of coronary artery disease. The Candelario catheter is noted to be coiled up in the urinary bladder which needs to be repositioned.      Xr Chest Portable    Result Date: 2020  Patient MRN: [id number] : 1950 Age:  77 years Gender: Female Order Date: 9/19/2020 11:36 AM Exam: XR CHEST PORTABLE Number of Images: 1 view Indication:   sepsis sepsis Comparison: Prior study from 07/30/2020 is available. Findings: The lungs are clear. There is no evidence of pulmonary infiltrate or pleural effusion. The pulmonary vascularity is unremarkable. The cardiac, hilar and mediastinal silhouettes are satisfactory. There is uncoiling atherosclerotic change of thoracic aorta. The bony thorax demonstrates no gross abnormality. NO ACUTE CARDIOPULMONARY PROCESS         ASSESSMENT/PLAN:  77 y.o. female with pancolitis, suspect Cdiff, hx of cirrhosis likely 2/2 hx of alcoholism    Overall care per Primary  ID following,abx per ID. On rocephin, flagyl  Send stool cultures, cdiff  PT-INR now  Daily MELD labs  Known to Dr. Kapil Guerra for her cirrhosis and prior EGD  CEA 6.5, elevated, check AFP  Needs outpatient colonoscopy in approx 6 wks when colitis has resolved, can follow up with Dr. Kapil Guerra for this as she is known to him for endoscopies.     Plan will be discussed with Dr. Ti James who is covering for Dr. Suzy Nunez DO  Resident, PGY-2  9/21/2020  11:43 AM

## 2020-09-21 NOTE — PLAN OF CARE
Problem: Injury - Risk of, Physical Injury:  Goal: Absence of physical injury  Description: Absence of physical injury  Outcome: Met This Shift  Goal: Will remain free from falls  Description: Will remain free from falls  Outcome: Met This Shift     Problem: Falls - Risk of:  Goal: Absence of physical injury  Description: Absence of physical injury  Outcome: Met This Shift  Goal: Will remain free from falls  Description: Will remain free from falls  Outcome: Met This Shift     Problem: Confusion - Acute:  Goal: Absence of continued neurological deterioration signs and symptoms  Description: Absence of continued neurological deterioration signs and symptoms  Outcome: Not Met This Shift  Goal: Mental status will be restored to baseline  Description: Mental status will be restored to baseline  Outcome: Not Met This Shift

## 2020-09-21 NOTE — PROGRESS NOTES
all passive movement and only intermittently showed active movement. Pt able to squeeze and open hands but returned to resting position of hands closed with arms fully extended and supinated. Able to break pts rigidity with slow PROM. PROM completed to all extremities. Pt raised R arm one time to command but no other time. Repositioned in bed for comfort with pillows. Pt with all needs met and call light in reach. Pt would benefit from continued PT POC to address functional deficits described above. Treatment:  Patient practiced and was instructed in the following treatment:     Patient education provided continuously throughout session for sequencing, safety maintenance, and improving any deficits found during the evaluation.  PROM to all extremities and joint. Positioned rolled towel in B hands to prevent nail from damaging skin of palms. · Skilled positioning - Pt placed in upright position with pillows utilized to facilitate upright posture, joint and skin integrity, and interaction with environment. · Non-pharmacological treatment and prevention of delirium - Pt oriented to date, time, time of day, place, and situation as well as provided with visual and auditory stimuli in order to improve cognition and combat effects of ICU delirium. Pt's/ family goals   1. None stated     Patient and or family understand(s) diagnosis, prognosis, and plan of care. No     PLAN:    Current Treatment Recommendations   [x] Strengthening     [x] ROM   [] Balance Training   [] Endurance Training   [x] Transfer Training   [] Gait Training   [] Stair Training   [x] Positioning   [x] Safety and Education Training   [] Patient/Caregiver Education   [] HEP  [] Other     Frequency of treatments: 2-5x/week x 1-2 weeks.     Time in  0810  Time out  0825    Total Treatment Time 10 minutes     Evaluation Time includes thorough review of current medical information, gathering information on past medical history/social history and prior level of function, completion of standardized testing/informal observation of tasks, assessment of data and education on plan of care and goals.     CPT codes:  [x] Low Complexity PT evaluation 79527  [] Moderate Complexity PT evaluation 52133  [] High Complexity PT evaluation 41891  [] PT Re-evaluation 13404  [] Gait training 78985 - minutes  [] Manual therapy 79193 - minutes  [x] Therapeutic activities 82658 10 minutes  [] Therapeutic exercises 40050 - minutes  [] Neuromuscular reeducation 88689 - minutes     Pau Rascon, PT, DPT  [id number]

## 2020-09-21 NOTE — PROGRESS NOTES
poor   Judgement/safety:  poor  Additional Comments:  Pt presented w/ flat affect, fair(+) eye contact, pleasant w/ bright affect at end of session        Functional Assessment:   Initial Eval Status  Date: 9-21-20 Treatment Status  Date: Short Term/Long Term Goals  Treatment frequency: PRN 2-4 x/week  7-10 days   Feeding Max A - Hand-over-Hand assist     Unable to grasp cup/utensils d/t Severe UE weakness, limited AROM of digits, wrist and elbows - assist w/ all functional reaching  Mod A   Grooming Max A - H. O.H assist    Required assist to grasp/manipulate items, assist w/ functional reaching to wash hands/face seated EOB - max A for dynamic sitting balance    Mod A   UB Dressing Max A    Required Max A to thread Edilberto UEs into/out of Gown seated EOB, Max A for dynamic sitting balance w/ ax    Mod A   LB Dressing Max A    Unsafe to attempt to stand for task d/t weakness - all ADLs performed bed level for safety, required Max A d/t inability to use UEs to facilitate task    Mod A   Bathing NT      Mod A   Toileting Max A    Unsafe to attempt to stand for task d/t weakness, Task completed Bed Level, Incontinent of Bowel - loose stool, Max A for bed mobility + Max A for clothing adjustment and bowel hygiene    Mod A   Bed Mobility  Rolling for functional ax:   Max A  Repositioning:  Max A of 2 in supine   Supine to Sit:  Max A     Sit to Supine:  Max A   Pt ed for safe techs   Mod A   Functional Transfers Sit to stand:  Dep  Stand to sit:  Dep      Unsafe to attempt w/ Max A of 1 d/t weakness, inability to use Edilberto UEs, increased confusion/not following commands despite Max VCs/Pt ed re: safety/hand placement    Max A   Functional Mobility NT        Max A   Balance Sitting:      Static:  Max A at EOB    Dynamic:  Max A w/ functional ax seated EOB    Standing:      Static:  NT    Dynamic:  NT       Activity Tolerance Fair(-)  Limited by Weakness/poor cognition    Tolerated Sitting:  EOB ~ 10 mins w/ functional ax w/ Max A for balance    Tolerated Standing:  NT      Visual/  Perceptual WFL - difficult to assess  Glasses:  Yes - present      Hearing WFL  Hearing Aids  None noted         Hand dominance: Right    UE ROM: RUE: AROM Shoulder flex ~ 20*, Elbow flex ~ 40*, Unable to extend wrist to neutral or extend digits w/ gravity eliminated - posturing hands/wrists in supination     LUE: Same as right    Strength: RUE: grossly 2/5 - proximally, 1/5 distally     LUE: grossly 2/5- proximally, 1/5 distally     Strength:  Poor Edilberto UEs    Fine Motor Coordination:  Poor Edilberto UEs    **RN Notified of Findings of UE ROM/MMT. Per OT assessment 7-31-20 Edilberto UEs generally 3/5 w/ good /fine motor skills**       Sensation:  Denies numbness or tingling Edilberto UEs - difficult to assess  Tone:  Low Tone Distal Edilberto UEs  Edema:  Mild edema noted Edilberto Hands/digits                             Comments: Upon arrival, patient was found in supine. She was agreeable to participate in therapeutic ax. No Family present during session. Received permission from RN prior to engaging pt in OT services. At the end of the session, patient was properly positioned in Semi-Supine. Call light and phone within reach, all lines and tubes intact. Oriented pt to call bell. Made all appropriate Environmental Modifications to facilitate pt's level of IND and safety. All needs met. Bed Alarm activated. RN At b/s         Overall patient demonstrated decreased independence and safety during completion of ADL/functional transfer/mobility tasks. Pt would benefit from continued skilled OT to increase safety and independence with completion of ADL/IADL tasks for functional independence and quality of life. Treatment:      Provided Skilled SUP/Assist w/ Pt safety, Proper Positioning, ADLs, Functional Transfers and Functional Mobility as noted above, as well as set up and clean up for session. Skilled monitoring of Vitals and pts response to treatment.   Consulted RN    Education:      Provided Pt/Family ed re: Purpose of OT services;  OT Plan of Care;       ADL-  Instruction/training on use of DME/AD/Adaptive equip/techs to improve safety/IND with Functional Ax    Mobility-  Instruction/training on safety and improved independence with bed mobility, functional transfers and functional mobility    Sitting EOB - to improve dynamic sitting balance and activity tolerance during ADLs as noted above   Activity tolerance - Instruction/training on energy conservation/work simplification for completion of ADLs    Neuromuscular Reeducation to facilitate balance/righting reactions for increased function with ADLs tasks    Cognitive retraining -  Oriented pt to current Date, Place and Situation; Cues for safety during Functional Ax for safety, sequencing, problem solving    Skilled positioning/alignment for Pain Mgmt, Skin Integrity, Edema Control    Skilled monitoring of Vitals during session and pt's response    Techs for improved Safety/Safety Awareness w/ Functional Activity/Mobility    Recommendations for Continued Participation in OT services during Hospitalization and at D/C    Neuromuscular Facilitation of UE Functional movement/ROM/Fine Motor dexterity   Therapeutic Exercises- Instruction on BUE ROM exercises to improve strength and function of BUE for improved indep with ADLs    Pt and/or Family verbalized/demonstrated a Limited understanding of education provided. Will Review PRN.        Assessment of current deficits   Functional mobility [x]  ADLs [x] Strength [x]  Cognition [x]  Functional transfers  [x] IADLs [x] Safety Awareness [x]  Endurance [x]  Fine Motor Coordination [x] Balance [x] Vision/perception [] Sensation [x]   Gross Motor Coordination [x] ROM [x] Delirium []                  Motor Control [x]      Plan of Care:  OT 2--4 x/week for 7-10 days PRN   [x] ADL retraining/AE, Equipment Needs/Recommendations   [x] Energy Conservation Matt Lakhani, [facility], OTR/L  # [id number]

## 2020-09-21 NOTE — PROGRESS NOTES
Department of Internal Medicine  Infectious Diseases  Progress  Note      C/C :   Colitis, leukocytosis     Pt is more awake, non communicating   Afebrile     Current Facility-Administered Medications   Medication Dose Route Frequency Provider Last Rate Last Dose    magnesium sulfate 1 g in dextrose 5% 100 mL IVPB  1 g Intravenous Once Berl Clear, DO        potassium chloride (KLOR-CON M) extended release tablet 40 mEq  40 mEq Oral Q4H Katelyn Alexis,         cefTRIAXone (ROCEPHIN) 1 g in sterile water 10 mL IV syringe  1 g Intravenous Q24H Juan Ni MD   1 g at 09/21/20 1030    morphine (PF) injection 2 mg  2 mg Intravenous Q4H PRN Krupa Cerda MD   2 mg at 09/21/20 0934    sodium chloride flush 0.9 % injection 10 mL  10 mL Intravenous 2 times per day April ARMEN King - CNP        sodium chloride flush 0.9 % injection 10 mL  10 mL Intravenous PRN April ARMEN King - CNP   10 mL at 09/20/20 1601    acetaminophen (TYLENOL) tablet 650 mg  650 mg Oral Q6H PRN April ARMEN King - CNP        Or    acetaminophen (TYLENOL) suppository 650 mg  650 mg Rectal Q6H PRN April FENG KingN - SILAS        polyethylene glycol (GLYCOLAX) packet 17 g  17 g Oral Daily PRN April FENG KingN - CNP        heparin (porcine) injection 5,000 Units  5,000 Units Subcutaneous Q8H April FENG KingN - CNP   5,000 Units at 09/21/20 0925    metronidazole (FLAGYL) 500 mg in NaCl 100 mL IVPB premix  500 mg Intravenous Q8H April FENG KingN - CNP   Stopped at 09/21/20 1030    0.9 % sodium chloride infusion   Intravenous Continuous April ARMEN King CNP 75 mL/hr at 09/20/20 1601      baclofen (LIORESAL) tablet 10 mg  10 mg Oral BID April FENG KingN - CNP   10 mg at 09/21/20 0926    clopidogrel (PLAVIX) tablet 75 mg  75 mg Oral Daily April ARMEN King - CNP   75 mg at 09/21/20 0926    escitalopram (LEXAPRO) tablet 20 mg  20 mg Oral Daily April Chasidy-Ashwin, APRN - CNP   20 mg at 76/92/12 3742    folic acid (FOLVITE) tablet 1 mg  1 mg Oral Daily April Chasidy-Ashwin, APRN - CNP   1 mg at 09/21/20 5857    levothyroxine (SYNTHROID) tablet 25 mcg  25 mcg Oral QAM April WareSofiaus, APRN - CNP   25 mcg at 09/21/20 0630    cetirizine (ZYRTEC) tablet 10 mg  10 mg Oral Daily April ChasidySofiaus, APRN - CNP   10 mg at 09/21/20 1089    antioxidant multivitamin (OCUVITE) tablet  1 tablet Oral Daily April Ware-Ashwin, APRN - CNP        potassium chloride (KLOR-CON M) extended release tablet 20 mEq  20 mEq Oral Daily April Kusumus, APRN - CNP   20 mEq at 09/21/20 0943    atorvastatin (LIPITOR) tablet 5 mg  5 mg Oral Nightly April WareSofiaus, APRN - CNP   5 mg at 09/20/20 2022    traMADol (ULTRAM) tablet 50 mg  50 mg Oral Q12H PRN April Chasidy-Ashwin, APRN - CNP        trimethobenzamide Linward Drain) injection 200 mg  200 mg Intramuscular Q6H PRN April Ware-Paola, APRN - CNP        brimonidine (ALPHAGAN) 0.2 % ophthalmic solution 1 drop  1 drop Both Eyes BID April Kusumus, APRN - CNP   1 drop at 09/21/20 0944    And    timolol (TIMOPTIC) 0.5 % ophthalmic solution 1 drop  1 drop Both Eyes BID April Chasidy-Ashwin, APRN - CNP   1 drop at 09/21/20 0944    latanoprost (XALATAN) 0.005 % ophthalmic solution 1 drop  1 drop Both Eyes Nightly April Lucaslius, APRN - CNP   1 drop at 09/20/20 2045         REVIEW OF SYSTEMS:  Could not be obtained   CONSTITUTIONAL:  No fever , COVID 19 neg   NEUROLOGICAL:  Lethargy at nursing home     PHYSICAL EXAM:      Vitals:     Vitals:    09/21/20 0800   BP: (!) 142/65   Pulse: 78   Resp: 18   Temp: 96.9 °F (36.1 °C)   SpO2: 98%       General Appearance:    More awake, non communicating     Head:    Normocephalic, atraumatic   Eyes:    No pallor, no icterus,   Ears:    No obvious deformity or drainage.    Nose:   No nasal drainage   Throat:   Mucosa dry    Neck:   Supple, no lymphadenopathy   Back:     no CVA tenderness   Lungs:     Clear to auscultation bilaterally    Heart:    Regular rate and rhythm, no murmur   Abdomen:     Soft,? tender, bowel sounds present    Extremities:   No edema, no cyanosis   Pulses:   Dorsalis pedis palpable    Skin:   no rashes or lesions     CBC with Differential:      Lab Results   Component Value Date    WBC 28.5 09/21/2020    RBC 2.59 09/21/2020    HGB 8.1 09/21/2020    HCT 25.4 09/21/2020     09/21/2020    MCV 98.1 09/21/2020    MCH 31.3 09/21/2020    MCHC 31.9 09/21/2020    RDW 13.7 09/21/2020    NRBC 0.0 05/07/2017    METASPCT 2.6 09/19/2020    LYMPHOPCT 2.6 09/21/2020    MONOPCT 0.9 09/21/2020    MYELOPCT 0.9 09/20/2020    BASOPCT 0.2 09/21/2020    MONOSABS 0.28 09/21/2020    LYMPHSABS 0.86 09/21/2020    EOSABS 0.00 09/21/2020    BASOSABS 0.00 09/21/2020       CMP     Lab Results   Component Value Date     09/21/2020    K 2.9 09/21/2020    K 3.3 09/20/2020     09/21/2020    CO2 15 09/21/2020    BUN 29 09/21/2020    CREATININE 0.8 09/21/2020    GFRAA >60 09/21/2020    LABGLOM >60 09/21/2020    GLUCOSE 125 09/21/2020    PROT 5.8 09/20/2020    LABALBU 2.6 09/20/2020    CALCIUM 8.1 09/21/2020    BILITOT 0.9 09/20/2020    ALKPHOS 110 09/20/2020    AST 42 09/20/2020    ALT 26 09/20/2020         Hepatic Function Panel:    Lab Results   Component Value Date    ALKPHOS 110 09/20/2020    ALT 26 09/20/2020    AST 42 09/20/2020    PROT 5.8 09/20/2020    BILITOT 0.9 09/20/2020    BILIDIR 0.6 04/19/2019    IBILI 0.6 04/19/2019    LABALBU 2.6 09/20/2020       PT/INR:    Lab Results   Component Value Date    PROTIME 17.8 09/19/2020    PROTIME 19.5 09/05/2019    INR 1.6 09/19/2020       TSH:    Lab Results   Component Value Date    TSH 2.590 07/30/2020       U/A:    Lab Results   Component Value Date    COLORU Yellow 09/19/2020    PHUR 6.0 09/19/2020    WBCUA 2-5 09/19/2020    RBCUA 5-10 09/19/2020    MUCUS Present 07/30/2020    BACTERIA MODERATE 09/19/2020    CLARITYU Clear 09/19/2020    SPECGRAV 1.015 09/19/2020    LEUKOCYTESUR SMALL 09/19/2020    UROBILINOGEN 0.2 09/19/2020    BILIRUBINUR SMALL 09/19/2020    BLOODU TRACE-INTACT 09/19/2020    GLUCOSEU Negative 09/19/2020       ABG:  No results found for: HPX0BXA, BEART, P7DIPXLV, PHART, THGBART, XII8EPX, PO2ART, NSN0CCH    MICROBIOLOGY:    Blood culture - pending     Urine Culture - mixed bacteria      CEA 6.54    Radiology :    CT scan of abdomen and pelvis -  Impression:            Colitis mostly involving the ascending colon, rectum and extending to   the anus. Adynamic ileus     Coronary calcification suggesting of coronary artery disease. The Candelario catheter is noted to be coiled up in the urinary bladder   which needs to be repositioned. IMPRESSION:     1. Colitis ( CT scan )   2. Leukocytosis - trending down   3. Lactic acidosis - improved     RECOMMENDATIONS:      1. Rocephin 1 gram IV q 24 hrs and Flagyl 500 mg IV q 8 hrs   2. Surgery following   3.  CBC with diff

## 2020-09-21 NOTE — PROGRESS NOTES
[facility]    Subjective:  Pt seen this am pt responsive to verbal stimuli      Current Facility-Administered Medications:     magnesium sulfate 1 g in dextrose 5% 100 mL IVPB, 1 g, Intravenous, Once, Rod Larson,     potassium chloride (KLOR-CON M) extended release tablet 40 mEq, 40 mEq, Oral, Q4H, Thuy Alexis,     cefTRIAXone (ROCEPHIN) 1 g in sterile water 10 mL IV syringe, 1 g, Intravenous, Q24H, Juan Ni MD, 1 g at 09/21/20 1030    morphine (PF) injection 2 mg, 2 mg, Intravenous, Q4H PRN, Bfufy Shoemaker MD, 2 mg at 09/21/20 0934    sodium chloride flush 0.9 % injection 10 mL, 10 mL, Intravenous, 2 times per day, April ARMEN King - CNP    sodium chloride flush 0.9 % injection 10 mL, 10 mL, Intravenous, PRN, April ARMEN King - CNP, 10 mL at 09/20/20 1601    acetaminophen (TYLENOL) tablet 650 mg, 650 mg, Oral, Q6H PRN **OR** acetaminophen (TYLENOL) suppository 650 mg, 650 mg, Rectal, Q6H PRN, April ARMEN King - CNP    polyethylene glycol (GLYCOLAX) packet 17 g, 17 g, Oral, Daily PRN, April ARMEN King - CNP    heparin (porcine) injection 5,000 Units, 5,000 Units, Subcutaneous, Q8H, April ARMEN King - CNP, 5,000 Units at 09/21/20 0579    metronidazole (FLAGYL) 500 mg in NaCl 100 mL IVPB premix, 500 mg, Intravenous, Q8H, April ARMEN King - CNP, Stopped at 09/21/20 1030    0.9 % sodium chloride infusion, , Intravenous, Continuous, April ARMEN King CNP, Last Rate: 75 mL/hr at 09/20/20 1601    baclofen (LIORESAL) tablet 10 mg, 10 mg, Oral, BID, April ARMEN King CNP, 10 mg at 09/21/20 5962    clopidogrel (PLAVIX) tablet 75 mg, 75 mg, Oral, Daily, April ARMEN King CNP, 75 mg at 09/21/20 7457    escitalopram (LEXAPRO) tablet 20 mg, 20 mg, Oral, Daily, April ARMEN King CNP, 20 mg at 37/38/84 3386    folic acid (FOLVITE) tablet 1 mg, 1 mg, Oral, Daily, April Chasidy-[place], APRN - CNP, 1 mg at 09/21/20 0954    levothyroxine (SYNTHROID) tablet 25 mcg, 25 mcg, Oral, QAM, April Storey-[place], APRN - CNP, 25 mcg at 09/21/20 0630    cetirizine (ZYRTEC) tablet 10 mg, 10 mg, Oral, Daily, April Storey-[place], APRN - CNP, 10 mg at 09/21/20 2055    antioxidant multivitamin (OCUVITE) tablet, 1 tablet, Oral, Daily, April Storey-[place], APRN - CNP    potassium chloride (KLOR-CON M) extended release tablet 20 mEq, 20 mEq, Oral, Daily, April Storey-Ashwin, APRN - CNP, 20 mEq at 09/21/20 9915    atorvastatin (LIPITOR) tablet 5 mg, 5 mg, Oral, Nightly, April Storey-Ashwin, APRN - CNP, 5 mg at 09/20/20 2022    traMADol (ULTRAM) tablet 50 mg, 50 mg, Oral, Q12H PRN, April Storey-[place], APRN - CNP    trimethobenzamide Marixakobe Salmeron) injection 200 mg, 200 mg, Intramuscular, Q6H PRN, April Storey-[place], APRN - CNP    brimonidine (ALPHAGAN) 0.2 % ophthalmic solution 1 drop, 1 drop, Both Eyes, BID, 1 drop at 09/21/20 0944 **AND** timolol (TIMOPTIC) 0.5 % ophthalmic solution 1 drop, 1 drop, Both Eyes, BID, April Storey-Ashwin, APRN - CNP, 1 drop at 09/21/20 0944    latanoprost (XALATAN) 0.005 % ophthalmic solution 1 drop, 1 drop, Both Eyes, Nightly, April Storey-Ashwin, APRN - CNP, 1 drop at 09/20/20 2045    Objective:    BP (!) 142/65   Pulse 78   Temp 96.9 °F (36.1 °C) (Temporal)   Resp 18   Wt 125 lb 6.4 oz (56.9 kg)   SpO2 98%   BMI 19.07 kg/m²     Heart:  Reg  Lungs:  CTA bilaterally, no wheeze, rales or rhonchi  Abd: bowel sounds present, tender to palp epigastrium  Extrem:  No clubbing, cyanosis, or edema    CBC with Differential:    Lab Results   Component Value Date    WBC 28.5 09/21/2020    RBC 2.59 09/21/2020    HGB 8.1 09/21/2020    HCT 25.4 09/21/2020     09/21/2020    MCV 98.1 09/21/2020    MCH 31.3 09/21/2020    MCHC 31.9 09/21/2020    RDW 13.7 09/21/2020    NRBC 0.0 05/07/2017    METASPCT 2.6 09/19/2020    LYMPHOPCT 2.6 09/21/2020 MONOPCT 0.9 09/21/2020    MYELOPCT 0.9 09/20/2020    BASOPCT 0.2 09/21/2020    MONOSABS 0.28 09/21/2020    LYMPHSABS 0.86 09/21/2020    EOSABS 0.00 09/21/2020    BASOSABS 0.00 09/21/2020     CMP:    Lab Results   Component Value Date     09/21/2020    K 2.9 09/21/2020    K 3.3 09/20/2020     09/21/2020    CO2 15 09/21/2020    BUN 29 09/21/2020    CREATININE 0.8 09/21/2020    GFRAA >60 09/21/2020    LABGLOM >60 09/21/2020    GLUCOSE 125 09/21/2020    PROT 5.8 09/20/2020    LABALBU 2.6 09/20/2020    CALCIUM 8.1 09/21/2020    BILITOT 0.9 09/20/2020    ALKPHOS 110 09/20/2020    AST 42 09/20/2020    ALT 26 09/20/2020     Warfarin PT/INR:    Lab Results   Component Value Date    INR 1.6 09/19/2020    INR 1.1 07/30/2020    INR 1.0 06/03/2020    PROTIME 17.8 (H) 09/19/2020    PROTIME 12.0 07/30/2020    PROTIME 11.3 06/03/2020       Assessment:    Principal Problem:    Acute colitis  Active Problems:    Glaucoma    Macular degeneration    Depression    History of cirrhosis    PVD (peripheral vascular disease) with claudication (HCC)    Mixed hyperlipidemia    Acquired hypothyroidism    Essential hypertension    Sepsis due to anaerobic bacteria (HCC)    Leukocytosis    DIVINA (acute kidney injury) ([facility] [address]    Elevated alkaline phosphatase level    Anemia  Resolved Problems:    * No resolved hospital problems.  *      Plan:  Replace k mag cont iv fluids cont atb per id surgery to see        Cy Fuentes  11:55 AM  9/21/2020

## 2020-09-21 NOTE — CARE COORDINATION
Pt from [place]. Spoke to [facility], she is a bed hold under Medicaid pending. They plan to accept back and try to skill her but will accept pending. Covid 9/19 (-). Ambulance form on soft chart.  consulted for colitis/cirrhosis. IV Flagyl and Rocephin continues. Cdiff pending.  CM will continue to follow  Thomas Moise, RN  [facility] Case Management  [phone]

## 2020-09-22 ENCOUNTER — APPOINTMENT (OUTPATIENT)
Dept: GENERAL RADIOLOGY | Age: 70
DRG: 871 | End: 2020-09-22
Payer: MEDICARE

## 2020-09-22 LAB
ALBUMIN SERPL-MCNC: 2.3 G/DL (ref 3.5–5.2)
ALP BLD-CCNC: 134 U/L (ref 35–104)
ALT SERPL-CCNC: 23 U/L (ref 0–32)
AMMONIA: 28 UMOL/L (ref 11–51)
ANION GAP SERPL CALCULATED.3IONS-SCNC: 14 MMOL/L (ref 7–16)
AST SERPL-CCNC: 35 U/L (ref 0–31)
BILIRUB SERPL-MCNC: 0.4 MG/DL (ref 0–1.2)
BUN BLDV-MCNC: 22 MG/DL (ref 8–23)
C DIFF TOXIN/ANTIGEN: ABNORMAL
CALCIUM SERPL-MCNC: 8.3 MG/DL (ref 8.6–10.2)
CHLORIDE BLD-SCNC: 121 MMOL/L (ref 98–107)
CO2: 13 MMOL/L (ref 22–29)
CREAT SERPL-MCNC: 0.8 MG/DL (ref 0.5–1)
GFR AFRICAN AMERICAN: >60
GFR NON-AFRICAN AMERICAN: >60 ML/MIN/1.73
GLUCOSE BLD-MCNC: 114 MG/DL (ref 74–99)
HCT VFR BLD CALC: 25.1 % (ref 34–48)
HEMOGLOBIN: 8.3 G/DL (ref 11.5–15.5)
INR BLD: 1.7
MAGNESIUM: 2.1 MG/DL (ref 1.6–2.6)
MCH RBC QN AUTO: 31.8 PG (ref 26–35)
MCHC RBC AUTO-ENTMCNC: 33.1 % (ref 32–34.5)
MCV RBC AUTO: 96.2 FL (ref 80–99.9)
PDW BLD-RTO: 14.2 FL (ref 11.5–15)
PLATELET # BLD: 416 E9/L (ref 130–450)
PMV BLD AUTO: 11.6 FL (ref 7–12)
POTASSIUM SERPL-SCNC: 3.6 MMOL/L (ref 3.5–5)
PROTHROMBIN TIME: 18.8 SEC (ref 9.3–12.4)
RBC # BLD: 2.61 E12/L (ref 3.5–5.5)
SODIUM BLD-SCNC: 148 MMOL/L (ref 132–146)
TOTAL PROTEIN: 5.4 G/DL (ref 6.4–8.3)
WBC # BLD: 25.9 E9/L (ref 4.5–11.5)

## 2020-09-22 PROCEDURE — 99232 SBSQ HOSP IP/OBS MODERATE 35: CPT | Performed by: TRANSPLANT SURGERY

## 2020-09-22 PROCEDURE — 74022 RADEX COMPL AQT ABD SERIES: CPT

## 2020-09-22 PROCEDURE — 82140 ASSAY OF AMMONIA: CPT

## 2020-09-22 PROCEDURE — 85027 COMPLETE CBC AUTOMATED: CPT

## 2020-09-22 PROCEDURE — 80053 COMPREHEN METABOLIC PANEL: CPT

## 2020-09-22 PROCEDURE — 2580000003 HC RX 258: Performed by: NURSE PRACTITIONER

## 2020-09-22 PROCEDURE — 2500000003 HC RX 250 WO HCPCS: Performed by: NURSE PRACTITIONER

## 2020-09-22 PROCEDURE — 6360000002 HC RX W HCPCS: Performed by: INTERNAL MEDICINE

## 2020-09-22 PROCEDURE — 6370000000 HC RX 637 (ALT 250 FOR IP): Performed by: INTERNAL MEDICINE

## 2020-09-22 PROCEDURE — 6360000002 HC RX W HCPCS: Performed by: NURSE PRACTITIONER

## 2020-09-22 PROCEDURE — 97535 SELF CARE MNGMENT TRAINING: CPT

## 2020-09-22 PROCEDURE — 2580000003 HC RX 258: Performed by: INTERNAL MEDICINE

## 2020-09-22 PROCEDURE — 2060000000 HC ICU INTERMEDIATE R&B

## 2020-09-22 PROCEDURE — 97530 THERAPEUTIC ACTIVITIES: CPT

## 2020-09-22 PROCEDURE — 97530 THERAPEUTIC ACTIVITIES: CPT | Performed by: PHYSICAL THERAPIST

## 2020-09-22 PROCEDURE — 36415 COLL VENOUS BLD VENIPUNCTURE: CPT

## 2020-09-22 PROCEDURE — 85610 PROTHROMBIN TIME: CPT

## 2020-09-22 PROCEDURE — 6370000000 HC RX 637 (ALT 250 FOR IP): Performed by: NURSE PRACTITIONER

## 2020-09-22 PROCEDURE — 83735 ASSAY OF MAGNESIUM: CPT

## 2020-09-22 RX ORDER — SODIUM CHLORIDE 450 MG/100ML
INJECTION, SOLUTION INTRAVENOUS CONTINUOUS
Status: DISCONTINUED | OUTPATIENT
Start: 2020-09-22 | End: 2020-09-24

## 2020-09-22 RX ADMIN — BRIMONIDINE TARTRATE 1 DROP: 2 SOLUTION/ DROPS OPHTHALMIC at 20:12

## 2020-09-22 RX ADMIN — TIMOLOL MALEATE 1 DROP: 5 SOLUTION OPHTHALMIC at 09:00

## 2020-09-22 RX ADMIN — METRONIDAZOLE 500 MG: 500 INJECTION, SOLUTION INTRAVENOUS at 23:28

## 2020-09-22 RX ADMIN — BACLOFEN 10 MG: 10 TABLET ORAL at 09:00

## 2020-09-22 RX ADMIN — TIMOLOL MALEATE 1 DROP: 5 SOLUTION OPHTHALMIC at 20:12

## 2020-09-22 RX ADMIN — METRONIDAZOLE 500 MG: 500 INJECTION, SOLUTION INTRAVENOUS at 17:39

## 2020-09-22 RX ADMIN — CLOPIDOGREL 75 MG: 75 TABLET, FILM COATED ORAL at 10:09

## 2020-09-22 RX ADMIN — VANCOMYCIN HYDROCHLORIDE 250 MG: 10 INJECTION, POWDER, LYOPHILIZED, FOR SOLUTION INTRAVENOUS at 23:28

## 2020-09-22 RX ADMIN — HEPARIN SODIUM 5000 UNITS: 10000 INJECTION INTRAVENOUS; SUBCUTANEOUS at 10:10

## 2020-09-22 RX ADMIN — CEFTRIAXONE SODIUM 1 G: 1 INJECTION, POWDER, FOR SOLUTION INTRAMUSCULAR; INTRAVENOUS at 10:10

## 2020-09-22 RX ADMIN — VANCOMYCIN HYDROCHLORIDE 250 MG: 10 INJECTION, POWDER, LYOPHILIZED, FOR SOLUTION INTRAVENOUS at 18:00

## 2020-09-22 RX ADMIN — METRONIDAZOLE 500 MG: 500 INJECTION, SOLUTION INTRAVENOUS at 09:00

## 2020-09-22 RX ADMIN — BACLOFEN 10 MG: 10 TABLET ORAL at 20:12

## 2020-09-22 RX ADMIN — Medication 10 ML: at 09:30

## 2020-09-22 RX ADMIN — FOLIC ACID 1 MG: 1 TABLET ORAL at 10:10

## 2020-09-22 RX ADMIN — LEVOTHYROXINE SODIUM 25 MCG: 0.03 TABLET ORAL at 06:21

## 2020-09-22 RX ADMIN — SODIUM CHLORIDE: 4.5 INJECTION, SOLUTION INTRAVENOUS at 11:24

## 2020-09-22 RX ADMIN — ESCITALOPRAM 20 MG: 10 TABLET, FILM COATED ORAL at 10:10

## 2020-09-22 RX ADMIN — BRIMONIDINE TARTRATE 1 DROP: 2 SOLUTION/ DROPS OPHTHALMIC at 09:30

## 2020-09-22 RX ADMIN — MORPHINE SULFATE 2 MG: 2 INJECTION, SOLUTION INTRAMUSCULAR; INTRAVENOUS at 10:11

## 2020-09-22 RX ADMIN — POTASSIUM CHLORIDE 20 MEQ: 20 TABLET, EXTENDED RELEASE ORAL at 09:30

## 2020-09-22 RX ADMIN — VANCOMYCIN HYDROCHLORIDE 250 MG: 10 INJECTION, POWDER, LYOPHILIZED, FOR SOLUTION INTRAVENOUS at 12:15

## 2020-09-22 RX ADMIN — HEPARIN SODIUM 5000 UNITS: 10000 INJECTION INTRAVENOUS; SUBCUTANEOUS at 00:31

## 2020-09-22 RX ADMIN — ATORVASTATIN CALCIUM 5 MG: 10 TABLET, FILM COATED ORAL at 20:12

## 2020-09-22 RX ADMIN — LATANOPROST 1 DROP: 50 SOLUTION OPHTHALMIC at 20:12

## 2020-09-22 RX ADMIN — HEPARIN SODIUM 5000 UNITS: 10000 INJECTION INTRAVENOUS; SUBCUTANEOUS at 17:39

## 2020-09-22 ASSESSMENT — PAIN SCALES - GENERAL
PAINLEVEL_OUTOF10: 0
PAINLEVEL_OUTOF10: 4
PAINLEVEL_OUTOF10: 10
PAINLEVEL_OUTOF10: 0
PAINLEVEL_OUTOF10: 0

## 2020-09-22 NOTE — CARE COORDINATION
Plan at discharge remains [address]. They will accept pending precert. Therapy evals on chart. Covid 9/19 (-). Ambulance form on soft chart. Clear liquid diet started today. IV Rocephin continues.  CM to follow  Milli Nina, JACY  [facility] Case Management  [phone]

## 2020-09-22 NOTE — PROGRESS NOTES
Department of Internal Medicine  Infectious Diseases  Progress  Note      C/C :   C diff Colitis, leukocytosis     Pt is more awake, and alert  Denies abdomen pain      Afebrile     Current Facility-Administered Medications   Medication Dose Route Frequency Provider Last Rate Last Dose    0.45 % sodium chloride infusion   Intravenous Continuous Ovidio Alexis,  75 mL/hr at 09/22/20 1124      cefTRIAXone (ROCEPHIN) 1 g in sterile water 10 mL IV syringe  1 g Intravenous Q24H Juan Ni MD   1 g at 09/22/20 1010    morphine (PF) injection 2 mg  2 mg Intravenous Q4H PRN Ceferino Hardy MD   2 mg at 09/22/20 1011    sodium chloride flush 0.9 % injection 10 mL  10 mL Intravenous 2 times per day April ARMEN King CNP   10 mL at 09/22/20 0930    sodium chloride flush 0.9 % injection 10 mL  10 mL Intravenous PRN April ARMEN King CNP   10 mL at 09/20/20 1601    acetaminophen (TYLENOL) tablet 650 mg  650 mg Oral Q6H PRN April ARMEN King CNP        Or    acetaminophen (TYLENOL) suppository 650 mg  650 mg Rectal Q6H PRN April ARMEN King - SILAS        polyethylene glycol (GLYCOLAX) packet 17 g  17 g Oral Daily PRN April ARMEN King - CNP        heparin (porcine) injection 5,000 Units  5,000 Units Subcutaneous Q8H April ARMEN King - CNP   5,000 Units at 09/22/20 1010    metronidazole (FLAGYL) 500 mg in NaCl 100 mL IVPB premix  500 mg Intravenous Q8H April ARMEN King - CNP   Stopped at 09/22/20 1048    baclofen (LIORESAL) tablet 10 mg  10 mg Oral BID April ARMEN King CNP   10 mg at 09/22/20 0900    clopidogrel (PLAVIX) tablet 75 mg  75 mg Oral Daily April ARMEN King CNP   75 mg at 09/22/20 1009    escitalopram (LEXAPRO) tablet 20 mg  20 mg Oral Daily April ARMEN King CNP   20 mg at 47/68/02 1354    folic acid (FOLVITE) tablet 1 mg  1 mg Oral Daily April Christine, APRN - CNP   1 mg at 09/22/20 1010    levothyroxine (SYNTHROID) tablet 25 mcg  25 mcg Oral QAM April Christine, APRN - CNP   25 mcg at 09/22/20 6422    cetirizine (ZYRTEC) tablet 10 mg  10 mg Oral Daily April Christine, APRN - CNP   10 mg at 09/21/20 9579    antioxidant multivitamin (OCUVITE) tablet  1 tablet Oral Daily April Christine, APRN - CNP        potassium chloride (KLOR-CON M) extended release tablet 20 mEq  20 mEq Oral Daily April Christine, APRN - CNP   20 mEq at 09/22/20 0930    atorvastatin (LIPITOR) tablet 5 mg  5 mg Oral Nightly April Christine, APRN - CNP   5 mg at 09/21/20 2007    traMADol (ULTRAM) tablet 50 mg  50 mg Oral Q12H PRN April Christine, APRN - CNP        trimethobenzamide Danielle Europe) injection 200 mg  200 mg Intramuscular Q6H PRN April Christine, APRN - CNP        brimonidine (ALPHAGAN) 0.2 % ophthalmic solution 1 drop  1 drop Both Eyes BID April Christine, APRN - CNP   1 drop at 09/22/20 0930    And    timolol (TIMOPTIC) 0.5 % ophthalmic solution 1 drop  1 drop Both Eyes BID April Christine, APRN - CNP   1 drop at 09/22/20 0900    latanoprost (XALATAN) 0.005 % ophthalmic solution 1 drop  1 drop Both Eyes Nightly April Christine, APRN - CNP   1 drop at 09/21/20 2006         REVIEW OF SYSTEMS    CONSTITUTIONAL: Denies fever   Resp : Denies SOB   CVS ; Denies chest pain   Abdomen : denies pain abdomen   NEUROLOGICAL: more awake and alert  SKIN  : no rash     PHYSICAL EXAM:      Vitals:     Vitals:    09/22/20 0800   BP: 124/60   Pulse: 86   Resp: 18   Temp: 99.1 °F (37.3 °C)   SpO2: 98%       General Appearance:    More awake, and alert      Head:    Normocephalic, atraumatic   Eyes:    No pallor, no icterus,   Ears:    No obvious deformity or drainage.    Nose:   No nasal drainage   Throat:   Mucosa dry    Neck:   Supple, no lymphadenopathy   Back:     no CVA tenderness   Lungs:     Clear to auscultation bilaterally    Heart: Regular rate and rhythm, no murmur   Abdomen:     Soft,? tender, bowel sounds present    Extremities:   No edema, no cyanosis   Pulses:   Dorsalis pedis palpable    Skin:   no rashes or lesions     CBC with Differential:      Lab Results   Component Value Date    WBC 25.9 09/22/2020    RBC 2.61 09/22/2020    HGB 8.3 09/22/2020    HCT 25.1 09/22/2020     09/22/2020    MCV 96.2 09/22/2020    MCH 31.8 09/22/2020    MCHC 33.1 09/22/2020    RDW 14.2 09/22/2020    NRBC 0.0 05/07/2017    METASPCT 2.6 09/19/2020    LYMPHOPCT 2.6 09/21/2020    MONOPCT 0.9 09/21/2020    MYELOPCT 0.9 09/20/2020    BASOPCT 0.2 09/21/2020    MONOSABS 0.28 09/21/2020    LYMPHSABS 0.86 09/21/2020    EOSABS 0.00 09/21/2020    BASOSABS 0.00 09/21/2020       CMP     Lab Results   Component Value Date     09/22/2020    K 3.6 09/22/2020    K 3.3 09/20/2020     09/22/2020    CO2 13 09/22/2020    BUN 22 09/22/2020    CREATININE 0.8 09/22/2020    GFRAA >60 09/22/2020    LABGLOM >60 09/22/2020    GLUCOSE 114 09/22/2020    PROT 5.4 09/22/2020    LABALBU 2.3 09/22/2020    CALCIUM 8.3 09/22/2020    BILITOT 0.4 09/22/2020    ALKPHOS 134 09/22/2020    AST 35 09/22/2020    ALT 23 09/22/2020         Hepatic Function Panel:    Lab Results   Component Value Date    ALKPHOS 134 09/22/2020    ALT 23 09/22/2020    AST 35 09/22/2020    PROT 5.4 09/22/2020    BILITOT 0.4 09/22/2020    BILIDIR 0.6 04/19/2019    IBILI 0.6 04/19/2019    LABALBU 2.3 09/22/2020       PT/INR:    Lab Results   Component Value Date    PROTIME 18.8 09/22/2020    PROTIME 19.5 09/05/2019    INR 1.7 09/22/2020       TSH:    Lab Results   Component Value Date    TSH 2.590 07/30/2020       U/A:    Lab Results   Component Value Date    COLORU Yellow 09/19/2020    PHUR 6.0 09/19/2020    WBCUA 2-5 09/19/2020    RBCUA 5-10 09/19/2020    MUCUS Present 07/30/2020    BACTERIA MODERATE 09/19/2020    CLARITYU Clear 09/19/2020    SPECGRAV 1.015 09/19/2020    LEUKOCYTESUR SMALL 09/19/2020 UROBILINOGEN 0.2 09/19/2020    BILIRUBINUR SMALL 09/19/2020    BLOODU TRACE-INTACT 09/19/2020    GLUCOSEU Negative 09/19/2020       ABG:  No results found for: BIL5AEQ, BEART, A3TOWDFS, PHART, THGBART, CIZ3NZA, PO2ART, SGN1DRP    MICROBIOLOGY:    Blood culture - neg      Urine Culture - mixed bacteria      CEA 6.54    Stool for C diff toxin +ve     Radiology :    CT scan of abdomen and pelvis -  Impression:            Colitis mostly involving the ascending colon, rectum and extending to   the anus. Adynamic ileus     Coronary calcification suggesting of coronary artery disease. The Candelario catheter is noted to be coiled up in the urinary bladder   which needs to be repositioned. IMPRESSION:     1. Colitis - C diff infection   2. Leukocytosis - trending down   3. Lactic acidosis - improved     RECOMMENDATIONS:      1. Stop rocephin   2. Oral vancomycin 250 mg po q 6 hrs   3. Flagyl 500 mg IV q 8 hrs   4. CBC with diff   5.  Contact isolation

## 2020-09-22 NOTE — PROGRESS NOTES
Physical Therapy  Facility/Department: [address] PICU  Daily Treatment Note  NAME: Zulma Canseco  : 1950  MRN: [id number]    Date of Service: 2020    Referring Provider:  ARMEN Roach CNP     Evaluating PT:  Shruthi Mayo, PT, DPT. [id number]     Room #:  [address] [place], [place] [address]-  Diagnosis:  Sepsis d/t bacteria   Reason for admission:  Abnormal labs, pain  Precautions:  Falls, cognition  Pertinent PMHx: HTN, HLD, malnutrition, sciatica, liver disease, glaucoma, DVT, depression, anemia   Procedures: none  Equipment Recommendations:  TBD     SUBJECTIVE:  Pt admitted from nursing home. Poor hx, unknown PLOF, states \"yes\" when asked if she walks.      OBJECTIVE:    Initial Evaluation  Date:  Treatment  20  Short Term/ Long Term   Goals   AM-PAC 6 Clicks   0     Was pt agreeable to Eval/treatment? Yes   yes     Does pt have pain? States yes but unable to specify where  unable to localize, does indicate discomfort with movement     Bed Mobility  Rolling: Dependent  Supine to sit: NT  Sit to supine: NT  Scooting: Dependent Rolling: Max A to R, Dep to L  Supine to sit: Max A x2  Sit to supine: Max A x2  Scooting: Dependent ModA   Transfers Sit to stand: NT  Stand to sit: NT  Stand pivot: NT  NT ModA   Ambulation    NT    NT >10 feet with [employer] ModA   Stair negotiation: ascended and descended  NT NT  TBD   ROM BUE:  See OT eval   BLE:  WFL       Strength BUE:  See OT eval   BLE:  Unable to formally assess.  Grossly 2/5 feet/toes   Increase by 1/3 MMT grade    Balance Sitting EOB:  NT  Dynamic Standing:  NT  Sitting EOB: Min<>Mod A  Dynamic standing: NT Sitting EOB:  Jim  Dynamic Standing:  ModA      Pt is A & O x 1 self  Sensation:  Pt denies numbness and tingling to extremities  Edema:  Mild edema of B feet    Therapeutic Exercises:  B LE ROM with cues for engagement, modified PNF1 pattern x15 ea LE  Sitting balance training with cues for core engagement and B UE support x15 minutes    Patient education  Pt educated on positioning, safety with mobility, sitting balance    Patient response to education:   Pt verbalized understanding Pt demonstrated skill Pt requires further education in this area   yes yes ye     ASSESSMENT:    Comments:  Pt resting semi-supine upon arrival, agreeable to PT session. Pt was co-treated for safety with OT with emphasis on individual goals. Pt tolerated increased bed mobility and sitting EOB x15 minutes. When exhibiting signs of increased stress or discomfort pt presenting with extensor pattern tone of B UEs; L UE more than R UE. Pt fatigues quickly and was only able to maintain brief periods of Min A sitting balance. Pt unsafe to attempt standing this date due to poor trunk and LE control. Treatment:  Patient practiced and was instructed in the following treatment:    Bed mobility: cues for sequencing and hand placement, manual assist for completion of all transfers, hand over hand assist for B UE placement on bed rails. Therapeutic activities: cues for sitting balance and core engagement, manual assist for B UE placement and balance support  Therapeutic exercises: B LE ROM as noted above with cues for AAROM and engagement    PLAN:    Patient is making continued progress towards established goals. Will continue with current POC.         PLAN:    Time in  1025  Time out  1111    Total Treatment Time  23    CPT codes:  [] Gait training 29327 0 minutes  [] Manual therapy 25181 0 minutes  [x] Therapeutic activities 11718 23 minutes  [] Therapeutic exercises 98523 0 minutes  [] Neuromuscular reeducation 11945 0 minutes      Rolanda Martinez, PT, DPT  [id number]

## 2020-09-22 NOTE — PROGRESS NOTES
[facility] Medicine    Subjective:  Pt alert responsive      Current Facility-Administered Medications:     cefTRIAXone (ROCEPHIN) 1 g in sterile water 10 mL IV syringe, 1 g, Intravenous, Q24H, Juan Ni MD, 1 g at 09/21/20 1030    morphine (PF) injection 2 mg, 2 mg, Intravenous, Q4H PRN, Quynh Baker MD, 2 mg at 09/21/20 0934    sodium chloride flush 0.9 % injection 10 mL, 10 mL, Intravenous, 2 times per day, April JayuyaARMEN Christopher - CNP    sodium chloride flush 0.9 % injection 10 mL, 10 mL, Intravenous, PRN, April Jay[facility]yaDuke University HospitalCarson, APRN - CNP, 10 mL at 09/20/20 1601    acetaminophen (TYLENOL) tablet 650 mg, 650 mg, Oral, Q6H PRN **OR** acetaminophen (TYLENOL) suppository 650 mg, 650 mg, Rectal, Q6H PRN, April ARMEN King - CNP    polyethylene glycol (GLYCOLAX) packet 17 g, 17 g, Oral, Daily PRN, April ARMEN King - CNP    heparin (porcine) injection 5,000 Units, 5,000 Units, Subcutaneous, Q8H, April Chasidy-CarsonARMEN  CNP, 5,000 Units at 09/22/20 0031    metronidazole (FLAGYL) 500 mg in NaCl 100 mL IVPB premix, 500 mg, Intravenous, Q8H, April ChasidySSM DePaul Health CenterAshwinARMEN méndez - CNP, Stopped at 09/22/20 0032    0.9 % sodium chloride infusion, , Intravenous, Continuous, April ChasidyARMEN Christopher - CNP, Last Rate: 75 mL/hr at 09/20/20 1601    baclofen (LIORESAL) tablet 10 mg, 10 mg, Oral, BID, April JayuyaARMEN Christopher - CNP, 10 mg at 09/21/20 2006    clopidogrel (PLAVIX) tablet 75 mg, 75 mg, Oral, Daily, April ARMEN King - CNP, 75 mg at 09/21/20 7590    escitalopram (LEXAPRO) tablet 20 mg, 20 mg, Oral, Daily, April ARMEN King - CNP, 20 mg at 19/04/81 3304    folic acid (FOLVITE) tablet 1 mg, 1 mg, Oral, Daily, April ARMEN King - CNP, 1 mg at 09/21/20 1415    levothyroxine (SYNTHROID) tablet 25 mcg, 25 mcg, Oral, QAM, April ARMEN King - CNP, 25 mcg at 09/22/20 9100    cetirizine (ZYRTEC) tablet 10 mg, 10 mg, Oral, Daily, 09/22/2020    K 3.6 09/22/2020    K 3.3 09/20/2020     09/22/2020    CO2 13 09/22/2020    BUN 22 09/22/2020    CREATININE 0.8 09/22/2020    GFRAA >60 09/22/2020    LABGLOM >60 09/22/2020    GLUCOSE 114 09/22/2020    PROT 5.4 09/22/2020    LABALBU 2.3 09/22/2020    CALCIUM 8.3 09/22/2020    BILITOT 0.4 09/22/2020    ALKPHOS 134 09/22/2020    AST 35 09/22/2020    ALT 23 09/22/2020     Warfarin PT/INR:    Lab Results   Component Value Date    INR 1.7 09/22/2020    INR 1.5 09/21/2020    INR 1.6 09/19/2020    PROTIME 18.8 (H) 09/22/2020    PROTIME 17.0 (H) 09/21/2020    PROTIME 17.8 (H) 09/19/2020       Assessment:    Principal Problem:    Acute colitis  Active Problems:    Glaucoma    Macular degeneration    Depression    History of cirrhosis    PVD (peripheral vascular disease) with claudication (HCC)    Mixed hyperlipidemia    Acquired hypothyroidism    Essential hypertension    Sepsis due to anaerobic bacteria (HCC)    Leukocytosis    DIVINA (acute kidney injury) ([facility] [address]    Elevated alkaline phosphatase level    Anemia    Septicemia (HCC)  Resolved Problems:    * No resolved hospital problems.  *      Plan:  Cont atb ivf increase activity        Jacey Red  8:20 AM  9/22/2020

## 2020-09-22 NOTE — PROGRESS NOTES
[facility] SURGERY  DAILY PROGRESS NOTE  9/22/2020  Cc:   Abdominal pain    Subjective:  No overnight events noted  Patient moaning this morning but not specific about having complaints    Ammonia level 28  WBC 25.9 from 28.5  MELD of 12    Objective:  /60   Pulse 86   Temp 99.1 °F (37.3 °C) (Oral)   Resp 18   Wt 122 lb 9.2 oz (55.6 kg)   SpO2 98%   BMI 18.64 kg/m²     GENERAL:  Laying in bed, awake, alert, cooperative, no apparent distress  HEAD: Normocephalic, atraumatic  EYES: No sclera icterus, pupils equal  LUNGS:  No increased work of breathing  CARDIOVASCULAR:  Regular rate  ABDOMEN:  Soft, protuberant abdomen (?baseline), RLQ TTP  EXTREMITIES: No edema or swelling  SKIN: Warm and dry    Assessment/Plan:  77 y.o. female with pancolitis, suspect C diff colitis; hx of cirrhosis likely 2/2 alcoholism    Overall care per Primary  ID following, abx per ID  Stool studies pending  Ok for clears  CEA 6, AFP pending  MELD of 12  Monitor abdominal exam for development of toxicmegacolon  Will need outpatient colonoscopy, defer to GI, Dr. Graham Palacios as she is already established with him    Electronically signed by Cristel Cooper DO on 9/22/2020 at 9:04 AM     C Dif colitis  Appreciate ID's input  Acute abdominal series - softly distended with left lower quadrant abdominal pain    Electronically signed by Terence Donnelly MD on 9/22/2020 at 9:45 AM

## 2020-09-22 NOTE — PROGRESS NOTES
Occupational Therapy  OT BEDSIDE TREATMENT NOTE      Date:2020  Patient Name: Thai Calhoun  MRN: [id number]  : 1950  Room: [facility]     Referring Physician:  ARMEN Roach - CNP     Evaluating OT:  OSMAR Baig OTR/L #[id number]     AM-PAC Daily Activity Raw Score:    Recommended Adaptive Equipment:  TBD as pt progresses      Reason for Admission:  Pt was transferred from SNF w/ altered mental status, fever, anemia, tachycardia    Diagnosis:     1. Septicemia ([facility] [address]    2. DIVINA (acute kidney injury) ([facility] [address]    3. Colitis, acute      Procedures this admission:  None      Pertinent Medical History:  Alcohol Abuse, Chronic DVT R LE, Glaucoma, Macular Degeneration, HTN     Precautions:  Falls  Weakness Edilberto UEs  C. Diff R/O  Clear liquid/Low Na diet     Pt is a poor historian - all information taken from chart/previous therapy records     Home Living: As recently as 2020, pt lived alone in a 1-story house. Bed/bath on the main floor. Laundry in Basement. Bathroom setup:  Tub-Shower, Standard Commode   Equipment owned:  [employer], Rollator, Shower Chair    Prior Level of Function:  Pt was IND with ADLs, IADLs, Transfers and Mobility using ? ? for ambulation. Has been in a SNF since 2020 per chart  Driving:  ?? Occupation:  ??     Pain Level:  Unable to quantify - FACES Scale 6/10 Edilberto LEs w/ movement;  Nsg Notified   Additional Complaints:  Noted pt w/ severe weakness of Edilberto UEs, posturing of distal UEs in supination and inability to extend wrist/digits - CT Of head (-) for intracranial process     Cognition: A & O x 1 - pt communicated minimally.   Able to Follow Simple Commands w/ mod-max VCs - limited physically w/ carryout of task              Memory:  poor              Sequencing:  poor              Problem solving:  poor              Judgement/safety:  poor  Additional Comments:  Pt presented w/ flat affect, fair(+) eye contact, pleasant w/ bright affect at end of session    Functional Assessment:    Initial Eval Status  Date: 9-21-20 Treatment Status  Date: 9/22/20 Short Term/Long Term Goals  Treatment frequency: PRN 2-4 x/week  7-10 days   Feeding Max A - Hand-over-Hand assist      Unable to grasp cup/utensils d/t Severe UE weakness, limited AROM of digits, wrist and elbows - assist w/ all functional reaching  Dep; Pt required assistance to bring liquid to mouth with increased time to increase intake. Mod A   Grooming Max A - H. O.H assist     Required assist to grasp/manipulate items, assist w/ functional reaching to wash hands/face seated EOB - max A for dynamic sitting balance     Max A of [place]; To complete washing face, combing hair and improving hygiene of mouth. Mod A   UB Dressing Max A     Required Max A to thread Edilberto UEs into/out of Gown seated EOB, Max A for dynamic sitting balance w/ ax     Dep; Due to limited movement in BUEs patient requires Dep A to kaia/doff gown. Mod A   LB Dressing Max A     Unsafe to attempt to stand for task d/t weakness - all ADLs performed bed level for safety, required Max A d/t inability to use UEs to facilitate task     Dep; To kaia/doff socks while in supine. Mod A   Bathing NT        Dep Mod A   Toileting Max A     Unsafe to attempt to stand for task d/t weakness, Task completed Bed Level, Incontinent of Bowel - loose stool, Max A for bed mobility + Max A for clothing adjustment and bowel hygiene     Dep; Pt incontinent and requires Dep A to improve hygiene. Mod A   Bed Mobility  Rolling for functional ax: Max A  Repositioning:  Max A of 2 in supine   Supine to Sit:  Max A     Sit to Supine:  Max A   Pt ed for safe techs   Sup <> Sit: Max A of 2; To properly transfer to/from supine with verbal prompting to increase positioning.     Mod A   Functional Transfers Sit to stand:  Dep  Stand to sit:  Dep       Unsafe to attempt w/ Max A of 1 d/t weakness, inability to use Edilberto UEs, increased confusion/not following commands despite Max VCs/Pt ed re: safety/hand placement     Sit <> stand: N/A    Max A   Functional Mobility NT          N/A Max A   Balance Sitting:      Static:  Max A at EOB    Dynamic:  Max A w/ functional ax seated EOB     Standing:      Static:  NT    Dynamic:  NT    Sitting: Max A      Standing: N/T      Activity Tolerance Fair(-)  Limited by Weakness/poor cognition     Tolerated Sitting:  EOB ~ 10 mins w/ functional ax w/ Max A for balance     Tolerated Standing:  NT   Poor-;    Completed BUE exercises to increase ROM and tolerance however patient has poor ability to attend to exercise or strength to participate.        Visual/  Perceptual WFL - difficult to assess  Glasses:  Yes - present       Hearing WFL  Hearing Aids  None noted          Hand dominance: Right     UE ROM:        RUE:   AROM Shoulder flex ~ 20*, Elbow flex ~ 40*, Unable to extend wrist to neutral or extend digits w/ gravity eliminated - posturing hands/wrists in supination                                      LUE:    Same as right     Strength:        RUE:   grossly 2/5 - proximally, 1/5 distally                                     LUE:    grossly 2/5- proximally, 1/5 distally      Strength:  Poor Edilberto UEs     Fine Motor Coordination:  Poor Edilberto UEs     **RN Notified of Findings of UE ROM/MMT. Per OT assessment 7-31-20 Edilberto UEs generally 3/5 w/ good /fine motor skills**      Sensation:  Denies numbness or tingling Edilberto UEs - difficult to assess  Tone:  Low Tone Distal Edilberto UEs  Edema:  Mild edema noted Edilberto Hands/digits                             Comments: Upon arrival, patient was found in supine. She was agreeable to participate in therapeutic ax.  No Family present during session.  Received permission from RN prior to engaging pt in OT services.       At the end of the session, patient was properly positioned in Semi-Supine. Pillows properly positioned to improve posture and reduce skin breakdown.  Call light and phone within reach, all lines and tubes intact. Oriented pt to call bell. Made all appropriate Environmental Modifications to facilitate pt's level of IND and safety. All needs met.        Overall patient demonstrated decreased independence and safety during completion of ADL/functional transfer/mobility tasks. Pt would benefit from continued skilled OT to increase safety and independence with completion of ADL/IADL tasks for functional independence and quality of life.     Treatment:  Provided Skilled SUP/Assist w/ Pt safety, Proper Positioning, ADLs and Functional Transfers as noted above, as well as set up and clean up for session. Skilled monitoring of Vitals and pts response to treatment. Consulted RN     Pt would benefit from continued skilled OT services to increase safety and independence with completion of ADL/IADL tasks for functional independence and quality of life. Pt/Family actively participated in the establishment of goals.     Time In:  1025              Time Out:  1111  Total Treatment Time:  23 mins        Treatment Charges: Mins Units   OT Eval Low 37302       OT Eval Medium 40337     OT Eval High 57283       OT Re-Eval 51021       Therapeutic Ex  74244       Therapeutic Activities 46742 10 1   ADL/Self Care 61379 13 1   Neuro Re-ed 05405       Orthotic manage/training  62307       Non-Billable Time 23 0   Total Timed Treatment 23 [id number] [facility] [address]

## 2020-09-23 ENCOUNTER — APPOINTMENT (OUTPATIENT)
Dept: CT IMAGING | Age: 70
DRG: 871 | End: 2020-09-23
Payer: MEDICARE

## 2020-09-23 LAB
ALBUMIN SERPL-MCNC: 2.4 G/DL (ref 3.5–5.2)
ALP BLD-CCNC: 152 U/L (ref 35–104)
ALT SERPL-CCNC: 18 U/L (ref 0–32)
AMMONIA: 13 UMOL/L (ref 11–51)
ANION GAP SERPL CALCULATED.3IONS-SCNC: 17 MMOL/L (ref 7–16)
AST SERPL-CCNC: 29 U/L (ref 0–31)
BILIRUB SERPL-MCNC: 0.3 MG/DL (ref 0–1.2)
BUN BLDV-MCNC: 21 MG/DL (ref 8–23)
CALCIUM SERPL-MCNC: 8.3 MG/DL (ref 8.6–10.2)
CHLORIDE BLD-SCNC: 117 MMOL/L (ref 98–107)
CO2: 13 MMOL/L (ref 22–29)
CREAT SERPL-MCNC: 1 MG/DL (ref 0.5–1)
CULTURE, STOOL: NORMAL
GFR AFRICAN AMERICAN: >60
GFR NON-AFRICAN AMERICAN: 55 ML/MIN/1.73
GLUCOSE BLD-MCNC: 103 MG/DL (ref 74–99)
HCT VFR BLD CALC: 28.6 % (ref 34–48)
HEMOGLOBIN: 9 G/DL (ref 11.5–15.5)
INR BLD: 1.8
MCH RBC QN AUTO: 31.4 PG (ref 26–35)
MCHC RBC AUTO-ENTMCNC: 31.5 % (ref 32–34.5)
MCV RBC AUTO: 99.7 FL (ref 80–99.9)
PDW BLD-RTO: 14.6 FL (ref 11.5–15)
PLATELET # BLD: 458 E9/L (ref 130–450)
PMV BLD AUTO: 11.7 FL (ref 7–12)
POTASSIUM SERPL-SCNC: 3 MMOL/L (ref 3.5–5)
PROTHROMBIN TIME: 20.7 SEC (ref 9.3–12.4)
RBC # BLD: 2.87 E12/L (ref 3.5–5.5)
SODIUM BLD-SCNC: 147 MMOL/L (ref 132–146)
TOTAL PROTEIN: 5.5 G/DL (ref 6.4–8.3)
WBC # BLD: 27.9 E9/L (ref 4.5–11.5)

## 2020-09-23 PROCEDURE — 80053 COMPREHEN METABOLIC PANEL: CPT

## 2020-09-23 PROCEDURE — 97530 THERAPEUTIC ACTIVITIES: CPT

## 2020-09-23 PROCEDURE — 70450 CT HEAD/BRAIN W/O DYE: CPT

## 2020-09-23 PROCEDURE — 6370000000 HC RX 637 (ALT 250 FOR IP): Performed by: NURSE PRACTITIONER

## 2020-09-23 PROCEDURE — 6370000000 HC RX 637 (ALT 250 FOR IP): Performed by: INTERNAL MEDICINE

## 2020-09-23 PROCEDURE — 99222 1ST HOSP IP/OBS MODERATE 55: CPT | Performed by: NURSE PRACTITIONER

## 2020-09-23 PROCEDURE — 6360000002 HC RX W HCPCS: Performed by: NURSE PRACTITIONER

## 2020-09-23 PROCEDURE — 82140 ASSAY OF AMMONIA: CPT

## 2020-09-23 PROCEDURE — 97535 SELF CARE MNGMENT TRAINING: CPT

## 2020-09-23 PROCEDURE — 99232 SBSQ HOSP IP/OBS MODERATE 35: CPT | Performed by: TRANSPLANT SURGERY

## 2020-09-23 PROCEDURE — 85610 PROTHROMBIN TIME: CPT

## 2020-09-23 PROCEDURE — 97530 THERAPEUTIC ACTIVITIES: CPT | Performed by: PHYSICAL THERAPIST

## 2020-09-23 PROCEDURE — 85027 COMPLETE CBC AUTOMATED: CPT

## 2020-09-23 PROCEDURE — 2500000003 HC RX 250 WO HCPCS: Performed by: NURSE PRACTITIONER

## 2020-09-23 PROCEDURE — 2060000000 HC ICU INTERMEDIATE R&B

## 2020-09-23 PROCEDURE — 36415 COLL VENOUS BLD VENIPUNCTURE: CPT

## 2020-09-23 RX ORDER — METRONIDAZOLE 500 MG/1
500 TABLET ORAL EVERY 8 HOURS SCHEDULED
Status: DISCONTINUED | OUTPATIENT
Start: 2020-09-23 | End: 2020-09-25 | Stop reason: HOSPADM

## 2020-09-23 RX ORDER — POTASSIUM CHLORIDE 20 MEQ/1
40 TABLET, EXTENDED RELEASE ORAL ONCE
Status: COMPLETED | OUTPATIENT
Start: 2020-09-23 | End: 2020-09-23

## 2020-09-23 RX ADMIN — BRIMONIDINE TARTRATE 1 DROP: 2 SOLUTION/ DROPS OPHTHALMIC at 08:36

## 2020-09-23 RX ADMIN — VANCOMYCIN HYDROCHLORIDE 250 MG: 10 INJECTION, POWDER, LYOPHILIZED, FOR SOLUTION INTRAVENOUS at 05:44

## 2020-09-23 RX ADMIN — CYCLOPENTOLATE HYDROCHLORIDE 1 TABLET: 10 SOLUTION/ DROPS OPHTHALMIC at 08:41

## 2020-09-23 RX ADMIN — CLOPIDOGREL 75 MG: 75 TABLET, FILM COATED ORAL at 08:41

## 2020-09-23 RX ADMIN — LATANOPROST 1 DROP: 50 SOLUTION OPHTHALMIC at 20:09

## 2020-09-23 RX ADMIN — CETIRIZINE HYDROCHLORIDE 10 MG: 10 TABLET, FILM COATED ORAL at 08:41

## 2020-09-23 RX ADMIN — BACLOFEN 10 MG: 10 TABLET ORAL at 08:41

## 2020-09-23 RX ADMIN — HEPARIN SODIUM 5000 UNITS: 10000 INJECTION INTRAVENOUS; SUBCUTANEOUS at 17:58

## 2020-09-23 RX ADMIN — TIMOLOL MALEATE 1 DROP: 5 SOLUTION OPHTHALMIC at 08:36

## 2020-09-23 RX ADMIN — POTASSIUM CHLORIDE 40 MEQ: 1500 TABLET, EXTENDED RELEASE ORAL at 13:12

## 2020-09-23 RX ADMIN — METRONIDAZOLE 500 MG: 500 TABLET ORAL at 20:10

## 2020-09-23 RX ADMIN — ATORVASTATIN CALCIUM 5 MG: 10 TABLET, FILM COATED ORAL at 20:09

## 2020-09-23 RX ADMIN — METRONIDAZOLE 500 MG: 500 INJECTION, SOLUTION INTRAVENOUS at 08:42

## 2020-09-23 RX ADMIN — TRAMADOL HYDROCHLORIDE 50 MG: 50 TABLET, FILM COATED ORAL at 13:12

## 2020-09-23 RX ADMIN — BACLOFEN 10 MG: 10 TABLET ORAL at 20:09

## 2020-09-23 RX ADMIN — POTASSIUM CHLORIDE 20 MEQ: 20 TABLET, EXTENDED RELEASE ORAL at 08:41

## 2020-09-23 RX ADMIN — HEPARIN SODIUM 5000 UNITS: 10000 INJECTION INTRAVENOUS; SUBCUTANEOUS at 01:58

## 2020-09-23 RX ADMIN — VANCOMYCIN HYDROCHLORIDE 250 MG: 10 INJECTION, POWDER, LYOPHILIZED, FOR SOLUTION INTRAVENOUS at 11:26

## 2020-09-23 RX ADMIN — ESCITALOPRAM 20 MG: 10 TABLET, FILM COATED ORAL at 08:41

## 2020-09-23 RX ADMIN — FOLIC ACID 1 MG: 1 TABLET ORAL at 08:41

## 2020-09-23 RX ADMIN — VANCOMYCIN HYDROCHLORIDE 250 MG: 10 INJECTION, POWDER, LYOPHILIZED, FOR SOLUTION INTRAVENOUS at 17:58

## 2020-09-23 RX ADMIN — HEPARIN SODIUM 5000 UNITS: 10000 INJECTION INTRAVENOUS; SUBCUTANEOUS at 09:55

## 2020-09-23 RX ADMIN — LEVOTHYROXINE SODIUM 25 MCG: 0.03 TABLET ORAL at 05:44

## 2020-09-23 RX ADMIN — BRIMONIDINE TARTRATE 1 DROP: 2 SOLUTION/ DROPS OPHTHALMIC at 20:09

## 2020-09-23 RX ADMIN — TIMOLOL MALEATE 1 DROP: 5 SOLUTION OPHTHALMIC at 20:09

## 2020-09-23 RX ADMIN — METRONIDAZOLE 500 MG: 500 TABLET ORAL at 13:12

## 2020-09-23 ASSESSMENT — PAIN SCALES - GENERAL
PAINLEVEL_OUTOF10: 0
PAINLEVEL_OUTOF10: 7
PAINLEVEL_OUTOF10: 0
PAINLEVEL_OUTOF10: 3
PAINLEVEL_OUTOF10: 0
PAINLEVEL_OUTOF10: 0

## 2020-09-23 NOTE — PROGRESS NOTES
Department of Internal Medicine  Infectious Diseases  Progress  Note      C/C :   C diff Colitis, leukocytosis     Pt is  awake, and alert  Denies abdomen pain   Reports diarrhea      Afebrile     Current Facility-Administered Medications   Medication Dose Route Frequency Provider Last Rate Last Dose    0.45 % sodium chloride infusion   Intravenous Continuous Tiernye Alexis DO 75 mL/hr at 09/22/20 1124      vancomycin (VANCOCIN) oral solution 250 mg  250 mg Oral 4 times per day Eva Morales MD   250 mg at 09/23/20 1126    morphine (PF) injection 2 mg  2 mg Intravenous Q4H PRN Yony Mon MD   2 mg at 09/22/20 1011    sodium chloride flush 0.9 % injection 10 mL  10 mL Intravenous 2 times per day April ARMEN King CNP   10 mL at 09/22/20 0930    sodium chloride flush 0.9 % injection 10 mL  10 mL Intravenous PRN April ARMEN King CNP   10 mL at 09/20/20 1601    acetaminophen (TYLENOL) tablet 650 mg  650 mg Oral Q6H PRN April ARMEN King CNP        Or    acetaminophen (TYLENOL) suppository 650 mg  650 mg Rectal Q6H PRN April ARMEN King CNP        polyethylene glycol (GLYCOLAX) packet 17 g  17 g Oral Daily PRN April ARMEN King CNP        heparin (porcine) injection 5,000 Units  5,000 Units Subcutaneous [facility] April ARMEN King CNP   5,000 Units at 09/23/20 0955    metronidazole (FLAGYL) 500 mg in NaCl 100 mL IVPB premix  500 mg Intravenous Q8H April ARMEN King CNP   Stopped at 09/23/20 7971    baclofen (LIORESAL) tablet 10 mg  10 mg Oral BID April ARMEN King CNP   10 mg at 09/23/20 0841    clopidogrel (PLAVIX) tablet 75 mg  75 mg Oral Daily April ARMEN King CNP   75 mg at 09/23/20 0841    escitalopram (LEXAPRO) tablet 20 mg  20 mg Oral Daily April ARMEN King CNP   20 mg at 71/70/19 0660    folic acid (FOLVITE) tablet 1 mg  1 mg Oral Daily April Christine, APRN - CNP   1 mg at 09/23/20 0841    levothyroxine (SYNTHROID) tablet 25 mcg  25 mcg Oral QAM April Storey-Cornelius, APRN - CNP   25 mcg at 09/23/20 0544    cetirizine (ZYRTEC) tablet 10 mg  10 mg Oral Daily April Storey-Cornelius, APRN - CNP   10 mg at 09/23/20 3178    antioxidant multivitamin (OCUVITE) tablet  1 tablet Oral Daily April Storey-Ashwin, APRN - CNP   1 tablet at 09/23/20 0841    potassium chloride (KLOR-CON M) extended release tablet 20 mEq  20 mEq Oral Daily April Storey-Marana, APRN - CNP   20 mEq at 09/23/20 0841    atorvastatin (LIPITOR) tablet 5 mg  5 mg Oral Nightly April Storey-Ashwin, APRN - CNP   5 mg at 09/22/20 2012    traMADol (ULTRAM) tablet 50 mg  50 mg Oral Q12H PRN April Fillmore-Ashwin, APRN - CNP        trimethobenzamide [place]) injection 200 mg  200 mg Intramuscular Q6H PRN April Storey-Marana, APRN - CNP        brimonidine (ALPHAGAN) 0.2 % ophthalmic solution 1 drop  1 drop Both Eyes BID April Storey-Ashwin, APRN - CNP   1 drop at 09/23/20 7222    And    timolol (TIMOPTIC) 0.5 % ophthalmic solution 1 drop  1 drop Both Eyes BID April Storey-Ashwin, APRN - CNP   1 drop at 09/23/20 0836    latanoprost (XALATAN) 0.005 % ophthalmic solution 1 drop  1 drop Both Eyes Nightly April Storey-Ashwin, APRN - CNP   1 drop at 09/22/20 2012         REVIEW OF SYSTEMS    CONSTITUTIONAL: Denies fever   Resp : Denies SOB   CVS ; Denies chest pain   Abdomen : Reports diarrhea   NEUROLOGICAL: more awake and alert  SKIN  : no rash     PHYSICAL EXAM:      Vitals:     BP (!) 105/53   Pulse 65   Temp 96.1 °F (35.6 °C) (Temporal)   Resp 15   Wt 119 lb 11.4 oz (54.3 kg)   SpO2 95%   BMI 18.20 kg/m²     General Appearance:    Awake , alert, confused,      Head:    Normocephalic, atraumatic   Eyes:    No pallor, no icterus,   Ears:    No obvious deformity or drainage.    Nose:   No nasal drainage   Throat:   Mucosa dry    Neck:   Supple, no lymphadenopathy   Back:     no CVA tenderness   Lungs:     Clear to auscultation bilaterally    Heart:    Regular rate and rhythm, no murmur   Abdomen:     Soft,? tender, bowel sounds present    Extremities:   No edema, no cyanosis   Pulses:   Dorsalis pedis palpable    Skin:   no rashes or lesions     CBC with Differential:      Lab Results   Component Value Date    WBC 27.9 09/23/2020    RBC 2.87 09/23/2020    HGB 9.0 09/23/2020    HCT 28.6 09/23/2020     09/23/2020    MCV 99.7 09/23/2020    MCH 31.4 09/23/2020    MCHC 31.5 09/23/2020    RDW 14.6 09/23/2020    NRBC 0.0 05/07/2017    METASPCT 2.6 09/19/2020    LYMPHOPCT 2.6 09/21/2020    MONOPCT 0.9 09/21/2020    MYELOPCT 0.9 09/20/2020    BASOPCT 0.2 09/21/2020    MONOSABS 0.28 09/21/2020    LYMPHSABS 0.86 09/21/2020    EOSABS 0.00 09/21/2020    BASOSABS 0.00 09/21/2020       CMP     Lab Results   Component Value Date     09/23/2020    K 3.0 09/23/2020    K 3.3 09/20/2020     09/23/2020    CO2 13 09/23/2020    BUN 21 09/23/2020    CREATININE 1.0 09/23/2020    GFRAA >60 09/23/2020    LABGLOM 55 09/23/2020    GLUCOSE 103 09/23/2020    PROT 5.5 09/23/2020    LABALBU 2.4 09/23/2020    CALCIUM 8.3 09/23/2020    BILITOT 0.3 09/23/2020    ALKPHOS 152 09/23/2020    AST 29 09/23/2020    ALT 18 09/23/2020         Hepatic Function Panel:    Lab Results   Component Value Date    ALKPHOS 152 09/23/2020    ALT 18 09/23/2020    AST 29 09/23/2020    PROT 5.5 09/23/2020    BILITOT 0.3 09/23/2020    BILIDIR 0.6 04/19/2019    IBILI 0.6 04/19/2019    LABALBU 2.4 09/23/2020       PT/INR:    Lab Results   Component Value Date    PROTIME 20.7 09/23/2020    PROTIME 19.5 09/05/2019    INR 1.8 09/23/2020       TSH:    Lab Results   Component Value Date    TSH 2.590 07/30/2020       U/A:    Lab Results   Component Value Date    COLORU Yellow 09/19/2020    PHUR 6.0 09/19/2020    WBCUA 2-5 09/19/2020    RBCUA 5-10 09/19/2020    MUCUS Present 07/30/2020    BACTERIA MODERATE 09/19/2020    CLARITYU Clear 09/19/2020    SPECGRAV 1.015 09/19/2020    LEUKOCYTESUR SMALL 09/19/2020    UROBILINOGEN 0.2 09/19/2020    BILIRUBINUR SMALL 09/19/2020    BLOODU TRACE-INTACT 09/19/2020    GLUCOSEU Negative 09/19/2020       ABG:  No results found for: CVO9FKE, BEART, K4HOOHUU, PHART, THGBART, QUJ4QOA, PO2ART, OIL2GJN    MICROBIOLOGY:    Blood culture - neg      Urine Culture - mixed bacteria      CEA 6.54    Stool for C diff toxin +ve     Radiology :    CT scan of abdomen and pelvis -  Impression:            Colitis mostly involving the ascending colon, rectum and extending to   the anus. Adynamic ileus     Coronary calcification suggesting of coronary artery disease. The Candelario catheter is noted to be coiled up in the urinary bladder   which needs to be repositioned. IMPRESSION:     1. Colitis - C diff infection   2. Leukocytosis - trending down   3. Lactic acidosis - improved     RECOMMENDATIONS:      1. Oral vancomycin 250 mg po q 6 hrs   2. Flagyl 500 mg IV to po  q 8 hrs   3. CBC with diff   4.  Contact isolation

## 2020-09-23 NOTE — CONSULTS
Tony Manuel is a 77 y.o. right handed woman. Neurology was consulted for AMS. No family present at bedside currently. Past Medical History:     Past Medical History:   Diagnosis Date    Alcoholism ([facility]     heavy alcohol consumption since ~[id number] till early 2019    Anemia 05/2017    Blue toe syndrome of left lower extremity ([address] 5/22/2017    C. difficile colitis 09/22/2020    Cataract     Chronic deep vein thrombosis (DVT) of femoral vein of right lower extremity ([facility] [address] 3/4/2019    Critical lower limb ischemia 5/29/2017    Depression \    DVT (deep venous thrombosis) ([facility] [address] 09/13/2018    R leg    Glaucoma     History of blood transfusion 05/2017    Hyperlipidemia     Hypertension     Hypothyroidism     Liver disease     Macular degeneration     Peripheral edema     Sciatica     Severe protein-calorie malnutrition ([facility] [address] 4/18/2019       Past Surgical History:     Past Surgical History:   Procedure Laterality Date    DENTAL SURGERY  2007    teeth extraction    UPPER GASTROINTESTINAL ENDOSCOPY N/A 1/14/2019    EGD BIOPSY performed by Sabrina Dee MD at [address]         Allergies:     Levofloxacin; Other; Pcn [penicillins]; and Erythromycin    Medications:     Prior to Admission medications    Medication Sig Start Date End Date Taking? Authorizing Provider   baclofen (LIORESAL) 10 MG tablet Take 10 mg by mouth 2 times daily   Yes Historical Provider, MD   traMADol (ULTRAM) 50 MG tablet Take 50 mg by mouth every 12 hours as needed for Pain.     Yes Historical Provider, MD   loratadine (CLARITIN) 10 MG capsule Take 10 mg by mouth daily   Yes Historical Provider, MD   acetaminophen (TYLENOL) 325 MG tablet Take 2 tablets by mouth every 6 hours as needed for Pain 8/3/20  Yes Chucky Miller, DO   lisinopril (PRINIVIL;ZESTRIL) 20 MG tablet Take 1 tablet by mouth daily 8/3/20  Yes Chucky Miller, DO   folic acid (FOLVITE) 1 MG tablet Take 1 tablet by mouth daily 8/3/20  Yes Jacinta Perezin, DO   clopidogrel (PLAVIX) 75 MG tablet Take 1 tablet by mouth daily 6/15/20  Yes Sandor Hilario DO   escitalopram (LEXAPRO) 20 MG tablet Take 1 tablet by mouth daily 6/15/20  Yes Sandor Hilario DO   levothyroxine (SYNTHROID) 25 MCG tablet Take 1 tablet by mouth every morning 6/15/20  Yes Sandor Hilario DO   potassium chloride (KLOR-CON M) 20 MEQ extended release tablet Take 1 tablet by mouth daily 6/15/20  Yes Sandor Hilario DO   simvastatin (ZOCOR) 5 MG tablet Take 1 tablet by mouth nightly 6/15/20  Yes Sandor Hilario DO   latanoprost (XALATAN) 0.005 % ophthalmic solution  7/12/13  Yes Historical Provider, MD   brimonidine-timolol (COMBIGAN) 0.2-0.5 % ophthalmic solution Place 1 drop into both eyes every 12 hours   Yes Historical Provider, MD   Handicap Placard MISC by NOT APPLICABLE route 3/4/61   Historical Provider, MD   Cholecalciferol (VITAMIN D3) 58931 units CAPS  5/4/17   Historical Provider, MD   Multiple Vitamins-Minerals (PRESERVISION AREDS PO) Take 1 tablet by mouth daily    Historical Provider, MD       Social History:     Denies ETOH, tobacco, or illicit drugs. Patient is currently a resident at [address] [place] [address]. Review of Systems:     No complaints voiced  No chest pain or palpitations  No SOB  No vertigo, lightheadedness or loss of consciousness  No falls, tripping or stumbling  No incontinence of bowels or bladder  No itching or bruising appreciated  No numbness, tingling or focal arm/leg weakness    ROS is otherwise negative     Family History:     Family History   Problem Relation Age of Onset    Diabetes Father     Diabetes Sister         History of Present Illness:     Patient presents the ED on 9/19 for fatigue. Patient was brought in from nursing home for altered mental status, fever and abnormal labs---anemia and leukocytosis. Patient hemoglobin is also low--- patient denies blood in stool and urine.   Patient was nonverbal and did not follow commands in ED. Per nursing home, patient was able to talk a little bit but not so much as her baseline. On arrival to ED, patient found to be tachycardic, febrile with altered mental status. Chest x-ray showed no infiltrate, CT head on 9/19 unremarkable however UA showed moderate bacteria, RBCs and leukocyte estrace. CT abdomen 9/19 revealed colitis mostly involving ascending colon, rectum and extending to the anus with adynamic ileus. CT head 9/23 showed generalized atrophy; no change since prior study. On arrival, /70, pulse 90, temp 100, respirations 20 and O2 saturation 98%. At present time, patient is alert to self, time and place; knows she is in the hospital however thought she is in [facility]--- does not recall being downtown. Patient able to state current president and follows commands for me without difficulty. Patient intermittently confused; adamant that she was in [facility] earlier today--- after stepping off an elevator with a man who arrested her for abusing children. Talking to staff, patient has felt this way all day as she has repeated her story before. Patient is pleasant in bed resting quietly with no complaints for me. Objective:     BP (!) 105/53   Pulse 65   Temp 96.1 °F (35.6 °C) (Temporal)   Resp 15   Wt 119 lb 11.4 oz (54.3 kg)   SpO2 95%   BMI 18.20 kg/m²     General appearance: alert, frail, appears older than stated age, cooperative and no distress  Head: normocephalic, without obvious abnormality, atraumatic  Eyes: conjunctivae/corneas clear.  .  Neck:  supple, symmetrical, trachea midline and thyroid not enlarged  Lungs: clear--diminished to auscultation bilaterally, unlabored breaths  Abdomen: Distended and tender  Heart: regular rate and rhythm, NSR on telemetry  Extremities: normal, atraumatic, no cyanosis or edema; frail  Pulses: 1+ and symmetric  Skin: color, texture, turgor normal---no rashes or lesions    Mental Status: alert, oriented x3; intermittently confused to situation, thought content appropriate  Follows simple commands without difficulty for me; pleasant and even makes jokes    Appropriate attention/concentration  Intact fundus of knowledge  Memories impaired    Speech: mild dysarthria also edentulous  Language: no aphasias--- no issues identifying objects, repeating phrases and expressing herself    Cranial Nerves:  I: smell    II: visual acuity     II: visual fields  blinks to threat bilaterally   II: pupils PERRL   III,VII: ptosis None   III,IV,VI: extraocular muscles  EOMI without nystagmus    V: mastication Normal   V: facial light touch sensation  Normal   V,VII: corneal reflex     VII: facial muscle function - upper  Normal   VII: facial muscle function - lower Normal   VIII: hearing Normal   IX: soft palate elevation  Normal   IX,X: gag reflex    XI: trapezius strength  5/5   XI: sternocleidomastoid strength 5/5   XI: neck extension strength  5/5   XII: tongue strength  Normal     Motor:  Minimal hand  due to contractures and atrophy to bilateral wrist  Generalized weakness noted--- +3/5 throughout  Decreased bulk and normal tone   BUE drifts initially--- later in the exam when repeated no drift present  No abnormal movements noted    Sensory:  LT and PP diminished to BLE    Coordination:   FN and FFM impaired and slow due to contracture/decreased dexterity    Gait:  Not tested     DTR:   Right Brachioradialis reflex 1+  Left Brachioradialis reflex 1+  Right Biceps reflex 1+  Left Biceps reflex 1+  Right Triceps reflex 1+  Left Triceps reflex 1+  Right Quadriceps reflex 1+  Left Quadriceps reflex 1+  Right Achilles reflex 1+  Left Achilles reflex 1+    No Babinskis  No Rutherford's     No other pathological reflexes    Laboratory/Radiology:     CBC:   Lab Results   Component Value Date    WBC 27.9 09/23/2020    RBC 2.87 09/23/2020    HGB 9.0 09/23/2020    HCT 28.6 09/23/2020    MCV 99.7 09/23/2020    MCH 31.4 09/23/2020    MCHC 31.5 09/23/2020    RDW 14.6 09/23/2020     09/23/2020    MPV 11.7 09/23/2020     CMP:    Lab Results   Component Value Date     09/23/2020    K 3.0 09/23/2020    K 3.3 09/20/2020     09/23/2020    CO2 13 09/23/2020    BUN 21 09/23/2020    CREATININE 1.0 09/23/2020    GFRAA >60 09/23/2020    LABGLOM 55 09/23/2020    GLUCOSE 103 09/23/2020    PROT 5.5 09/23/2020    LABALBU 2.4 09/23/2020    CALCIUM 8.3 09/23/2020    BILITOT 0.3 09/23/2020    ALKPHOS 152 09/23/2020    AST 29 09/23/2020    ALT 18 09/23/2020     Hepatic Function Panel:    Lab Results   Component Value Date    ALKPHOS 152 09/23/2020    ALT 18 09/23/2020    AST 29 09/23/2020    PROT 5.5 09/23/2020    BILITOT 0.3 09/23/2020    LABALBU 2.4 09/23/2020     U/A:    Lab Results   Component Value Date    COLORU Yellow 09/19/2020    PROTEINU 100 09/19/2020    PHUR 6.0 09/19/2020    WBCUA 2-5 09/19/2020    RBCUA 5-10 09/19/2020    MUCUS Present 07/30/2020    BACTERIA MODERATE 09/19/2020    CLARITYU Clear 09/19/2020    SPECGRAV 1.015 09/19/2020    LEUKOCYTESUR SMALL 09/19/2020    UROBILINOGEN 0.2 09/19/2020    BILIRUBINUR SMALL 09/19/2020    BLOODU TRACE-INTACT 09/19/2020    GLUCOSEU Negative 09/19/2020     HgBA1c:    Lab Results   Component Value Date    LABA1C 5.1 06/03/2020     FLP:    Lab Results   Component Value Date    TRIG 72 06/03/2020    HDL 65 06/03/2020    LDLCALC 64 06/03/2020    LABVLDL 14 06/03/2020     TSH:    Lab Results   Component Value Date    TSH 2.590 07/30/2020   FOLATE:    Lab Results   Component Value Date    FOLATE 2.2 07/30/2020     IRON:    Lab Results   Component Value Date    IRON 181 06/03/2020     CT HEAD WO CONTRAST   Final Result      Generalized atrophy; no change since the exam dated 9/19/2020         XR ACUTE ABD SERIES CHEST 1 VW   Final Result      No definite change since the CT scan dated 9/19/2020         CT ABDOMEN PELVIS W IV CONTRAST Additional Contrast? None   Final Result Colitis mostly involving the ascending colon, rectum and extending to   the anus. Adynamic ileus      Coronary calcification suggesting of coronary artery disease. The Candelario catheter is noted to be coiled up in the urinary bladder   which needs to be repositioned. CT HEAD WO CONTRAST   Final Result      NO ACUTE INTRACRANIAL PROCESS      Unchanged from prior study. XR CHEST PORTABLE   Final Result      NO ACUTE CARDIOPULMONARY PROCESS           I independently reviewed the labs and imaging studies today    Assessment:     Acute metabolic encephalopathy due to multiple medical conditions on arrival---   CT head x2 shows no acute pathology    Leukocytosis--- WBC 43.3 and Lactic acidosis 3.2 in ED   Colitis with C. difficile infection--- on vancomycin and Flagyl   UTI from UA on 9/19/2020   WBC improved to 27.9 today     Anemia 8.1/24.7 on arrival   Stable--- 9.0/28.6 today    Acute kidney injury 1.4, hypokalemia, hyponatremia, hypocalcemia and     History of known cirrhosis   Patient is known to Dr. Tee Conway   Elevated LFTs noted since arrival    At present time patient is alert and oriented x3; confused to situation intermittently. Patient follows commands, able to express herself and hold conversation without difficulty. Plan:     Discussed case and plan of care with Dr. Jung Grew supportive care  Neurochecks every 4hrs to monitor for changes  Continue to treat infections  Continue to treat metabolic abnormalities  Continue Plavix and statin for stroke prevention  Continue telemetry  Continue PT/OT      Continue to treat patient's medical issues. No further neuro work-up at this time is indicated. If confusion continues after correction of above, please call neuro to re-evaluate. Neuro will sign off.     [id number], APRN NP-C   2:47 PM  9/23/2020

## 2020-09-23 NOTE — PROGRESS NOTES
Occupational Therapy  OT BEDSIDE TREATMENT NOTE      Date:2020  Patient Name: Rea Jackson  MRN: [id number]  : 1950  Room: [facility]     Referring Physician:  ARMEN Roach - CNP     Evaluating OT:  OSMAR Tao, OTR/L #[id number]     AM-PAC Daily Activity Raw Score:    Recommended Adaptive Equipment:  TBD as pt progresses      Reason for Admission:  Pt was transferred from SNF w/ altered mental status, fever, anemia, tachycardia    Diagnosis:     1. Septicemia ([facility] [facility]    2. DIVINA (acute kidney injury) ([facility] [address]    3. Colitis, acute       Pertinent Medical History:  Alcohol Abuse, Chronic DVT R LE, Glaucoma, Macular Degeneration, HTN     Precautions:  Falls  Weakness Edilberto UEs  C. Diff R/O  Clear liquid/Low Na diet     Pt is a poor historian - all information taken from chart/previous therapy records     Home Living: As recently as 2020, pt lived alone in a 1-story house. Bed/bath on the main floor. Laundry in Basement. Bathroom setup:  Tub-Shower, Standard Commode   Equipment owned:  [employer], Rollator, Shower Chair    Prior Level of Function:  Pt was IND with ADLs, IADLs, Transfers and Mobility using ? ? for ambulation. Has been in a SNF since 2020 per chart  Driving:  ?? Occupation:  ??     Pain Level:  Unable to quantify - FACES Scale 6/10 Edilberto LEs w/ movement;  Nsg Notified   Additional Complaints:  Noted pt w/ severe weakness of Edilberto UEs, posturing of distal UEs in supination and inability to extend wrist/digits - CT Of head (-) for intracranial process     Cognition: A & O x 3 - increased time to communicate however patient able to communicate more this date.    Able to Follow Simple Commands w/ mod-max VCs - limited physically w/ carryout of task              Memory:  poor              Sequencing:  poor              Problem solving:  poor              Judgement/safety:  poor  Additional Comments:  Pt presented with increased interaction this date.      Functional Assessment:    Initial Eval Status  Date: 9-21-20 Treatment Status  Date: 9/23/20 Short Term/Long Term Goals  Treatment frequency: PRN 2-4 x/week  7-10 days   Feeding Max A - Hand-over-Hand assist      Unable to grasp cup/utensils d/t Severe UE weakness, limited AROM of digits, wrist and elbows - assist w/ all functional reaching  MAX A;    Pt required [place] assist to bring liquid to mouth with increased time to increase intake. Mod A   Grooming Max A - H. O.H assist     Required assist to grasp/manipulate items, assist w/ functional reaching to wash hands/face seated EOB - max A for dynamic sitting balance     Max A of [place]; To complete washing face, combing hair and improving mouth care. Mod A   UB Dressing Max A     Required Max A to thread Edilberto UEs into/out of Gown seated EOB, Max A for dynamic sitting balance w/ ax     Dep; Due to limited movement in BUEs patient requires Dep A to kaia/doff gown. Mod A   LB Dressing Max A     Unsafe to attempt to stand for task d/t weakness - all ADLs performed bed level for safety, required Max A d/t inability to use UEs to facilitate task     Dep; To kaia/doff socks while in supine. Mod A   Bathing NT        Dep; To simulate bathing tasks while in supine. Mod A   Toileting Max A     Unsafe to attempt to stand for task d/t weakness, Task completed Bed Level, Incontinent of Bowel - loose stool, Max A for bed mobility + Max A for clothing adjustment and bowel hygiene     Dep; Pt incontinent and requires Dep A to improve hygiene. Mod A   Bed Mobility  Rolling for functional ax: Max A  Repositioning:  Max A of 2 in supine   Supine to Sit:  Max A     Sit to Supine:  Max A   Pt ed for safe techs   Sup <> Sit: Max A    To properly transfer to/from supine with verbal prompting to increase positioning due to pt leaning posteriorly.     Mod A   Functional Transfers Sit to stand:  Dep  Stand to sit:  Dep       Unsafe to attempt w/ Max A of 1 d/t weakness, inability to use Edilberto UEs, increased confusion/not following commands despite Max VCs/Pt ed re: safety/hand placement     Sit <> stand: N/A    Max A   Functional Mobility NT          N/A Max A   Balance Sitting:      Static:  Max A at EOB    Dynamic:  Max A w/ functional ax seated EOB     Standing:      Static:  NT    Dynamic:  NT    Sitting:  Max/Dep A      Standing: N/T      Activity Tolerance Fair(-)  Limited by Weakness/poor cognition     Tolerated Sitting:  EOB ~ 10 mins w/ functional ax w/ Max A for balance     Tolerated Standing:  NT   Poor-;    Completed BUE exercises to increase ROM and tolerance with patient showing more ability to attend to exercise and strength to participate in each exercise.        Visual/  Perceptual WFL - difficult to assess  Glasses:  Yes - present       Hearing WFL  Hearing Aids  None noted          Hand dominance: Right     UE ROM:        RUE:   AROM Shoulder flex ~ 20*, Elbow flex ~ 40*, Unable to extend wrist to neutral or extend digits w/ gravity eliminated - posturing hands/wrists in supination                                      LUE:    Same as right     Strength:        RUE:   grossly 2/5 - proximally, 1/5 distally                                     LUE:    grossly 2/5- proximally, 1/5 distally      Strength:  Poor Edilberto UEs     Fine Motor Coordination:  Poor Edilberto UEs     **RN Notified of Findings of UE ROM/MMT. Per OT assessment 7-31-20 Edilberto UEs generally 3/5 w/ good /fine motor skills**      Sensation:  Denies numbness or tingling Edilberto UEs - difficult to assess  Tone:  Low Tone Distal Edilberto UEs  Edema:  Mild edema noted Edilberto Hands/digits                             Comments: Upon arrival, patient was found in supine. She was agreeable to participate in therapeutic ax.  No Family present during session.  Received permission from RN prior to engaging pt in OT services.       At the end of the session, patient was properly positioned in Semi-Supine.  Properly positioned to improve posture and reduce skin breakdown. Call light and phone within reach, all lines and tubes intact. Oriented pt to call bell. Made all appropriate Environmental Modifications to facilitate pt's level of IND and safety. All needs met.        Overall patient demonstrated decreased independence and safety during completion of ADL/functional transfer/mobility tasks. Pt would benefit from continued skilled OT to increase safety and independence with completion of ADL/IADL tasks for functional independence and quality of life.     Treatment:  Provided Skilled SUP/Assist w/ Pt safety, Proper Positioning, ADLs and Functional Transfers as noted above, as well as set up and clean up for session. Skilled monitoring of Vitals and pts response to treatment. Consulted RN     Pt would benefit from continued skilled OT services to increase safety and independence with completion of ADL/IADL tasks for functional independence and quality of life. Pt/Family actively participated in the establishment of goals.     Time In:  1445             Time Out:  1515  Total Treatment Time:  30 mins        Treatment Charges: Mins Units   OT Eval Low 62157       OT Eval Medium 40496     OT Eval High 68767       OT Re-Eval 47829       Therapeutic Ex  23159       Therapeutic Activities 90833 64 1   ADL/Self Care 99746 17 1   Neuro Re-ed 62753       Orthotic manage/training  17892       Non-Billable Time     Total Timed Treatment 30 [id number] [facility] [address]

## 2020-09-23 NOTE — PLAN OF CARE
Problem: Confusion - Acute:  Goal: Absence of continued neurological deterioration signs and symptoms  Description: Absence of continued neurological deterioration signs and symptoms  Outcome: Not Met This Shift  Goal: Mental status will be restored to baseline  Description: Mental status will be restored to baseline  Outcome: Not Met This Shift

## 2020-09-23 NOTE — PROGRESS NOTES
[facility] SURGERY  DAILY PROGRESS NOTE  9/23/2020  Cc:   Abdominal pain    Subjective:  No overnight events noted  Patient moaning this morning but not specific about having complaints    Ammonia level 28  WBC 25.9 from 28.5  MELD of 12    Objective:  /78   Pulse 82   Temp 98.2 °F (36.8 °C) (Oral)   Resp 18   Wt 119 lb 11.4 oz (54.3 kg)   SpO2 98%   BMI 18.20 kg/m²     GENERAL:  Laying in bed, awake, alert, cooperative, no apparent distress  HEAD: Normocephalic, atraumatic  EYES: No sclera icterus, pupils equal  LUNGS:  No increased work of breathing  CARDIOVASCULAR:  Regular rate  ABDOMEN:  Soft, protuberant abdomen (?baseline), RLQ TTP  EXTREMITIES: No edema or swelling  SKIN: Warm and dry    Assessment/Plan:  77 y.o. female with C diff colitis; hx of cirrhosis likely 2/2 alcoholism    Overall care per Primary  ID following, abx per ID, on po Vanc and IV flagyl  Stool studies pending  Ok for clears  CEA 6, AFP pending  MELD of 12  Monitor abdominal exam for development of toxic megacolon  Will need outpatient colonoscopy, defer to GI, Dr. Myriam Ochoa as she is already established with him    Electronically signed by Elder Smyth DO on 9/23/2020 at 6:58 AM     Abdominal exam improving, pain decreasing  Agree with above    Electronically signed by Laney Underwood MD on 9/23/2020 at 5:28 PM

## 2020-09-23 NOTE — PROGRESS NOTES
Hospital Medicine    Subjective:  Pt seen this am alert responsive      Current Facility-Administered Medications:     metroNIDAZOLE (FLAGYL) tablet 500 mg, 500 mg, Oral, 3 times per day, Juan Ni MD    0.45 % sodium chloride infusion, , Intravenous, Continuous, Halina Rinne Malmer, DO, Last Rate: 75 mL/hr at 09/22/20 1124    vancomycin (VANCOCIN) oral solution 250 mg, 250 mg, Oral, 4 times per day, Jo Ann Llamas MD, 250 mg at 09/23/20 1126    morphine (PF) injection 2 mg, 2 mg, Intravenous, Q4H PRN, Luke Turner MD, 2 mg at 09/22/20 1011    sodium chloride flush 0.9 % injection 10 mL, 10 mL, Intravenous, 2 times per day, April ARMEN King - CNP, 10 mL at 09/22/20 0930    sodium chloride flush 0.9 % injection 10 mL, 10 mL, Intravenous, PRN, April ARMEN King - CNP, 10 mL at 09/20/20 1601    acetaminophen (TYLENOL) tablet 650 mg, 650 mg, Oral, Q6H PRN **OR** acetaminophen (TYLENOL) suppository 650 mg, 650 mg, Rectal, Q6H PRN, April ARMEN King - CNP    polyethylene glycol (GLYCOLAX) packet 17 g, 17 g, Oral, Daily PRN, April ARMEN King - CNP    heparin (porcine) injection 5,000 Units, 5,000 Units, Subcutaneous, Q8H, April ARMEN King - CNP, 5,000 Units at 09/23/20 3388    baclofen (LIORESAL) tablet 10 mg, 10 mg, Oral, BID, April ARMEN King - CNP, 10 mg at 09/23/20 9805    clopidogrel (PLAVIX) tablet 75 mg, 75 mg, Oral, Daily, April ARMEN King - CNP, 75 mg at 09/23/20 0841    escitalopram (LEXAPRO) tablet 20 mg, 20 mg, Oral, Daily, April ARMEN King - SILAS, 20 mg at 83/97/64 9856    folic acid (FOLVITE) tablet 1 mg, 1 mg, Oral, Daily, April ARMEN King CNP, 1 mg at 09/23/20 3360    levothyroxine (SYNTHROID) tablet 25 mcg, 25 mcg, Oral, QAM, April ARMEN King - CNP, 25 mcg at 09/23/20 0544    cetirizine (ZYRTEC) tablet 10 mg, 10 mg, Oral, Daily, April ARMEN King CNP, 10 mg at 09/23/20 0841    antioxidant multivitamin (OCUVITE) tablet, 1 tablet, Oral, Daily, April Storey-Cornelius, APRN - CNP, 1 tablet at 09/23/20 0841    potassium chloride (KLOR-CON M) extended release tablet 20 mEq, 20 mEq, Oral, Daily, April Storey-Cornelius, APRN - CNP, 20 mEq at 09/23/20 0841    atorvastatin (LIPITOR) tablet 5 mg, 5 mg, Oral, Nightly, April Storey-Cornelius, APRN - CNP, 5 mg at 09/22/20 2012    traMADol (ULTRAM) tablet 50 mg, 50 mg, Oral, Q12H PRN, April Storey-Cornelius, APRN - CNP    trimethobenzamide Julio C[facility]ar Stabs) injection 200 mg, 200 mg, Intramuscular, Q6H PRN, April [place], APRN - CNP    brimonidine (ALPHAGAN) 0.2 % ophthalmic solution 1 drop, 1 drop, Both Eyes, BID, 1 drop at 09/23/20 0836 **AND** timolol (TIMOPTIC) 0.5 % ophthalmic solution 1 drop, 1 drop, Both Eyes, BID, April Storey-Cornelius, APRN - CNP, 1 drop at 09/23/20 0836    latanoprost (XALATAN) 0.005 % ophthalmic solution 1 drop, 1 drop, Both Eyes, Nightly, April Storey-Cornelius, APRN - CNP, 1 drop at 09/22/20 2012    Objective:    BP (!) 105/53   Pulse 65   Temp 96.1 °F (35.6 °C) (Temporal)   Resp 15   Wt 119 lb 11.4 oz (54.3 kg)   SpO2 95%   BMI 18.20 kg/m²     Heart:  Reg  Lungs:  CTA bilaterally, no wheeze, rales or rhonchi  Abd: bowel sounds present, min tender to palp, nondistended, no masses  Extrem:  No clubbing, cyanosis, or edema    CBC with Differential:    Lab Results   Component Value Date    WBC 27.9 09/23/2020    RBC 2.87 09/23/2020    HGB 9.0 09/23/2020    HCT 28.6 09/23/2020     09/23/2020    MCV 99.7 09/23/2020    MCH 31.4 09/23/2020    MCHC 31.5 09/23/2020    RDW 14.6 09/23/2020    NRBC 0.0 05/07/2017    METASPCT 2.6 09/19/2020    LYMPHOPCT 2.6 09/21/2020    MONOPCT 0.9 09/21/2020    MYELOPCT 0.9 09/20/2020    BASOPCT 0.2 09/21/2020    MONOSABS 0.28 09/21/2020    LYMPHSABS 0.86 09/21/2020    EOSABS 0.00 09/21/2020    BASOSABS 0.00 09/21/2020     CMP:    Lab Results   Component Value Date     09/23/2020    K 3.0 09/23/2020    K 3.3 09/20/2020     09/23/2020    CO2 13 09/23/2020    BUN 21 09/23/2020    CREATININE 1.0 09/23/2020    GFRAA >60 09/23/2020    LABGLOM 55 09/23/2020    GLUCOSE 103 09/23/2020    PROT 5.5 09/23/2020    LABALBU 2.4 09/23/2020    CALCIUM 8.3 09/23/2020    BILITOT 0.3 09/23/2020    ALKPHOS 152 09/23/2020    AST 29 09/23/2020    ALT 18 09/23/2020     Warfarin PT/INR:    Lab Results   Component Value Date    INR 1.8 09/23/2020    INR 1.7 09/22/2020    INR 1.5 09/21/2020    PROTIME 20.7 (H) 09/23/2020    PROTIME 18.8 (H) 09/22/2020    PROTIME 17.0 (H) 09/21/2020       Assessment:    Principal Problem:    C. difficile colitis  Active Problems:    Glaucoma    Macular degeneration    Depression    History of cirrhosis    PVD (peripheral vascular disease) with claudication (HCC)    Mixed hyperlipidemia    Acquired hypothyroidism    Essential hypertension    Leukocytosis    DIVINA (acute kidney injury) ([facility] [address]    Elevated alkaline phosphatase level    Anemia    Sepsis ([facility] [address]  Resolved Problems:    * No resolved hospital problems.  *      Plan:  Replace k / cont atb per id /neuro to see re: Lindy Gotti / posturing        Clare Yang  12:27 PM  9/23/2020

## 2020-09-23 NOTE — PROGRESS NOTES
Physical Therapy  Facility/Department: [address] PICU  Daily Treatment Note  NAME: Olinda Dougherty  : 1950  MRN: [id number]    Date of Service: 2020    Referring Provider:  ARMEN Roach CNP      Evaluating PT: Rashad Sanchez, PT, DPT. [id number]     Room #:  [address] [place] [address]  Diagnosis:  Sepsis d/t bacteria   Reason for admission:  Abnormal labs, pain  Precautions:  Falls, cognition  Pertinent PMHx: HTN, HLD, malnutrition, sciatica, liver disease, glaucoma, DVT, depression, anemia   Procedures: none  Equipment Recommendations:  TBD     SUBJECTIVE:  Pt admitted from nursing home. Poor hx, unknown PLOF, states \"yes\" when asked if she walks.      OBJECTIVE:    Initial Evaluation  Date:  Treatment  20 Short Term/ Long Term   Goals   AM-PAC 6 Clicks 9/73  3/85     Was pt agreeable to Eval/treatment? Yes   yes     Does pt have pain? States yes but unable to specify where No c/o pain     Bed Mobility  Rolling: Dependent  Supine to sit: NT  Sit to supine: NT  Scooting: Dependent Rolling: Mod A  Supine to sit: Max A  Sit to supine: Max A  Scooting: Max A to EOB ModA   Transfers Sit to stand: NT  Stand to sit: NT  Stand pivot: NT  NT ModA   Ambulation    NT    NT >10 feet with [employer] ModA   Stair negotiation: ascended and descended  NT NT  TBD   ROM BUE:  See OT eval   BLE:  WFL       Strength BUE:  See OT eval   BLE:  Unable to formally assess. Grossly 2/5 feet/toes   Increase by 1/3 MMT grade    Balance Sitting EOB:  NT  Dynamic Standing:  NT Sitting EOB: Min<>Mod A  Dynamic standing: NT Sitting EOB:  Jim  Dynamic Standing:  ModA      Pt is A & O x 2-3 self/type of place/year. Pt states month is \"October\". She appears confused with conversation describing being arrested and taken to the police station earlier this date.   Sensation:  Pt denies numbness and tingling to extremities  Edema:  unremarkable    Therapeutic Exercises:    AAROM LAQ 10x2 ea LE  Seated weight shift anterior/posterior x5 85516 30 minutes  [] Therapeutic exercises 54471 0 minutes  [] Neuromuscular reeducation 13458 0 minutes      Jorge Smith, PT, DPT  [id number]

## 2020-09-23 NOTE — CARE COORDINATION
Plan at discharge remains [address]. Spoke to [employer] and she stated patient is not eligible for Medicaid. They will accept under her [employer], she will now need a precert. Therapy updates on chart, they plan to start today. Covid 9/19 (-). Ambulance form on soft chart.  CM to follow  Delio Mulligan, RN  [address] Case Management  [phone]--Spoke to brother Danielito Powell, he confirmed plan is to return to [address] at discharge

## 2020-09-24 LAB
AFP-TUMOR MARKER: 1 NG/ML (ref 0–9)
ALBUMIN SERPL-MCNC: 2.3 G/DL (ref 3.5–5.2)
ALP BLD-CCNC: 144 U/L (ref 35–104)
ALT SERPL-CCNC: 17 U/L (ref 0–32)
AMMONIA: 24 UMOL/L (ref 11–51)
ANION GAP SERPL CALCULATED.3IONS-SCNC: 13 MMOL/L (ref 7–16)
AST SERPL-CCNC: 26 U/L (ref 0–31)
BILIRUB SERPL-MCNC: 0.3 MG/DL (ref 0–1.2)
BLOOD CULTURE, ROUTINE: NORMAL
BUN BLDV-MCNC: 20 MG/DL (ref 8–23)
CALCIUM SERPL-MCNC: 8 MG/DL (ref 8.6–10.2)
CHLORIDE BLD-SCNC: 114 MMOL/L (ref 98–107)
CO2: 14 MMOL/L (ref 22–29)
CREAT SERPL-MCNC: 0.9 MG/DL (ref 0.5–1)
CULTURE, BLOOD 2: NORMAL
GFR AFRICAN AMERICAN: >60
GFR NON-AFRICAN AMERICAN: >60 ML/MIN/1.73
GLUCOSE BLD-MCNC: 84 MG/DL (ref 74–99)
HCT VFR BLD CALC: 28.2 % (ref 34–48)
HEMOGLOBIN: 9 G/DL (ref 11.5–15.5)
INR BLD: 1.7
MCH RBC QN AUTO: 31 PG (ref 26–35)
MCHC RBC AUTO-ENTMCNC: 31.9 % (ref 32–34.5)
MCV RBC AUTO: 97.2 FL (ref 80–99.9)
PDW BLD-RTO: 14.8 FL (ref 11.5–15)
PLATELET # BLD: 456 E9/L (ref 130–450)
PMV BLD AUTO: 11.5 FL (ref 7–12)
POTASSIUM SERPL-SCNC: 3.7 MMOL/L (ref 3.5–5)
PROTHROMBIN TIME: 19.8 SEC (ref 9.3–12.4)
RBC # BLD: 2.9 E12/L (ref 3.5–5.5)
SODIUM BLD-SCNC: 141 MMOL/L (ref 132–146)
TOTAL PROTEIN: 5.3 G/DL (ref 6.4–8.3)
WBC # BLD: 26.3 E9/L (ref 4.5–11.5)

## 2020-09-24 PROCEDURE — 80053 COMPREHEN METABOLIC PANEL: CPT

## 2020-09-24 PROCEDURE — 2580000003 HC RX 258: Performed by: NURSE PRACTITIONER

## 2020-09-24 PROCEDURE — 85027 COMPLETE CBC AUTOMATED: CPT

## 2020-09-24 PROCEDURE — 99232 SBSQ HOSP IP/OBS MODERATE 35: CPT | Performed by: TRANSPLANT SURGERY

## 2020-09-24 PROCEDURE — 2060000000 HC ICU INTERMEDIATE R&B

## 2020-09-24 PROCEDURE — 6370000000 HC RX 637 (ALT 250 FOR IP): Performed by: INTERNAL MEDICINE

## 2020-09-24 PROCEDURE — 82140 ASSAY OF AMMONIA: CPT

## 2020-09-24 PROCEDURE — 6360000002 HC RX W HCPCS: Performed by: NURSE PRACTITIONER

## 2020-09-24 PROCEDURE — 85610 PROTHROMBIN TIME: CPT

## 2020-09-24 PROCEDURE — 36415 COLL VENOUS BLD VENIPUNCTURE: CPT

## 2020-09-24 PROCEDURE — 6370000000 HC RX 637 (ALT 250 FOR IP): Performed by: NURSE PRACTITIONER

## 2020-09-24 RX ADMIN — LEVOTHYROXINE SODIUM 25 MCG: 0.03 TABLET ORAL at 05:23

## 2020-09-24 RX ADMIN — VANCOMYCIN HYDROCHLORIDE 250 MG: 10 INJECTION, POWDER, LYOPHILIZED, FOR SOLUTION INTRAVENOUS at 12:28

## 2020-09-24 RX ADMIN — VANCOMYCIN HYDROCHLORIDE 250 MG: 10 INJECTION, POWDER, LYOPHILIZED, FOR SOLUTION INTRAVENOUS at 18:18

## 2020-09-24 RX ADMIN — BACLOFEN 10 MG: 10 TABLET ORAL at 19:53

## 2020-09-24 RX ADMIN — METRONIDAZOLE 500 MG: 500 TABLET ORAL at 19:55

## 2020-09-24 RX ADMIN — METRONIDAZOLE 500 MG: 500 TABLET ORAL at 12:28

## 2020-09-24 RX ADMIN — ATORVASTATIN CALCIUM 5 MG: 10 TABLET, FILM COATED ORAL at 19:54

## 2020-09-24 RX ADMIN — BACLOFEN 10 MG: 10 TABLET ORAL at 09:55

## 2020-09-24 RX ADMIN — CYCLOPENTOLATE HYDROCHLORIDE 1 TABLET: 10 SOLUTION/ DROPS OPHTHALMIC at 09:55

## 2020-09-24 RX ADMIN — Medication 10 ML: at 19:55

## 2020-09-24 RX ADMIN — POTASSIUM CHLORIDE 20 MEQ: 20 TABLET, EXTENDED RELEASE ORAL at 09:54

## 2020-09-24 RX ADMIN — ESCITALOPRAM 20 MG: 10 TABLET, FILM COATED ORAL at 09:55

## 2020-09-24 RX ADMIN — BRIMONIDINE TARTRATE 1 DROP: 2 SOLUTION/ DROPS OPHTHALMIC at 09:54

## 2020-09-24 RX ADMIN — HEPARIN SODIUM 5000 UNITS: 10000 INJECTION INTRAVENOUS; SUBCUTANEOUS at 11:24

## 2020-09-24 RX ADMIN — METRONIDAZOLE 500 MG: 500 TABLET ORAL at 05:21

## 2020-09-24 RX ADMIN — TIMOLOL MALEATE 1 DROP: 5 SOLUTION OPHTHALMIC at 09:54

## 2020-09-24 RX ADMIN — TIMOLOL MALEATE 1 DROP: 5 SOLUTION OPHTHALMIC at 19:53

## 2020-09-24 RX ADMIN — HEPARIN SODIUM 5000 UNITS: 10000 INJECTION INTRAVENOUS; SUBCUTANEOUS at 02:02

## 2020-09-24 RX ADMIN — LATANOPROST 1 DROP: 50 SOLUTION OPHTHALMIC at 19:53

## 2020-09-24 RX ADMIN — VANCOMYCIN HYDROCHLORIDE 250 MG: 10 INJECTION, POWDER, LYOPHILIZED, FOR SOLUTION INTRAVENOUS at 05:21

## 2020-09-24 RX ADMIN — VANCOMYCIN HYDROCHLORIDE 250 MG: 10 INJECTION, POWDER, LYOPHILIZED, FOR SOLUTION INTRAVENOUS at 00:14

## 2020-09-24 RX ADMIN — HEPARIN SODIUM 5000 UNITS: 10000 INJECTION INTRAVENOUS; SUBCUTANEOUS at 18:18

## 2020-09-24 RX ADMIN — CLOPIDOGREL 75 MG: 75 TABLET, FILM COATED ORAL at 09:54

## 2020-09-24 RX ADMIN — FOLIC ACID 1 MG: 1 TABLET ORAL at 09:54

## 2020-09-24 RX ADMIN — BRIMONIDINE TARTRATE 1 DROP: 2 SOLUTION/ DROPS OPHTHALMIC at 19:53

## 2020-09-24 ASSESSMENT — PAIN SCALES - GENERAL
PAINLEVEL_OUTOF10: 0
PAINLEVEL_OUTOF10: 0

## 2020-09-24 NOTE — PROGRESS NOTES
[facility] SURGERY  DAILY PROGRESS NOTE  9/24/2020  Cc:   Abdominal pain    Subjective:  No overnight events noted  Patient states pain improving  States diarrhea is getting less  Continues to be less confused    Ammonia level 24 from 13  WBC 26.3  MELD of 12    Objective:  BP (!) 112/56   Pulse 74   Temp 97.3 °F (36.3 °C) (Oral)   Resp 16   Wt 119 lb 11.4 oz (54.3 kg)   SpO2 97%   BMI 18.20 kg/m²     GENERAL:  Laying in bed, awake, alert, cooperative, no apparent distress  HEAD: Normocephalic, atraumatic  EYES: No sclera icterus, pupils equal  LUNGS:  No increased work of breathing  CARDIOVASCULAR:  Regular rate  ABDOMEN:  Soft, protuberant abdomen, mild RLQ TTP, improving  EXTREMITIES: No edema or swelling  SKIN: Warm and dry    Assessment/Plan:  77 y.o. female with C diff colitis; hx of cirrhosis likely 2/2 alcoholism    Overall care per Primary  ID following, abx per ID, on po Vanc and IV flagyl  Ok for clears  CEA 6, AFP pending  MELD of 12  Monitor abdominal exam, improving  Will need outpatient colonoscopy, defer to GI, Dr. Agustin Simmons as she is already established with him    Electronically signed by Tho Aguilar DO on 9/24/2020 at 6:48 AM     Continues to improve clinically despite leukocytosis  Pain improving  Okay to advance diet    Electronically signed by Lucius Weaver MD on 9/24/2020 at 8:31 AM

## 2020-09-24 NOTE — PROGRESS NOTES
Department of Internal Medicine  Infectious Diseases  Progress  Note      C/C :   C diff Colitis, leukocytosis     Pt is  awake, and alert  Denies abdomen pain   Reports diarrhea -stopped      Afebrile     Current Facility-Administered Medications   Medication Dose Route Frequency Provider Last Rate Last Dose    metroNIDAZOLE (FLAGYL) tablet 500 mg  500 mg Oral 3 times per day Rubina Blandon MD   500 mg at 09/24/20 1228    vancomycin (VANCOCIN) oral solution 250 mg  250 mg Oral 4 times per day Rubina Blandon MD   250 mg at 09/24/20 1228    sodium chloride flush 0.9 % injection 10 mL  10 mL Intravenous 2 times per day April ARMEN King CNP   10 mL at 09/22/20 0930    sodium chloride flush 0.9 % injection 10 mL  10 mL Intravenous PRN April ARMEN King CNP   10 mL at 09/20/20 1601    acetaminophen (TYLENOL) tablet 650 mg  650 mg Oral Q6H PRN April ARMEN King CNP        Or    acetaminophen (TYLENOL) suppository 650 mg  650 mg Rectal Q6H PRN April ARMEN King CNP        polyethylene glycol (GLYCOLAX) packet 17 g  17 g Oral Daily PRN April ARMEN King CNP        heparin (porcine) injection 5,000 Units  5,000 Units Subcutaneous Q8H April ARMEN King CNP   5,000 Units at 09/24/20 1124    baclofen (LIORESAL) tablet 10 mg  10 mg Oral BID April ARMEN King CNP   10 mg at 09/24/20 0955    clopidogrel (PLAVIX) tablet 75 mg  75 mg Oral Daily April ARMEN King CNP   75 mg at 09/24/20 0954    escitalopram (LEXAPRO) tablet 20 mg  20 mg Oral Daily April ARMEN King CNP   20 mg at 51/75/47 1134    folic acid (FOLVITE) tablet 1 mg  1 mg Oral Daily April ARMEN King CNP   1 mg at 09/24/20 0954    levothyroxine (SYNTHROID) tablet 25 mcg  25 mcg Oral QAM April ARMEN King CNP   25 mcg at 09/24/20 0523    antioxidant multivitamin (OCUVITE) tablet  1 tablet Oral Daily April ARMEN King - CNP   1 tablet at 09/24/20 0955    potassium chloride (KLOR-CON M) extended release tablet 20 mEq  20 mEq Oral Daily April ARMEN King CNP   20 mEq at 09/24/20 0954    atorvastatin (LIPITOR) tablet 5 mg  5 mg Oral Nightly April ARMEN King CNP   5 mg at 09/23/20 2009    trimethobenzamide [facility]) injection 200 mg  200 mg Intramuscular Q6H PRN April FENG KingN - SILAS        brimonidine (ALPHAGAN) 0.2 % ophthalmic solution 1 drop  1 drop Both Eyes BID April FENG KingN - CNP   1 drop at 09/24/20 5468    And    timolol (TIMOPTIC) 0.5 % ophthalmic solution 1 drop  1 drop Both Eyes BID April ARMEN King - CNP   1 drop at 09/24/20 0954    latanoprost (XALATAN) 0.005 % ophthalmic solution 1 drop  1 drop Both Eyes Nightly April ARMEN King CNP   1 drop at 09/23/20 2009         REVIEW OF SYSTEMS    CONSTITUTIONAL: Denies fever   Resp : Denies SOB   CVS ; Denies chest pain   Abdomen : Reports diarrhea -improved   NEUROLOGICAL: awake and alert   SKIN  : no rash     PHYSICAL EXAM:      Vitals:     /62   Pulse 65   Temp 97.8 °F (36.6 °C) (Temporal)   Resp 20   Wt 113 lb 9.6 oz (51.5 kg)   SpO2 97%   BMI 17.27 kg/m²     General Appearance:    Awake , alert, confused,      Head:    Normocephalic, atraumatic   Eyes:    No pallor, no icterus,   Ears:    No obvious deformity or drainage.    Nose:   No nasal drainage   Throat:   Mucosa dry    Neck:   Supple, no lymphadenopathy   Lungs:     Clear to auscultation bilaterally    Heart:    Regular rate and rhythm, no murmur   Abdomen:     Soft,? tender, bowel sounds present    Extremities:   No edema, no cyanosis   Pulses:   Dorsalis pedis palpable    Skin:   no rashes or lesions     CBC with Differential:      Lab Results   Component Value Date    WBC 26.3 09/24/2020    RBC 2.90 09/24/2020    HGB 9.0 09/24/2020    HCT 28.2 09/24/2020     09/24/2020    MCV 97.2 09/24/2020    MCH 31.0 09/24/2020    MCHC 31.9 09/24/2020    RDW 14.8 09/24/2020    NRBC 0.0 05/07/2017    METASPCT 2.6 09/19/2020    LYMPHOPCT 2.6 09/21/2020    MONOPCT 0.9 09/21/2020    MYELOPCT 0.9 09/20/2020    BASOPCT 0.2 09/21/2020    MONOSABS 0.28 09/21/2020    LYMPHSABS 0.86 09/21/2020    EOSABS 0.00 09/21/2020    BASOSABS 0.00 09/21/2020       CMP     Lab Results   Component Value Date     09/24/2020    K 3.7 09/24/2020    K 3.3 09/20/2020     09/24/2020    CO2 14 09/24/2020    BUN 20 09/24/2020    CREATININE 0.9 09/24/2020    GFRAA >60 09/24/2020    LABGLOM >60 09/24/2020    GLUCOSE 84 09/24/2020    PROT 5.3 09/24/2020    LABALBU 2.3 09/24/2020    CALCIUM 8.0 09/24/2020    BILITOT 0.3 09/24/2020    ALKPHOS 144 09/24/2020    AST 26 09/24/2020    ALT 17 09/24/2020         Hepatic Function Panel:    Lab Results   Component Value Date    ALKPHOS 144 09/24/2020    ALT 17 09/24/2020    AST 26 09/24/2020    PROT 5.3 09/24/2020    BILITOT 0.3 09/24/2020    BILIDIR 0.6 04/19/2019    IBILI 0.6 04/19/2019    LABALBU 2.3 09/24/2020       PT/INR:    Lab Results   Component Value Date    PROTIME 19.8 09/24/2020    PROTIME 19.5 09/05/2019    INR 1.7 09/24/2020       TSH:    Lab Results   Component Value Date    TSH 2.590 07/30/2020       U/A:    Lab Results   Component Value Date    COLORU Yellow 09/19/2020    PHUR 6.0 09/19/2020    WBCUA 2-5 09/19/2020    RBCUA 5-10 09/19/2020    MUCUS Present 07/30/2020    BACTERIA MODERATE 09/19/2020    CLARITYU Clear 09/19/2020    SPECGRAV 1.015 09/19/2020    LEUKOCYTESUR SMALL 09/19/2020    UROBILINOGEN 0.2 09/19/2020    BILIRUBINUR SMALL 09/19/2020    BLOODU TRACE-INTACT 09/19/2020    GLUCOSEU Negative 09/19/2020       ABG:  No results found for: UIL9HPM, BEART, S4KCLWNR, PHART, THGBART, JBS9QPF, PO2ART, SJP3ADK    MICROBIOLOGY:    Blood culture - neg      Urine Culture - mixed bacteria      CEA 6.54    Stool for C diff toxin +ve     Radiology :    CT scan of abdomen and pelvis -  Impression:            Colitis mostly involving the ascending colon, rectum and extending to   the anus. Adynamic ileus     Coronary calcification suggesting of coronary artery disease. The Candelario catheter is noted to be coiled up in the urinary bladder   which needs to be repositioned. IMPRESSION:     1. Colitis - C diff infection   2. Leukocytosis - trending down   3. Lactic acidosis - improved     RECOMMENDATIONS:      1. Oral vancomycin 250 mg po q 6 hrs   2. Flagyl 500 mg  po  q 8 hrs   3. CBC with diff   4.  Contact isolation

## 2020-09-24 NOTE — PROGRESS NOTES
Hospital Medicine    Subjective:  Pt more alert responsive today      Current Facility-Administered Medications:     metroNIDAZOLE (FLAGYL) tablet 500 mg, 500 mg, Oral, 3 times per day, Nery Hutchins MD, 500 mg at 09/24/20 0521    vancomycin (VANCOCIN) oral solution 250 mg, 250 mg, Oral, 4 times per day, Nery Hutchins MD, 250 mg at 09/24/20 0502    sodium chloride flush 0.9 % injection 10 mL, 10 mL, Intravenous, 2 times per day, April ARMEN King - CNP, 10 mL at 09/22/20 0930    sodium chloride flush 0.9 % injection 10 mL, 10 mL, Intravenous, PRN, April ARMEN King - CNP, 10 mL at 09/20/20 1601    acetaminophen (TYLENOL) tablet 650 mg, 650 mg, Oral, Q6H PRN **OR** acetaminophen (TYLENOL) suppository 650 mg, 650 mg, Rectal, Q6H PRN, April ARMEN King  CNP    polyethylene glycol (GLYCOLAX) packet 17 g, 17 g, Oral, Daily PRN, April ARMEN King CNP    heparin (porcine) injection 5,000 Units, 5,000 Units, Subcutaneous, Q8H, April ARMEN King  CNP, 5,000 Units at 09/24/20 0202    baclofen (LIORESAL) tablet 10 mg, 10 mg, Oral, BID, April ARMEN King - CNP, 10 mg at 09/23/20 2009    clopidogrel (PLAVIX) tablet 75 mg, 75 mg, Oral, Daily, April ARMEN King - CNP, 75 mg at 09/23/20 0841    escitalopram (LEXAPRO) tablet 20 mg, 20 mg, Oral, Daily, April ARMEN King - SILAS, 20 mg at 97/22/40 3581    folic acid (FOLVITE) tablet 1 mg, 1 mg, Oral, Daily, April ARMEN King - CNP, 1 mg at 09/23/20 9434    levothyroxine (SYNTHROID) tablet 25 mcg, 25 mcg, Oral, QAM, April ARMEN King CNP, 25 mcg at 09/24/20 8123    antioxidant multivitamin (OCUVITE) tablet, 1 tablet, Oral, Daily, April ARMEN King CNP, 1 tablet at 09/23/20 0841    potassium chloride (KLOR-CON M) extended release tablet 20 mEq, 20 mEq, Oral, Daily, April ARMEN King CNP, 20 mEq at 09/23/20 0841    atorvastatin (LIPITOR) tablet 5 mg, 5 mg, Oral, Nightly, April Flathead-Sheldon, APRN - CNP, 5 mg at 09/23/20 2009    trimethobenzamide [facility]) injection 200 mg, 200 mg, Intramuscular, Q6H PRN, April Flathead-Ashwin, APRN - CNP    brimonidine (ALPHAGAN) 0.2 % ophthalmic solution 1 drop, 1 drop, Both Eyes, BID, 1 drop at 09/23/20 2009 **AND** timolol (TIMOPTIC) 0.5 % ophthalmic solution 1 drop, 1 drop, Both Eyes, BID, April Flathead-Ashwin, APRN - CNP, 1 drop at 09/23/20 2009    latanoprost (XALATAN) 0.005 % ophthalmic solution 1 drop, 1 drop, Both Eyes, Nightly, April Flathead-Sheldon, APRN - CNP, 1 drop at 09/23/20 2009    Objective:    BP (!) 112/56   Pulse 74   Temp 97.3 °F (36.3 °C) (Oral)   Resp 16   Wt 119 lb 11.4 oz (54.3 kg)   SpO2 97%   BMI 18.20 kg/m²     Heart:  Reg  Lungs:  CTA bilaterally, no wheeze, rales or rhonchi  Abd: bowel sounds present, nontender, nondistended  Extrem:  No clubbing, cyanosis, or edema    CBC with Differential:    Lab Results   Component Value Date    WBC 26.3 09/24/2020    RBC 2.90 09/24/2020    HGB 9.0 09/24/2020    HCT 28.2 09/24/2020     09/24/2020    MCV 97.2 09/24/2020    MCH 31.0 09/24/2020    MCHC 31.9 09/24/2020    RDW 14.8 09/24/2020    NRBC 0.0 05/07/2017    METASPCT 2.6 09/19/2020    LYMPHOPCT 2.6 09/21/2020    MONOPCT 0.9 09/21/2020    MYELOPCT 0.9 09/20/2020    BASOPCT 0.2 09/21/2020    MONOSABS 0.28 09/21/2020    LYMPHSABS 0.86 09/21/2020    EOSABS 0.00 09/21/2020    BASOSABS 0.00 09/21/2020     CMP:    Lab Results   Component Value Date     09/24/2020    K 3.7 09/24/2020    K 3.3 09/20/2020     09/24/2020    CO2 14 09/24/2020    BUN 20 09/24/2020    CREATININE 0.9 09/24/2020    GFRAA >60 09/24/2020    LABGLOM >60 09/24/2020    GLUCOSE 84 09/24/2020    PROT 5.3 09/24/2020    LABALBU 2.3 09/24/2020    CALCIUM 8.0 09/24/2020    BILITOT 0.3 09/24/2020    ALKPHOS 144 09/24/2020    AST 26 09/24/2020    ALT 17 09/24/2020     Warfarin PT/INR:    Lab Results   Component Value Date    INR 1.7 09/24/2020    INR 1.8 09/23/2020    INR 1.7 09/22/2020    PROTIME 19.8 (H) 09/24/2020    PROTIME 20.7 (H) 09/23/2020    PROTIME 18.8 (H) 09/22/2020       Assessment:    Principal Problem:    C. difficile colitis  Active Problems:    Glaucoma    Macular degeneration    Depression    History of cirrhosis    PVD (peripheral vascular disease) with claudication (HCC)    Mixed hyperlipidemia    Acquired hypothyroidism    Essential hypertension    Leukocytosis    DIVINA (acute kidney injury) ([facility] [address]    Elevated alkaline phosphatase level    Anemia    Septicemia (HCC)    Acute metabolic encephalopathy  Resolved Problems:    * No resolved hospital problems.  *      Plan:  Cont atb per id advance diet per surgery dc planning to [facility]        [email or web address]  7:52 AM  9/24/2020

## 2020-09-24 NOTE — CARE COORDINATION
Plan at discharge remains [address]. Precert pending, initiated yesterday. Plan confirmed with brother Aaliyah Stephens. Covid 9/19 (-). Ambulance form on soft chart.  CM to follow  Jona Berg RN  [facility] Case Management  [phone]

## 2020-09-24 NOTE — DISCHARGE INSTR - COC
Continuity of Care Form    Patient Name: Chano Moffett   :  1950  MRN:  [id number]    Admit date:  2020  Discharge date:    Code Status Order: Full Code   Advance Directives:   Advance Care Flowsheet Documentation       Date/Time Healthcare Directive Type of Healthcare Directive Copy in [address] Agent's Name Healthcare Agent's Phone Number    20 1819  No, patient does not have an advance directive for healthcare treatment -- -- -- -- --            Admitting Physician:  Nick Bone DO  PCP: Anastasiia Beard DO    Discharging Nurse:   [address] Unit/Room#: 8219/8917-H  Discharging Unit Phone Number: [phone]    Emergency Contact:   Extended Emergency Contact Information  Primary Emergency Contact: [facility] [address] Phone: [phone]  Mobile Phone: [phone]  Relation: Brother/Sister  Secondary Emergency Contact: Katina Gipson   [facility] [address]  Phone: [phone]  Mobile Phone: [phone]  Relation: Brother/Sister    Past Surgical History:  Past Surgical History:   Procedure Laterality Date    DENTAL SURGERY  2007    teeth extraction    UPPER GASTROINTESTINAL ENDOSCOPY N/A 2019    EGD BIOPSY performed by Marcin Lovell MD at [facility] 38 EXTRACTION         Immunization History:   Immunization History   Administered Date(s) Administered    Pneumococcal Polysaccharide ([id number]) 2019       Active Problems:  Patient Active Problem List   Diagnosis Code    Glaucoma H40.9    Macular degeneration H35.30    Depression F32.9    Macrocytic anemia D53.9    History of cirrhosis Z87.19    PVD (peripheral vascular disease) with claudication (HCC) I73.9    Severe protein-calorie malnutrition ([facility] [address] E43    Mixed hyperlipidemia E78.2    Acquired hypothyroidism E03.9    Essential hypertension I10    Weakness R53.1    Leukocytosis D72.829    DIVINA (acute kidney injury) ([facility] [address] N17.9    Elevated alkaline phosphatase level R74.8    Anemia D64.9    Septicemia (HCC) A41.9    C. difficile colitis A04.72    Acute metabolic encephalopathy M02.18       Isolation/Infection:   Isolation            C Diff Contact          Patient Infection Status       Infection Onset Added Last Indicated Last Indicated By Review Planned Expiration Resolved Resolved By    C-diff (Clostridium difficile) 09/21/20 09/22/20 09/21/20 CLOSTRIDIUM DIFFICILE EIA 09/29/20       Resolved    C-diff Rule Out 09/20/20 09/20/20 09/21/20 CLOSTRIDIUM DIFFICILE EIA (Ordered)   09/22/20 Rule-Out Test Resulted    COVID-19 Rule Out 09/19/20 09/19/20 09/19/20 COVID-19 (Ordered)   09/19/20 Rule-Out Test Resulted    COVID-19 Rule Out 08/03/20 08/03/20 08/03/20 COVID-19 (Ordered)   08/03/20 Rule-Out Test Resulted            Nurse Assessment:  Last Vital Signs: BP (!) 112/56   Pulse 74   Temp 97.3 °F (36.3 °C) (Oral)   Resp 16   Wt 119 lb 11.4 oz (54.3 kg)   SpO2 97%   BMI 18.20 kg/m²     Last documented pain score (0-10 scale): Pain Level: 0  Last Weight:   Wt Readings from Last 1 Encounters:   09/23/20 119 lb 11.4 oz (54.3 kg)     Mental Status:  disoriented and alert    IV Access:  - None    Nursing Mobility/ADLs:  Walking   Dependent  Transfer  Dependent  Bathing  Dependent  Dressing  Dependent  Toileting  Dependent  Feeding  Dependent  Med Admin  Dependent  Med Delivery   whole and prefers mixed with applesauce    Wound Care Documentation and Therapy:  Wound 04/18/19 Buttocks Right; Inner (Active)   Number of days: 525       Wound 04/18/19 Buttocks Right (Active)   Number of days: 525       Wound 04/19/19 Heel Right (Active)   Number of days: 523       Wound 04/19/19 Heel Left (Active)   Number of days: 523        Elimination:  Continence:   · Bowel: No  · Bladder: No  Urinary Catheter:  Insertion Date: ***   Colostomy/Ileostomy/Ileal Conduit: No       Date of Last BM: 9/25    Intake/Output Summary (Last requires {Admit to Appropriate Level of Care:34338:::0} for {GREATER/LESS:310926208} 30 days.      Update Admission H&P: {CHP DME Changes in HandP:[id number]:::0}    PHYSICIAN SIGNATURE:  {Esignature:[id number]:::0}Electronically signed by Kylie Ibrahim DO on 9/24/2020 at 7:51 AM

## 2020-09-25 VITALS
OXYGEN SATURATION: 97 % | SYSTOLIC BLOOD PRESSURE: 163 MMHG | TEMPERATURE: 97.5 F | BODY MASS INDEX: 17.27 KG/M2 | RESPIRATION RATE: 18 BRPM | HEART RATE: 75 BPM | DIASTOLIC BLOOD PRESSURE: 70 MMHG | WEIGHT: 113.6 LBS

## 2020-09-25 LAB
ALBUMIN SERPL-MCNC: 2.2 G/DL (ref 3.5–5.2)
ALP BLD-CCNC: 137 U/L (ref 35–104)
ALT SERPL-CCNC: 14 U/L (ref 0–32)
AMMONIA: 27 UMOL/L (ref 11–51)
ANION GAP SERPL CALCULATED.3IONS-SCNC: 16 MMOL/L (ref 7–16)
AST SERPL-CCNC: 22 U/L (ref 0–31)
BILIRUB SERPL-MCNC: 0.3 MG/DL (ref 0–1.2)
BUN BLDV-MCNC: 17 MG/DL (ref 8–23)
CALCIUM SERPL-MCNC: 8.1 MG/DL (ref 8.6–10.2)
CHLORIDE BLD-SCNC: 117 MMOL/L (ref 98–107)
CO2: 12 MMOL/L (ref 22–29)
CREAT SERPL-MCNC: 0.8 MG/DL (ref 0.5–1)
GFR AFRICAN AMERICAN: >60
GFR NON-AFRICAN AMERICAN: >60 ML/MIN/1.73
GLUCOSE BLD-MCNC: 96 MG/DL (ref 74–99)
HCT VFR BLD CALC: 25.8 % (ref 34–48)
HEMOGLOBIN: 8.3 G/DL (ref 11.5–15.5)
INR BLD: 1.9
MCH RBC QN AUTO: 31 PG (ref 26–35)
MCHC RBC AUTO-ENTMCNC: 32.2 % (ref 32–34.5)
MCV RBC AUTO: 96.3 FL (ref 80–99.9)
PDW BLD-RTO: 15 FL (ref 11.5–15)
PLATELET # BLD: 393 E9/L (ref 130–450)
PMV BLD AUTO: 11.2 FL (ref 7–12)
POTASSIUM SERPL-SCNC: 3.6 MMOL/L (ref 3.5–5)
PROTHROMBIN TIME: 21.8 SEC (ref 9.3–12.4)
RBC # BLD: 2.68 E12/L (ref 3.5–5.5)
SODIUM BLD-SCNC: 145 MMOL/L (ref 132–146)
TOTAL PROTEIN: 5.3 G/DL (ref 6.4–8.3)
WBC # BLD: 21.4 E9/L (ref 4.5–11.5)

## 2020-09-25 PROCEDURE — 82140 ASSAY OF AMMONIA: CPT

## 2020-09-25 PROCEDURE — 6360000002 HC RX W HCPCS: Performed by: NURSE PRACTITIONER

## 2020-09-25 PROCEDURE — 85610 PROTHROMBIN TIME: CPT

## 2020-09-25 PROCEDURE — 80053 COMPREHEN METABOLIC PANEL: CPT

## 2020-09-25 PROCEDURE — 6370000000 HC RX 637 (ALT 250 FOR IP): Performed by: INTERNAL MEDICINE

## 2020-09-25 PROCEDURE — 36415 COLL VENOUS BLD VENIPUNCTURE: CPT

## 2020-09-25 PROCEDURE — 85027 COMPLETE CBC AUTOMATED: CPT

## 2020-09-25 PROCEDURE — 6370000000 HC RX 637 (ALT 250 FOR IP): Performed by: NURSE PRACTITIONER

## 2020-09-25 PROCEDURE — 2580000003 HC RX 258: Performed by: NURSE PRACTITIONER

## 2020-09-25 RX ORDER — METRONIDAZOLE 500 MG/1
500 TABLET ORAL EVERY 8 HOURS SCHEDULED
Qty: 30 TABLET | Refills: 0 | DISCHARGE
Start: 2020-09-25 | End: 2020-10-05

## 2020-09-25 RX ADMIN — CYCLOPENTOLATE HYDROCHLORIDE 1 TABLET: 10 SOLUTION/ DROPS OPHTHALMIC at 10:45

## 2020-09-25 RX ADMIN — VANCOMYCIN HYDROCHLORIDE 250 MG: 10 INJECTION, POWDER, LYOPHILIZED, FOR SOLUTION INTRAVENOUS at 14:18

## 2020-09-25 RX ADMIN — CLOPIDOGREL 75 MG: 75 TABLET, FILM COATED ORAL at 10:44

## 2020-09-25 RX ADMIN — ESCITALOPRAM 20 MG: 10 TABLET, FILM COATED ORAL at 10:45

## 2020-09-25 RX ADMIN — TIMOLOL MALEATE 1 DROP: 5 SOLUTION OPHTHALMIC at 10:44

## 2020-09-25 RX ADMIN — METRONIDAZOLE 500 MG: 500 TABLET ORAL at 05:14

## 2020-09-25 RX ADMIN — METRONIDAZOLE 500 MG: 500 TABLET ORAL at 14:18

## 2020-09-25 RX ADMIN — BACLOFEN 10 MG: 10 TABLET ORAL at 10:45

## 2020-09-25 RX ADMIN — LEVOTHYROXINE SODIUM 25 MCG: 0.03 TABLET ORAL at 05:14

## 2020-09-25 RX ADMIN — POTASSIUM CHLORIDE 20 MEQ: 20 TABLET, EXTENDED RELEASE ORAL at 10:45

## 2020-09-25 RX ADMIN — BRIMONIDINE TARTRATE 1 DROP: 2 SOLUTION/ DROPS OPHTHALMIC at 10:44

## 2020-09-25 RX ADMIN — FOLIC ACID 1 MG: 1 TABLET ORAL at 10:46

## 2020-09-25 RX ADMIN — VANCOMYCIN HYDROCHLORIDE 250 MG: 10 INJECTION, POWDER, LYOPHILIZED, FOR SOLUTION INTRAVENOUS at 00:49

## 2020-09-25 RX ADMIN — HEPARIN SODIUM 5000 UNITS: 10000 INJECTION INTRAVENOUS; SUBCUTANEOUS at 02:13

## 2020-09-25 RX ADMIN — HEPARIN SODIUM 5000 UNITS: 10000 INJECTION INTRAVENOUS; SUBCUTANEOUS at 10:46

## 2020-09-25 RX ADMIN — Medication 10 ML: at 10:46

## 2020-09-25 RX ADMIN — VANCOMYCIN HYDROCHLORIDE 250 MG: 10 INJECTION, POWDER, LYOPHILIZED, FOR SOLUTION INTRAVENOUS at 05:14

## 2020-09-25 ASSESSMENT — PAIN SCALES - GENERAL: PAINLEVEL_OUTOF10: 0

## 2020-09-25 NOTE — PROGRESS NOTES
Department of Internal Medicine  Infectious Diseases  Progress  Note      C/C :   C diff Colitis, leukocytosis     Pt is  awake, and alert  Denies abdomen pain   Diarrhea stopped      Afebrile     Current Facility-Administered Medications   Medication Dose Route Frequency Provider Last Rate Last Dose    metroNIDAZOLE (FLAGYL) tablet 500 mg  500 mg Oral 3 times per day Eddie Nguyen MD   500 mg at 09/25/20 1418    vancomycin (VANCOCIN) oral solution 250 mg  250 mg Oral 4 times per day Eddie Nguyen MD   250 mg at 09/25/20 1418    sodium chloride flush 0.9 % injection 10 mL  10 mL Intravenous 2 times per day April ARMEN King CNP   10 mL at 09/25/20 1046    sodium chloride flush 0.9 % injection 10 mL  10 mL Intravenous PRN April ARMEN King CNP   10 mL at 09/20/20 1601    acetaminophen (TYLENOL) tablet 650 mg  650 mg Oral Q6H PRN April ARMEN King CNP        Or    acetaminophen (TYLENOL) suppository 650 mg  650 mg Rectal Q6H PRN April ARMEN King CNP        polyethylene glycol (GLYCOLAX) packet 17 g  17 g Oral Daily PRN April ARMEN King CNP        heparin (porcine) injection 5,000 Units  5,000 Units Subcutaneous Q8H April ARMEN King CNP   5,000 Units at 09/25/20 1046    baclofen (LIORESAL) tablet 10 mg  10 mg Oral BID April ARMEN King CNP   10 mg at 09/25/20 1045    clopidogrel (PLAVIX) tablet 75 mg  75 mg Oral Daily April ARMEN King CNP   75 mg at 09/25/20 1044    escitalopram (LEXAPRO) tablet 20 mg  20 mg Oral Daily April ARMEN King CNP   20 mg at 12/21/80 5802    folic acid (FOLVITE) tablet 1 mg  1 mg Oral Daily April ARMEN King CNP   1 mg at 09/25/20 1046    levothyroxine (SYNTHROID) tablet 25 mcg  25 mcg Oral QAM April ARMEN King CNP   25 mcg at 09/25/20 0514    antioxidant multivitamin (OCUVITE) tablet  1 tablet Oral Daily April Christine, APRN - CNP   1 tablet at 09/25/20 1045    potassium chloride (KLOR-CON M) extended release tablet 20 mEq  20 mEq Oral Daily April Storey-Ashwin APRN - CNP   20 mEq at 09/25/20 1045    atorvastatin (LIPITOR) tablet 5 mg  5 mg Oral Nightly April Storey-Cornelius, APRN - CNP   5 mg at 09/24/20 1954    trimethobenzamide (TIGAN) injection 200 mg  200 mg Intramuscular Q6H PRN April Storey-Baldwin, APRN - CNP        brimonidine (ALPHAGAN) 0.2 % ophthalmic solution 1 drop  1 drop Both Eyes BID April StoreyElmoAshwin, APRN - CNP   1 drop at 09/25/20 1044    And    timolol (TIMOPTIC) 0.5 % ophthalmic solution 1 drop  1 drop Both Eyes BID April ChasidyKayAshwin, APRN - CNP   1 drop at 09/25/20 1044    latanoprost (XALATAN) 0.005 % ophthalmic solution 1 drop  1 drop Both Eyes Nightly April Storey-Ashwin, APRN - CNP   1 drop at 09/24/20 1953         REVIEW OF SYSTEMS    CONSTITUTIONAL: Denies fever   Resp : Denies SOB   CVS ; Denies chest pain   Abdomen : Reports diarrhea -improved   NEUROLOGICAL: awake and alert   SKIN  : no rash     PHYSICAL EXAM:      Vitals:     BP (!) 163/70   Pulse 75   Temp 97.5 °F (36.4 °C) (Temporal)   Resp 18   Wt 113 lb 9.6 oz (51.5 kg)   SpO2 97%   BMI 17.27 kg/m²     General Appearance:    Awake , alert, confused,      Head:    Normocephalic, atraumatic   Eyes:    No pallor, no icterus,   Ears:    No obvious deformity or drainage.    Nose:   No nasal drainage   Throat:   Mucosa dry    Neck:   Supple, no lymphadenopathy   Lungs:     Clear to auscultation bilaterally    Heart:    Regular rate and rhythm, no murmur   Abdomen:     Soft,? tender, bowel sounds present    Extremities:   No edema, no cyanosis   Pulses:   Dorsalis pedis palpable    Skin:   no rashes or lesions     CBC with Differential:      Lab Results   Component Value Date    WBC 21.4 09/25/2020    RBC 2.68 09/25/2020    HGB 8.3 09/25/2020    HCT 25.8 09/25/2020     09/25/2020    MCV 96.3 09/25/2020    MCH 31.0 09/25/2020    MCHC 32.2 09/25/2020    RDW 15.0 09/25/2020    NRBC 0.0 05/07/2017    METASPCT 2.6 09/19/2020    LYMPHOPCT 2.6 09/21/2020    MONOPCT 0.9 09/21/2020    MYELOPCT 0.9 09/20/2020    BASOPCT 0.2 09/21/2020    MONOSABS 0.28 09/21/2020    LYMPHSABS 0.86 09/21/2020    EOSABS 0.00 09/21/2020    BASOSABS 0.00 09/21/2020       CMP     Lab Results   Component Value Date     09/25/2020    K 3.6 09/25/2020    K 3.3 09/20/2020     09/25/2020    CO2 12 09/25/2020    BUN 17 09/25/2020    CREATININE 0.8 09/25/2020    GFRAA >60 09/25/2020    LABGLOM >60 09/25/2020    GLUCOSE 96 09/25/2020    PROT 5.3 09/25/2020    LABALBU 2.2 09/25/2020    CALCIUM 8.1 09/25/2020    BILITOT 0.3 09/25/2020    ALKPHOS 137 09/25/2020    AST 22 09/25/2020    ALT 14 09/25/2020         Hepatic Function Panel:    Lab Results   Component Value Date    ALKPHOS 137 09/25/2020    ALT 14 09/25/2020    AST 22 09/25/2020    PROT 5.3 09/25/2020    BILITOT 0.3 09/25/2020    BILIDIR 0.6 04/19/2019    IBILI 0.6 04/19/2019    LABALBU 2.2 09/25/2020       PT/INR:    Lab Results   Component Value Date    PROTIME 21.8 09/25/2020    PROTIME 19.5 09/05/2019    INR 1.9 09/25/2020       TSH:    Lab Results   Component Value Date    TSH 2.590 07/30/2020       U/A:    Lab Results   Component Value Date    COLORU Yellow 09/19/2020    PHUR 6.0 09/19/2020    WBCUA 2-5 09/19/2020    RBCUA 5-10 09/19/2020    MUCUS Present 07/30/2020    BACTERIA MODERATE 09/19/2020    CLARITYU Clear 09/19/2020    SPECGRAV 1.015 09/19/2020    LEUKOCYTESUR SMALL 09/19/2020    UROBILINOGEN 0.2 09/19/2020    BILIRUBINUR SMALL 09/19/2020    BLOODU TRACE-INTACT 09/19/2020    GLUCOSEU Negative 09/19/2020       ABG:  No results found for: ACI0KRK, BEART, G6TIQQWW, PHART, THGBART, WLK4QSC, PO2ART, YDX7AII    MICROBIOLOGY:    Blood culture - neg      Urine Culture - mixed bacteria      CEA 6.54    Stool for C diff toxin +ve     Radiology :    CT scan of abdomen and pelvis -  Impression:            Colitis mostly involving the ascending colon, rectum and extending to   the anus. Adynamic ileus     Coronary calcification suggesting of coronary artery disease. The Candelario catheter is noted to be coiled up in the urinary bladder   which needs to be repositioned. IMPRESSION:     1. Colitis - C diff infection   2. Leukocytosis - trending down  ( 21 K)   3. Lactic acidosis - improved     RECOMMENDATIONS:      1. Oral vancomycin 250 mg po q 6 hrs / Flagyl 500 mg  po  q 8 hrs X 10 days   2.  To Reta Ellison

## 2020-09-25 NOTE — PROGRESS NOTES
Hospital Medicine    Subjective:  Pt seen this am much more alert today conversive      Current Facility-Administered Medications:     metroNIDAZOLE (FLAGYL) tablet 500 mg, 500 mg, Oral, 3 times per day, Desean Dietrich MD, 500 mg at 09/25/20 0514    vancomycin (VANCOCIN) oral solution 250 mg, 250 mg, Oral, 4 times per day, Desean Dietrich MD, 250 mg at 09/25/20 0514    sodium chloride flush 0.9 % injection 10 mL, 10 mL, Intravenous, 2 times per day, April ARMEN King - CNP, 10 mL at 09/25/20 1046    sodium chloride flush 0.9 % injection 10 mL, 10 mL, Intravenous, PRN, April ARMEN King - CNP, 10 mL at 09/20/20 1601    acetaminophen (TYLENOL) tablet 650 mg, 650 mg, Oral, Q6H PRN **OR** acetaminophen (TYLENOL) suppository 650 mg, 650 mg, Rectal, Q6H PRN, April ARMEN King - CNP    polyethylene glycol (GLYCOLAX) packet 17 g, 17 g, Oral, Daily PRN, April ARMEN King - CNP    heparin (porcine) injection 5,000 Units, 5,000 Units, Subcutaneous, Q8H, April ARMEN King - CNP, 5,000 Units at 09/25/20 1046    baclofen (LIORESAL) tablet 10 mg, 10 mg, Oral, BID, April ARMEN King - CNP, 10 mg at 09/25/20 1045    clopidogrel (PLAVIX) tablet 75 mg, 75 mg, Oral, Daily, April ARMEN Kign - CNP, 75 mg at 09/25/20 1044    escitalopram (LEXAPRO) tablet 20 mg, 20 mg, Oral, Daily, April ARMEN King - SILAS, 20 mg at 27/27/85 0360    folic acid (FOLVITE) tablet 1 mg, 1 mg, Oral, Daily, April ARMEN King - CNP, 1 mg at 09/25/20 1046    levothyroxine (SYNTHROID) tablet 25 mcg, 25 mcg, Oral, QAM, April ARMEN King - CNP, 25 mcg at 09/25/20 8364    antioxidant multivitamin (OCUVITE) tablet, 1 tablet, Oral, Daily, April ARMEN King CNP, 1 tablet at 09/25/20 1045    potassium chloride (KLOR-CON M) extended release tablet 20 mEq, 20 mEq, Oral, Daily, April ARMEN King CNP, 20 mEq at 09/25/20 1045   atorvastatin (LIPITOR) tablet 5 mg, 5 mg, Oral, Nightly, [facility], APR - CNP, 5 mg at 09/24/20 1954    trimethobenzamide (TIGAN) injection 200 mg, 200 mg, Intramuscular, Q6H PRN, [facility], APRN - CNP    brimonidine (ALPHAGAN) 0.2 % ophthalmic solution 1 drop, 1 drop, Both Eyes, BID, 1 drop at 09/25/20 1044 **AND** timolol (TIMOPTIC) 0.5 % ophthalmic solution 1 drop, 1 drop, Both Eyes, BID, April [facility], APR - CNP, 1 drop at 09/25/20 1044    latanoprost (XALATAN) 0.005 % ophthalmic solution 1 drop, 1 drop, Both Eyes, Nightly, April [facility] - CNP, 1 drop at 09/24/20 1953    Objective:    BP (!) 163/70   Pulse 75   Temp 97.5 °F (36.4 °C) (Temporal)   Resp 18   Wt 113 lb 9.6 oz (51.5 kg)   SpO2 97%   BMI 17.27 kg/m²     Heart:  Reg  Lungs:  CTA bilaterally, no wheeze, rales or rhonchi  Abd: bowel sounds present, nontender, nondistended, no masses  Extrem:  No clubbing, cyanosis, or edema    CBC with Differential:    Lab Results   Component Value Date    WBC 21.4 09/25/2020    RBC 2.68 09/25/2020    HGB 8.3 09/25/2020    HCT 25.8 09/25/2020     09/25/2020    MCV 96.3 09/25/2020    MCH 31.0 09/25/2020    MCHC 32.2 09/25/2020    RDW 15.0 09/25/2020    NRBC 0.0 05/07/2017    METASPCT 2.6 09/19/2020    LYMPHOPCT 2.6 09/21/2020    MONOPCT 0.9 09/21/2020    MYELOPCT 0.9 09/20/2020    BASOPCT 0.2 09/21/2020    MONOSABS 0.28 09/21/2020    LYMPHSABS 0.86 09/21/2020    EOSABS 0.00 09/21/2020    BASOSABS 0.00 09/21/2020     CMP:    Lab Results   Component Value Date     09/25/2020    K 3.6 09/25/2020    K 3.3 09/20/2020     09/25/2020    CO2 12 09/25/2020    BUN 17 09/25/2020    CREATININE 0.8 09/25/2020    GFRAA >60 09/25/2020    LABGLOM >60 09/25/2020    GLUCOSE 96 09/25/2020    PROT 5.3 09/25/2020    LABALBU 2.2 09/25/2020    CALCIUM 8.1 09/25/2020    BILITOT 0.3 09/25/2020    ALKPHOS 137 09/25/2020    AST 22 09/25/2020    ALT 14 09/25/2020 Warfarin PT/INR:    Lab Results   Component Value Date    INR 1.9 09/25/2020    INR 1.7 09/24/2020    INR 1.8 09/23/2020    PROTIME 21.8 (H) 09/25/2020    PROTIME 19.8 (H) 09/24/2020    PROTIME 20.7 (H) 09/23/2020       Assessment:    Principal Problem:    C. difficile colitis  Active Problems:    Glaucoma    Macular degeneration    Depression    History of cirrhosis    PVD (peripheral vascular disease) with claudication (HCC)    Mixed hyperlipidemia    Acquired hypothyroidism    Essential hypertension    Leukocytosis    DIVINA (acute kidney injury) ([facility] [address]    Elevated alkaline phosphatase level    Anemia    Septicemia (HCC)    Acute metabolic encephalopathy  Resolved Problems:    * No resolved hospital problems.  *      Plan:  Cont atb per id dc to snf        Evette Cherry  12:40 PM  9/25/2020

## 2020-09-25 NOTE — CARE COORDINATION
Discharge order noted on chart. Pt discharging to [place]. Transport set up with [facility] @ 7:30pm. I did ask them to outsource if possible for sooner discharge. Covid 9/19 (-). Ambulance form on soft chart. Nursing and facility notified. Message left with brother to notify him. Nursing please verify he got message upon discharge.  Levi Sharif, RN  [address] Case Management  [phone] 1245--[facility] Facility and nursing updated

## 2020-09-25 NOTE — PROGRESS NOTES
[facility] SURGERY  DAILY PROGRESS NOTE  9/25/2020  Cc: Abdominal pain    Subjective:  Patient feeling much better this morning. Per nursing 3 large watery BMs. States abdominal pain has improved. Denies nausea, vomiting.  Tolerating diet without issue    Ammonia level 27 from 24  WBC 21.4  MELD of 14    Objective:  BP (!) 163/70   Pulse 75   Temp 97.5 °F (36.4 °C) (Temporal)   Resp 18   Wt 113 lb 9.6 oz (51.5 kg)   SpO2 97%   BMI 17.27 kg/m²     GENERAL:  Laying in bed, awake, alert, cooperative, no apparent distress  LUNGS:  No increased work of breathing  CARDIOVASCULAR:  Regular rate  ABDOMEN:  Soft, protuberant abdomen, NT  EXTREMITIES: No edema or swelling     Assessment/Plan:  77 y.o. female with C diff colitis; hx of cirrhosis likely 2/2 alcoholism    Overall care per Primary  ID following, abx per ID, on po Vanc and IV flagyl  Continue low residue diet  CEA 6, AFP 1  MELD of 14  Monitor abdominal exam, improving  Will need outpatient colonoscopy, defer to GI, Dr. Myriam Ochoa as she is already established with him    Electronically signed by Azalia Lyman MD on 9/25/2020 at 11:28 AM

## 2020-10-03 NOTE — DISCHARGE SUMMARY
[address]                  [address] [facility],  [facility]                               DISCHARGE SUMMARY    PATIENT NAME: Lucy Tay                   :        1950  MED REC NO:   [id number]                            ROOM:       18  ACCOUNT NO:   [de-identified]                           ADMIT DATE: 2020  PROVIDER:     Hussein Proctor DO                  DISCHARGE DATE:  2020      DISCHARGE DIAGNOSES:  C. diff colitis, sepsis, altered mental status,  hypothyroid, hyperlipidemia, glaucoma, cirrhosis, peripheral vascular  disease, hypertension, depression. HOSPITAL COURSE:  The patient is a 60-year-old  female who  presented to the emergency room with low hemoglobin and elevated WBC. CT of the abdomen revealed pancolitis. She was admitted to the  hospital.  She was found to have C. diff. She was seen by Infectious  Disease, Surgery, and Neurology. She was treated for C. diff colitis. Her status slowly improved, and she was eventually discharged to skilled  nursing facility in stable condition on 2020. DISCHARGE MEDICATIONS:  As per discharge med rec which include Flagyl,  vancomycin, Tylenol p.r.n., Lioresal, Plavix, Combigan eyedrops,  Lexapro, folic acid, Xalatan eyedrops, Synthroid, potassium chloride,  PreserVision vitamin, Zocor. DISCHARGE INSTRUCTIONS:  The patient instructed to follow up with Dr. Osiel Adkins. Call office for appointment.         Sunni Ordonez DO    D: 10/02/2020 13:08:08       T: 10/02/2020 13:17:40     [email or web address]  Job#: [id number]     Doc#: [id number]    CC:

## 2020-10-08 ENCOUNTER — HOSPITAL ENCOUNTER (INPATIENT)
Age: 70
LOS: 8 days | Discharge: NURSING, REHAB OR LONG-TERM CARE | DRG: 377 | End: 2020-10-16
Attending: EMERGENCY MEDICINE | Admitting: INTERNAL MEDICINE
Payer: MEDICARE

## 2020-10-08 PROBLEM — F10.90 CHRONIC ALCOHOL USE: Status: ACTIVE | Noted: 2020-10-08

## 2020-10-08 PROBLEM — D51.9 B12 DEFICIENCY ANEMIA: Status: ACTIVE | Noted: 2020-09-19

## 2020-10-08 PROBLEM — D62 ACUTE POST-HEMORRHAGIC ANEMIA: Status: ACTIVE | Noted: 2020-10-08

## 2020-10-08 PROBLEM — K92.2 GI BLEEDING: Status: ACTIVE | Noted: 2020-10-08

## 2020-10-08 LAB
ABO/RH: NORMAL
ABO/RH: NORMAL
ALBUMIN SERPL-MCNC: 2.5 G/DL (ref 3.5–5.2)
ALP BLD-CCNC: 93 U/L (ref 35–104)
ALT SERPL-CCNC: 8 U/L (ref 0–32)
AMMONIA: <10 UMOL/L (ref 11–51)
ANION GAP SERPL CALCULATED.3IONS-SCNC: 5 MMOL/L (ref 7–16)
ANTIBODY SCREEN: NORMAL
APTT: 36.1 SEC (ref 24.5–35.1)
AST SERPL-CCNC: 17 U/L (ref 0–31)
BASOPHILS ABSOLUTE: 0.01 E9/L (ref 0–0.2)
BASOPHILS RELATIVE PERCENT: 0.1 % (ref 0–2)
BILIRUB SERPL-MCNC: 0.3 MG/DL (ref 0–1.2)
BLOOD BANK DISPENSE STATUS: NORMAL
BLOOD BANK PRODUCT CODE: NORMAL
BPU ID: NORMAL
BUN BLDV-MCNC: 14 MG/DL (ref 8–23)
CALCIUM SERPL-MCNC: 8.8 MG/DL (ref 8.6–10.2)
CHLORIDE BLD-SCNC: 105 MMOL/L (ref 98–107)
CO2: 22 MMOL/L (ref 22–29)
CREAT SERPL-MCNC: 0.8 MG/DL (ref 0.5–1)
DESCRIPTION BLOOD BANK: NORMAL
EOSINOPHILS ABSOLUTE: 0.19 E9/L (ref 0.05–0.5)
EOSINOPHILS RELATIVE PERCENT: 2.2 % (ref 0–6)
GFR AFRICAN AMERICAN: >60
GFR NON-AFRICAN AMERICAN: >60 ML/MIN/1.73
GLUCOSE BLD-MCNC: 90 MG/DL (ref 74–99)
HCT VFR BLD CALC: 22.3 % (ref 34–48)
HEMOGLOBIN: 6.8 G/DL (ref 11.5–15.5)
IMMATURE GRANULOCYTES #: 0.05 E9/L
IMMATURE GRANULOCYTES %: 0.6 % (ref 0–5)
INR BLD: 1.2
LACTIC ACID: 1 MMOL/L (ref 0.5–2.2)
LIPASE: 66 U/L (ref 13–60)
LYMPHOCYTES ABSOLUTE: 1.83 E9/L (ref 1.5–4)
LYMPHOCYTES RELATIVE PERCENT: 21.3 % (ref 20–42)
MCH RBC QN AUTO: 30.2 PG (ref 26–35)
MCHC RBC AUTO-ENTMCNC: 30.5 % (ref 32–34.5)
MCV RBC AUTO: 99.1 FL (ref 80–99.9)
MONOCYTES ABSOLUTE: 0.5 E9/L (ref 0.1–0.95)
MONOCYTES RELATIVE PERCENT: 5.8 % (ref 2–12)
NEUTROPHILS ABSOLUTE: 6.01 E9/L (ref 1.8–7.3)
NEUTROPHILS RELATIVE PERCENT: 70 % (ref 43–80)
PDW BLD-RTO: 15.7 FL (ref 11.5–15)
PLATELET # BLD: 182 E9/L (ref 130–450)
PMV BLD AUTO: 10.8 FL (ref 7–12)
POTASSIUM SERPL-SCNC: 5.2 MMOL/L (ref 3.5–5)
PROTHROMBIN TIME: 14.3 SEC (ref 9.3–12.4)
RBC # BLD: 2.25 E12/L (ref 3.5–5.5)
SARS-COV-2, NAAT: NOT DETECTED
SODIUM BLD-SCNC: 132 MMOL/L (ref 132–146)
TOTAL PROTEIN: 7.7 G/DL (ref 6.4–8.3)
WBC # BLD: 8.6 E9/L (ref 4.5–11.5)

## 2020-10-08 PROCEDURE — 86900 BLOOD TYPING SEROLOGIC ABO: CPT

## 2020-10-08 PROCEDURE — U0002 COVID-19 LAB TEST NON-CDC: HCPCS

## 2020-10-08 PROCEDURE — 83690 ASSAY OF LIPASE: CPT

## 2020-10-08 PROCEDURE — 86923 COMPATIBILITY TEST ELECTRIC: CPT

## 2020-10-08 PROCEDURE — 85610 PROTHROMBIN TIME: CPT

## 2020-10-08 PROCEDURE — 83605 ASSAY OF LACTIC ACID: CPT

## 2020-10-08 PROCEDURE — 86901 BLOOD TYPING SEROLOGIC RH(D): CPT

## 2020-10-08 PROCEDURE — 99222 1ST HOSP IP/OBS MODERATE 55: CPT | Performed by: INTERNAL MEDICINE

## 2020-10-08 PROCEDURE — 2580000003 HC RX 258: Performed by: STUDENT IN AN ORGANIZED HEALTH CARE EDUCATION/TRAINING PROGRAM

## 2020-10-08 PROCEDURE — P9016 RBC LEUKOCYTES REDUCED: HCPCS

## 2020-10-08 PROCEDURE — 36430 TRANSFUSION BLD/BLD COMPNT: CPT

## 2020-10-08 PROCEDURE — 82140 ASSAY OF AMMONIA: CPT

## 2020-10-08 PROCEDURE — 86850 RBC ANTIBODY SCREEN: CPT

## 2020-10-08 PROCEDURE — 85025 COMPLETE CBC W/AUTO DIFF WBC: CPT

## 2020-10-08 PROCEDURE — 1200000000 HC SEMI PRIVATE

## 2020-10-08 PROCEDURE — 80053 COMPREHEN METABOLIC PANEL: CPT

## 2020-10-08 PROCEDURE — 99285 EMERGENCY DEPT VISIT HI MDM: CPT

## 2020-10-08 PROCEDURE — 85730 THROMBOPLASTIN TIME PARTIAL: CPT

## 2020-10-08 RX ORDER — CLOPIDOGREL BISULFATE 75 MG/1
75 TABLET ORAL DAILY
Status: DISCONTINUED | OUTPATIENT
Start: 2020-10-09 | End: 2020-10-16 | Stop reason: HOSPADM

## 2020-10-08 RX ORDER — ACETAMINOPHEN 325 MG/1
650 TABLET ORAL EVERY 6 HOURS PRN
Status: DISCONTINUED | OUTPATIENT
Start: 2020-10-08 | End: 2020-10-16 | Stop reason: HOSPADM

## 2020-10-08 RX ORDER — BRIMONIDINE TARTRATE 2 MG/ML
1 SOLUTION/ DROPS OPHTHALMIC 2 TIMES DAILY
Status: DISCONTINUED | OUTPATIENT
Start: 2020-10-09 | End: 2020-10-16 | Stop reason: HOSPADM

## 2020-10-08 RX ORDER — BACLOFEN 10 MG/1
10 TABLET ORAL 2 TIMES DAILY
Status: DISCONTINUED | OUTPATIENT
Start: 2020-10-09 | End: 2020-10-16 | Stop reason: HOSPADM

## 2020-10-08 RX ORDER — SODIUM CHLORIDE 0.9 % (FLUSH) 0.9 %
10 SYRINGE (ML) INJECTION PRN
Status: DISCONTINUED | OUTPATIENT
Start: 2020-10-08 | End: 2020-10-16 | Stop reason: HOSPADM

## 2020-10-08 RX ORDER — RISPERIDONE 1 MG/1
1 TABLET, FILM COATED ORAL 2 TIMES DAILY
COMMUNITY

## 2020-10-08 RX ORDER — 0.9 % SODIUM CHLORIDE 0.9 %
1000 INTRAVENOUS SOLUTION INTRAVENOUS ONCE
Status: COMPLETED | OUTPATIENT
Start: 2020-10-09 | End: 2020-10-09

## 2020-10-08 RX ORDER — BRIMONIDINE TARTRATE, TIMOLOL MALEATE 2; 5 MG/ML; MG/ML
1 SOLUTION/ DROPS OPHTHALMIC EVERY 12 HOURS
Status: DISCONTINUED | OUTPATIENT
Start: 2020-10-09 | End: 2020-10-08 | Stop reason: CLARIF

## 2020-10-08 RX ORDER — 0.9 % SODIUM CHLORIDE 0.9 %
250 INTRAVENOUS SOLUTION INTRAVENOUS ONCE
Status: DISCONTINUED | OUTPATIENT
Start: 2020-10-08 | End: 2020-10-09

## 2020-10-08 RX ORDER — ATORVASTATIN CALCIUM 10 MG/1
10 TABLET, FILM COATED ORAL DAILY
Status: DISCONTINUED | OUTPATIENT
Start: 2020-10-09 | End: 2020-10-16 | Stop reason: HOSPADM

## 2020-10-08 RX ORDER — SODIUM CHLORIDE 0.9 % (FLUSH) 0.9 %
10 SYRINGE (ML) INJECTION EVERY 12 HOURS SCHEDULED
Status: DISCONTINUED | OUTPATIENT
Start: 2020-10-09 | End: 2020-10-16 | Stop reason: HOSPADM

## 2020-10-08 RX ORDER — RISPERIDONE 1 MG/1
1 TABLET, FILM COATED ORAL 2 TIMES DAILY
Status: DISCONTINUED | OUTPATIENT
Start: 2020-10-09 | End: 2020-10-16 | Stop reason: HOSPADM

## 2020-10-08 RX ORDER — POTASSIUM CHLORIDE 20 MEQ/1
20 TABLET, EXTENDED RELEASE ORAL DAILY
Status: DISCONTINUED | OUTPATIENT
Start: 2020-10-09 | End: 2020-10-16 | Stop reason: HOSPADM

## 2020-10-08 RX ORDER — ACETAMINOPHEN 325 MG/1
650 TABLET ORAL EVERY 4 HOURS PRN
Status: DISCONTINUED | OUTPATIENT
Start: 2020-10-08 | End: 2020-10-08 | Stop reason: DRUGHIGH

## 2020-10-08 RX ORDER — FOLIC ACID 1 MG/1
1 TABLET ORAL DAILY
Status: DISCONTINUED | OUTPATIENT
Start: 2020-10-09 | End: 2020-10-16 | Stop reason: HOSPADM

## 2020-10-08 RX ORDER — LEVOTHYROXINE SODIUM 0.03 MG/1
25 TABLET ORAL EVERY MORNING
Status: DISCONTINUED | OUTPATIENT
Start: 2020-10-09 | End: 2020-10-16 | Stop reason: HOSPADM

## 2020-10-08 RX ORDER — ESCITALOPRAM OXALATE 10 MG/1
20 TABLET ORAL DAILY
Status: DISCONTINUED | OUTPATIENT
Start: 2020-10-09 | End: 2020-10-16 | Stop reason: HOSPADM

## 2020-10-08 RX ORDER — LATANOPROST 50 UG/ML
1 SOLUTION/ DROPS OPHTHALMIC NIGHTLY
Status: DISCONTINUED | OUTPATIENT
Start: 2020-10-09 | End: 2020-10-16 | Stop reason: HOSPADM

## 2020-10-08 RX ORDER — TIMOLOL MALEATE 5 MG/ML
1 SOLUTION/ DROPS OPHTHALMIC 2 TIMES DAILY
Status: DISCONTINUED | OUTPATIENT
Start: 2020-10-09 | End: 2020-10-16 | Stop reason: HOSPADM

## 2020-10-08 RX ADMIN — SODIUM CHLORIDE 250 ML: 9 INJECTION, SOLUTION INTRAVENOUS at 18:30

## 2020-10-08 ASSESSMENT — ENCOUNTER SYMPTOMS
WHEEZING: 0
RECTAL PAIN: 0
ABDOMINAL DISTENTION: 0
ANAL BLEEDING: 0
NAUSEA: 0
BLOOD IN STOOL: 0
BACK PAIN: 0
CONSTIPATION: 0
SORE THROAT: 0
SHORTNESS OF BREATH: 0
COUGH: 0
DIARRHEA: 1
SINUS PRESSURE: 0
VOMITING: 0
ABDOMINAL PAIN: 1

## 2020-10-08 ASSESSMENT — PAIN SCALES - GENERAL
PAINLEVEL_OUTOF10: 5
PAINLEVEL_OUTOF10: 0

## 2020-10-08 ASSESSMENT — PAIN DESCRIPTION - LOCATION: LOCATION: ABDOMEN

## 2020-10-08 ASSESSMENT — PAIN DESCRIPTION - DESCRIPTORS: DESCRIPTORS: THROBBING

## 2020-10-08 ASSESSMENT — PAIN SCALES - WONG BAKER: WONGBAKER_NUMERICALRESPONSE: 0

## 2020-10-08 ASSESSMENT — PAIN DESCRIPTION - PAIN TYPE: TYPE: ACUTE PAIN

## 2020-10-08 NOTE — ED PROVIDER NOTES
Constitutional:       Appearance: She is ill-appearing. She is not diaphoretic. Comments: Cachectic, ill-appearing elderly female laying in bed comfortably   HENT:      Right Ear: External ear normal.      Left Ear: External ear normal.      Nose: Nose normal.      Mouth/Throat:      Mouth: Mucous membranes are moist.      Pharynx: Oropharynx is clear. Comments: Poor Dentition  Eyes:      Extraocular Movements: Extraocular movements intact. Pupils: Pupils are equal, round, and reactive to light. Comments: Pale conjunctivae   Neck:      Musculoskeletal: Normal range of motion and neck supple. Cardiovascular:      Rate and Rhythm: Normal rate and regular rhythm. Pulses: Normal pulses. Pulmonary:      Effort: Pulmonary effort is normal. No respiratory distress. Breath sounds: No wheezing. Chest:      Chest wall: No tenderness. Abdominal:      General: There is no distension. Tenderness: There is no guarding or rebound. Comments: Round, soft, mildly tender to deep palpation in left lower quadrant   Genitourinary:     Rectum: Guaiac result positive. Comments: Stool brown, guaiac positive  Musculoskeletal:         General: No swelling or deformity. Right lower leg: No edema. Left lower leg: No edema. Skin:     General: Skin is warm and dry. Coloration: Skin is pale. Neurological:      General: No focal deficit present. Mental Status: She is alert and oriented to person, place, and time. Comments: Mild resting tremor of bilateral upper extremities, no asterixis. 2 out of 5 strength in major muscle groups of bilateral lower extremities, 3/5 strength of major muscle groups bilateral upper extremities. Decreased sensation of hands and feet, sensation intact to light touch throughout dermatomes of upper and lower extremity   Psychiatric:         Mood and Affect: Mood normal.         Behavior: Behavior normal.         Thought Content:  Thought content normal.          Procedures     MDM  Number of Diagnoses or Management Options  Anemia, unspecified type:   Clostridium difficile colitis:   Hyperkalemia:   Lower GI bleed:   Diagnosis management comments: This is a 72-year-old female with history of cirrhosis, peripheral vascular disease, hyperlipidemia, hypertension, C. difficile colitis, presenting for reported low hemoglobin from routine lab draws, reported to be hemoglobin of 6.5. She is generally weak, but this seems to be her baseline and she is currently asymptomatic from her anemia. Patient was recently admitted to the hospital for C. difficile colitis, had an anemia at that time, which has worsened today. She has a history of alcohol use disorder, cirrhosis, history of macrocytic anemia. Suspect there is likely some blood loss associated with her extensive colitis related to her recent C. difficile infection. There is no history of varices, patient has not had any vomiting, bloody emesis. Patient's stool is Hemoccult positive, not grossly bloody, her hemoglobin here was 6.8, she was given a unit of blood. She will be admitted to the hospital for further monitoring, treatment, work-up of her probable GI bleed, anemia requiring blood transfusion. Patient is comfortable with this plan, all questions were answered. ED Course as of Oct 08 2339   Thu Oct 08, 2020   1841 Reevaluated, resting in bed comfortably, updated on results, current plan. She is comfortable with this plan. [RH]   1939 Discussed case with Dr. Nelson Simmons, he agreed to admit the patient under Dr. Bri Kemp.     []      ED Course User Index  [RH] [address], DO      ED Course as of Oct 08 2339   Thu Oct 08, 2020   1841 Reevaluated, resting in bed comfortably, updated on results, current plan. She is comfortable with this plan.     [RH]   1939 Discussed case with Dr. Nelson Simmons, he agreed to admit the patient under Dr. Bri Kemp.     [RH]      ED Course User Index  [RH] Ryley Katheryn Pacer, DO       --------------------------------------------- PAST HISTORY ---------------------------------------------  Past Medical History:  has a past medical history of Alcoholism ([facility] [address] Anemia, Blue toe syndrome of left lower extremity ([facility] [address] C. difficile colitis, Cataract, Chronic deep vein thrombosis (DVT) of femoral vein of right lower extremity ([facility] [address] Critical lower limb ischemia, Depression, [facility] Glaucoma, History of blood transfusion, Hyperlipidemia, Hypertension, Hypothyroidism, Liver disease, Macular degeneration, Peripheral edema, Sciatica, and Severe protein-calorie malnutrition ([facility] [address] Past Surgical History:  has a past surgical history that includes Dental surgery (2007); Clifford tooth extraction; and Upper gastrointestinal endoscopy (N/A, 1/14/2019). Social History:  reports that she quit smoking about 15 years ago. Her smoking use included cigarettes. She started smoking about 48 years ago. She smoked 3.00 packs per day. She has never used smokeless tobacco. She reports current alcohol use. She reports that she does not use drugs. Family History: family history includes Diabetes in her father and sister. The patients home medications have been reviewed. Allergies: Levofloxacin;  Other; Pcn [penicillins]; and Erythromycin    -------------------------------------------------- RESULTS -------------------------------------------------    LABS:  Results for orders placed or performed during the hospital encounter of 10/08/20   CBC auto differential   Result Value Ref Range    WBC 8.6 4.5 - 11.5 E9/L    RBC 2.25 (L) 3.50 - 5.50 E12/L    Hemoglobin 6.8 (LL) 11.5 - 15.5 g/dL    Hematocrit 22.3 (L) 34.0 - 48.0 %    MCV 99.1 80.0 - 99.9 fL    MCH 30.2 26.0 - 35.0 pg    MCHC 30.5 (L) 32.0 - 34.5 %    RDW 15.7 (H) 11.5 - 15.0 fL    Platelets 583 240 - 407 E9/L    MPV 10.8 7.0 - 12.0 fL    Neutrophils % 70.0 43.0 - 80.0 % REVIEWED ---------------------------  Date / Time Roomed:  10/8/2020  5:00 PM  ED Bed Assignment:  [id number]-D    The nursing notes within the ED encounter and vital signs as below have been reviewed. Patient Vitals for the past 24 hrs:   BP Temp Temp src Pulse Resp SpO2 Height Weight   10/08/20 2315 109/62 98.6 °F (37 °C) Oral 92 16 96 % 5' 8\" (1.727 m) 103 lb (46.7 kg)   10/08/20 2249 (!) 140/76 98.3 °F (36.8 °C) -- 90 16 97 % -- --   10/08/20 2240 127/74 -- -- 103 -- 98 % -- --   10/08/20 2230 113/66 -- -- 92 -- 98 % -- --   10/08/20 2224 113/66 98.9 °F (37.2 °C) -- 97 16 -- -- --   10/08/20 2221 131/63 98.9 °F (37.2 °C) -- 94 16 100 % -- --   10/08/20 2215 128/68 -- -- 83 -- 96 % -- --   10/08/20 2200 118/71 -- -- 84 -- 97 % -- --   10/08/20 2145 118/71 -- -- 86 -- 98 % -- --   10/08/20 2130 117/63 -- -- 94 -- 98 % -- --   10/08/20 2030 (!) 140/72 -- -- 82 -- 95 % -- --   10/08/20 2015 (!) 140/87 -- -- 84 -- 97 % -- --   10/08/20 1706 125/73 98.5 °F (36.9 °C) -- 98 16 96 % 5' 8\" (1.727 m) 103 lb (46.7 kg)       Oxygen Saturation Interpretation: Normal    ------------------------------------------ PROGRESS NOTES ------------------------------------------  Re-evaluation(s):  See ED Course    Counseling:  I have spoken with the patient and discussed todays results, in addition to providing specific details for the plan of care and counseling regarding the diagnosis and prognosis. Their questions are answered at this time and they are agreeable with the plan of admission.    --------------------------------- ADDITIONAL PROVIDER NOTES ---------------------------------  Consultations:  See ED Course    This patient's ED course included: a personal history and physicial examination, re-evaluation prior to disposition, multiple bedside re-evaluations, IV medications, cardiac monitoring and continuous pulse oximetry    This patient has remained hemodynamically stable during their ED course. Diagnosis:  1. Anemia, unspecified type    2. Clostridium difficile colitis    3. Lower GI bleed    4. Hyperkalemia        Disposition:  Patient's disposition: Admit to telemetry  Patient's condition is stable.              [address] DO  Resident  10/08/20 8846

## 2020-10-08 NOTE — ED NOTES
Bed: 23  Expected date:   Expected time:   Means of arrival:   Comments:  Neva Tatum RN  10/08/20 8542

## 2020-10-09 ENCOUNTER — ANESTHESIA (OUTPATIENT)
Dept: ENDOSCOPY | Age: 70
DRG: 377 | End: 2020-10-09
Payer: MEDICARE

## 2020-10-09 ENCOUNTER — ANESTHESIA EVENT (OUTPATIENT)
Dept: ENDOSCOPY | Age: 70
DRG: 377 | End: 2020-10-09
Payer: MEDICARE

## 2020-10-09 ENCOUNTER — APPOINTMENT (OUTPATIENT)
Dept: CT IMAGING | Age: 70
DRG: 377 | End: 2020-10-09
Payer: MEDICARE

## 2020-10-09 VITALS — SYSTOLIC BLOOD PRESSURE: 107 MMHG | OXYGEN SATURATION: 100 % | DIASTOLIC BLOOD PRESSURE: 53 MMHG

## 2020-10-09 LAB
ALBUMIN SERPL-MCNC: 2.3 G/DL (ref 3.5–5.2)
ALP BLD-CCNC: 83 U/L (ref 35–104)
ALT SERPL-CCNC: 6 U/L (ref 0–32)
ANION GAP SERPL CALCULATED.3IONS-SCNC: 8 MMOL/L (ref 7–16)
AST SERPL-CCNC: 13 U/L (ref 0–31)
BILIRUB SERPL-MCNC: 0.4 MG/DL (ref 0–1.2)
BLOOD BANK DISPENSE STATUS: NORMAL
BLOOD BANK PRODUCT CODE: NORMAL
BPU ID: NORMAL
BUN BLDV-MCNC: 13 MG/DL (ref 8–23)
CALCIUM SERPL-MCNC: 8.5 MG/DL (ref 8.6–10.2)
CHLORIDE BLD-SCNC: 108 MMOL/L (ref 98–107)
CO2: 20 MMOL/L (ref 22–29)
CREAT SERPL-MCNC: 0.7 MG/DL (ref 0.5–1)
DESCRIPTION BLOOD BANK: NORMAL
FOLATE: >20 NG/ML (ref 4.8–24.2)
GFR AFRICAN AMERICAN: >60
GFR NON-AFRICAN AMERICAN: >60 ML/MIN/1.73
GLUCOSE BLD-MCNC: 94 MG/DL (ref 74–99)
HCT VFR BLD CALC: 22.7 % (ref 34–48)
HCT VFR BLD CALC: 32.2 % (ref 34–48)
HEMOGLOBIN: 10 G/DL (ref 11.5–15.5)
HEMOGLOBIN: 7 G/DL (ref 11.5–15.5)
INR BLD: 1.3
POTASSIUM SERPL-SCNC: 4.3 MMOL/L (ref 3.5–5)
PREALBUMIN: 10 MG/DL (ref 20–40)
PROTHROMBIN TIME: 14.7 SEC (ref 9.3–12.4)
SODIUM BLD-SCNC: 136 MMOL/L (ref 132–146)
TOTAL PROTEIN: 6.9 G/DL (ref 6.4–8.3)
VITAMIN B-12: 1261 PG/ML (ref 211–946)

## 2020-10-09 PROCEDURE — 0DB68ZX EXCISION OF STOMACH, VIA NATURAL OR ARTIFICIAL OPENING ENDOSCOPIC, DIAGNOSTIC: ICD-10-PCS | Performed by: INTERNAL MEDICINE

## 2020-10-09 PROCEDURE — 99232 SBSQ HOSP IP/OBS MODERATE 35: CPT | Performed by: INTERNAL MEDICINE

## 2020-10-09 PROCEDURE — 2580000003 HC RX 258: Performed by: NURSE ANESTHETIST, CERTIFIED REGISTERED

## 2020-10-09 PROCEDURE — 3700000000 HC ANESTHESIA ATTENDED CARE: Performed by: INTERNAL MEDICINE

## 2020-10-09 PROCEDURE — 85014 HEMATOCRIT: CPT

## 2020-10-09 PROCEDURE — 6360000002 HC RX W HCPCS: Performed by: NURSE ANESTHETIST, CERTIFIED REGISTERED

## 2020-10-09 PROCEDURE — 2580000003 HC RX 258: Performed by: RADIOLOGY

## 2020-10-09 PROCEDURE — 7100000010 HC PHASE II RECOVERY - FIRST 15 MIN: Performed by: INTERNAL MEDICINE

## 2020-10-09 PROCEDURE — 3700000001 HC ADD 15 MINUTES (ANESTHESIA): Performed by: INTERNAL MEDICINE

## 2020-10-09 PROCEDURE — 2580000003 HC RX 258: Performed by: CLINICAL NURSE SPECIALIST

## 2020-10-09 PROCEDURE — 2580000003 HC RX 258: Performed by: INTERNAL MEDICINE

## 2020-10-09 PROCEDURE — 85610 PROTHROMBIN TIME: CPT

## 2020-10-09 PROCEDURE — 6360000002 HC RX W HCPCS: Performed by: CLINICAL NURSE SPECIALIST

## 2020-10-09 PROCEDURE — 84134 ASSAY OF PREALBUMIN: CPT

## 2020-10-09 PROCEDURE — 88305 TISSUE EXAM BY PATHOLOGIST: CPT

## 2020-10-09 PROCEDURE — P9016 RBC LEUKOCYTES REDUCED: HCPCS

## 2020-10-09 PROCEDURE — 36430 TRANSFUSION BLD/BLD COMPNT: CPT

## 2020-10-09 PROCEDURE — 1200000000 HC SEMI PRIVATE

## 2020-10-09 PROCEDURE — 7100000011 HC PHASE II RECOVERY - ADDTL 15 MIN: Performed by: INTERNAL MEDICINE

## 2020-10-09 PROCEDURE — 87081 CULTURE SCREEN ONLY: CPT

## 2020-10-09 PROCEDURE — 85018 HEMOGLOBIN: CPT

## 2020-10-09 PROCEDURE — 80053 COMPREHEN METABOLIC PANEL: CPT

## 2020-10-09 PROCEDURE — 2709999900 HC NON-CHARGEABLE SUPPLY: Performed by: INTERNAL MEDICINE

## 2020-10-09 PROCEDURE — 3609017700 HC EGD DILATION GASTRIC/DUODENAL STRICTURE: Performed by: INTERNAL MEDICINE

## 2020-10-09 PROCEDURE — 0D748ZZ DILATION OF ESOPHAGOGASTRIC JUNCTION, VIA NATURAL OR ARTIFICIAL OPENING ENDOSCOPIC: ICD-10-PCS | Performed by: INTERNAL MEDICINE

## 2020-10-09 PROCEDURE — 82607 VITAMIN B-12: CPT

## 2020-10-09 PROCEDURE — 0DB98ZX EXCISION OF DUODENUM, VIA NATURAL OR ARTIFICIAL OPENING ENDOSCOPIC, DIAGNOSTIC: ICD-10-PCS | Performed by: INTERNAL MEDICINE

## 2020-10-09 PROCEDURE — 36415 COLL VENOUS BLD VENIPUNCTURE: CPT

## 2020-10-09 PROCEDURE — 6370000000 HC RX 637 (ALT 250 FOR IP): Performed by: INTERNAL MEDICINE

## 2020-10-09 PROCEDURE — 97165 OT EVAL LOW COMPLEX 30 MIN: CPT

## 2020-10-09 PROCEDURE — C9113 INJ PANTOPRAZOLE SODIUM, VIA: HCPCS | Performed by: CLINICAL NURSE SPECIALIST

## 2020-10-09 PROCEDURE — 82746 ASSAY OF FOLIC ACID SERUM: CPT

## 2020-10-09 PROCEDURE — 6370000000 HC RX 637 (ALT 250 FOR IP): Performed by: CLINICAL NURSE SPECIALIST

## 2020-10-09 PROCEDURE — 6360000004 HC RX CONTRAST MEDICATION: Performed by: RADIOLOGY

## 2020-10-09 PROCEDURE — 74177 CT ABD & PELVIS W/CONTRAST: CPT

## 2020-10-09 RX ORDER — 0.9 % SODIUM CHLORIDE 0.9 %
20 INTRAVENOUS SOLUTION INTRAVENOUS ONCE
Status: COMPLETED | OUTPATIENT
Start: 2020-10-09 | End: 2020-10-09

## 2020-10-09 RX ORDER — SODIUM CHLORIDE 0.9 % (FLUSH) 0.9 %
10 SYRINGE (ML) INJECTION ONCE
Status: COMPLETED | OUTPATIENT
Start: 2020-10-09 | End: 2020-10-09

## 2020-10-09 RX ORDER — CHOLESTYRAMINE 4 G/9G
1 POWDER, FOR SUSPENSION ORAL 2 TIMES DAILY
Status: DISCONTINUED | OUTPATIENT
Start: 2020-10-09 | End: 2020-10-16 | Stop reason: HOSPADM

## 2020-10-09 RX ORDER — SODIUM CHLORIDE 9 MG/ML
INJECTION, SOLUTION INTRAVENOUS CONTINUOUS PRN
Status: DISCONTINUED | OUTPATIENT
Start: 2020-10-09 | End: 2020-10-09 | Stop reason: SDUPTHER

## 2020-10-09 RX ORDER — SODIUM CHLORIDE 9 MG/ML
10 INJECTION INTRAVENOUS 2 TIMES DAILY
Status: DISCONTINUED | OUTPATIENT
Start: 2020-10-09 | End: 2020-10-16 | Stop reason: HOSPADM

## 2020-10-09 RX ORDER — PROPOFOL 10 MG/ML
INJECTION, EMULSION INTRAVENOUS CONTINUOUS PRN
Status: DISCONTINUED | OUTPATIENT
Start: 2020-10-09 | End: 2020-10-09 | Stop reason: SDUPTHER

## 2020-10-09 RX ORDER — PANTOPRAZOLE SODIUM 40 MG/10ML
40 INJECTION, POWDER, LYOPHILIZED, FOR SOLUTION INTRAVENOUS 2 TIMES DAILY
Status: DISCONTINUED | OUTPATIENT
Start: 2020-10-09 | End: 2020-10-16 | Stop reason: HOSPADM

## 2020-10-09 RX ADMIN — BRIMONIDINE TARTRATE 1 DROP: 2 SOLUTION OPHTHALMIC at 10:22

## 2020-10-09 RX ADMIN — TIMOLOL MALEATE 1 DROP: 5 SOLUTION OPHTHALMIC at 00:49

## 2020-10-09 RX ADMIN — BACLOFEN 10 MG: 10 TABLET ORAL at 00:49

## 2020-10-09 RX ADMIN — TIMOLOL MALEATE 1 DROP: 5 SOLUTION OPHTHALMIC at 10:21

## 2020-10-09 RX ADMIN — BRIMONIDINE TARTRATE 1 DROP: 2 SOLUTION OPHTHALMIC at 20:32

## 2020-10-09 RX ADMIN — LATANOPROST 1 DROP: 50 SOLUTION OPHTHALMIC at 20:32

## 2020-10-09 RX ADMIN — MICONAZOLE NITRATE: 2 OINTMENT TOPICAL at 20:31

## 2020-10-09 RX ADMIN — LATANOPROST 1 DROP: 50 SOLUTION OPHTHALMIC at 00:49

## 2020-10-09 RX ADMIN — SODIUM CHLORIDE, PRESERVATIVE FREE 10 ML: 5 INJECTION INTRAVENOUS at 20:33

## 2020-10-09 RX ADMIN — SODIUM CHLORIDE 1000 ML: 9 INJECTION, SOLUTION INTRAVENOUS at 00:17

## 2020-10-09 RX ADMIN — BACLOFEN 10 MG: 10 TABLET ORAL at 20:32

## 2020-10-09 RX ADMIN — Medication 250 MG: at 23:47

## 2020-10-09 RX ADMIN — LEVOTHYROXINE SODIUM 25 MCG: 0.03 TABLET ORAL at 10:21

## 2020-10-09 RX ADMIN — Medication 10 ML: at 20:33

## 2020-10-09 RX ADMIN — RISPERIDONE 1 MG: 1 TABLET ORAL at 20:32

## 2020-10-09 RX ADMIN — ACETAMINOPHEN 650 MG: 325 TABLET ORAL at 00:53

## 2020-10-09 RX ADMIN — PROPOFOL 300 MCG/KG/MIN: 10 INJECTION, EMULSION INTRAVENOUS at 13:37

## 2020-10-09 RX ADMIN — Medication 10 ML: at 09:45

## 2020-10-09 RX ADMIN — TIMOLOL MALEATE 1 DROP: 5 SOLUTION OPHTHALMIC at 20:32

## 2020-10-09 RX ADMIN — SODIUM CHLORIDE: 9 INJECTION, SOLUTION INTRAVENOUS at 13:18

## 2020-10-09 RX ADMIN — Medication 10 ML: at 10:02

## 2020-10-09 RX ADMIN — RISPERIDONE 1 MG: 1 TABLET ORAL at 00:49

## 2020-10-09 RX ADMIN — PANTOPRAZOLE SODIUM 40 MG: 40 INJECTION, POWDER, FOR SOLUTION INTRAVENOUS at 20:31

## 2020-10-09 RX ADMIN — ACETAMINOPHEN 650 MG: 325 TABLET ORAL at 20:32

## 2020-10-09 RX ADMIN — BRIMONIDINE TARTRATE 1 DROP: 2 SOLUTION OPHTHALMIC at 00:49

## 2020-10-09 RX ADMIN — IOPAMIDOL 75 ML: 755 INJECTION, SOLUTION INTRAVENOUS at 09:45

## 2020-10-09 RX ADMIN — Medication 250 MG: at 18:09

## 2020-10-09 RX ADMIN — CHOLESTYRAMINE 4 G: 4 POWDER, FOR SUSPENSION ORAL at 20:32

## 2020-10-09 RX ADMIN — SODIUM CHLORIDE 20 ML: 9 INJECTION, SOLUTION INTRAVENOUS at 02:15

## 2020-10-09 ASSESSMENT — PAIN SCALES - GENERAL
PAINLEVEL_OUTOF10: 0
PAINLEVEL_OUTOF10: 3
PAINLEVEL_OUTOF10: 0
PAINLEVEL_OUTOF10: 3
PAINLEVEL_OUTOF10: 0

## 2020-10-09 ASSESSMENT — PAIN DESCRIPTION - DESCRIPTORS
DESCRIPTORS: HEADACHE;ACHING
DESCRIPTORS: HEADACHE

## 2020-10-09 ASSESSMENT — PAIN DESCRIPTION - PAIN TYPE
TYPE: ACUTE PAIN
TYPE: ACUTE PAIN

## 2020-10-09 ASSESSMENT — PAIN DESCRIPTION - ONSET
ONSET: ON-GOING
ONSET: ON-GOING

## 2020-10-09 ASSESSMENT — PAIN DESCRIPTION - FREQUENCY
FREQUENCY: INTERMITTENT
FREQUENCY: INTERMITTENT

## 2020-10-09 ASSESSMENT — PAIN SCALES - WONG BAKER: WONGBAKER_NUMERICALRESPONSE: 0

## 2020-10-09 ASSESSMENT — PAIN DESCRIPTION - PROGRESSION
CLINICAL_PROGRESSION: GRADUALLY WORSENING
CLINICAL_PROGRESSION: GRADUALLY WORSENING

## 2020-10-09 ASSESSMENT — PAIN DESCRIPTION - LOCATION
LOCATION: HEAD
LOCATION: HEAD

## 2020-10-09 ASSESSMENT — PAIN DESCRIPTION - ORIENTATION
ORIENTATION: RIGHT;LEFT
ORIENTATION: RIGHT;LEFT

## 2020-10-09 NOTE — PLAN OF CARE
Problem: Inadequate oral food/beverage intake (NI-2.1)  Goal: Food and/or Nutrient Delivery  Continue Diet and ADAT once med appropriate. Will Start ONS w/ diet once advanced. Description: Individualized approach for food/nutrient provision.   Outcome: Met This Shift

## 2020-10-09 NOTE — PROGRESS NOTES
[facility] Hospitalist   Progress Note    Admitting Date and Time: 10/8/2020  5:00 PM  Admit Dx: GI bleeding [K92.2]  GI bleeding [K92.2]    Subjective:    Patient was admitted with GI bleeding [K92.2]  GI bleeding [K92.2].  Patient denies fever, chills, cp, sob, n/v.     cholestyramine  1 packet Oral BID    pantoprazole  40 mg Intravenous BID    And    sodium chloride (PF)  10 mL Intravenous BID    vancomycin  250 mg Oral 4 times per day    miconazole nitrate   Topical BID    sodium chloride flush  10 mL Intravenous 2 times per day    baclofen  10 mg Oral BID    clopidogrel  75 mg Oral Daily    escitalopram  20 mg Oral Daily    folic acid  1 mg Oral Daily    latanoprost  1 drop Both Eyes Nightly    levothyroxine  25 mcg Oral QAM    potassium chloride  20 mEq Oral Daily    risperiDONE  1 mg Oral BID    atorvastatin  10 mg Oral Daily    brimonidine  1 drop Both Eyes BID    And    timolol  1 drop Both Eyes BID     sodium chloride flush, 10 mL, PRN  acetaminophen, 650 mg, Q6H PRN         Objective:    /62   Pulse 73   Temp 97.8 °F (36.6 °C) (Oral)   Resp 16   Ht 5' 8\" (1.727 m)   Wt 103 lb (46.7 kg)   SpO2 97%   BMI 15.66 kg/m²   Skin: warm and dry, no rash or erythema  Pulmonary/Chest: clear to auscultation bilaterally- no wheezes, rales or rhonchi, normal air movement, no respiratory distress  Cardiovascular: rhythm reg at rate of 72,lungs cta, abd pos bs soft nt, ext neg for le edema  Abdomen: soft, mild diffuse tenderness, non-distended, normal bowel sounds, no masses or organomegaly  Extremities: no cyanosis, no clubbing and no edema      Recent Labs     10/08/20  1751 10/09/20  0130    136   K 5.2* 4.3    108*   CO2 22 20*   BUN 14 13   CREATININE 0.8 0.7   GLUCOSE 90 94   CALCIUM 8.8 8.5*       Recent Labs     10/08/20  1751 10/09/20  0130 10/09/20  1040   WBC 8.6  --   --    RBC 2.25*  --   --    HGB 6.8* 7.0* 10.0*   HCT 22.3* 22.7* 32.2*   MCV 99.1 --   --    MCH 30.2  --   --    MCHC 30.5*  --   --    RDW 15.7*  --   --      --   --    MPV 10.8  --   --        Hemoglobin/Hematocrit:    Lab Results   Component Value Date    HGB 10.0 10/09/2020    HCT 32.2 10/09/2020     CMP:    Lab Results   Component Value Date     10/09/2020    K 4.3 10/09/2020    K 3.3 09/20/2020     10/09/2020    CO2 20 10/09/2020    BUN 13 10/09/2020    CREATININE 0.7 10/09/2020    GFRAA >60 10/09/2020    LABGLOM >60 10/09/2020    GLUCOSE 94 10/09/2020    PROT 6.9 10/09/2020    LABALBU 2.3 10/09/2020    CALCIUM 8.5 10/09/2020    BILITOT 0.4 10/09/2020    ALKPHOS 83 10/09/2020    AST 13 10/09/2020    ALT 6 10/09/2020     PT/INR:    Lab Results   Component Value Date    PROTIME 14.7 10/09/2020    PROTIME 19.5 09/05/2019    INR 1.3 10/09/2020        Radiology:   CT ABDOMEN PELVIS W IV CONTRAST Additional Contrast? None   Final Result   Diffuse abnormal colonic wall thickening correlating patient's clinical   history of C difficile colitis. Abnormal hypoattenuation left kidney. Differential includes sequela of   underlying pyelonephritis renal abscess versus sequela of renal infarct. Nodular contour of the liver. Correlate clinical evidence to suggest   cirrhosis. Cholelithiasis. Stable indeterminate retroperitoneal lymph nodes potentially reactive in   nature. FL MODIFIED BARIUM SWALLOW W VIDEO    (Results Pending)       Assessment:    Principal Problem:    GI bleeding  Active Problems:    History of cirrhosis    Severe protein-calorie malnutrition (HCC)    Acquired hypothyroidism    B12 deficiency anemia    C. difficile colitis    Acute post-hemorrhagic anemia    Chronic alcohol use  Resolved Problems:    * No resolved hospital problems. *      Plan:  1. C. Diff colitis  Continue po vanc per ID  2. Cirrhosis due to alcohol  Gi following  3. Jimmey Prayer  S/p egd   ppi gi following  4. schatzki ring s/p dilation  Monitor  5.  Gastritis s/p egd  ppi  Gi following  6. anemia with possible acute blood loss component  Possibly due to gi bleed. Pt s/p EGD. Gi following  monitor hgb and transfuse prn  7. Hyperkalemia improved  8. Acidosis monitor  9. Hypothyroidism continue med  10. Hyperlipidemia continue med  11.  Depression continue med        Electronically signed by Paradise Villalobos DO on 10/9/2020 at 6:32 PM

## 2020-10-09 NOTE — PROGRESS NOTES
Wound / ostomy dept consulted for this Pt admitted with GIB. History includes: macrocytic anemia, PVD, HTN, HLD, cirrhosis,  and ETOH abuse. The Pt is incontinent of stool. Her perirectal and groin areas are red with satellite lesions. Recommend Aloe Vesta, SOS precautions. Heels are intact.

## 2020-10-09 NOTE — CARE COORDINATION
COVID negative 10/8. Phone conversation w/ Dani Fried sister-in-law  [phone]. Pt from [place] PTA- Per Dani Fried and her  Tl(pt's brother), plan is for pt  to return to [place] on discharge. Per [address] @ [place], pt is not a bedhold and will need precert to return. Awaiting PT/OT robbi.   Cy Fung

## 2020-10-09 NOTE — PLAN OF CARE
Problem: Skin Integrity:  Goal: Will show no infection signs and symptoms  Description: Will show no infection signs and symptoms  Outcome: Met This Shift  Goal: Absence of new skin breakdown  Description: Absence of new skin breakdown  Outcome: Met This Shift     Problem: Falls - Risk of:  Goal: Will remain free from falls  Description: Will remain free from falls  10/9/2020 1843 by Daniel Lind RN  Outcome: Met This Shift  10/9/2020 0646 by El Ling  Outcome: Met This Shift  Goal: Absence of physical injury  Description: Absence of physical injury  10/9/2020 1843 by Daniel Lind RN  Outcome: Met This Shift  10/9/2020 0646 by El Ling  Outcome: Met This Shift     Problem: Inadequate oral food/beverage intake (NI-2.1)  Goal: Food and/or Nutrient Delivery  Description: Individualized approach for food/nutrient provision.   10/9/2020 1214 by Peyton Castorena RD, LD  Outcome: Met This Shift

## 2020-10-09 NOTE — DISCHARGE INSTR - COC
Continuity of Care Form    Patient Name: Yinka Leal   :  1950  MRN:  [id number]    Admit date:  10/8/2020  Discharge date:  10/16/20    Code Status Order: Full Code   Advance Directives:   Advance Care Flowsheet Documentation       Date/Time Healthcare Directive Type of Healthcare Directive Copy in [address] Agent's Name Healthcare Agent's Phone Number    10/08/20 2341  No, patient does not have an advance directive for healthcare treatment -- -- -- -- --            Admitting Physician:  Bee Hightower DO  PCP: Jennie Ortiz DO    Discharging Nurse: [facility] Unit/Room#: 2144/6242-R  Discharging Unit Phone Number: [phone]    Emergency Contact:   Extended Emergency Contact Information  Primary Emergency Contact: Ilya Resendiz[address] Phone: [phone]  Mobile Phone: [phone]  Relation: Brother/Sister  Secondary Emergency Contact: Katina Gipson   [address] Phone: [phone]  Mobile Phone: [phone]  Relation: Brother/Sister    Past Surgical History:  Past Surgical History:   Procedure Laterality Date    DENTAL SURGERY  2007    teeth extraction    UPPER GASTROINTESTINAL ENDOSCOPY N/A 2019    EGD BIOPSY performed by Naif Ruiz MD at [facility] EXTRACTION         Immunization History:   Immunization History   Administered Date(s) Administered    Pneumococcal Polysaccharide ([id number]) 2019       Active Problems:  Patient Active Problem List   Diagnosis Code    Glaucoma H40.9    Macular degeneration H35.30    Depression F32.9    Macrocytic anemia D53.9    History of cirrhosis Z87.19    PVD (peripheral vascular disease) with claudication (HCC) I73.9    Severe protein-calorie malnutrition ([facility] [address] E43    Mixed hyperlipidemia E78.2    Acquired hypothyroidism E03.9    Essential hypertension I10    Weakness R53.1    Leukocytosis D72.829    DIVINA (acute kidney injury) ([facility] [address] N17.9    Elevated alkaline phosphatase level R74.8    B12 deficiency anemia D51.9    Septicemia (HCC) A41.9    C. difficile colitis A04.72    Acute metabolic encephalopathy Z05.98    GI bleeding K92.2    Acute post-hemorrhagic anemia D62    Chronic alcohol use Z72.89       Isolation/Infection:   Isolation            C Diff Contact          Patient Infection Status       Infection Onset Added Last Indicated Last Indicated By Review Planned Expiration Resolved Resolved By    None active    Resolved    COVID-19 Rule Out 10/08/20 10/08/20 10/08/20 COVID-19 (Ordered)   10/08/20 Rule-Out Test Resulted    C-diff Rule Out 09/20/20 09/20/20 09/21/20 CLOSTRIDIUM DIFFICILE EIA (Ordered)   09/22/20 Rule-Out Test Resulted    COVID-19 Rule Out 09/19/20 09/19/20 09/19/20 COVID-19 (Ordered)   09/19/20 Rule-Out Test Resulted    COVID-19 Rule Out 08/03/20 08/03/20 08/03/20 COVID-19 (Ordered)   08/03/20 Rule-Out Test Resulted            Nurse Assessment:  Last Vital Signs: BP (!) 100/55   Pulse 75   Temp 98.5 °F (36.9 °C) (Oral)   Resp 18   Ht 5' 8\" (1.727 m)   Wt 103 lb (46.7 kg)   SpO2 98%   BMI 15.66 kg/m²     Last documented pain score (0-10 scale): Pain Level: 0  Last Weight:   Wt Readings from Last 1 Encounters:   10/08/20 103 lb (46.7 kg)     Mental Status:  oriented and alert    IV Access:  - None    Nursing Mobility/ADLs:  Walking   Assisted  Transfer  Assisted  Bathing  Assisted  Dressing  Assisted  Toileting  Assisted  Feeding  Assisted  Med Admin  Assisted  Med Delivery   whole    Wound Care Documentation and Therapy:  Wound 04/18/19 Buttocks Right; Inner (Active)   Number of days: 540       Wound 04/18/19 Buttocks Right (Active)   Number of days: 540       Wound 04/19/19 Heel Right (Active)   Number of days: 538       Wound 04/19/19 Heel Left (Active)   Number of days: 538        Elimination:  Continence:   · Bowel:  Yes  · Bladder: Yes  Urinary Catheter: None Potential (if transferring to Rehab): Fair    Recommended Labs or Other Treatments After Discharge: ***      Vancomycin 250 mg 4 times daily for 10 days then decrease to 3 times daily for 1 week then twice daily for 1 week then daily for 1 week then stop      Physician Certification: I certify the above information and transfer of Stephenie Jim  is necessary for the continuing treatment of the diagnosis listed and that she requires [place] for less 30 days.      Update Admission H&P: No change in H&P    PHYSICIAN SIGNATURE: Electronically signed by Wen Thomas MD on 10/16/2020 at 2:33 PM

## 2020-10-09 NOTE — CARE COORDINATION
Social work / Discharge Planning : Patient was admitted form [facility]. SW placed call to [employer] to verify bed hold status and pre-cert. N 17, and transport forms completed. Patient had a negative COVID 10/08. SW to follow. Electronically signed by RUBÉN Bills on 10/9/20 at 9:02 AM EDT      Addendum : CM verified plan at discharge is to return to [address] Angélica Tineo from [address] did verify patient is not a bed hold but can return. Will need pre-cert. SW to follow.  Electronically signed by RUBÉN Bills on 10/9/20 at 10:01 AM EDT

## 2020-10-09 NOTE — OP NOTE
[id number] [address]                                OPERATIVE REPORT    PATIENT NAME: Neysa Meckel                   :        1950  MED REC NO:   [id number]                            ROOM:       2418  ACCOUNT NO:   [de-identified]                           ADMIT DATE: 10/08/2020  PROVIDER:     Rufino Cohen MD    DATE OF PROCEDURE:  10/09/2020    PROCEDURE PERFORMED:  Upper endoscopy with biopsy and Alcantar  dilatation. PREOPERATIVE DIAGNOSES:  Evaluation for dysphagia, anemia, alcoholic  cirrhosis, and rule out varices. POSTOPERATIVE DIAGNOSES:  Schatzki's ring dilated with 52-Polish  Vaughn Forget. No varices seen. Stomach showed gastritis, biopsied to rule  out H. pylori infection. Duodenum biopsied to rule out sprue. ANESTHESIA:  Local monitored anesthesia care. SURGEON:  Rufino Cohen M.D.    NOTE:  Prior to the procedure an informed consent was obtained from the  patient after explaining the benefits as well as the risks,  alternatives, and complications of the procedure to the patient, who  understood and agreed. DESCRIPTION OF PROCEDURE:  With the patient in the left lateral  decubitus position, the Olympus [id number] forwardlviewing videoscope was  introduced into the esophagus, the evaluation of which showed Schatzki's  ring and no varices seen. There was also a small hiatal hernia. The scope was then advanced through the gastroesophageal junction into  the gastric body, along the greater curvature. Evaluation of the body  of the stomach showed gastritis. Biopsies were taken from this area to  rule out Helicobacter pylori infection. The scope was then advanced through the pylorus into the duodenal bulb  and second portion of the duodenum, both of which appeared to be  unremarkable. Duodenum biopsied to rule out sprue.   The scope was then  retrieved and retroflexed in the prepyloric antrum, with thorough  evaluation of the cardiac and fundal portions of the stomach, which  appeared to be within normal limits. The scope was then straightened, the area deflated, and the procedure  was terminated by withdrawing the scope and conducting a second look on  the way out, which was essentially the same. The patient tolerated the procedure well. A #52-Greenlandic Ethelle Sneddon was introduced with dilatation carried out without  difficulty. The patient tolerated it well.         Abdulkadir Pinon MD    D: 10/09/2020 14:31:55       T: 10/09/2020 14:41:15     [id number]  Job#: [id number]     Doc#: [id number]    CC:  Maida Louis, Maude Crigler, MD

## 2020-10-09 NOTE — ANESTHESIA PRE PROCEDURE
Department of Anesthesiology  Preprocedure Note       Name:  Ramón Tejada   Age:  77 y.o.  :  1950                                          MRN:  [id number]         Date:  10/9/2020      Surgeon: Eugenia Sterling):  Venus Mayo MD    Procedure: Procedure(s):  EGD ESOPHAGOGASTRODUODENOSCOPY    Medications prior to admission:   Prior to Admission medications    Medication Sig Start Date End Date Taking?  Authorizing Provider   risperiDONE (RISPERDAL) 1 MG tablet Take 1 mg by mouth 2 times daily   Yes Historical Provider, MD   acetaminophen (TYLENOL) 325 MG tablet Take 2 tablets by mouth every 6 hours as needed for Pain 8/3/20  Yes Wilton Muñoz DO   baclofen (LIORESAL) 10 MG tablet Take 10 mg by mouth 2 times daily    Historical Provider, MD   folic acid (FOLVITE) 1 MG tablet Take 1 tablet by mouth daily 8/3/20   Wilton Muñoz DO   clopidogrel (PLAVIX) 75 MG tablet Take 1 tablet by mouth daily 6/15/20   Aracely Hilario,    escitalopram (LEXAPRO) 20 MG tablet Take 1 tablet by mouth daily 6/15/20   Sandor Hilario, DO   levothyroxine (SYNTHROID) 25 MCG tablet Take 1 tablet by mouth every morning 6/15/20   Sandor Hilario, DO   potassium chloride (KLOR-CON M) 20 MEQ extended release tablet Take 1 tablet by mouth daily 6/15/20   Sandor Hilario, DO   simvastatin (ZOCOR) 5 MG tablet Take 1 tablet by mouth nightly 6/15/20   Sandor Hilario DO   Handicap Placard MISC by NOT APPLICABLE route    Historical Provider, MD   latanoprost (XALATAN) 0.005 % ophthalmic solution  13   Historical Provider, MD   Multiple Vitamins-Minerals (PRESERVISION AREDS PO) Take 1 tablet by mouth daily    Historical Provider, MD   brimonidine-timolol (COMBIGAN) 0.2-0.5 % ophthalmic solution Place 1 drop into both eyes every 12 hours    Historical Provider, MD       Current medications:    Current Facility-Administered Medications   Medication Dose Route Frequency Provider Last Rate Last Dose    cholestyramine Bing Ness) packet 4 g  1 packet Oral BID Ahmed Mata, APRN - CNS        pantoprazole (PROTONIX) injection 40 mg  40 mg Intravenous BID Ahmed Mata, APRN - CNS        And    sodium chloride (PF) 0.9 % injection 10 mL  10 mL Intravenous BID Ahmed Mata, APRN - CNS        vancomycin (VANCOCIN) oral solution 250 mg  250 mg Oral 4 times per day Dorota Reas, DO        sodium chloride flush 0.9 % injection 10 mL  10 mL Intravenous 2 times per day Randy Hric, DO   10 mL at 10/09/20 1002    sodium chloride flush 0.9 % injection 10 mL  10 mL Intravenous PRN Randy Hric, DO        acetaminophen (TYLENOL) tablet 650 mg  650 mg Oral Q6H PRN Mckenzie Bernal MD   650 mg at 10/09/20 0053    baclofen (LIORESAL) tablet 10 mg  10 mg Oral BID Mckenzie Bernal MD   10 mg at 10/09/20 0049    clopidogrel (PLAVIX) tablet 75 mg  75 mg Oral Daily Mckenzie Bernal MD        escitalopram (LEXAPRO) tablet 20 mg  20 mg Oral Daily Mckenzie Bernal MD        folic acid (FOLVITE) tablet 1 mg  1 mg Oral Daily Mckenzie Bernal MD        latanoprost (XALATAN) 0.005 % ophthalmic solution 1 drop  1 drop Both Eyes Nightly Mckenzie Bernal MD   1 drop at 10/09/20 0049    levothyroxine (SYNTHROID) tablet 25 mcg  25 mcg Oral QAM Mckenzie Bernal MD   25 mcg at 10/09/20 1021    potassium chloride (KLOR-CON M) extended release tablet 20 mEq  20 mEq Oral Daily Mckenzie Bernal MD        risperiDONE (RISPERDAL) tablet 1 mg  1 mg Oral BID Mckenzie Bernal MD   1 mg at 10/09/20 0049    atorvastatin (LIPITOR) tablet 10 mg  10 mg Oral Daily Mckenzie Bernal MD        brimonidine (ALPHAGAN) 0.2 % ophthalmic solution 1 drop  1 drop Both Eyes BID Mckenzie Bernal MD   1 drop at 10/09/20 1022    And    timolol (TIMOPTIC) 0.5 % ophthalmic solution 1 drop  1 drop Both Eyes BID Mckenzie Bernal MD   1 drop at 10/09/20 1021       Allergies:     Allergies   Allergen Reactions    Levofloxacin     Other      Sinus medications- unknown brand or reactions    Pcn [Penicillins]      Patient states she has no knowledge of herself having an allergy, but states everyone in her family is allergic and assumes she is too.      Erythromycin Nausea And Vomiting       Problem List:    Patient Active Problem List   Diagnosis Code    Glaucoma H40.9    Macular degeneration H35.30    Depression F32.9    Macrocytic anemia D53.9    History of cirrhosis Z87.19    PVD (peripheral vascular disease) with claudication ([address] I73.9    Severe protein-calorie malnutrition ([address] E43    Mixed hyperlipidemia E78.2    Acquired hypothyroidism E03.9    Essential hypertension I10    Weakness R53.1    Leukocytosis D72.829    DIVINA (acute kidney injury) ([address] N17.9    Elevated alkaline phosphatase level R74.8    B12 deficiency anemia D51.9    Septicemia (HCC) A41.9    C. difficile colitis A04.72    Acute metabolic encephalopathy Z91.81    GI bleeding K92.2    Acute post-hemorrhagic anemia D62    Chronic alcohol use Z72.89       Past Medical History:        Diagnosis Date    Alcoholism ([address]     heavy alcohol consumption since ~4003-6477 till early 2019    Anemia 05/2017    Blue toe syndrome of left lower extremity (HCC) 5/22/2017    C. difficile colitis 09/22/2020    Cataract     Chronic deep vein thrombosis (DVT) of femoral vein of right lower extremity ([address] 3/4/2019    Critical lower limb ischemia 5/29/2017    Depression \    DVT (deep venous thrombosis) (HCC) 09/13/2018    R leg    Glaucoma     History of blood transfusion 05/2017    Hyperlipidemia     Hypertension     Hypothyroidism     Liver disease     Macular degeneration     Peripheral edema     Sciatica     Severe protein-calorie malnutrition ([address] 4/18/2019       Past Surgical History:        Procedure Laterality Date    DENTAL SURGERY  2007    teeth extraction    UPPER GASTROINTESTINAL ENDOSCOPY N/A 1/14/2019    EGD BIOPSY performed by Renaye Aase, MD at [place] [address] Social History:    Social History     Tobacco Use    Smoking status: Former Smoker     Packs/day: 3.00     Types: Cigarettes     Start date: 1/10/1972     Last attempt to quit: 5/4/2005     Years since quitting: 15.4    Smokeless tobacco: Never Used    Tobacco comment: quit 12-13 years ago   Substance Use Topics    Alcohol use: Yes     Comment: every other day                                Counseling given: Not Answered  Comment: quit 12-13 years ago      Vital Signs (Current):   Vitals:    10/09/20 0242 10/09/20 0517 10/09/20 0903 10/09/20 1202   BP: (!) 100/50 112/68 (!) 100/55    Pulse: 68 74 75    Resp: 18 18 18    Temp: 37 °C (98.6 °F) 37 °C (98.6 °F) 36.9 °C (98.5 °F)    TempSrc: Oral Oral Oral    SpO2: 99% 99% 98%    Weight:       Height:    5' 8\" (1.727 m)                                              BP Readings from Last 3 Encounters:   10/09/20 (!) 100/55   09/25/20 (!) 163/70   08/03/20 (!) 174/82       NPO Status:                                                                                 BMI:   Wt Readings from Last 3 Encounters:   10/08/20 103 lb (46.7 kg)   09/24/20 113 lb 9.6 oz (51.5 kg)   07/31/20 112 lb 1.6 oz (50.8 kg)     Body mass index is 15.66 kg/m².     CBC:   Lab Results   Component Value Date    WBC 8.6 10/08/2020    RBC 2.25 10/08/2020    HGB 10.0 10/09/2020    HCT 32.2 10/09/2020    MCV 99.1 10/08/2020    RDW 15.7 10/08/2020     10/08/2020       CMP:   Lab Results   Component Value Date     10/09/2020    K 4.3 10/09/2020    K 3.3 09/20/2020     10/09/2020    CO2 20 10/09/2020    BUN 13 10/09/2020    CREATININE 0.7 10/09/2020    GFRAA >60 10/09/2020    LABGLOM >60 10/09/2020    GLUCOSE 94 10/09/2020    PROT 6.9 10/09/2020    CALCIUM 8.5 10/09/2020    BILITOT 0.4 10/09/2020    ALKPHOS 83 10/09/2020    AST 13 10/09/2020    ALT 6 10/09/2020       POC Tests: No results for input(s): POCGLU, POCNA, POCK, POCCL, POCBUN, POCHEMO, POCHCT in the last 72 hours.    Coags:   Lab Results   Component Value Date    PROTIME 14.3 10/08/2020    PROTIME 19.5 09/05/2019    INR 1.2 10/08/2020    APTT 36.1 10/08/2020       HCG (If Applicable): No results found for: PREGTESTUR, PREGSERUM, HCG, HCGQUANT     ABGs: No results found for: PHART, PO2ART, ZGW5ETR, KBV2IVQ, BEART, O8VWQDIU     Type & Screen (If Applicable):  No results found for: LABABO, LABRH    Drug/Infectious Status (If Applicable):  No results found for: HIV, HEPCAB    COVID-19 Screening (If Applicable):   Lab Results   Component Value Date    COVID19 Not Detected 10/08/2020         Anesthesia Evaluation  Patient summary reviewed and Nursing notes reviewed   history of anesthetic complications (ponv): PONV. Airway: Mallampati: III  TM distance: >3 FB   Neck ROM: full  Comment: Small mouth opening  Mouth opening: > = 3 FB Dental:    (+) lower dentures, upper dentures and edentulous      Pulmonary: breath sounds clear to auscultation            Patient did not smoke on day of surgery. ROS comment: Former smoker quit 15 years ago   Cardiovascular:    (+) hypertension:,         Rhythm: regular  Rate: normal                    Neuro/Psych:   (+) neuromuscular disease:, psychiatric history:            GI/Hepatic/Renal:   (+) liver disease:,           Endo/Other:    (+) hypothyroidism, blood dyscrasia: anemia:., .                 Abdominal:           Vascular:                                        Anesthesia Plan      general and MAC     ASA 3     (Pt npo 1700/ egd gi bleed hgb 6.8)        Anesthetic plan and risks discussed with patient. Use of blood products discussed with patient whom consented to blood products. Plan discussed with attending.                   Bev Lazar, APRN - CRNA   10/9/2020

## 2020-10-09 NOTE — H&P
05/2017    Blue toe syndrome of left lower extremity (HCC) 5/22/2017    C. difficile colitis 09/22/2020    Cataract     Chronic deep vein thrombosis (DVT) of femoral vein of right lower extremity ([facility] 3/4/2019    Critical lower limb ischemia 5/29/2017    Depression \    DVT (deep venous thrombosis) ([facility] 09/13/2018    R leg    Glaucoma     History of blood transfusion 05/2017    Hyperlipidemia     Hypertension     Hypothyroidism     Liver disease     Macular degeneration     Peripheral edema     Sciatica     Severe protein-calorie malnutrition ([facility] 4/18/2019       Past Surgical History:        Procedure Laterality Date    DENTAL SURGERY  2007    teeth extraction    UPPER GASTROINTESTINAL ENDOSCOPY N/A 1/14/2019    EGD BIOPSY performed by Jamaica Bedoya MD at [id number] [facility] Medications:  Prior to Admission medications    Medication Sig Start Date End Date Taking?  Authorizing Provider   risperiDONE (RISPERDAL) 1 MG tablet Take 1 mg by mouth 2 times daily   Yes Historical Provider, MD   baclofen (LIORESAL) 10 MG tablet Take 10 mg by mouth 2 times daily    Historical Provider, MD   acetaminophen (TYLENOL) 325 MG tablet Take 2 tablets by mouth every 6 hours as needed for Pain 8/3/20   Nicki Saldana, DO   folic acid (FOLVITE) 1 MG tablet Take 1 tablet by mouth daily 8/3/20   Nicki Saldana, DO   clopidogrel (PLAVIX) 75 MG tablet Take 1 tablet by mouth daily 6/15/20   Wandalee Delbert Hilario, DO   escitalopram (LEXAPRO) 20 MG tablet Take 1 tablet by mouth daily 6/15/20   Sandor Hilario, DO   levothyroxine (SYNTHROID) 25 MCG tablet Take 1 tablet by mouth every morning 6/15/20   Sandor Hilario, DO   potassium chloride (KLOR-CON M) 20 MEQ extended release tablet Take 1 tablet by mouth daily 6/15/20   Sandor Hilario, DO   simvastatin (ZOCOR) 5 MG tablet Take 1 tablet by mouth nightly 6/15/20   Sandor Hilario, DO   Handicap Placard MISC by NOT APPLICABLE route 9/5/19   Historical Provider, MD   latanoprost (XALATAN) 0.005 % ophthalmic solution  7/12/13   Historical Provider, MD   Multiple Vitamins-Minerals (PRESERVISION AREDS PO) Take 1 tablet by mouth daily    Historical Provider, MD   brimonidine-timolol (COMBIGAN) 0.2-0.5 % ophthalmic solution Place 1 drop into both eyes every 12 hours    Historical Provider, MD       Allergies:  Levofloxacin; Other; Pcn [penicillins]; and Erythromycin    Social History:   TOBACCO:   reports that she quit smoking about 15 years ago. Her smoking use included cigarettes. She started smoking about 48 years ago. She smoked 3.00 packs per day. She has never used smokeless tobacco.  ETOH:   reports current alcohol use. Family History:       Problem Relation Age of Onset    Diabetes Father     Diabetes Sister       Or deferred/otherwise considered non contributory to current admission  PHYSICAL EXAM:    VS: /73   Pulse 98   Temp 98.5 °F (36.9 °C)   Resp 16   Ht 5' 8\" (1.727 m)   Wt 103 lb (46.7 kg)   SpO2 96%   BMI 15.66 kg/m²     General Appearance:     no acute distress. Very thin, caxechia   Psych:  HEENT:    A.O. As per HPI details  NC/AT, PERRL, + pallor no icterus, lips/ext mucous membrane grossly N    Neck:   Supple, trachea midline, no obvious JVD   Resp:     CTAB, No wheezes, No rhonchi   Chest wall:    No tenderness or deformity   Heart:    Regular rate and rhythm, S1 and S2 normal, no rub or gallop. Abdomen:     Soft, non-tender, bowel sounds active    no suspicious obvious masses/organomegaly  Scaphoid abd   Genitalia & Rectal:    Deferred.    Extremities x4:   Extremities normal, atraumatic, no cyanosis, no clubbing   Musculoskeletal:      NO active synovitis or swollen b/l wrists, 2-5 MCPs examined   Skin:   Skin color,  +pale no ACUTE rashes or lesions in lower legs and arms examined   Lymph nodes:   Cervical nodes grossly normal   Neurologic:  .Grossly symmetric  strength in UEs and LEs with symmetric grossly intact to light touch sensation     LABS:  CBC:   Lab Results   Component Value Date    WBC 8.6 10/08/2020    RBC 2.25 10/08/2020    HGB 6.8 10/08/2020    HCT 22.3 10/08/2020     10/08/2020    MCV 99.1 10/08/2020     BMP:    Lab Results   Component Value Date     10/08/2020    K 5.2 10/08/2020    K 3.3 09/20/2020     10/08/2020    CO2 22 10/08/2020    BUN 14 10/08/2020    CREATININE 0.8 10/08/2020    GLUCOSE 90 10/08/2020    CALCIUM 8.8 10/08/2020     Hepatic Function Panel:    Lab Results   Component Value Date    ALKPHOS 93 10/08/2020    AST 17 10/08/2020    ALT 8 10/08/2020    PROT 7.7 10/08/2020    LABALBU 2.5 10/08/2020    BILITOT 0.3 10/08/2020     Magnesium:    Lab Results   Component Value Date    MG 2.1 09/22/2020       PT/INR:    Lab Results   Component Value Date    PROTIME 14.3 10/08/2020    PROTIME 19.5 09/05/2019    INR 1.2 10/08/2020     U/A:   Lab Results   Component Value Date    LEUKOCYTESUR SMALL 09/19/2020    PHUR 6.0 09/19/2020    WBCUA 2-5 09/19/2020    RBCUA 5-10 09/19/2020    BACTERIA MODERATE 09/19/2020    SPECGRAV 1.015 09/19/2020    BLOODU TRACE-INTACT 09/19/2020    GLUCOSEU Negative 09/19/2020     ABG:  No results found for: PHART, PWN0RQG, PO2ART, E7CYYBNL, ZPS3OOU, BEART  TSH:    Lab Results   Component Value Date    TSH 2.590 07/30/2020     Cardiac Enzymes:   Lab Results   Component Value Date    CKTOTAL 40 07/30/2020    CKTOTAL 147 04/17/2019    TROPONINI 0.01 09/19/2020    TROPONINI <0.01 07/30/2020    TROPONINI <0.01 04/17/2019       Radiology: No results found. 9/22 xray:  Supine views demonstrate gas scattered throughout nondilated large and    small bowel. Likely bowel wall edema again evident. No abnormal soft    tissue mass or visceromegaly identified.            Assessment & Plan   ACTIVE hospital problems being addressed/reassessed for this admission:  Principal Problem:    GI bleeding  Active Problems:    History of cirrhosis    Severe protein-calorie malnutrition ([facility]    Acquired hypothyroidism    C. difficile colitis    Acute post-hemorrhagic anemia  Resolved Problems:    * No resolved hospital problems. *    Code status/DVT prophylaxis and PLAN --see orders   Note extensive time spent coordinating care between ER docs, ER and floor nurses, and transitioning care over to day providers  Plan of care/ clinical impressions/communication specifics detailed below:    Acute blood loss anemia-- plan 1 PRBC  Baseline hgb 9 range- sent in with hemoglbin 6.5 from NH  Here 6.9 +hem stool  gI consult- gib, acute-- +hem stool  ? etoh pancytopenia/cirrhhosis/splenomegaly? Seen by hem onc in recent past, who thought besides etoh, b12 deficiency  Was likely contributing to pancytopenia    ID  Consult--?completed PO vanco and flagyl for recent Cdif- stil 4-5 BM daily   Months ago was 1-2BMs daily prior to Cdif  She claims stools are NON bloody. ?hx of hemorrhoids  egd by dr. Ana Laura Hardy in past.    Collect stool tonight-- recently treated Cdif  - but ongiong diarrhea, Check with ID with do they want stool sent,  abx completed?         Marcelo Castaneda MD   Night Officer, overnight admitting doctor at [facility] call day time doctor   for questions after 7:30am    Covering for [facility]DR[facility] Service  If Qs please call [phone]  Electronically signed by Golden Watts MD on 10/8/2020 at 9:44 PM

## 2020-10-09 NOTE — PROGRESS NOTES
Answering service called for consult to ID. Will call back to let know which dr. It went to. Electronically signed by Dannie Calderón RN on 10/9/2020 at 8:27 AM    ID returned call back, Dr. Lauren Jay to see patient.    Electronically signed by Dannie Calderón RN on 10/9/2020 at 9:18 AM

## 2020-10-09 NOTE — PROGRESS NOTES
Nursing Transfer Note    Data:  Summary of patients progress: S/P EGD  Reason for transfer: Continuation of care    Action:  Explained reason for transfer to Patient and Family. Report given to: Keerthi Head, using RN Handoff Navigator.   Mode of transportation: Cart    Response:  RN Recommendations: Continuation of care

## 2020-10-09 NOTE — PROGRESS NOTES
Comprehensive Nutrition Assessment    Type and Reason for Visit:  Initial, Positive Nutrition Screen    Nutrition Recommendations/Plan: Continue Diet and ADAT once med appropriate. Will Start ONS w/ diet once advanced. Nutrition Assessment:  Pt adm w/ Low Hgb Labs as well as abd pain/diarrhea x ~2 months 2/2 GIB w/ C-diff+ per EMR. Pt at risk d/t poor intake and subjective ~30# wt loss x ~1yr 2/2 poor appetite and abd pain/diarrhea 2/2 C-diff+ w/ ETOH Abuse/Cirrhosis hx. Will Start ONS w/ diet once advanced. Malnutrition Assessment:  Malnutrition Status:  Insufficient data    Context:  Chronic Illness     Findings of the 6 clinical characteristics of malnutrition:  Energy Intake:  7 - 75% or less estimated energy requirements for 1 month or longer  Weight Loss:  Unable to assess(2/2 poor EMR wt hx at this time)     Body Fat Loss:  Unable to assess     Muscle Mass Loss:  Unable to assess    Fluid Accumulation:  No significant fluid accumulation     Strength:  Not Performed    Estimated Daily Nutrient Needs:  Energy (kcal):  4812-0268(30-35 kcals/kg per CBW); Weight Used for Energy Requirements:  Current     Protein (g):  85-95(1.8-2.0 gm/kg per CBW); Weight Used for Protein Requirements:  Current        Fluid (ml/day):  7553-8333(1 ml/kcal); Weight Used for Fluid Requirements:  Current      Nutrition Related Findings:   AMSx3, edentulous, tender/distended Abd, +BS, +diarrhea, No Edema, +I/O's      Wounds:  None       Current Nutrition Therapies:    Diet NPO Effective Now Exceptions are: Sips with Meds    Anthropometric Measures:  · Height: 5' 8\" (172.7 cm)  · Current Body Weight: 103 lb (46.7 kg)(unknown method 10/8)   · Admission Body Weight: 103 lb (46.7 kg)(unknown method 10/8)    · Usual Body Weight: 113 lb (51.3 kg)(bed 9/19/20 per EMR; noted subjective ~30# wt loss x ~1yr however unable to properly assess/confirm d/t poor EMR wt hx pta as well as since adm at this time)     · Ideal Body Weight: 140 lbs; % Ideal Body Weight 73.6 %   · BMI: 15.7  · Adjusted Body Weight:  ; No Adjustment   · Adjusted BMI:      · BMI Categories: Underweight (BMI less than 22) age over 72       Nutrition Diagnosis:   · Inadequate oral intake related to altered GI function(2/2 C-diff+ w/ h/o ETOH Abuse/Cirrhosis) as evidenced by NPO or clear liquid status due to medical condition, poor intake prior to admission, weight loss, BMI, GI abnormality, diarrhea      Nutrition Interventions:   Food and/or Nutrient Delivery:  Continue NPO(Continue Diet and ADAT once med appropriate. Will Start ONS w/ diet once advanced.)  Nutrition Education/Counseling:  No recommendation at this time   Coordination of Nutrition Care:  Continued Inpatient Monitoring    Goals:  Nutrition Progression. Nutrition Monitoring and Evaluation:   Behavioral-Environmental Outcomes:  Readiness for Change   Food/Nutrient Intake Outcomes:  Diet Advancement/Tolerance, Food and Nutrient Intake, Supplement Intake  Physical Signs/Symptoms Outcomes:  Biochemical Data, Chewing or Swallowing, GI Status, Diarrhea, Fluid Status or Edema, Nutrition Focused Physical Findings, Skin, Weight     Discharge Planning:     Too soon to determine     Electronically signed by Ras Elizalde RD, LD on 10/9/20 at 12:14 PM EDT    Contact: ext [phone]

## 2020-10-09 NOTE — PROGRESS NOTES
Patient states when being transferred from bed  to wheel chair while at facility ([facility]) right upper thigh was pinched between wheel chair and aid inspected the area on admission and patient stated that an xray was done today at the facility no abnormalities noted with admission

## 2020-10-09 NOTE — PROGRESS NOTES
Occupational Therapy  OCCUPATIONAL THERAPY INITIAL EVALUATION      Date:10/9/2020  Patient Name: Lemuel Johnson  MRN: [id number]  : 1950  Room: [address] [place] [address]    Referring Provider: Joaquina Eric DO   Evaluating OT: Tristen [email or web address]    AM-PAC Daily Activity Raw Score:       Recommended Adaptive Equipment: Continue to assess. Diagnosis: GI bleeding [K92.2]  Pertinent Medical History: HTN, glaucoma, macular degneration, sciatica, alcoholism      Precautions: falls, skin integrity, contact isolation    Home Living: Patient admitted from [facility]/F. Prior Level of Function (PLOF): Patient stated that she needs assistance with ADLs and reported h/o bowel incontinence. Patient reported that she has not been out of bed much recently; patient stated that a sanna lift was used once recently to facilitate a functional transfer out of bed. Pain Level: Patient reported experiencing pain in her R hip, but did not rate/describe her pain. Cognition: Patient alert and oriented grossly. WFL command follow demonstrated. Patient pleasant and cooperative; patient verbalized fear of falling. Patient is a questionable historian; no family/caregiver present to verify information gathered. Memory: Fair   Sequencing: Fair   Problem Solving: Fair   Judgement/Safety: Fair    Functional Assessment:   Initial Eval Status  Date: 10/9/2020 Treatment Status  Date:  Short Term Goals   Feeding SBA  Setup   Grooming Min A  SBA   UB Dressing Mod A  SBA   LB Dressing Dependent to adjust socks. Mod A - with use of AE, as needed/appropriate   Bathing Max A  Mod A - with use of AE/DME, as needed/appropriate   Toileting Dependent  Mod A   Bed Mobility  Log Rolling: Max A  Patient declined to attempt supine-to-sit secondary to fear of falling. Functional Transfers Not assessed. Max A   Functional Mobility Not assessed. N/A   Balance Not assessed.      Activity Tolerance Poor secondary to complaints of pain; nursing aware. Patient will demonstrate Good understanding and consistent implementation of energy conservation techniques and work simplification techniques into ADL routines. Visual/  Perceptual Fair     N/A   B UE Strength B Shoulders: 3/5  B Biceps: 3/5 grossly  B Triceps: 3+/5 grossly  Patient will demonstrate 4-/5 B UE strength in order to maximize independence with ADLs, bed mobility, and functional transfers. Long-Term Goal: Patient will increase functional independence to PLOF in order to allow patient to live in least restrictive environment. ROM: Additional Information:    R UE  WFL    L UE WFL      Hearing: WFL  Sensation: Patient reported experiencing numbness/tingling in B hands. Tone: WFL  Edema: No    Comments: RN approved patient's participation in EOB activities. Upon arrival, patient supine in bed. At end of session, patient supine in bed with call light and phone within reach, bed alarm activated, and all lines and tubes intact. Patient would benefit from continued skilled OT to increase safety and independence with completion of ADL/IADL tasks for functional independence and quality of life. Patient education provided regardin) techniques to maximize participation/independence with bed mobility, 2) importance of EOB/OOB activities, 3) importance of frequent re-positioning to prevent skin breakdown. Patient indicated Cinde[place] understanding.     Eval Complexity: Low    Assessment of Current Deficits:   Functional Mobility [x]  ADLs [x] Strength [x]  Cognition []  Functional Transfers  [x] IADLs [x] Safety Awareness [x]  Endurance [x]  Fine Motor Coordination [] Balance [x] Vision/Perception [] Sensation []   Gross Motor Coordination [] ROM [] Delirium []                  Motor Control []    Plan of Care:   OT treatment to be provided 1-3x/week for 5-7 days PRN to address the following:  [x] ADL Re-Training/AE Recommendations  [x] Energy Conservation Techniques/Strategies [x] Neuromuscular Re-Education     [x] Functional Transfer Training         [x] Functional Mobility Training          [] Cognitive Re-Training         [] Splinting/Positioning Needs           [x] Therapeutic Activity   [x] Therapeutic Exercise  [] Visual/Perceptual   [] Delirium Prevention/Treatment   [x] Positioning to Improve Functional [place], Safety, and Skin Integrity   [x] Patient and/or Family Education to Increase Safety and Functional [place]   [] Other:     Rehab Potential: Fair to Good for established goals. Patient / Family Goal: No goal stated. Patient and/or family were instructed on functional diagnosis, prognosis/goals, and OT plan of care. Demonstrated Fair understanding. Low complexity evaluation + 0 timed treatment minutes  Time In: 1120  Time Out: 1155  Total Treatment Time: 0 minutes    Evaluation time includes thorough review of current medical information, gathering information on past medical history/social history and prior level of function, completion of standardized testing/informal observation of tasks, assessment of data, and development of POC/Goals. Cristel Perez, FRANKR/L  License Number: [id number]

## 2020-10-09 NOTE — CONSULTS
Gastroenterology Consult Note   [facility] with Jong Bess M.D. Consult Note        Date of Service: 10/9/2020  Reason for Consult: known to dr Holland Renee, ?etoh cirrhosis/pancytopenia,  new drop hgb- gib, recently treated cdif   Requesting Physician: Dr Milka Scott: Anemia    History Obtained From:  patient, electronic medical record    HISTORY OF PRESENT ILLNESS:       Joselin Cotton is a 77 y.o. female with significant past medical history of alcoholic cirrhosis, alcohol abuse, GERD, gastritis, anemia, diarrhea, C. difficile colitis, neutropenia, requiring blood transfusion; DVT;  PVD, COPD; heart murmur; hypothyroidism; diarrhea; depression; sciatica; cataract; and macular degeneration admitted via ED from [facility] for anemia, routine lab work showed hemoglobin 6.5. Patient recently admitted for abdominal pain and diarrhea 9/19/2020, seen by ID and Dr. Keagan Alarcon at that time, diagnosed with C. difficile colitis, sepsis, altered mental status, cirrhosis, she was treated and discharged 9/25/2020 reportedly in stable condition, to follow-up with Dr. Emiliano Lowe for outpatient colonoscopy as she is known to him per surgery note. Pt reports she has had 2 months of crampy abdominal pain, worse over the past 2 weeks over her entire low abdomen rated 9/10 associated with diarrhea at least 10 times a day. Patient states diarrhea was a late brown color until 2 weeks ago and has progressively become darker and for the past 2 days she has noticed her stools to be black. FOBT positive in ED. Patient states she is weak. She reports unintentional weight loss of over 30#/year and a half; last seen by us 1/11/2019, weight loss 10# since that time. Patient states her appetite is fair, she does not like the puréed diet that she has to eat. Patient denies nausea, vomiting, chills, fever, hematemesis, hematochezia, NAPOLES, or chest pain.   Admission labs: H&H 6.8 & 22.3; RBC 2.25; MCHC 30.5; RDW 15.7; INR 1.2; albumin 2.5; lipase 66; potassium 5.2; AG 5, otherwise CMP and CBC/differential WNL. SARS-CoV-2 and COVID-19 not detected. No imaging this admission. Consultation for known to dr Diana Garcia, ?etoh cirrhosis/pancytopenia,  new drop hgb- gib, recently treated cdiff. Pt is known to Dr. Ilan Brewer, last seen inpatient 1/2019, she was lost to follow-up. EGD 1/14/2019 with Dr. Ilan Brewer demonstrated grade B LA classification GERD and no varices seen. Stomach showed gastritis, biopsied to rule out H. Pylori infection. Duodenum biopsied to rule out sprue. Currently, pt reports her abdominal pain is somewhat improved over her entire low abdomen described as a aching cramping pain rated 2/10. She reports 2 loose \"dark\" stools since midnight. Labs today: H&H 7.0 & 22.7; albumin 2.3; chloride 108; CO2 20; calcium 8.5.     Past Medical History:        Diagnosis Date    Alcoholism ([address]     heavy alcohol consumption since ~[id number] till early 2019    Anemia 05/2017    Blue toe syndrome of left lower extremity ([address] 5/22/2017    C. difficile colitis 09/22/2020    Cataract     Chronic deep vein thrombosis (DVT) of femoral vein of right lower extremity ([address] 3/4/2019    Critical lower limb ischemia 5/29/2017    Depression \    DVT (deep venous thrombosis) ([address] 09/13/2018    R leg    Glaucoma     History of blood transfusion 05/2017    Hyperlipidemia     Hypertension     Hypothyroidism     Liver disease     Macular degeneration     Peripheral edema     Sciatica     Severe protein-calorie malnutrition ([address] 4/18/2019     Past Surgical History:        Procedure Laterality Date    DENTAL SURGERY  2007    teeth extraction    UPPER GASTROINTESTINAL ENDOSCOPY N/A 1/14/2019    EGD BIOPSY performed by Harriet Rashid MD at [address]       Current Medications:    Current Facility-Administered Medications: cholestyramine (QUESTRAN) packet 4 g, 1 packet, Oral, BID  pantoprazole (PROTONIX) injection 40 mg, 40 mg, Intravenous, BID **AND** sodium chloride (PF) 0.9 % injection 10 mL, 10 mL, Intravenous, BID  vancomycin (VANCOCIN) oral solution 250 mg, 250 mg, Oral, 4 times per day  sodium chloride flush 0.9 % injection 10 mL, 10 mL, Intravenous, 2 times per day  sodium chloride flush 0.9 % injection 10 mL, 10 mL, Intravenous, PRN  acetaminophen (TYLENOL) tablet 650 mg, 650 mg, Oral, Q6H PRN  baclofen (LIORESAL) tablet 10 mg, 10 mg, Oral, BID  clopidogrel (PLAVIX) tablet 75 mg, 75 mg, Oral, Daily  escitalopram (LEXAPRO) tablet 20 mg, 20 mg, Oral, Daily  folic acid (FOLVITE) tablet 1 mg, 1 mg, Oral, Daily  latanoprost (XALATAN) 0.005 % ophthalmic solution 1 drop, 1 drop, Both Eyes, Nightly  levothyroxine (SYNTHROID) tablet 25 mcg, 25 mcg, Oral, QAM  potassium chloride (KLOR-CON M) extended release tablet 20 mEq, 20 mEq, Oral, Daily  risperiDONE (RISPERDAL) tablet 1 mg, 1 mg, Oral, BID  atorvastatin (LIPITOR) tablet 10 mg, 10 mg, Oral, Daily  brimonidine (ALPHAGAN) 0.2 % ophthalmic solution 1 drop, 1 drop, Both Eyes, BID **AND** timolol (TIMOPTIC) 0.5 % ophthalmic solution 1 drop, 1 drop, Both Eyes, BID    Allergies:  Levofloxacin; Other; Pcn [penicillins]; and Erythromycin    Social History:    Tobacco:  Pt states she quit smoking in , prior she smoked 3 packs per day for 33 years. Alcohol:  Pt reports she drinks anywhere between 1/4-1/2 bottle of vodka a day alternating with anywhere from 3-9 beers a day stating she drinks beer more in the summer months. Last drink reportedly 2 mo ago. .  Illicit Drugs: Pt denies. Lives alone. Hx blood transfusions, not before .     Family History: Mother  at the age of 80 from natural causes. Father  at the age of 80, he had MI, CVA, and diabetes. 1 sister  from ? Anorexia - [place] Sings started herself to death. \"  1 sister living with diabetes. 1 brother living and healthy  she has no children.   Patient denies any family history of colon cancer, liver disease, or any GI illness. REVIEW OF SYSTEMS:    Aside from what was mentioned in the PMH and HPI, essentially unremarkable, all others negative. PHYSICAL EXAM:      Vitals:    BP (!) 100/55   Pulse 75   Temp 98.5 °F (36.9 °C) (Oral)   Resp 18   Ht 5' 8\" (1.727 m)   Wt 103 lb (46.7 kg)   SpO2 98%   BMI 15.66 kg/m²     CONSTITUTIONAL:  awake, alert, pale, cooperative, thin, fatigued, and frail appearing, and appears stated age  EYES:  pupils equal, round and reactive to light, sclera anicteric and conjunctiva pale  ENT:  normocephalic, oral pharynx with moist mucous membranes  LUNGS:  clear to auscultation bilaterally.   CARDIOVASCULAR:  regular rate and rhythm, no murmur noted; 2+ pulses; no edema  ABDOMEN:  Normal bowel sounds, soft, non-distended, diffusely tender to palpation > RLQ without guarding or rebound, no masses palpated, no hepatosplenomegaly  MUSCULOSKELETAL:  full range of motion noted; motor strength is 4 out of 5 all extremities bilaterally   NEUROLOGIC:  Mental Status Exam:  Level of Alertness:   awake  Orientation:   person, place, time  Motor Exam:  Motor exam is symmetrical 4 out of 5 all extremities bilaterally  SKIN: pale skin color; normal texture, turgor    DATA:    CBC with Differential:    Lab Results   Component Value Date    WBC 8.6 10/08/2020    RBC 2.25 10/08/2020    HGB 10.0 10/09/2020    HCT 32.2 10/09/2020     10/08/2020    MCV 99.1 10/08/2020    MCH 30.2 10/08/2020    MCHC 30.5 10/08/2020    RDW 15.7 10/08/2020    NRBC 0.0 05/07/2017    METASPCT 2.6 09/19/2020    LYMPHOPCT 21.3 10/08/2020    MONOPCT 5.8 10/08/2020    MYELOPCT 0.9 09/20/2020    BASOPCT 0.1 10/08/2020    ATYLYMREL 0.9 02/06/2020    MONOSABS 0.50 10/08/2020    LYMPHSABS 1.83 10/08/2020    EOSABS 0.19 10/08/2020    BASOSABS 0.01 10/08/2020     CMP:    Lab Results   Component Value Date     10/09/2020    K 4.3 10/09/2020    K 3.3 09/20/2020     10/09/2020    CO2 20 10/09/2020    BUN 13 10/09/2020    CREATININE 0.7 10/09/2020    GFRAA >60 10/09/2020    LABGLOM >60 10/09/2020    GLUCOSE 94 10/09/2020    PROT 6.9 10/09/2020    LABALBU 2.3 10/09/2020    CALCIUM 8.5 10/09/2020    BILITOT 0.4 10/09/2020    ALKPHOS 83 10/09/2020    AST 13 10/09/2020    ALT 6 10/09/2020     Hepatic Function Panel:    Lab Results   Component Value Date    ALKPHOS 83 10/09/2020    ALT 6 10/09/2020    AST 13 10/09/2020    PROT 6.9 10/09/2020    BILITOT 0.4 10/09/2020    BILIDIR 0.6 04/19/2019    IBILI 0.6 04/19/2019    LABALBU 2.3 10/09/2020     PT/INR:    Lab Results   Component Value Date    PROTIME 14.3 10/08/2020    PROTIME 19.5 09/05/2019    INR 1.2 10/08/2020     PTT:    Lab Results   Component Value Date    APTT 36.1 10/08/2020   [APTT}  Last 3 Troponin:    Lab Results   Component Value Date    TROPONINI 0.01 09/19/2020    TROPONINI <0.01 07/30/2020    TROPONINI <0.01 04/17/2019     TSH:    Lab Results   Component Value Date    TSH 2.590 07/30/2020     VITAMIN B12:   Lab Results   Component Value Date    IPDBDJHB65 >2000 07/30/2020     FOLATE:    Lab Results   Component Value Date    FOLATE 2.2 07/30/2020     IRON:    Lab Results   Component Value Date    IRON 181 06/03/2020     Iron Saturation:    Lab Results   Component Value Date    LABIRON 105 04/18/2019     TIBC:    Lab Results   Component Value Date    TIBC 118 04/18/2019     FERRITIN:    Lab Results   Component Value Date    FERRITIN 1,250 04/18/2019     HIV:  No results found for: HIV  SHAQUILLE:    Lab Results   Component Value Date    SHAQUILLE NEGATIVE 01/12/2019     No components found for: CHLPL    Lab Results   Component Value Date    TRIG 72 06/03/2020    TRIG 83 02/06/2020    TRIG 156 (H) 11/07/2019       Lab Results   Component Value Date    HDL 65 06/03/2020    HDL 58 02/06/2020    HDL 69 11/07/2019       Lab Results   Component Value Date    LDLCALC 64 06/03/2020    LDLCALC 56 02/06/2020    LDLCALC 53 11/07/2019       Lab Results Component Value Date    LABVLDL 14 2020    LABVLDL 17 2020    LABVLDL 31 2019        Xr Acute Abd Series Chest 1 Vw    Result Date: 2020  Patient MRN: [id number] : 1950 Age:  77 years Gender: Female Order Date: 2020 10:30 AM Exam: XR ACUTE ABD SERIES CHEST 1 VW Number of Images: 4 views Indication:   abdominal distention abdominal distention Comparison: CT 2020 Findings: Upright view demonstrates no acute process within the chest. No free air evident. Supine views demonstrate gas scattered throughout nondilated large and small bowel. Likely bowel wall edema again evident. No abnormal soft tissue mass or visceromegaly identified. No definite change since the CT scan dated 2020     Ct Head Wo Contrast    Result Date: 2020  Patient MRN: [id number] : 1950 Age:  77 years Gender: Female Order Date: 2020 10:43 AM Exam: CT HEAD WO CONTRAST Number of Images: 102 views Indication:   altered mental status altered mental status Comparison: 2020 Findings: Scans obtained without contrast with additional reformatted images submitted as well. Again, changes of generalized atrophy present. No mass lesion, midline shift, extra-axial collection, or intracranial hemorrhage is identified. There has been no interim change. Generalized atrophy; no change since the exam dated 2020     Ct Head Wo Contrast    Result Date: 2020  Patient MRN: [id number] : 1950 Age:  77 years Gender: Female Order Date: 2020 1:06 PM Exam: CT HEAD WO CONTRAST Number of Images: 146 views Indication:   altered mental status altered mental status Comparison: Prior study from 2020 is available. Technique: Sequential axial CT of the head was obtained from the base of the skull to the vertex without IV contrast.  Radiation Output: CTDIvol 55.61 (mGy); DLP 1042.19 (mGy-cm) Findings: The study demonstrates the fourth ventricle to be midline.  The posterior fossa appears to be normal. There is global atrophy with periventricular ischemic white matter changes. There is no mass, mass effect or midline shift. The ventricles, sulci and cisterns are normal in appearance for patient's age. The gray-white matter differentiation is preserved throughout. There is no acute intracranial hemorrhage. No extra-axial fluid collection is identified. The bony calvarium is intact. The visualized portion of the paranasal sinuses and the mastoid air cells are clear. There is no focal extracranial soft tissue swelling. NO ACUTE INTRACRANIAL PROCESS Unchanged from prior study. Ct Abdomen Pelvis W Iv Contrast Additional Contrast? None    Result Date: 2020  Patient MRN: [id number] : 1950 Age:  77 years Gender: Female Order Date: 2020 2:13 PM Exam: CT ABDOMEN PELVIS W IV CONTRAST Number of Images: 654 views Indication:   septicemia septicemia Comparison: None. Technique: The sequential axial CT of the scan of the abdomen and pelvis was obtained from base of the diaphragm to the pubic symphysis with multiplanar reformat. The study was obtained with intravenous contrast. Radiation Output: CTDIvol 13.65 (mGy); .31 (mGy-cm) Finding: The lung bases are clear. The heart and pericardium appear normal. There is coronary calcification suggesting of coronary artery disease. The liver demonstrates no evidence of parenchymal lesions or intrahepatic biliary dilatation. The gallbladder is well distended without radiopaque stones. The spleen, pancreas and adrenal glands do not show any appreciable abnormality. Both kidneys have normal nephrogram with normal excretion of contrast. There is no stone, mass or hydronephrosis. There is dilution of small bowel fluid filled. There is fecal stasis suggesting of constipation. The colon demonstrates thickened mucosa mostly in the left colon extending to the rectum. This finding is suggesting of colitis.  No evidence of intestinal obstruction is seen. The appendix is not visualized; however, no evidence of appendicitis is seen. No retroperitoneal or mesenteric lymphadenopathy is detected. The abdominal aorta and iliac arteries demonstrate diffuse atherosclerotic changes with calcified plaque with no evidence of aneurysmal dilatation. The osseous structures of the abdomen and pelvis appear to be normal. The contrast study demonstrates no enhancing lesion identified. The urinary bladder appears to be normal. There is Candelario catheter in the urinary bladder which is coiled up in the lumen. There is no mass or debris seen. There is no mass or free fluid seen in the pelvic structure. Colitis mostly involving the ascending colon, rectum and extending to the anus. Adynamic ileus Coronary calcification suggesting of coronary artery disease. The Candelario catheter is noted to be coiled up in the urinary bladder which needs to be repositioned. Xr Chest Portable    Result Date: 2020  Patient MRN: [id number] : 1950 Age:  77 years Gender: Female Order Date: 2020 11:36 AM Exam: XR CHEST PORTABLE Number of Images: 1 view Indication:   sepsis sepsis Comparison: Prior study from 2020 is available. Findings: The lungs are clear. There is no evidence of pulmonary infiltrate or pleural effusion. The pulmonary vascularity is unremarkable. The cardiac, hilar and mediastinal silhouettes are satisfactory. There is uncoiling atherosclerotic change of thoracic aorta. The bony thorax demonstrates no gross abnormality. NO ACUTE CARDIOPULMONARY PROCESS       IMPRESSION:  ? Cirrhosis of liver, alcoholic   ? Recent C. difficile colitis-completed treatment  ? Diarrhea  ? Dysphagia  ? Pancytopenia  ? Anemia, macrocytic, hyperchromic - FOBT positive  ? 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 MELD 8.47  ? Unintentional wt loss  ? Hx of DVT, on Plavix - held since admission   ? COPD  ? EGD 2019-grade B LA classification GERD and no varices seen.  Stomach showed gastritis, biopsy negative for H. Pylori infection. Duodenum biopsy negative for sprue  ? Elevated CEA @ 6.5    RECOMMENDATIONS:    ? CT abdomen/pelvis W IV contrast only  ? EGD with possible dilatation, possible banding with Dr Marino Marlow. Procedure details for EGD discussed in detail. Complications including but not limited to, perforation, bleeding and infection were discussed in great detail. Risks, benefits, and alternatives explained. Pt has understood the information and has agreed to proceed.   ? N.p.o. except sips with meds  ? Swallow study post procedure  ? Monitor CBC, CMP, INR daily  ? Protonix IV as ordered  ? Defer comorbidities to others  ? D/W Dr Carlos Alston, ID defer vancomycin to ID  ? Continue to follow    Note: This report was completed utilizing computer voice recognition software. Every effort has been made to ensure accuracy, however; inadvertent computerized transcription errors may be present. Thank you very much for your consultation. We will follow closely with you.     Discussed with Dr. Paz Lazcano developed by Dr. Caballero Going [id number], FNP-BC 10/9/2020 11:29 AM for Dr. Marino Marlow

## 2020-10-09 NOTE — CONSULTS
use: No    Sexual activity: Never   Lifestyle    Physical activity     Days per week: Not on file     Minutes per session: Not on file    Stress: Not on file   Relationships    Social connections     Talks on phone: Not on file     Gets together: Not on file     Attends Zoroastrianism service: Not on file     Active member of club or organization: Not on file     Attends meetings of clubs or organizations: Not on file     Relationship status: Not on file    Intimate partner violence     Fear of current or ex partner: Not on file     Emotionally abused: Not on file     Physically abused: Not on file     Forced sexual activity: Not on file   Other Topics Concern    Not on file   Social History Narrative        SMOKER---QUIT     MENOPAUSE    HYPOTHYROID    FATIGUE    ETOH    FOLIC ACID DEFIC    POSS CUSHINGS SYNDROME    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 [address]    MOM  AT 93 9-11    [address] [facility] AT [address]    [address] [facility] 1950 Page #2    Denise Shelling DEGENERATION---DR BALDERAS    NEUROPATHY LEGS DR Jefry Nicolas     ANEMIA     CELLULITIS LEFT FOOT WITH PROBABLE PVD    MUKESH POLY NEUROPATHY ON EMD DR ROSARIO     VIT D DEFIC     VIT B 12 DEFIC     ADMIT  WITH ISCHEMIA LEFT FOOT-----ATHERSOCOLEROIS---- ANEMIA DUE TO    ETOH----SEEN BY DR TEE----SARAH----MARGAUX---ECHO DONE AND NEG    OR DR SMITH---17------- LEFT LEG EMBOLECTOMY AND MUKESH VAS DIS    ECHO NEG  DUE TO MURMUR    HYPONATREMIA     PT DC NEURONTIN     PT DC PLETAL ON OWN  DUE TO DIAR    PT CHG FROM VODKA TO BEER 10-17     DM 10-17    DVT R LEG ---SEES DR SMITH ON     EVAL DR SAM PAST AND DX ITH MEGALOBLASITC ANEMIA IN PAST    eval dr Stanley Hayes office for anemia 3-20 and started b 12         Family History:         Problem Relation Age of Onset    Diabetes Father     Diabetes Sister    .  Otherwise non-pertinent to the chief complaint. REVIEW OF SYSTEMS:    CONSTITUTIONAL:  No chills, fevers or night sweats. Significant weight loss. EYES:  No double vision or drainage from eyes, ears or throat. HEENT:  No neck stiffness. No dysphagia. No drainage from eyes, ears or throat  RESPIRATORY:  No cough, productive sputum or hemoptysis. CARDIOVASCULAR:  No chest pain, palpitations, orthopnea or dyspnea on exertion. GASTROINTESTINAL: DIARRHEA WITH BLACK TARRY STOOLS NOTED, NO NAUSEA VOMITING, PROFOUND WEIGHT LOSS, GENERALIZED ABDOMINAL PAIN. GENITOURINARY:  No frequency burning dysuria or hematuria. INTEGUMENT/BREAST:  No rash or breast masses. HEMATOLOGIC/LYMPHATIC:  No lymphadenopathy or blood dyscrasics. ALLERGIC/IMMUNOLOGIC:  No anaphylaxis. ENDOCRINE:  No polyuria or polydipsia or temperature intolerance. MUSCULOSKELETAL:  No myalgia or arthralgia. Full ROM. NEUROLOGICAL:  No focal motor sensory deficit. BEHAVIOR/PSYCH:  No psychosis. PHYSICAL EXAM:      Vitals:    BP (!) 100/55   Pulse 75   Temp 98.5 °F (36.9 °C) (Oral)   Resp 18   Ht 5' 8\" (1.727 m)   Wt 103 lb (46.7 kg)   SpO2 98%   BMI 15.66 kg/m²   Constitutional: The patient is awake, alert, and oriented. Extremely cachectic  Skin: Warm and dry. No rashes were noted. No jaundice. HEENT: Eyes show round, and reactive pupils. Moist mucous membranes, no ulcerations, no thrush. Neck: Supple to movements. No lymphadenopathy. Chest: No use of accessory muscles to breathe. Symmetrical expansion. Auscultation reveals no wheezing, crackles, or rhonchi. Cardiovascular: Regular rate and rhythm. No murmurs appreciated. Abdomen: Distended with hypoactive bowel sounds, moderate generalized tenderness, no rebound. Extremities: No clubbing, no cyanosis, no edema.   Neurological: No focal neurologic deficits        CBC+dif:  Recent Labs     10/08/20  1751  10/09/20  1040   WBC 8.6  --   --    HGB 6.8*   < > 10.0*   HCT 22.3*   < > 32.2*   MCV 99.1  -- --      --   --    NEUTROABS 6.01  --   --     < > = values in this interval not displayed. Lab Results   Component Value Date    CRP 2.4 (H) 04/18/2019     No results found for: CRPHS  Lab Results   Component Value Date    SEDRATE 14 04/18/2019     Lab Results   Component Value Date    ALT 6 10/09/2020    AST 13 10/09/2020    ALKPHOS 83 10/09/2020    BILITOT 0.4 10/09/2020     Lab Results   Component Value Date     10/09/2020    K 4.3 10/09/2020    K 3.3 09/20/2020     10/09/2020    CO2 20 10/09/2020    BUN 13 10/09/2020    CREATININE 0.7 10/09/2020    GFRAA >60 10/09/2020    LABGLOM >60 10/09/2020    GLUCOSE 94 10/09/2020    PROT 6.9 10/09/2020    LABALBU 2.3 10/09/2020    CALCIUM 8.5 10/09/2020    BILITOT 0.4 10/09/2020    ALKPHOS 83 10/09/2020    AST 13 10/09/2020    ALT 6 10/09/2020       Lab Results   Component Value Date    PROTIME 14.3 10/08/2020    PROTIME 19.5 09/05/2019    INR 1.2 10/08/2020       Lab Results   Component Value Date    TSH 2.590 07/30/2020       Lab Results   Component Value Date    COLORU Yellow 09/19/2020    PHUR 6.0 09/19/2020    WBCUA 2-5 09/19/2020    RBCUA 5-10 09/19/2020    MUCUS Present 07/30/2020    BACTERIA MODERATE 09/19/2020    CLARITYU Clear 09/19/2020    SPECGRAV 1.015 09/19/2020    LEUKOCYTESUR SMALL 09/19/2020    UROBILINOGEN 0.2 09/19/2020    BILIRUBINUR SMALL 09/19/2020    BLOODU TRACE-INTACT 09/19/2020    GLUCOSEU Negative 09/19/2020       No results found for: WJV2UTE, BEART, G3PQDFUG, PHART, THGBART, LFQ6UXS, PO2ART, HRU7JGA  Radiology:  CT ABDOMEN PELVIS W IV CONTRAST Additional Contrast? None   Preliminary Result   Diffuse abnormal colonic wall thickening correlating patient's clinical   history of C difficile colitis. Abnormal hypoattenuation left kidney. Differential includes sequela of   underlying pyelonephritis renal abscess versus sequela of renal infarct. Nodular contour of the liver.   Correlate clinical evidence to suggest cirrhosis. Cholelithiasis. Stable indeterminate retroperitoneal lymph nodes potentially reactive in   nature. Microbiology:  Pending  No results for input(s): BC in the last 72 hours. No results for input(s): ORG in the last 72 hours. No results for input(s): Ronita Vallejo in the last 72 hours. No results for input(s): STREPNEUMAGU in the last 72 hours. No results for input(s): LP1UAG in the last 72 hours. No results for input(s): ASO in the last 72 hours. No results for input(s): CULTRESP in the last 72 hours. Problem list:    Principal Problem:    GI bleeding  Active Problems:    History of cirrhosis    Severe protein-calorie malnutrition (HCC)    Acquired hypothyroidism    B12 deficiency anemia    C. difficile colitis    Acute post-hemorrhagic anemia    Chronic alcohol use  Resolved Problems:    * No resolved hospital problems. *      Assessment:    · Anemia secondary to GI bleed possibly related to severe C. difficile colitis  · Alcoholic cirrhosis  · Severe protein calorie malnutrition    Plan:      · Resume vancomycin 250 milligrams 4 times a day  · Anticipate slow taper  · Check stool cultures  · Check prealbumin  · Discussed with GI  · Monitor labs  · Will follow with you    Thank you for having us see this patient in consultation. I will be discussing this case with the treating physicians.       Electronically signed by Segundo Noriega DO on 10/9/2020 at 11:28 AM

## 2020-10-10 ENCOUNTER — APPOINTMENT (OUTPATIENT)
Dept: GENERAL RADIOLOGY | Age: 70
DRG: 377 | End: 2020-10-10
Payer: MEDICARE

## 2020-10-10 LAB
ALBUMIN SERPL-MCNC: 2.1 G/DL (ref 3.5–5.2)
ALP BLD-CCNC: 87 U/L (ref 35–104)
ALT SERPL-CCNC: 6 U/L (ref 0–32)
ANION GAP SERPL CALCULATED.3IONS-SCNC: 8 MMOL/L (ref 7–16)
AST SERPL-CCNC: 15 U/L (ref 0–31)
BASOPHILS ABSOLUTE: 0.01 E9/L (ref 0–0.2)
BASOPHILS RELATIVE PERCENT: 0.1 % (ref 0–2)
BILIRUB SERPL-MCNC: 0.6 MG/DL (ref 0–1.2)
BUN BLDV-MCNC: 12 MG/DL (ref 8–23)
CALCIUM SERPL-MCNC: 8.9 MG/DL (ref 8.6–10.2)
CHLORIDE BLD-SCNC: 107 MMOL/L (ref 98–107)
CO2: 19 MMOL/L (ref 22–29)
CREAT SERPL-MCNC: 0.9 MG/DL (ref 0.5–1)
EOSINOPHILS ABSOLUTE: 0.2 E9/L (ref 0.05–0.5)
EOSINOPHILS RELATIVE PERCENT: 2.9 % (ref 0–6)
GFR AFRICAN AMERICAN: >60
GFR NON-AFRICAN AMERICAN: >60 ML/MIN/1.73
GLUCOSE BLD-MCNC: 75 MG/DL (ref 74–99)
HCT VFR BLD CALC: 29.2 % (ref 34–48)
HEMOGLOBIN: 9.2 G/DL (ref 11.5–15.5)
IMMATURE GRANULOCYTES #: 0.03 E9/L
IMMATURE GRANULOCYTES %: 0.4 % (ref 0–5)
INR BLD: 1.2
LYMPHOCYTES ABSOLUTE: 1.87 E9/L (ref 1.5–4)
LYMPHOCYTES RELATIVE PERCENT: 27.5 % (ref 20–42)
MCH RBC QN AUTO: 30.2 PG (ref 26–35)
MCHC RBC AUTO-ENTMCNC: 31.5 % (ref 32–34.5)
MCV RBC AUTO: 95.7 FL (ref 80–99.9)
MONOCYTES ABSOLUTE: 0.42 E9/L (ref 0.1–0.95)
MONOCYTES RELATIVE PERCENT: 6.2 % (ref 2–12)
NEUTROPHILS ABSOLUTE: 4.27 E9/L (ref 1.8–7.3)
NEUTROPHILS RELATIVE PERCENT: 62.9 % (ref 43–80)
PDW BLD-RTO: 16.5 FL (ref 11.5–15)
PLATELET # BLD: 146 E9/L (ref 130–450)
PMV BLD AUTO: 10.6 FL (ref 7–12)
POTASSIUM SERPL-SCNC: 4.6 MMOL/L (ref 3.5–5)
PROTHROMBIN TIME: 14.1 SEC (ref 9.3–12.4)
RBC # BLD: 3.05 E12/L (ref 3.5–5.5)
SODIUM BLD-SCNC: 134 MMOL/L (ref 132–146)
TOTAL PROTEIN: 7.5 G/DL (ref 6.4–8.3)
WBC # BLD: 6.8 E9/L (ref 4.5–11.5)

## 2020-10-10 PROCEDURE — 6370000000 HC RX 637 (ALT 250 FOR IP): Performed by: CLINICAL NURSE SPECIALIST

## 2020-10-10 PROCEDURE — 6370000000 HC RX 637 (ALT 250 FOR IP): Performed by: INTERNAL MEDICINE

## 2020-10-10 PROCEDURE — 2500000003 HC RX 250 WO HCPCS: Performed by: RADIOLOGY

## 2020-10-10 PROCEDURE — 6360000002 HC RX W HCPCS: Performed by: CLINICAL NURSE SPECIALIST

## 2020-10-10 PROCEDURE — 99232 SBSQ HOSP IP/OBS MODERATE 35: CPT | Performed by: INTERNAL MEDICINE

## 2020-10-10 PROCEDURE — 36415 COLL VENOUS BLD VENIPUNCTURE: CPT

## 2020-10-10 PROCEDURE — 92611 MOTION FLUOROSCOPY/SWALLOW: CPT

## 2020-10-10 PROCEDURE — C9113 INJ PANTOPRAZOLE SODIUM, VIA: HCPCS | Performed by: CLINICAL NURSE SPECIALIST

## 2020-10-10 PROCEDURE — 2580000003 HC RX 258: Performed by: INTERNAL MEDICINE

## 2020-10-10 PROCEDURE — 80053 COMPREHEN METABOLIC PANEL: CPT

## 2020-10-10 PROCEDURE — 1200000000 HC SEMI PRIVATE

## 2020-10-10 PROCEDURE — 85025 COMPLETE CBC W/AUTO DIFF WBC: CPT

## 2020-10-10 PROCEDURE — 74230 X-RAY XM SWLNG FUNCJ C+: CPT

## 2020-10-10 PROCEDURE — 85610 PROTHROMBIN TIME: CPT

## 2020-10-10 PROCEDURE — 2580000003 HC RX 258: Performed by: CLINICAL NURSE SPECIALIST

## 2020-10-10 RX ADMIN — BARIUM SULFATE 70 G: 0.81 POWDER, FOR SUSPENSION ORAL at 12:06

## 2020-10-10 RX ADMIN — SODIUM CHLORIDE, PRESERVATIVE FREE 10 ML: 5 INJECTION INTRAVENOUS at 21:03

## 2020-10-10 RX ADMIN — CLOPIDOGREL BISULFATE 75 MG: 75 TABLET ORAL at 09:00

## 2020-10-10 RX ADMIN — ESCITALOPRAM OXALATE 20 MG: 10 TABLET ORAL at 09:00

## 2020-10-10 RX ADMIN — MICONAZOLE NITRATE: 2 OINTMENT TOPICAL at 21:02

## 2020-10-10 RX ADMIN — PANTOPRAZOLE SODIUM 40 MG: 40 INJECTION, POWDER, FOR SOLUTION INTRAVENOUS at 08:59

## 2020-10-10 RX ADMIN — PANTOPRAZOLE SODIUM 40 MG: 40 INJECTION, POWDER, FOR SOLUTION INTRAVENOUS at 21:02

## 2020-10-10 RX ADMIN — Medication 10 ML: at 21:02

## 2020-10-10 RX ADMIN — BACLOFEN 10 MG: 10 TABLET ORAL at 08:59

## 2020-10-10 RX ADMIN — CHOLESTYRAMINE 4 G: 4 POWDER, FOR SUSPENSION ORAL at 08:59

## 2020-10-10 RX ADMIN — CHOLESTYRAMINE 4 G: 4 POWDER, FOR SUSPENSION ORAL at 21:02

## 2020-10-10 RX ADMIN — Medication 250 MG: at 06:14

## 2020-10-10 RX ADMIN — BARIUM SULFATE 10 ML: 400 PASTE ORAL at 12:06

## 2020-10-10 RX ADMIN — Medication 10 ML: at 09:02

## 2020-10-10 RX ADMIN — TIMOLOL MALEATE 1 DROP: 5 SOLUTION OPHTHALMIC at 08:06

## 2020-10-10 RX ADMIN — BRIMONIDINE TARTRATE 1 DROP: 2 SOLUTION OPHTHALMIC at 08:06

## 2020-10-10 RX ADMIN — FOLIC ACID 1 MG: 1 TABLET ORAL at 09:05

## 2020-10-10 RX ADMIN — RISPERIDONE 1 MG: 1 TABLET ORAL at 09:01

## 2020-10-10 RX ADMIN — Medication 250 MG: at 17:38

## 2020-10-10 RX ADMIN — BACLOFEN 10 MG: 10 TABLET ORAL at 21:02

## 2020-10-10 RX ADMIN — Medication 250 MG: at 13:00

## 2020-10-10 RX ADMIN — MICONAZOLE NITRATE: 2 OINTMENT TOPICAL at 08:05

## 2020-10-10 RX ADMIN — SODIUM CHLORIDE, PRESERVATIVE FREE 10 ML: 5 INJECTION INTRAVENOUS at 08:58

## 2020-10-10 RX ADMIN — ATORVASTATIN CALCIUM 10 MG: 10 TABLET, FILM COATED ORAL at 09:01

## 2020-10-10 RX ADMIN — LEVOTHYROXINE SODIUM 25 MCG: 0.03 TABLET ORAL at 09:00

## 2020-10-10 RX ADMIN — BRIMONIDINE TARTRATE 1 DROP: 2 SOLUTION OPHTHALMIC at 21:02

## 2020-10-10 RX ADMIN — TIMOLOL MALEATE 1 DROP: 5 SOLUTION OPHTHALMIC at 21:02

## 2020-10-10 RX ADMIN — POTASSIUM CHLORIDE 20 MEQ: 20 TABLET, EXTENDED RELEASE ORAL at 08:59

## 2020-10-10 RX ADMIN — BARIUM SULFATE 120 ML: 400 SUSPENSION ORAL at 12:06

## 2020-10-10 RX ADMIN — RISPERIDONE 1 MG: 1 TABLET ORAL at 21:02

## 2020-10-10 RX ADMIN — LATANOPROST 1 DROP: 50 SOLUTION OPHTHALMIC at 21:02

## 2020-10-10 ASSESSMENT — PAIN SCALES - WONG BAKER
WONGBAKER_NUMERICALRESPONSE: 0
WONGBAKER_NUMERICALRESPONSE: 0

## 2020-10-10 ASSESSMENT — PAIN SCALES - GENERAL
PAINLEVEL_OUTOF10: 0
PAINLEVEL_OUTOF10: 0

## 2020-10-10 NOTE — PROGRESS NOTES
PROGRESS NOTE    Patient Presents with/Seen in Consultation For      *known to dr Dimas Lanes, ?etoh cirrhosis/pancytopenia,  new drop hgb- gib, recently treated cdif   CHIEF COMPLAINT: Anemia    Subjective:     Patient denies abdominal pain, nausea, or vomiting. Patient states she is doing okay, just a little tired. EGD results discussed with patient at length with all questions answered. Patient asking T, discussed with her will check swallow study and if okay will start diet and she agrees. Review of Systems  Aside from what was mentioned in the PMH and HPI, essentially unremarkable, all others negative. Objective:     BP (!) 155/82   Pulse 72   Temp 98.8 °F (37.1 °C) (Oral)   Resp 18   Ht 5' 8\" (1.727 m)   Wt 116 lb 8 oz (52.8 kg)   SpO2 95%   BMI 17.71 kg/m²     General appearance: alert, awake, pale, thin, fatigued appearing, laying in bed, and cooperative  Eyes: conjunctiva pale, sclera anicteric. PERRL.   Lungs: Diminished throughout to auscultation bilaterally  Heart: regular rate and rhythm, no murmur, 2+ pulses; no edema  Abdomen: soft, non-tender; bowel sounds normal; no masses,  no organomegaly  Extremities: extremities without edema  Pulses: 2+ and symmetric  Skin: Skin color pale, texture, turgor normal.   Neurologic: Alert, oriented to person, place, president    cholestyramine (QUESTRAN) packet 4 g, BID  pantoprazole (PROTONIX) injection 40 mg, BID    And  sodium chloride (PF) 0.9 % injection 10 mL, BID  vancomycin (VANCOCIN) oral solution 250 mg, 4 times per day  miconazole nitrate 2 % ointment, BID  sodium chloride flush 0.9 % injection 10 mL, 2 times per day  sodium chloride flush 0.9 % injection 10 mL, PRN  acetaminophen (TYLENOL) tablet 650 mg, Q6H PRN  baclofen (LIORESAL) tablet 10 mg, BID  clopidogrel (PLAVIX) tablet 75 mg, Daily  escitalopram (LEXAPRO) tablet 20 mg, Daily  folic acid (FOLVITE) tablet 1 mg, Daily  latanoprost (XALATAN) 0.005 % ophthalmic solution 1 drop, Nightly  levothyroxine (SYNTHROID) tablet 25 mcg, QAM  potassium chloride (KLOR-CON M) extended release tablet 20 mEq, Daily  risperiDONE (RISPERDAL) tablet 1 mg, BID  atorvastatin (LIPITOR) tablet 10 mg, Daily  brimonidine (ALPHAGAN) 0.2 % ophthalmic solution 1 drop, BID    And  timolol (TIMOPTIC) 0.5 % ophthalmic solution 1 drop, BID         Data Review  CBC:   Lab Results   Component Value Date    WBC 6.8 10/10/2020    RBC 3.05 10/10/2020    HGB 9.2 10/10/2020    HCT 29.2 10/10/2020    MCV 95.7 10/10/2020    MCH 30.2 10/10/2020    MCHC 31.5 10/10/2020    RDW 16.5 10/10/2020     10/10/2020    MPV 10.6 10/10/2020     CMP:    Lab Results   Component Value Date     10/10/2020    K 4.6 10/10/2020    K 3.3 09/20/2020     10/10/2020    CO2 19 10/10/2020    BUN 12 10/10/2020    CREATININE 0.9 10/10/2020    GFRAA >60 10/10/2020    LABGLOM >60 10/10/2020    GLUCOSE 75 10/10/2020    PROT 7.5 10/10/2020    LABALBU 2.1 10/10/2020    CALCIUM 8.9 10/10/2020    BILITOT 0.6 10/10/2020    ALKPHOS 87 10/10/2020    AST 15 10/10/2020    ALT 6 10/10/2020     Hepatic Function Panel:    Lab Results   Component Value Date    ALKPHOS 87 10/10/2020    ALT 6 10/10/2020    AST 15 10/10/2020    PROT 7.5 10/10/2020    BILITOT 0.6 10/10/2020    BILIDIR 0.6 04/19/2019    IBILI 0.6 04/19/2019    LABALBU 2.1 10/10/2020     No components found for: CHLPL    Lab Results   Component Value Date    TRIG 72 06/03/2020    TRIG 83 02/06/2020    TRIG 156 (H) 11/07/2019       Lab Results   Component Value Date    HDL 65 06/03/2020    HDL 58 02/06/2020    HDL 69 11/07/2019       Lab Results   Component Value Date    LDLCALC 64 06/03/2020    LDLCALC 56 02/06/2020    LDLCALC 53 11/07/2019       Lab Results   Component Value Date    LABVLDL 14 06/03/2020    LABVLDL 17 02/06/2020    LABVLDL 31 11/07/2019      PT/INR:    Lab Results   Component Value Date    PROTIME 14.1 10/10/2020    PROTIME 19.5 09/05/2019    INR 1.2 10/10/2020     IRON: Lab Results   Component Value Date    IRON 181 06/03/2020     Iron Saturation:  No components found for: PERCENTFE  FERRITIN:    Lab Results   Component Value Date    FERRITIN 1,250 04/18/2019         Assessment:     Principal Problem:    GI bleeding  Active Problems:  ? Cirrhosis of liver, alcoholic   ? Recent C. difficile colitis-completed treatment  ? Diarrhea  ? Dysphagia  ? Pancytopenia  ? Anemia, macrocytic, hyperchromic - FOBT positive  ? 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 MELD 8.47  ? Unintentional wt loss  ? Hx of DVT, on Plavix - held since admission   ? COPD  ? EGD 1/14/2019-grade B LA classification GERD and no varices seen. Stomach showed gastritis, biopsy negative for H. Pylori infection. Duodenum biopsy negative for sprue  ? Elevated CEA @ 6.5  ? EGD 10/9/2020 - Schatzki's ring dilated with 52-[place] Night. No varices seen. Stomach showed gastritis, biopsy pending to rule out H. pylori infection. Duodenum biopsy pending to rule out sprue. Plan:     ? CT abdomen/pelvis results reviewed  ? Low residue diet as tolerated if swallow study allows  ? Swallow study   ? Monitor CBC, CMP, INR daily  ? Protonix IV as ordered  ? Defer comorbidities to others  ? Vancomycin per ID  ? Will need colonoscopy once C-diff colitis resolved, to be done outpatient   ? Continue to follow      Note: This report was completed utilizing computer voice recognition software. Every effort has been made to ensure accuracy, however; inadvertent computerized transcription errors may be present.      Discussed with Dr. Carlos Dubose per Dr. Antoine Martinez [id number], FNP-BC 10/10/2020 12:29 PM For Dr. Gurdeep Brown

## 2020-10-10 NOTE — PROGRESS NOTES
[facility]ah[facility]hal[facility] Hospitalist   Progress Note    Admitting Date and Time: 10/8/2020  5:00 PM  Admit Dx: GI bleeding [K92.2]  GI bleeding [K92.2]    Subjective:    Patient was admitted with GI bleeding [K92.2]  GI bleeding [K92.2].  Patient denies fever, chills, cp, sob, n/v.     cholestyramine  1 packet Oral BID    pantoprazole  40 mg Intravenous BID    And    sodium chloride (PF)  10 mL Intravenous BID    vancomycin  250 mg Oral 4 times per day    miconazole nitrate   Topical BID    sodium chloride flush  10 mL Intravenous 2 times per day    baclofen  10 mg Oral BID    clopidogrel  75 mg Oral Daily    escitalopram  20 mg Oral Daily    folic acid  1 mg Oral Daily    latanoprost  1 drop Both Eyes Nightly    levothyroxine  25 mcg Oral QAM    potassium chloride  20 mEq Oral Daily    risperiDONE  1 mg Oral BID    atorvastatin  10 mg Oral Daily    brimonidine  1 drop Both Eyes BID    And    timolol  1 drop Both Eyes BID     sodium chloride flush, 10 mL, PRN  acetaminophen, 650 mg, Q6H PRN         Objective:    BP (!) 155/82   Pulse 72   Temp 98.8 °F (37.1 °C) (Oral)   Resp 18   Ht 5' 8\" (1.727 m)   Wt 116 lb 8 oz (52.8 kg)   SpO2 95%   BMI 17.71 kg/m²   Skin: warm and dry, no rash or erythema  Pulmonary/Chest: clear to auscultation bilaterally- no wheezes, rales or rhonchi, normal air movement, no respiratory distress  Cardiovascular: rhythm reg at rate of 76  Abdomen: soft, non-tender, non-distended, normal bowel sounds, no masses or organomegaly  Extremities: no cyanosis, no clubbing and no edema      Recent Labs     10/08/20  1751 10/09/20  0130 10/10/20  0254    136 134   K 5.2* 4.3 4.6    108* 107   CO2 22 20* 19*   BUN 14 13 12   CREATININE 0.8 0.7 0.9   GLUCOSE 90 94 75   CALCIUM 8.8 8.5* 8.9       Recent Labs     10/08/20  1751 10/09/20  0130 10/09/20  1040 10/10/20  0254   WBC 8.6  --   --  6.8   RBC 2.25*  --   --  3.05*   HGB 6.8* 7.0* 10.0* 9.2*   HCT 22.3* 22.7* 32.2* 29.2*   MCV 99.1  --   --  95.7   MCH 30.2  --   --  30.2   MCHC 30.5*  --   --  31.5*   RDW 15.7*  --   --  16.5*     --   --  146   MPV 10.8  --   --  10.6       CBC with Differential:    Lab Results   Component Value Date    WBC 6.8 10/10/2020    RBC 3.05 10/10/2020    HGB 9.2 10/10/2020    HCT 29.2 10/10/2020     10/10/2020    MCV 95.7 10/10/2020    MCH 30.2 10/10/2020    MCHC 31.5 10/10/2020    RDW 16.5 10/10/2020    NRBC 0.0 05/07/2017    METASPCT 2.6 09/19/2020    LYMPHOPCT 27.5 10/10/2020    MONOPCT 6.2 10/10/2020    MYELOPCT 0.9 09/20/2020    BASOPCT 0.1 10/10/2020    MONOSABS 0.42 10/10/2020    LYMPHSABS 1.87 10/10/2020    EOSABS 0.20 10/10/2020    BASOSABS 0.01 10/10/2020     CMP:    Lab Results   Component Value Date     10/10/2020    K 4.6 10/10/2020    K 3.3 09/20/2020     10/10/2020    CO2 19 10/10/2020    BUN 12 10/10/2020    CREATININE 0.9 10/10/2020    GFRAA >60 10/10/2020    LABGLOM >60 10/10/2020    GLUCOSE 75 10/10/2020    PROT 7.5 10/10/2020    LABALBU 2.1 10/10/2020    CALCIUM 8.9 10/10/2020    BILITOT 0.6 10/10/2020    ALKPHOS 87 10/10/2020    AST 15 10/10/2020    ALT 6 10/10/2020     PT/INR:    Lab Results   Component Value Date    PROTIME 14.1 10/10/2020    PROTIME 19.5 09/05/2019    INR 1.2 10/10/2020        Radiology:   FL MODIFIED BARIUM SWALLOW W VIDEO   Final Result   Deep laryngeal penetration with nectar thick liquids. No aspiration seen. Please see separate speech pathology report for full discussion of findings   and recommendations. CT ABDOMEN PELVIS W IV CONTRAST Additional Contrast? None   Final Result   Diffuse abnormal colonic wall thickening correlating patient's clinical   history of C difficile colitis. Abnormal hypoattenuation left kidney. Differential includes sequela of   underlying pyelonephritis renal abscess versus sequela of renal infarct. Nodular contour of the liver.   Correlate clinical evidence to suggest cirrhosis. Cholelithiasis. Stable indeterminate retroperitoneal lymph nodes potentially reactive in   nature. Assessment:    Principal Problem:    GI bleeding  Active Problems:    History of cirrhosis    Severe protein-calorie malnutrition (HCC)    Acquired hypothyroidism    B12 deficiency anemia    C. difficile colitis    Acute post-hemorrhagic anemia    Chronic alcohol use  Resolved Problems:    * No resolved hospital problems. *      Plan:  1. C. Diff colitis  Continue po vanc per ID  2. Cirrhosis due to alcohol  Gi following  3. Erasto Redhead  S/p egd   ppi gi following  4. schatzki ring s/p dilation  Monitor  5. Gastritis s/p egd  ppi  Gi following  6. anemia with possible acute blood loss component  Possibly due to gi bleed. Pt s/p EGD. Gi following  monitor hgb and transfuse prn  7. Hyperkalemia improved  8. Acidosis monitor  9. Hypothyroidism continue med  10. Hyperlipidemia continue med  11. Depression continue med  15. Dysphagia s/p speech evaluation  Diet per recommendations.  Monitor pt closely          Electronically signed by Ted Lab, DO on 10/10/2020 at 7:44 PM

## 2020-10-10 NOTE — PROGRESS NOTES
SPEECH/LANGUAGE PATHOLOGY  VIDEOFLUOROSCOPIC STUDY OF SWALLOWING (MBS)      PATIENT NAME:  Rossana Jhaveri      :  1950          TODAY'S DATE:  10/10/2020 ROOM:  9863/7260-A      SUMMARY OF EVALUATION     DYSPHAGIA DIAGNOSIS:  Moderate oropharyngeal dysphagia      DIET RECOMMENDATIONS:  Dysphagia 1, Pureed solids with  honey consistency (moderately thick) liquids     FEEDING RECOMMENDATIONS:     Assistance level:  Stand by assistance is needed during all oral intake      Compensatory strategies recommended: Double swallow and Small bites/sips    THERAPY RECOMMENDATIONS:      Dysphagia therapy is recommended 3-5 times per week for LOS or when goals are met with emphasis on the following:  Pt will complete oral motor strength/ coordination exercises to improve bolus prep/ control and mastication with minimal  cuing. Pt will complete BOTR strength/ ROM exercises to reduce pharyngeal residuals and improve epiglottic inversion . with minimal  cuing   Pt will complete laryngeal strength/ ROM therapeutic exercises to improve airway protection for the least restrictive PO diet with with minimal  cuing.   Ongoing mealtime assessment to provide diet modification and compensatory strategy implementation to minimize risk of aspiration associated with PO intake                  PROCEDURE     Consistencies Administered During the Evaluation   Liquids: nectar thick liquid and honey thick liquid   Solids:  pureed foods      Method of Intake:   cup, spoon  Fed by clinician      Position:   Seated, upright, Lateral plane    Current Respiratory Status   nasal canula                RESULTS     ORAL STAGE       Delayed A-P transit due to: reduced lingual strength         PHARYNGEAL STAGE           ONSET TIME     Onset time of the pharyngeal swallow was adequate       PHARYNGEAL RESIDUALS          Vallecula/Pharyngeal Wall           Reduced tongue base retraction resulting in residuals in vallecula and/or posterior pharyngeal wall for honey consistency liquid and pureed foods which mostly cleared with cued double swallow      Pyriform Sinuses      No significant residuals were noted in the pyriform sinuses    LARYNGEAL PENETRATION     Laryngeal penetration occurred in the absence of aspiration DURING the swallow for nectar consistency liquid due to  delayed laryngeal closure which remained in the laryngeal vestibule. and penetrated deep into the laryngeal vestibule (to the level of the true vocal folds). Laryngeal penetration was mild-moderate and occurred inconsistently   an absent cough/throat clear was noted                 ASPIRATION    Aspiration  was not present during this evaluation however there is high risk for aspiration  of thin liquid and nectar consistency liquid due to laryngeal penetration and pharyngeal residuals         COMPENSATORY STRATEGIES       Compensatory strategies that were beneficial included Double swallow and Compensatory strategies that were not beneficial included Throat clear      STRUCTURAL/FUNCTIONAL ANOMALIES       No structural/functional anomalies were noted      CERVICAL ESOPHAGEAL STAGE :        The cervical esophagus appeared adequate                            Prognosis for improvements is good  This plan will be re-evaluated and revised in 1 week  if warranted. Patient stated goals: Agreed with above  Treatment goals discussed with Patient   The Patient understand(s) the diagnosis, prognosis and plan of care       CPT code:  80613  dysphagia study      [x]The admitting diagnosis and active problem list, as listed below have been reviewed prior to initiation of this evaluation.      ADMITTING DIAGNOSIS: GI bleeding [K92.2]  GI bleeding [K92.2]     ACTIVE PROBLEM LIST:   Patient Active Problem List   Diagnosis    Glaucoma    Macular degeneration    Depression    Macrocytic anemia    History of cirrhosis    PVD (peripheral vascular disease) with claudication ([facility] [address] [facility]    Severe protein-calorie malnutrition ([facility] [address] [facility]    Mixed hyperlipidemia    Acquired hypothyroidism    Essential hypertension    Weakness    Leukocytosis    [facility]    Elevated alkaline phosphatase level    B12 deficiency anemia    Septicemia (HCC)    C. difficile colitis    Acute metabolic encephalopathy    GI bleeding    Acute post-hemorrhagic anemia    Chronic alcohol use

## 2020-10-10 NOTE — PROGRESS NOTES
[address] [place] [address] [facility]  Progress Note      Chief Complaint   Patient presents with    Abnormal Lab     hgb6.5 this morning        SUBJECTIVE:      Patient is tolerating medications. No reported adverse drug reactions. No problems overnight. Tolerated EGD well without complication  Awaiting swallow evaluation      Review of systems:    As stated above in the chief complaint, otherwise negative. Medications:    Scheduled Meds:   cholestyramine  1 packet Oral BID    pantoprazole  40 mg Intravenous BID    And    sodium chloride (PF)  10 mL Intravenous BID    vancomycin  250 mg Oral 4 times per day    miconazole nitrate   Topical BID    sodium chloride flush  10 mL Intravenous 2 times per day    baclofen  10 mg Oral BID    clopidogrel  75 mg Oral Daily    escitalopram  20 mg Oral Daily    folic acid  1 mg Oral Daily    latanoprost  1 drop Both Eyes Nightly    levothyroxine  25 mcg Oral QAM    potassium chloride  20 mEq Oral Daily    risperiDONE  1 mg Oral BID    atorvastatin  10 mg Oral Daily    brimonidine  1 drop Both Eyes BID    And    timolol  1 drop Both Eyes BID     Continuous Infusions:  PRN Meds:sodium chloride flush, acetaminophen  Prior to Admission medications    Medication Sig Start Date End Date Taking?  Authorizing Provider   risperiDONE (RISPERDAL) 1 MG tablet Take 1 mg by mouth 2 times daily   Yes Historical Provider, MD   acetaminophen (TYLENOL) 325 MG tablet Take 2 tablets by mouth every 6 hours as needed for Pain 8/3/20  Yes Welcome Peeling, DO   baclofen (LIORESAL) 10 MG tablet Take 10 mg by mouth 2 times daily    Historical Provider, MD   folic acid (FOLVITE) 1 MG tablet Take 1 tablet by mouth daily 8/3/20   Welcome Peeling, DO   clopidogrel (PLAVIX) 75 MG tablet Take 1 tablet by mouth daily 6/15/20   Sandor Hilario DO   escitalopram (LEXAPRO) 20 MG tablet Take 1 tablet by mouth daily 6/15/20   Sandor Hilario,    levothyroxine (SYNTHROID) 25 MCG tablet Take 1 tablet by mouth every morning 6/15/20   Sandor Hilario DO   potassium chloride (KLOR-CON M) 20 MEQ extended release tablet Take 1 tablet by mouth daily 6/15/20   Sandor Hilario DO   simvastatin (ZOCOR) 5 MG tablet Take 1 tablet by mouth nightly 6/15/20   Sandor Hilario DO   Handicap Placard MISC by NOT APPLICABLE route    Historical Provider, MD   latanoprost (XALATAN) 0.005 % ophthalmic solution  13   Historical Provider, MD   Multiple Vitamins-Minerals (PRESERVISION AREDS PO) Take 1 tablet by mouth daily    Historical Provider, MD   brimonidine-timolol (COMBIGAN) 0.2-0.5 % ophthalmic solution Place 1 drop into both eyes every 12 hours    Historical Provider, MD       OBJECTIVE:  BP (!) 155/82   Pulse 72   Temp 98.8 °F (37.1 °C) (Oral)   Resp 18   Ht 5' 8\" (1.727 m)   Wt 116 lb 8 oz (52.8 kg)   SpO2 95%   BMI 17.71 kg/m²   Temp  Av.1 °F (36.7 °C)  Min: 97.8 °F (36.6 °C)  Max: 98.8 °F (37.1 °C)  General appearance: Resting in bed in no apparent distress. Remains cachectic  Skin: Warm and dry. No rashes were noted. HEENT: Round and reactive pupils. Moist mucous membranes. No ulcerations or thrush. Neck: Supple to movements. Chest: No use of accessory muscles to breathe. Symmetrical expansion. No wheezing, crackles or rhonchi. Cardiovascular: Reguar rate and rhythm. No murmurs gallops, or rubs appreciated. Abdomen: Mildly distended and mildly tender, bowel sounds hypoactive  Extremities: No clubbing, no cyanosis, no edema. Lines: peripheral    I/O last 3 completed shifts:   In: 130 [P.O.:20; I.V.:110]  Out: -       Laboratory and Tests Review:      Lab Results   Component Value Date    WBC 6.8 10/10/2020    WBC 8.6 10/08/2020    WBC 21.4 (H) 2020    HGB 9.2 (L) 10/10/2020    HCT 29.2 (L) 10/10/2020    MCV 95.7 10/10/2020     10/10/2020     Lab Results   Component Value Date    NEUTROABS 4.27 10/10/2020    NEUTROABS 6.01 10/08/2020 NEUTROABS 27.65 (H) 09/21/2020     No results found for: [facility]  Lab Results   Component Value Date    ALT 6 10/10/2020    AST 15 10/10/2020    ALKPHOS 87 10/10/2020    BILITOT 0.6 10/10/2020     Lab Results   Component Value Date     10/10/2020    K 4.6 10/10/2020    K 3.3 09/20/2020     10/10/2020    CO2 19 10/10/2020    BUN 12 10/10/2020    CREATININE 0.9 10/10/2020    CREATININE 0.7 10/09/2020    CREATININE 0.8 10/08/2020    GFRAA >60 10/10/2020    LABGLOM >60 10/10/2020    GLUCOSE 75 10/10/2020    PROT 7.5 10/10/2020    LABALBU 2.1 10/10/2020    CALCIUM 8.9 10/10/2020    BILITOT 0.6 10/10/2020    ALKPHOS 87 10/10/2020    AST 15 10/10/2020    ALT 6 10/10/2020     Lab Results   Component Value Date    CRP 2.4 (H) 04/18/2019     Lab Results   Component Value Date    SEDRATE 14 04/18/2019       Radiology:    CT ABDOMEN PELVIS W IV CONTRAST Additional Contrast? None   Final Result   Diffuse abnormal colonic wall thickening correlating patient's clinical   history of C difficile colitis. Abnormal hypoattenuation left kidney. Differential includes sequela of   underlying pyelonephritis renal abscess versus sequela of renal infarct. Nodular contour of the liver. Correlate clinical evidence to suggest   cirrhosis. Cholelithiasis. Stable indeterminate retroperitoneal lymph nodes potentially reactive in   nature.          FL MODIFIED BARIUM SWALLOW W VIDEO    (Results Pending)       Microbiology:   Lab Results   Component Value Date    BC 5 Days no growth 09/19/2020    BC 5 Days no growth 07/30/2020     Lab Results   Component Value Date    BLOODCULT2 5 Days no growth 09/19/2020    BLOODCULT2 5 Days no growth 07/30/2020     No results found for: WNDABS  No results found for: RESPSMEAR  No results found for: MPNEUMO, CLAMYDCU, LABLEGI, AFBCX, FUNGSM, LABFUNG  No results found for: CULTRESP  No results found for: CXCATHTIP  No results found for: BFCS  No results found for: CXSURG  Urine Culture, Routine   Date Value Ref Range Status   09/19/2020   Final    10 to 100,000 CFU/mL  Mixed fallon isolated. Further workup and sensitivity testing  is not routinely indicated and will not be performed. Mixed fallon isolated includes:  Gram negative rods  Proteus species  Gram positive organism     07/30/2020 <10,000 CFU/mL  Gram negative rods    Final     No results found for: [facility]    Problem list:    Principal Problem:    GI bleeding  Active Problems:    History of cirrhosis    Severe protein-calorie malnutrition (HCC)    Acquired hypothyroidism    B12 deficiency anemia    C. difficile colitis    Acute post-hemorrhagic anemia    Chronic alcohol use  Resolved Problems:    * No resolved hospital problems.  *      ASSESSMENT:    · Severe C. difficile colitis  · Acute blood loss secondary to GI bleed  · Schatzki's ring status post dilatation  · Alcoholic cirrhosis  · Severe protein calorie malnutrition    PLAN:    · Continue vancomycin 250 milligrams 4 times a day 14 days  · After 14 days taper vancomycin slowly over 3 weeks  · Prealbumin noted  · Continue nutritional support      ALLISON Torres DO    9:25 AM  10/10/2020

## 2020-10-10 NOTE — PLAN OF CARE
Problem: Skin Integrity:  Goal: Will show no infection signs and symptoms  Description: Will show no infection signs and symptoms  10/9/2020 1843 by Samantha Rollins RN  Outcome: Met This Shift  Goal: Absence of new skin breakdown  Description: Absence of new skin breakdown  10/9/2020 1843 by Samantha Rollins RN  Outcome: Met This Shift     Problem: Falls - Risk of:  Goal: Will remain free from falls  Description: Will remain free from falls  10/10/2020 0544 by Sanam Rios  Outcome: Met This Shift  10/9/2020 1843 by Samantha Rollins RN  Outcome: Met This Shift  Goal: Absence of physical injury  Description: Absence of physical injury  10/10/2020 0544 by Sanam Rios  Outcome: Met This Shift  10/9/2020 1843 by Samantha Rollins RN  Outcome: Met This Shift

## 2020-10-11 LAB
ALBUMIN SERPL-MCNC: 2 G/DL (ref 3.5–5.2)
ALP BLD-CCNC: 78 U/L (ref 35–104)
ALT SERPL-CCNC: 6 U/L (ref 0–32)
ANION GAP SERPL CALCULATED.3IONS-SCNC: 7 MMOL/L (ref 7–16)
AST SERPL-CCNC: 13 U/L (ref 0–31)
BASOPHILS ABSOLUTE: 0.01 E9/L (ref 0–0.2)
BASOPHILS RELATIVE PERCENT: 0.2 % (ref 0–2)
BILIRUB SERPL-MCNC: 0.5 MG/DL (ref 0–1.2)
BUN BLDV-MCNC: 14 MG/DL (ref 8–23)
CALCIUM SERPL-MCNC: 8.4 MG/DL (ref 8.6–10.2)
CHLORIDE BLD-SCNC: 106 MMOL/L (ref 98–107)
CLOTEST: NORMAL
CO2: 18 MMOL/L (ref 22–29)
CREAT SERPL-MCNC: 0.8 MG/DL (ref 0.5–1)
EOSINOPHILS ABSOLUTE: 0.2 E9/L (ref 0.05–0.5)
EOSINOPHILS RELATIVE PERCENT: 3 % (ref 0–6)
GFR AFRICAN AMERICAN: >60
GFR NON-AFRICAN AMERICAN: >60 ML/MIN/1.73
GLUCOSE BLD-MCNC: 90 MG/DL (ref 74–99)
HCT VFR BLD CALC: 25 % (ref 34–48)
HEMOGLOBIN: 7.9 G/DL (ref 11.5–15.5)
IMMATURE GRANULOCYTES #: 0.05 E9/L
IMMATURE GRANULOCYTES %: 0.8 % (ref 0–5)
INR BLD: 1.3
LYMPHOCYTES ABSOLUTE: 1.45 E9/L (ref 1.5–4)
LYMPHOCYTES RELATIVE PERCENT: 22 % (ref 20–42)
MCH RBC QN AUTO: 29.9 PG (ref 26–35)
MCHC RBC AUTO-ENTMCNC: 31.6 % (ref 32–34.5)
MCV RBC AUTO: 94.7 FL (ref 80–99.9)
MONOCYTES ABSOLUTE: 0.42 E9/L (ref 0.1–0.95)
MONOCYTES RELATIVE PERCENT: 6.4 % (ref 2–12)
NEUTROPHILS ABSOLUTE: 4.46 E9/L (ref 1.8–7.3)
NEUTROPHILS RELATIVE PERCENT: 67.6 % (ref 43–80)
PDW BLD-RTO: 15.9 FL (ref 11.5–15)
PLATELET # BLD: 130 E9/L (ref 130–450)
PMV BLD AUTO: 10.7 FL (ref 7–12)
POTASSIUM SERPL-SCNC: 4.1 MMOL/L (ref 3.5–5)
PROTHROMBIN TIME: 15.2 SEC (ref 9.3–12.4)
RBC # BLD: 2.64 E12/L (ref 3.5–5.5)
SODIUM BLD-SCNC: 131 MMOL/L (ref 132–146)
TOTAL PROTEIN: 6.6 G/DL (ref 6.4–8.3)
WBC # BLD: 6.6 E9/L (ref 4.5–11.5)

## 2020-10-11 PROCEDURE — C9113 INJ PANTOPRAZOLE SODIUM, VIA: HCPCS | Performed by: CLINICAL NURSE SPECIALIST

## 2020-10-11 PROCEDURE — 6370000000 HC RX 637 (ALT 250 FOR IP): Performed by: CLINICAL NURSE SPECIALIST

## 2020-10-11 PROCEDURE — 99232 SBSQ HOSP IP/OBS MODERATE 35: CPT | Performed by: INTERNAL MEDICINE

## 2020-10-11 PROCEDURE — 6360000002 HC RX W HCPCS: Performed by: CLINICAL NURSE SPECIALIST

## 2020-10-11 PROCEDURE — 36415 COLL VENOUS BLD VENIPUNCTURE: CPT

## 2020-10-11 PROCEDURE — 85610 PROTHROMBIN TIME: CPT

## 2020-10-11 PROCEDURE — 80053 COMPREHEN METABOLIC PANEL: CPT

## 2020-10-11 PROCEDURE — 1200000000 HC SEMI PRIVATE

## 2020-10-11 PROCEDURE — 2580000003 HC RX 258: Performed by: CLINICAL NURSE SPECIALIST

## 2020-10-11 PROCEDURE — APPSS30 APP SPLIT SHARED TIME 16-30 MINUTES: Performed by: NURSE PRACTITIONER

## 2020-10-11 PROCEDURE — 2580000003 HC RX 258: Performed by: INTERNAL MEDICINE

## 2020-10-11 PROCEDURE — 6370000000 HC RX 637 (ALT 250 FOR IP): Performed by: INTERNAL MEDICINE

## 2020-10-11 PROCEDURE — 85025 COMPLETE CBC W/AUTO DIFF WBC: CPT

## 2020-10-11 RX ADMIN — ESCITALOPRAM OXALATE 20 MG: 10 TABLET ORAL at 09:02

## 2020-10-11 RX ADMIN — RISPERIDONE 1 MG: 1 TABLET ORAL at 20:53

## 2020-10-11 RX ADMIN — PANTOPRAZOLE SODIUM 40 MG: 40 INJECTION, POWDER, FOR SOLUTION INTRAVENOUS at 09:03

## 2020-10-11 RX ADMIN — Medication 10 ML: at 09:04

## 2020-10-11 RX ADMIN — Medication 10 ML: at 20:56

## 2020-10-11 RX ADMIN — BRIMONIDINE TARTRATE 1 DROP: 2 SOLUTION OPHTHALMIC at 20:53

## 2020-10-11 RX ADMIN — Medication 250 MG: at 17:51

## 2020-10-11 RX ADMIN — CHOLESTYRAMINE 4 G: 4 POWDER, FOR SUSPENSION ORAL at 20:53

## 2020-10-11 RX ADMIN — SODIUM CHLORIDE, PRESERVATIVE FREE 10 ML: 5 INJECTION INTRAVENOUS at 20:53

## 2020-10-11 RX ADMIN — ACETAMINOPHEN 650 MG: 325 TABLET ORAL at 15:48

## 2020-10-11 RX ADMIN — LATANOPROST 1 DROP: 50 SOLUTION OPHTHALMIC at 20:54

## 2020-10-11 RX ADMIN — Medication 250 MG: at 00:06

## 2020-10-11 RX ADMIN — MICONAZOLE NITRATE: 2 OINTMENT TOPICAL at 09:03

## 2020-10-11 RX ADMIN — LEVOTHYROXINE SODIUM 25 MCG: 0.03 TABLET ORAL at 09:03

## 2020-10-11 RX ADMIN — TIMOLOL MALEATE 1 DROP: 5 SOLUTION OPHTHALMIC at 10:23

## 2020-10-11 RX ADMIN — CLOPIDOGREL BISULFATE 75 MG: 75 TABLET ORAL at 09:02

## 2020-10-11 RX ADMIN — Medication 250 MG: at 11:48

## 2020-10-11 RX ADMIN — MICONAZOLE NITRATE: 2 OINTMENT TOPICAL at 20:54

## 2020-10-11 RX ADMIN — ATORVASTATIN CALCIUM 10 MG: 10 TABLET, FILM COATED ORAL at 09:02

## 2020-10-11 RX ADMIN — RISPERIDONE 1 MG: 1 TABLET ORAL at 09:18

## 2020-10-11 RX ADMIN — TIMOLOL MALEATE 1 DROP: 5 SOLUTION OPHTHALMIC at 20:53

## 2020-10-11 RX ADMIN — BACLOFEN 10 MG: 10 TABLET ORAL at 20:53

## 2020-10-11 RX ADMIN — Medication 250 MG: at 06:00

## 2020-10-11 RX ADMIN — CHOLESTYRAMINE 4 G: 4 POWDER, FOR SUSPENSION ORAL at 09:02

## 2020-10-11 RX ADMIN — SODIUM CHLORIDE, PRESERVATIVE FREE 10 ML: 5 INJECTION INTRAVENOUS at 09:03

## 2020-10-11 RX ADMIN — POTASSIUM CHLORIDE 20 MEQ: 20 TABLET, EXTENDED RELEASE ORAL at 09:02

## 2020-10-11 RX ADMIN — PANTOPRAZOLE SODIUM 40 MG: 40 INJECTION, POWDER, FOR SOLUTION INTRAVENOUS at 20:53

## 2020-10-11 RX ADMIN — FOLIC ACID 1 MG: 1 TABLET ORAL at 09:02

## 2020-10-11 RX ADMIN — BACLOFEN 10 MG: 10 TABLET ORAL at 09:02

## 2020-10-11 RX ADMIN — BRIMONIDINE TARTRATE 1 DROP: 2 SOLUTION OPHTHALMIC at 10:23

## 2020-10-11 RX ADMIN — Medication 250 MG: at 23:43

## 2020-10-11 ASSESSMENT — PAIN SCALES - GENERAL
PAINLEVEL_OUTOF10: 0
PAINLEVEL_OUTOF10: 0
PAINLEVEL_OUTOF10: 3

## 2020-10-11 NOTE — PROGRESS NOTES
PROGRESS NOTE    Patient Presents with/Seen in Consultation For      *known to dr Billey Goodpasture, ?etoh cirrhosis/pancytopenia,  new drop hgb- gib, recently treated cdif   CHIEF COMPLAINT: Anemia    Subjective:     Patient states she feels better. Pt reports she cannot tolerate the thickened liquids. D/W Speech Pathology Toni Trejo who will repeat swallow study today as pt is more awake then with prior study. Pt reports lower abdominal pain/cramping 5/10. Pt denies n/v. States she had a watery stool this am.    Review of Systems  Aside from what was mentioned in the PMH and HPI, essentially unremarkable, all others negative. Objective:     /60   Pulse 70   Temp 97.7 °F (36.5 °C) (Oral)   Resp 16   Ht 5' 8\" (1.727 m)   Wt 112 lb 6.4 oz (51 kg)   SpO2 97%   BMI 17.09 kg/m²     General appearance: alert, awake, pale, cachectic, laying in bed, and cooperative  Eyes: conjunctiva pale, sclera anicteric. PERRL.   Lungs: Diminished throughout to auscultation bilaterally  Heart: regular rate and rhythm, no murmur, 2+ pulses; no edema  Abdomen: soft, low abdomen tender to palpation without guarding or rebound; bowel sounds normal; no masses,  no organomegaly  Extremities: extremities without edema  Pulses: 2+ and symmetric  Skin: Skin color pale, texture, turgor normal.   Neurologic: Alert, oriented to person, place, president, and situation    magnesium sulfate 2 g in 50 mL IVPB premix, Once  fluconazole (DIFLUCAN) tablet 100 mg, Daily  cholestyramine (QUESTRAN) packet 4 g, BID  pantoprazole (PROTONIX) injection 40 mg, BID    And  sodium chloride (PF) 0.9 % injection 10 mL, BID  vancomycin (VANCOCIN) oral solution 250 mg, 4 times per day  miconazole nitrate 2 % ointment, BID  sodium chloride flush 0.9 % injection 10 mL, 2 times per day  sodium chloride flush 0.9 % injection 10 mL, PRN  acetaminophen (TYLENOL) tablet 650 mg, Q6H PRN  baclofen (LIORESAL) tablet 10 mg, BID  clopidogrel (PLAVIX) tablet 75 mg, Daily  escitalopram (LEXAPRO) tablet 20 mg, Daily  folic acid (FOLVITE) tablet 1 mg, Daily  latanoprost (XALATAN) 0.005 % ophthalmic solution 1 drop, Nightly  levothyroxine (SYNTHROID) tablet 25 mcg, QAM  potassium chloride (KLOR-CON M) extended release tablet 20 mEq, Daily  risperiDONE (RISPERDAL) tablet 1 mg, BID  atorvastatin (LIPITOR) tablet 10 mg, Daily  brimonidine (ALPHAGAN) 0.2 % ophthalmic solution 1 drop, BID    And  timolol (TIMOPTIC) 0.5 % ophthalmic solution 1 drop, BID         Data Review  CBC:   Lab Results   Component Value Date    WBC 5.3 10/12/2020    RBC 2.91 10/12/2020    HGB 8.7 10/12/2020    HCT 27.9 10/12/2020    MCV 95.9 10/12/2020    MCH 29.9 10/12/2020    MCHC 31.2 10/12/2020    RDW 15.7 10/12/2020     10/12/2020    MPV 10.9 10/12/2020     CMP:    Lab Results   Component Value Date     10/12/2020    K 4.6 10/12/2020    K 3.3 09/20/2020     10/12/2020    CO2 18 10/12/2020    BUN 13 10/12/2020    CREATININE 0.7 10/12/2020    GFRAA >60 10/12/2020    LABGLOM >60 10/12/2020    GLUCOSE 80 10/12/2020    PROT 7.2 10/12/2020    LABALBU 2.1 10/12/2020    CALCIUM 8.8 10/12/2020    BILITOT 0.4 10/12/2020    ALKPHOS 82 10/12/2020    AST 18 10/12/2020    ALT 6 10/12/2020     Hepatic Function Panel:    Lab Results   Component Value Date    ALKPHOS 82 10/12/2020    ALT 6 10/12/2020    AST 18 10/12/2020    PROT 7.2 10/12/2020    BILITOT 0.4 10/12/2020    BILIDIR 0.6 04/19/2019    IBILI 0.6 04/19/2019    LABALBU 2.1 10/12/2020     No components found for: CHLPLat  Lab Results   Component Value Date    TRIG 72 06/03/2020    TRIG 83 02/06/2020    TRIG 156 (H) 11/07/2019   e    LDLCALC 64 06/03/2020    LDLCALC 56 02/06/2020    LDLCALC 53 11/07/2019             Value Date    PROTIME 12.3 10/12/2020    PROTIME 19.5 09/05/2019    INR 1.0 10/12/2020     IRON:    Lab Results   Component Value Date    IRON 181 06/03/2020     Iron Saturation:  No components found for: PERCENTFE  FERRITIN:    Lab Results   Component Value Date    FERRITIN 1,250 04/18/2019         Assessment:     Principal Problem:    GI bleeding  Active Problems:  ? Cirrhosis of liver, alcoholic   ? Recent C. difficile colitis-completed treatment  ? Diarrhea  ? Dysphagia  ? Pancytopenia  ? Anemia, macrocytic, hyperchromic - FOBT positive  ? Alcohol abuse, in remission x1 year  ? MELD 8.47  ? Unintentional wt loss  ? Hx of DVT, on Plavix - held since 23 Ano Vouves  ? COPD  ? EGD 1/14/2019-grade B LA classification GERD and no varices seen. Stomach showed gastritis, biopsy negative for H. Pylori infection. Duodenum biopsy negative for sprue  ? Elevated CEA @ 6.5  ? EGD 10/9/2020 - Schatzki's ring dilated with 52-Hebrew Tanesha Leventhal.  No varices seen. Stomach showed gastritis, biopsy negative for H. pylori infection.  Duodenum biopsy pending to rule out sprue. Plan:     ? Bleeding scan STAT today, not done yesterday  ? Repeat BS Swallow, d/w speech therapy, they will do today  ? NPO for bleeding scan  ? Dysphagia 1, Pureed solids with  honey consistency (moderately thick) liquids per speech therapy  ? Diflucan as ordered   ? Monitor CBC, CMP, INR daily  ? Protonix IV as ordered  ? Defer comorbidities to others  ? Vancomycin per ID  ? Will need colonoscopy once C-diff colitis resolved, to be done outpatient   ? Continue to follow      Note: This report was completed utilizing computer voice recognition software. Every effort has been made to ensure accuracy, however; inadvertent computerized transcription errors may be present.      Discussed with Dr. Paris Wei per Dr. Mansoor Rodriguez [id number], FNP-BC 10/12/2020 10:32 AM For Dr. Marino Marlow

## 2020-10-11 NOTE — PROGRESS NOTES
Perfected served Dr. Kevin Grover about GI bleed scan not being conducted today due to lack of successful attempts at drawing blood.  HAROON Lemons talked to the charge about placing orders for another stat scan

## 2020-10-11 NOTE — PLAN OF CARE
Problem: Skin Integrity:  Goal: Will show no infection signs and symptoms  Description: Will show no infection signs and symptoms  Outcome: Met This Shift     Problem: Falls - Risk of:  Goal: Will remain free from falls  Description: Will remain free from falls  Outcome: Met This Shift  Goal: Absence of physical injury  Description: Absence of physical injury  Outcome: Met This Shift

## 2020-10-11 NOTE — PROGRESS NOTES
[address] [place] [address] [facility]  Progress Note      Chief Complaint   Patient presents with    Abnormal Lab     hgb6.5 this morning        SUBJECTIVE:      Patient is tolerating medications. No reported adverse drug reactions. No problems overnight. Review of systems:    As stated above in the chief complaint, otherwise negative. Medications:    Scheduled Meds:   cholestyramine  1 packet Oral BID    pantoprazole  40 mg Intravenous BID    And    sodium chloride (PF)  10 mL Intravenous BID    vancomycin  250 mg Oral 4 times per day    miconazole nitrate   Topical BID    sodium chloride flush  10 mL Intravenous 2 times per day    baclofen  10 mg Oral BID    clopidogrel  75 mg Oral Daily    escitalopram  20 mg Oral Daily    folic acid  1 mg Oral Daily    latanoprost  1 drop Both Eyes Nightly    levothyroxine  25 mcg Oral QAM    potassium chloride  20 mEq Oral Daily    risperiDONE  1 mg Oral BID    atorvastatin  10 mg Oral Daily    brimonidine  1 drop Both Eyes BID    And    timolol  1 drop Both Eyes BID     Continuous Infusions:  PRN Meds:sodium chloride flush, acetaminophen  Prior to Admission medications    Medication Sig Start Date End Date Taking?  Authorizing Provider   risperiDONE (RISPERDAL) 1 MG tablet Take 1 mg by mouth 2 times daily   Yes Historical Provider, MD   acetaminophen (TYLENOL) 325 MG tablet Take 2 tablets by mouth every 6 hours as needed for Pain 8/3/20  Yes Orie Kasie, DO   baclofen (LIORESAL) 10 MG tablet Take 10 mg by mouth 2 times daily    Historical Provider, MD   folic acid (FOLVITE) 1 MG tablet Take 1 tablet by mouth daily 8/3/20   Orie Kasie, DO   clopidogrel (PLAVIX) 75 MG tablet Take 1 tablet by mouth daily 6/15/20   Sandor Hilario,    escitalopram (LEXAPRO) 20 MG tablet Take 1 tablet by mouth daily 6/15/20   Sandor Hilario,    levothyroxine (SYNTHROID) 25 MCG tablet Take 1 tablet by mouth every morning 6/15/20   Austyn BELLAMY DO Yanelis   potassium chloride (KLOR-CON M) 20 MEQ extended release tablet Take 1 tablet by mouth daily 6/15/20   Sandor Hilario DO   simvastatin (ZOCOR) 5 MG tablet Take 1 tablet by mouth nightly 6/15/20   Sandor Hilario DO   Handicap Placard MISC by NOT APPLICABLE route 14   Historical Provider, MD   latanoprost (XALATAN) 0.005 % ophthalmic solution  13   Historical Provider, MD   Multiple Vitamins-Minerals (PRESERVISION AREDS PO) Take 1 tablet by mouth daily    Historical Provider, MD   brimonidine-timolol (COMBIGAN) 0.2-0.5 % ophthalmic solution Place 1 drop into both eyes every 12 hours    Historical Provider, MD       OBJECTIVE:  /66   Pulse 82   Temp 99.4 °F (37.4 °C) (Oral)   Resp 18   Ht 5' 8\" (1.727 m)   Wt 112 lb 6.4 oz (51 kg)   SpO2 98%   BMI 17.09 kg/m²   Temp  Av.5 °F (37.5 °C)  Min: 99.4 °F (37.4 °C)  Max: 99.6 °F (37.6 °C)  General appearance: Resting in bed in no apparent distress. Skin: Warm and dry. No rashes were noted. HEENT: Round and reactive pupils. Moist mucous membranes. No ulcerations or thrush. Neck: Supple to movements. Chest: No use of accessory muscles to breathe. Symmetrical expansion. No wheezing, crackles or rhonchi. Cardiovascular: Reguar rate and rhythm. No murmurs gallops, or rubs appreciated. Abdomen: Bowel sounds present, nontender, nondistended, no masses or hepatosplenomegaly. Extremities: No clubbing, no cyanosis, no edema. Lines: peripheral    I/O last 3 completed shifts:   In: 120 [P.O.:120]  Out: -       Laboratory and Tests Review:      Lab Results   Component Value Date    WBC 6.6 10/11/2020    WBC 6.8 10/10/2020    WBC 8.6 10/08/2020    HGB 7.9 (L) 10/11/2020    HCT 25.0 (L) 10/11/2020    MCV 94.7 10/11/2020     10/11/2020     Lab Results   Component Value Date    NEUTROABS 4.46 10/11/2020    NEUTROABS 4.27 10/10/2020    NEUTROABS 6.01 10/08/2020     No results found for: [facility]  Lab Results   Component Value Date ALT 6 10/11/2020    AST 13 10/11/2020    ALKPHOS 78 10/11/2020    BILITOT 0.5 10/11/2020     Lab Results   Component Value Date     10/11/2020    K 4.1 10/11/2020    K 3.3 09/20/2020     10/11/2020    CO2 18 10/11/2020    BUN 14 10/11/2020    CREATININE 0.8 10/11/2020    CREATININE 0.9 10/10/2020    CREATININE 0.7 10/09/2020    GFRAA >60 10/11/2020    LABGLOM >60 10/11/2020    GLUCOSE 90 10/11/2020    PROT 6.6 10/11/2020    LABALBU 2.0 10/11/2020    CALCIUM 8.4 10/11/2020    BILITOT 0.5 10/11/2020    ALKPHOS 78 10/11/2020    AST 13 10/11/2020    ALT 6 10/11/2020     Lab Results   Component Value Date    CRP 2.4 (H) 04/18/2019     Lab Results   Component Value Date    SEDRATE 14 04/18/2019       Radiology:    FL MODIFIED BARIUM SWALLOW W VIDEO   Final Result   Deep laryngeal penetration with nectar thick liquids. No aspiration seen. Please see separate speech pathology report for full discussion of findings   and recommendations. CT ABDOMEN PELVIS W IV CONTRAST Additional Contrast? None   Final Result   Diffuse abnormal colonic wall thickening correlating patient's clinical   history of C difficile colitis. Abnormal hypoattenuation left kidney. Differential includes sequela of   underlying pyelonephritis renal abscess versus sequela of renal infarct. Nodular contour of the liver. Correlate clinical evidence to suggest   cirrhosis. Cholelithiasis. Stable indeterminate retroperitoneal lymph nodes potentially reactive in   nature.          NM GI BLOOD LOSS    (Results Pending)       Microbiology:   Lab Results   Component Value Date    BC 5 Days no growth 09/19/2020    BC 5 Days no growth 07/30/2020     Lab Results   Component Value Date    BLOODCULT2 5 Days no growth 09/19/2020    BLOODCULT2 5 Days no growth 07/30/2020     No results found for: WNDABS  No results found for: RESPSMEAR  No results found for: MPNEUMO, CLAMYDCU, LABLEGI, AFBCX, FUNGSM, LABFUNG  No results found for: CULTRESP  No results found for: CXCATHTIP  No results found for: BFCS  No results found for: CXSURG  Urine Culture, Routine   Date Value Ref Range Status   09/19/2020   Final    10 to 100,000 CFU/mL  Mixed fallon isolated. Further workup and sensitivity testing  is not routinely indicated and will not be performed. Mixed fallon isolated includes:  Gram negative rods  Proteus species  Gram positive organism     07/30/2020 <10,000 CFU/mL  Gram negative rods    Final     No results found for: [facility]    Problem list:    Principal Problem:    GI bleeding  Active Problems:    History of cirrhosis    Severe protein-calorie malnutrition (HCC)    Acquired hypothyroidism    B12 deficiency anemia    C. difficile colitis    Acute post-hemorrhagic anemia    Chronic alcohol use  Resolved Problems:    * No resolved hospital problems. *      ASSESSMENT:    · Severe C. difficile colitis  · Acute blood loss secondary to GI bleed  · Schatzki's ring status post dilatation  · Alcoholic cirrhosis  · Severe protein calorie malnutrition    PLAN:    · Continue vancomycin 250 milligrams 4 times a day 14 days-reconciled  · After 14 days taper vancomycin slowly over 3 weeks  · Taper written in discharge instructions  · ID will sign off, please call if needed.   · Continue nutritional support      Eugenie Goff    8:58 AM  10/11/2020

## 2020-10-11 NOTE — PROGRESS NOTES
PROGRESS NOTE    Patient Presents with/Seen in Consultation For       *known to dr Agustina Kaba, ?etoh cirrhosis/pancytopenia,  new drop hgb- gib, recently treated cdif   CHIEF COMPLAINT: Anemia    Subjective:     Patient states she is feeling better today than she did yesterday. Discussed swallow study results with patient, all her questions answered. Patient denies abdominal pain, nausea, or vomiting. States she had a bout of diarrhea this a.m. but unsure of the color, per documentation stool was loose, watery, brown. Patient states she tolerated diet. Review of Systems  Aside from what was mentioned in the PMH and HPI, essentially unremarkable, all others negative. Objective:     /66   Pulse 82   Temp 99.4 °F (37.4 °C) (Oral)   Resp 18   Ht 5' 8\" (1.727 m)   Wt 112 lb 6.4 oz (51 kg)   SpO2 98%   BMI 17.09 kg/m²     General appearance: alert, awake, pale, cachectic, frail, fatigued appearing, laying in bed, and cooperative  Eyes: conjunctiva pale, sclera anicteric. PERRL.   Lungs: Diminished throughout to auscultation bilaterally  Heart: regular rate and rhythm, no murmur, 2+ pulses; no edema  Abdomen: soft, non-tender; bowel sounds normal; no masses,  no organomegaly  Extremities: extremities without edema  Pulses: 2+ and symmetric  Skin: Skin color pale, texture, turgor normal.   Neurologic: Alert, oriented to person, place, president, and situation    cholestyramine (QUESTRAN) packet 4 g, BID  pantoprazole (PROTONIX) injection 40 mg, BID    And  sodium chloride (PF) 0.9 % injection 10 mL, BID  vancomycin (VANCOCIN) oral solution 250 mg, 4 times per day  miconazole nitrate 2 % ointment, BID  sodium chloride flush 0.9 % injection 10 mL, 2 times per day  sodium chloride flush 0.9 % injection 10 mL, PRN  acetaminophen (TYLENOL) tablet 650 mg, Q6H PRN  baclofen (LIORESAL) tablet 10 mg, BID  clopidogrel (PLAVIX) tablet 75 mg, Daily  escitalopram (LEXAPRO) tablet 20 mg, Daily  folic acid (FOLVITE) tablet 1 mg, Daily  latanoprost (XALATAN) 0.005 % ophthalmic solution 1 drop, Nightly  levothyroxine (SYNTHROID) tablet 25 mcg, QAM  potassium chloride (KLOR-CON M) extended release tablet 20 mEq, Daily  risperiDONE (RISPERDAL) tablet 1 mg, BID  atorvastatin (LIPITOR) tablet 10 mg, Daily  brimonidine (ALPHAGAN) 0.2 % ophthalmic solution 1 drop, BID    And  timolol (TIMOPTIC) 0.5 % ophthalmic solution 1 drop, BID         Data Review  CBC:   Lab Results   Component Value Date    WBC 6.6 10/11/2020    RBC 2.64 10/11/2020    HGB 7.9 10/11/2020    HCT 25.0 10/11/2020    MCV 94.7 10/11/2020    MCH 29.9 10/11/2020    MCHC 31.6 10/11/2020    RDW 15.9 10/11/2020     10/11/2020    MPV 10.7 10/11/2020     CMP:    Lab Results   Component Value Date     10/11/2020    K 4.1 10/11/2020    K 3.3 09/20/2020     10/11/2020    CO2 18 10/11/2020    BUN 14 10/11/2020    CREATININE 0.8 10/11/2020    GFRAA >60 10/11/2020    LABGLOM >60 10/11/2020    GLUCOSE 90 10/11/2020    PROT 6.6 10/11/2020    LABALBU 2.0 10/11/2020    CALCIUM 8.4 10/11/2020    BILITOT 0.5 10/11/2020    ALKPHOS 78 10/11/2020    AST 13 10/11/2020    ALT 6 10/11/2020     Hepatic Function Panel:    Lab Results   Component Value Date    ALKPHOS 78 10/11/2020    ALT 6 10/11/2020    AST 13 10/11/2020    PROT 6.6 10/11/2020    BILITOT 0.5 10/11/2020    BILIDIR 0.6 04/19/2019    IBILI 0.6 04/19/2019    LABALBU 2.0 10/11/2020     No components found for: CHLPL    Lab Results   Component Value Date    TRIG 72 06/03/2020    TRIG 83 02/06/2020    TRIG 156 (H) 11/07/2019       Lab Results   Component Value Date    HDL 65 06/03/2020    HDL 58 02/06/2020    HDL 69 11/07/2019       Lab Results   Component Value Date    LDLCALC 64 06/03/2020    LDLCALC 56 02/06/2020    LDLCALC 53 11/07/2019       Lab Results   Component Value Date    LABVLDL 14 06/03/2020    LABVLDL 17 02/06/2020    LABVLDL 31 11/07/2019      PT/INR:    Lab Results   Component Value Date    PROTIME 15.2 10/11/2020    PROTIME 19.5 09/05/2019    INR 1.3 10/11/2020     IRON:    Lab Results   Component Value Date    IRON 181 06/03/2020     Iron Saturation:  No components found for: PERCENTFE  FERRITIN:    Lab Results   Component Value Date    FERRITIN 1,250 04/18/2019         Assessment:     Principal Problem:    GI bleeding  Active Problems:  ? Cirrhosis of liver, alcoholic   ? Recent C. difficile colitis-completed treatment  ? Diarrhea  ? Dysphagia  ? Pancytopenia  ? Anemia, macrocytic, hyperchromic - FOBT positive  ? Alcohol abuse, in remission x1 year  ? MELD 8.47  ? Unintentional wt loss  ? Hx of DVT, on Plavix - held since [address]  ? COPD  ? EGD 1/14/2019-grade B LA classification GERD and no varices seen. Stomach showed gastritis, biopsy negative for H. Pylori infection. Duodenum biopsy negative for sprue  ? Elevated CEA @ 6.5  ? EGD 10/9/2020 - Schatzki's ring dilated with 52-[place] Osie Trey.  No varices seen. Stomach showed gastritis, biopsy negative for H. pylori infection.  Duodenum biopsy pending to rule out sprue. Plan:     ? Bleeding scan STAT, 1.5 gm drop in hgb; neg EGD  ? Dysphagia 1, Pureed solids with  honey consistency (moderately thick) liquids per speech therapy  ? Monitor CBC, CMP, INR daily  ? Protonix IV as ordered  ? Defer comorbidities to others  ? Vancomycin per ID  ? Will need colonoscopy once C-diff colitis resolved, to be done outpatient   ? Continue to follow      Note: This report was completed utilizing computer voice recognition software. Every effort has been made to ensure accuracy, however; inadvertent computerized transcription errors may be present.      Discussed with Dr. Lina Vicente per Dr. Adela Sosa [id number], FNP-BC 10/11/2020 11:49 AM For Dr. Kevin Grover

## 2020-10-11 NOTE — PROGRESS NOTES
[facility] Hospitalist   Progress Note    Admitting Date and Time: 10/8/2020  5:00 PM  Admit Dx: GI bleeding [K92.2]  GI bleeding [K92.2]    Subjective:    Pt feels good this morning. Patient just returned from bleeding scan. Patient states she needs a swallow exam today as well. A little tired this morning but otherwise okay. No new concerns noted. Per RN: Patient with drop in hemoglobin this morning obtaining bleeding scan. ROS: denies fever, chills, cp, sob, n/v, HA unless stated above.  cholestyramine  1 packet Oral BID    pantoprazole  40 mg Intravenous BID    And    sodium chloride (PF)  10 mL Intravenous BID    vancomycin  250 mg Oral 4 times per day    miconazole nitrate   Topical BID    sodium chloride flush  10 mL Intravenous 2 times per day    baclofen  10 mg Oral BID    clopidogrel  75 mg Oral Daily    escitalopram  20 mg Oral Daily    folic acid  1 mg Oral Daily    latanoprost  1 drop Both Eyes Nightly    levothyroxine  25 mcg Oral QAM    potassium chloride  20 mEq Oral Daily    risperiDONE  1 mg Oral BID    atorvastatin  10 mg Oral Daily    brimonidine  1 drop Both Eyes BID    And    timolol  1 drop Both Eyes BID     sodium chloride flush, 10 mL, PRN  acetaminophen, 650 mg, Q6H PRN         Objective:    /66   Pulse 82   Temp 99.4 °F (37.4 °C) (Oral)   Resp 18   Ht 5' 8\" (1.727 m)   Wt 112 lb 6.4 oz (51 kg)   SpO2 98%   BMI 17.09 kg/m²   General Appearance: alert and oriented to person, place and time and in no acute distress  Skin: warm and dry  Head: normocephalic and atraumatic  Eyes: pupils equal, round, and reactive to light, extraocular eye movements intact, conjunctivae normal  Neck: neck supple and non tender without mass   Pulmonary/Chest: Diminished to auscultation bilaterally- no wheezes, rales or rhonchi, normal air movement, no respiratory distress  Cardiovascular: normal rate, normal S1 and S2 no rubs, gallops, murmur noted. le edema    I agree with the assessment and plan of Sheree Cardona. Alberto Engel - SILAS    c diff colitis  Cirrhosis  gerd  schatzki ring  Gastritis  Anemia with possible acute blood loss component. Possibly due to gi bleed  Hyperkalemia  Acidosis  hyponatremia  Hypothyroidism  Hyperlipidemia  Depression  dysphagia          Electronically signed by Bee Hightower D.O.   Hospitalist  [facility]

## 2020-10-12 ENCOUNTER — APPOINTMENT (OUTPATIENT)
Dept: GENERAL RADIOLOGY | Age: 70
DRG: 377 | End: 2020-10-12
Payer: MEDICARE

## 2020-10-12 ENCOUNTER — APPOINTMENT (OUTPATIENT)
Dept: NUCLEAR MEDICINE | Age: 70
DRG: 377 | End: 2020-10-12
Payer: MEDICARE

## 2020-10-12 LAB
ALBUMIN SERPL-MCNC: 2.1 G/DL (ref 3.5–5.2)
ALP BLD-CCNC: 82 U/L (ref 35–104)
ALT SERPL-CCNC: 6 U/L (ref 0–32)
ANION GAP SERPL CALCULATED.3IONS-SCNC: 9 MMOL/L (ref 7–16)
AST SERPL-CCNC: 18 U/L (ref 0–31)
BASOPHILS ABSOLUTE: 0 E9/L (ref 0–0.2)
BASOPHILS RELATIVE PERCENT: 0 % (ref 0–2)
BILIRUB SERPL-MCNC: 0.4 MG/DL (ref 0–1.2)
BUN BLDV-MCNC: 13 MG/DL (ref 8–23)
CALCIUM SERPL-MCNC: 8.8 MG/DL (ref 8.6–10.2)
CHLORIDE BLD-SCNC: 109 MMOL/L (ref 98–107)
CO2: 18 MMOL/L (ref 22–29)
CREAT SERPL-MCNC: 0.7 MG/DL (ref 0.5–1)
EOSINOPHILS ABSOLUTE: 0.2 E9/L (ref 0.05–0.5)
EOSINOPHILS RELATIVE PERCENT: 3.8 % (ref 0–6)
GFR AFRICAN AMERICAN: >60
GFR NON-AFRICAN AMERICAN: >60 ML/MIN/1.73
GLUCOSE BLD-MCNC: 80 MG/DL (ref 74–99)
HCT VFR BLD CALC: 27.9 % (ref 34–48)
HEMOGLOBIN: 8.7 G/DL (ref 11.5–15.5)
IMMATURE GRANULOCYTES #: 0.01 E9/L
IMMATURE GRANULOCYTES %: 0.2 % (ref 0–5)
INR BLD: 1
LYMPHOCYTES ABSOLUTE: 1.35 E9/L (ref 1.5–4)
LYMPHOCYTES RELATIVE PERCENT: 25.5 % (ref 20–42)
MAGNESIUM: 1.5 MG/DL (ref 1.6–2.6)
MCH RBC QN AUTO: 29.9 PG (ref 26–35)
MCHC RBC AUTO-ENTMCNC: 31.2 % (ref 32–34.5)
MCV RBC AUTO: 95.9 FL (ref 80–99.9)
MONOCYTES ABSOLUTE: 0.29 E9/L (ref 0.1–0.95)
MONOCYTES RELATIVE PERCENT: 5.5 % (ref 2–12)
NEUTROPHILS ABSOLUTE: 3.45 E9/L (ref 1.8–7.3)
NEUTROPHILS RELATIVE PERCENT: 65 % (ref 43–80)
PDW BLD-RTO: 15.7 FL (ref 11.5–15)
PHOSPHORUS: 3.5 MG/DL (ref 2.5–4.5)
PLATELET # BLD: 129 E9/L (ref 130–450)
PMV BLD AUTO: 10.9 FL (ref 7–12)
POTASSIUM SERPL-SCNC: 4.6 MMOL/L (ref 3.5–5)
PROTHROMBIN TIME: 12.3 SEC (ref 9.3–12.4)
RBC # BLD: 2.91 E12/L (ref 3.5–5.5)
SODIUM BLD-SCNC: 136 MMOL/L (ref 132–146)
TOTAL PROTEIN: 7.2 G/DL (ref 6.4–8.3)
WBC # BLD: 5.3 E9/L (ref 4.5–11.5)

## 2020-10-12 PROCEDURE — 1200000000 HC SEMI PRIVATE

## 2020-10-12 PROCEDURE — 78278 ACUTE GI BLOOD LOSS IMAGING: CPT

## 2020-10-12 PROCEDURE — 83735 ASSAY OF MAGNESIUM: CPT

## 2020-10-12 PROCEDURE — 6370000000 HC RX 637 (ALT 250 FOR IP): Performed by: INTERNAL MEDICINE

## 2020-10-12 PROCEDURE — 2580000003 HC RX 258: Performed by: INTERNAL MEDICINE

## 2020-10-12 PROCEDURE — 85025 COMPLETE CBC W/AUTO DIFF WBC: CPT

## 2020-10-12 PROCEDURE — 36415 COLL VENOUS BLD VENIPUNCTURE: CPT

## 2020-10-12 PROCEDURE — A9560 TC99M LABELED RBC: HCPCS | Performed by: RADIOLOGY

## 2020-10-12 PROCEDURE — 99232 SBSQ HOSP IP/OBS MODERATE 35: CPT | Performed by: STUDENT IN AN ORGANIZED HEALTH CARE EDUCATION/TRAINING PROGRAM

## 2020-10-12 PROCEDURE — 84100 ASSAY OF PHOSPHORUS: CPT

## 2020-10-12 PROCEDURE — 6370000000 HC RX 637 (ALT 250 FOR IP): Performed by: CLINICAL NURSE SPECIALIST

## 2020-10-12 PROCEDURE — 92526 ORAL FUNCTION THERAPY: CPT | Performed by: SPEECH-LANGUAGE PATHOLOGIST

## 2020-10-12 PROCEDURE — 6360000002 HC RX W HCPCS: Performed by: STUDENT IN AN ORGANIZED HEALTH CARE EDUCATION/TRAINING PROGRAM

## 2020-10-12 PROCEDURE — 92611 MOTION FLUOROSCOPY/SWALLOW: CPT | Performed by: SPEECH-LANGUAGE PATHOLOGIST

## 2020-10-12 PROCEDURE — 6360000002 HC RX W HCPCS: Performed by: CLINICAL NURSE SPECIALIST

## 2020-10-12 PROCEDURE — 80053 COMPREHEN METABOLIC PANEL: CPT

## 2020-10-12 PROCEDURE — C9113 INJ PANTOPRAZOLE SODIUM, VIA: HCPCS | Performed by: CLINICAL NURSE SPECIALIST

## 2020-10-12 PROCEDURE — 3430000000 HC RX DIAGNOSTIC RADIOPHARMACEUTICAL: Performed by: RADIOLOGY

## 2020-10-12 PROCEDURE — 2500000003 HC RX 250 WO HCPCS: Performed by: STUDENT IN AN ORGANIZED HEALTH CARE EDUCATION/TRAINING PROGRAM

## 2020-10-12 PROCEDURE — 97161 PT EVAL LOW COMPLEX 20 MIN: CPT

## 2020-10-12 PROCEDURE — 74230 X-RAY XM SWLNG FUNCJ C+: CPT

## 2020-10-12 PROCEDURE — 2580000003 HC RX 258: Performed by: CLINICAL NURSE SPECIALIST

## 2020-10-12 PROCEDURE — 85610 PROTHROMBIN TIME: CPT

## 2020-10-12 RX ORDER — MAGNESIUM SULFATE IN WATER 40 MG/ML
2 INJECTION, SOLUTION INTRAVENOUS ONCE
Status: DISCONTINUED | OUTPATIENT
Start: 2020-10-12 | End: 2020-10-12 | Stop reason: SDUPTHER

## 2020-10-12 RX ORDER — FLUCONAZOLE 100 MG/1
100 TABLET ORAL DAILY
Status: DISCONTINUED | OUTPATIENT
Start: 2020-10-12 | End: 2020-10-16 | Stop reason: HOSPADM

## 2020-10-12 RX ORDER — MAGNESIUM SULFATE IN WATER 40 MG/ML
2 INJECTION, SOLUTION INTRAVENOUS ONCE
Status: COMPLETED | OUTPATIENT
Start: 2020-10-12 | End: 2020-10-12

## 2020-10-12 RX ADMIN — CHOLESTYRAMINE 4 G: 4 POWDER, FOR SUSPENSION ORAL at 20:46

## 2020-10-12 RX ADMIN — Medication 20 MILLICURIE: at 10:00

## 2020-10-12 RX ADMIN — Medication 250 MG: at 18:10

## 2020-10-12 RX ADMIN — Medication 10 ML: at 20:47

## 2020-10-12 RX ADMIN — MICONAZOLE NITRATE: 2 OINTMENT TOPICAL at 20:46

## 2020-10-12 RX ADMIN — BRIMONIDINE TARTRATE 1 DROP: 2 SOLUTION OPHTHALMIC at 20:46

## 2020-10-12 RX ADMIN — SODIUM CHLORIDE, PRESERVATIVE FREE 10 ML: 5 INJECTION INTRAVENOUS at 20:47

## 2020-10-12 RX ADMIN — MAGNESIUM SULFATE 2 G: 2 INJECTION INTRAVENOUS at 16:14

## 2020-10-12 RX ADMIN — TIMOLOL MALEATE 1 DROP: 5 SOLUTION OPHTHALMIC at 20:46

## 2020-10-12 RX ADMIN — LATANOPROST 1 DROP: 50 SOLUTION OPHTHALMIC at 20:46

## 2020-10-12 RX ADMIN — RISPERIDONE 1 MG: 1 TABLET ORAL at 20:45

## 2020-10-12 RX ADMIN — BACLOFEN 10 MG: 10 TABLET ORAL at 20:46

## 2020-10-12 RX ADMIN — BARIUM SULFATE 120 ML: 400 SUSPENSION ORAL at 12:32

## 2020-10-12 RX ADMIN — BARIUM SULFATE 10 ML: 400 PASTE ORAL at 12:32

## 2020-10-12 RX ADMIN — Medication 250 MG: at 05:33

## 2020-10-12 RX ADMIN — ACETAMINOPHEN 650 MG: 325 TABLET ORAL at 20:45

## 2020-10-12 RX ADMIN — PANTOPRAZOLE SODIUM 40 MG: 40 INJECTION, POWDER, FOR SOLUTION INTRAVENOUS at 20:46

## 2020-10-12 ASSESSMENT — PAIN DESCRIPTION - FREQUENCY: FREQUENCY: INTERMITTENT

## 2020-10-12 ASSESSMENT — PAIN SCALES - GENERAL
PAINLEVEL_OUTOF10: 0
PAINLEVEL_OUTOF10: 4
PAINLEVEL_OUTOF10: 0

## 2020-10-12 ASSESSMENT — PAIN DESCRIPTION - PAIN TYPE: TYPE: ACUTE PAIN

## 2020-10-12 ASSESSMENT — PAIN SCALES - WONG BAKER
WONGBAKER_NUMERICALRESPONSE: 4
WONGBAKER_NUMERICALRESPONSE: 0

## 2020-10-12 ASSESSMENT — PAIN DESCRIPTION - LOCATION: LOCATION: HEAD

## 2020-10-12 ASSESSMENT — PAIN DESCRIPTION - ORIENTATION: ORIENTATION: RIGHT;LEFT

## 2020-10-12 ASSESSMENT — PAIN - FUNCTIONAL ASSESSMENT: PAIN_FUNCTIONAL_ASSESSMENT: PREVENTS OR INTERFERES SOME ACTIVE ACTIVITIES AND ADLS

## 2020-10-12 ASSESSMENT — PAIN DESCRIPTION - ONSET: ONSET: ON-GOING

## 2020-10-12 ASSESSMENT — PAIN DESCRIPTION - DESCRIPTORS: DESCRIPTORS: DISCOMFORT;HEADACHE

## 2020-10-12 ASSESSMENT — PAIN DESCRIPTION - PROGRESSION: CLINICAL_PROGRESSION: GRADUALLY WORSENING

## 2020-10-12 NOTE — PROGRESS NOTES
cyanosis, no clubbing and + edema  Neurologic: no cranial nerve deficit and speech normal        Recent Labs     10/10/20  0254 10/11/20  0400 10/12/20  0433    131* 136   K 4.6 4.1 4.6    106 109*   CO2 19* 18* 18*   BUN 12 14 13   CREATININE 0.9 0.8 0.7   GLUCOSE 75 90 80   CALCIUM 8.9 8.4* 8.8       Recent Labs     10/10/20  0254 10/11/20  0400 10/12/20  0610   WBC 6.8 6.6 5.3   RBC 3.05* 2.64* 2.91*   HGB 9.2* 7.9* 8.7*   HCT 29.2* 25.0* 27.9*   MCV 95.7 94.7 95.9   MCH 30.2 29.9 29.9   MCHC 31.5* 31.6* 31.2*   RDW 16.5* 15.9* 15.7*    130 129*   MPV 10.6 10.7 10.9       Micro:  No components found for: [facility])    Radiology:   Fl Modified Barium Swallow W Video    Result Date: 10/10/2020  EXAMINATION: MODIFIED BARIUM SWALLOW WAS PERFORMED IN CONJUNCTION WITH SPEECH PATHOLOGY SERVICES TECHNIQUE: Fluoroscopic evaluation of the swallowing mechanism was performed with multiple consistency of barium product. FLUOROSCOPY DOSE AND TYPE OR TIME AND EXPOSURES: Total fluoroscopy time was 2.2 minutes. No exposures were obtained. COMPARISON: None HISTORY: Dysphagia. FINDINGS: Oral phase of swallowing was grossly within normal limits. Deep laryngeal penetration was seen with nectar thick liquid. No laryngeal penetration was seen with honey thick liquid and pudding. No aspiration was seen. Mild vallecular residue noted with thicker consistencies. Deep laryngeal penetration with nectar thick liquids. No aspiration seen. Please see separate speech pathology report for full discussion of findings and recommendations. Assessment:    Principal Problem:    GI bleeding  Active Problems:    History of cirrhosis    Severe protein-calorie malnutrition (HCC)    Acquired hypothyroidism    B12 deficiency anemia    C. difficile colitis    Acute post-hemorrhagic anemia    Chronic alcohol use    Acidosis    Alcoholic cirrhosis ([address]    Gastritis  Resolved Problems:    * No resolved hospital problems. *      Plan:  1. Macrocytic Hypochromic Anemia- Acute blood loss anemia likely d/t GI bleed-FOBT positive 10/9/2020 EGD Schatzki's ring dilated with 52-French Fabio Hitchcock.  No varices seen. Stomach showed gastritis, biopsy negative for H. pylori infection.  Duodenum biopsy pending to rule out sprue. Bleeding scan today pending results. PPI. GI on board. On PPI,   2. Recent C. difficile colitis - completed treatment - Colonoscopy as outpatient once C. difficile colitis resolves. 3. Hx DVT- continue SCDs anticoagulation with stabilizing hemoglobin. 4. HTN- currently well controlled. Continue current regimen. Follow and adjust as needed. 5. Hypothyroidism- continue Levothyroxine  6. EtOH- continue to  cessation. 7. Alcoholic Cirrhosis- meld 3.35 with elevated CEA    8. Gerd    9. Dysphagia likely d/t Schatzki ring    11. Hyperkalemia   12. Acidosis   13. Hyponatremia   14. Hyperlipidemia    15. Depression    16. COPD    DVT- none  Diet- dysphagia diet- Pureed solids with  honey consistency (moderately thick) liquids per speech therapy, NPO for bleeding scan today. Code- Full    NOTE: This report was transcribed using voice recognition software. Every effort was made to ensure accuracy; however, inadvertent computerized transcription errors may be present.   Electronically signed by Rashad Conte MD on 10/12/2020 at 7:51 AM

## 2020-10-12 NOTE — PROGRESS NOTES
Occupational Therapy  Patient treatment attempted this AM.  Patient off the unit for procedures. Charge nurse reports pt will be off the unit most of the day. Will attempt at a later time.   Dominga CORDOBA/JOSESITO [id number]

## 2020-10-12 NOTE — CARE COORDINATION
Pt for repeat bleeding scan today;unable to do scope d/t bleeding;hgb 8.7; plan is for return to [facility] when medically cleared. COVID neg 10/9.will continue to follow work up; Ty Engel.

## 2020-10-12 NOTE — PROGRESS NOTES
SPEECH/LANGUAGE PATHOLOGY  VIDEOFLUOROSCOPIC STUDY OF SWALLOWING (MBS)      PATIENT NAME:  Freda Session      :  1950      TODAY'S DATE:  10/12/2020   ROOM:  2899/5353-D    SUMMARY OF EVALUATION     DYSPHAGIA DIAGNOSIS:  Moderate oropharyngeal dysphagia-minimal improvement noted when compared to previous video swallow eval. Pt continues to be at high risk of aspiration of thin liquids due to silent deep penetration to the level of the vocal cords. DIET RECOMMENDATIONS:  Dysphagia 1, Pureed solids with nectar consistency (mildly thick) liquids as tolerated     FEEDING RECOMMENDATIONS:       Assistance level:  Full assistance is needed during all oral intake      Compensatory strategies recommended: Small bites/sips    THERAPY RECOMMENDATIONS:      Dysphagia therapy is recommended 3-5 times per week for LOS or when goals are met. Pt will complete oral motor strength/ coordination exercises to improve bolus prep/ control and mastication with  minimal verbal prompts .   Pt will complete laryngeal strength/ ROM therapeutic exercises to improve airway protection for the least restrictive PO diet with  minimal verbal prompts   Patient will participate in DPNS to address functional deficits identified during swallow evaluation  Ongoing meal endurance analysis to provide diet modification and compensatory strategy implementation to minimize risk of aspiration associated with PO intake                  PROCEDURE     Consistencies Administered During the Evaluation   Liquids: thin liquid, nectar thick liquid and honey thick liquid   Solids:  pureed foods and solid foods-attempted     Method of Intake:   cup, straw, spoon  Self fed, Fed by clinician      Position:   Seated, upright, Lateral plane                       RESULTS     ORAL STAGE       Decreased mastication due to:  poor/missing dentition  and Oral residuals were noted :  throughout the oral cavity        PHARYNGEAL STAGE           ONSET TIME dysphagia therapy 15 mins        [x]The admitting diagnosis and active problem list, as listed below have been reviewed prior to initiation of this evaluation.      ADMITTING DIAGNOSIS: GI bleeding [K92.2]  GI bleeding [K92.2]     ACTIVE PROBLEM LIST:   Patient Active Problem List   Diagnosis    Glaucoma    Macular degeneration    Depression    Macrocytic anemia    History of cirrhosis    PVD (peripheral vascular disease) with claudication (HCC)    Severe protein-calorie malnutrition ([address]    Mixed hyperlipidemia    Acquired hypothyroidism    Essential hypertension    Weakness    Leukocytosis    DIVINA [facility] kidney [facility]) [facility]    Elevated alkaline phosphatase level    B12 deficiency anemia    Septicemia (HCC)    C. difficile colitis    Acute metabolic encephalopathy    GI bleeding    Acute post-hemorrhagic anemia    Chronic alcohol use    Acidosis    Alcoholic cirrhosis (HCC)    Gastritis

## 2020-10-12 NOTE — PLAN OF CARE
Problem: Falls - Risk of:  Goal: Will remain free from falls  Description: Will remain free from falls  10/12/2020 0125 by Teresita Collins RN  Outcome: Met This Shift  10/11/2020 1707 by Natasha Shelby RN  Outcome: Met This Shift

## 2020-10-12 NOTE — PROGRESS NOTES
Speech Language Pathology      NAME:  Penny Childers  :  1950  DATE: 10/12/2020  ROOM:  9665/8247-T       New order for repeat swallow eval due to improved medical status and pt complaining of thickened liquids. Chart reviewed including MBS completed on 10/10/20 which showed silent deep penetration and pharyngeal residue which would be difficult to assess bedside. Recommend repeat video swallow eval for possible diet upgrade. GI bleeding [K92.2]  GI bleeding [K92.2]        Ileana GARCIA CCC/SLP [id number]  Speech-Language Pathologist

## 2020-10-12 NOTE — ADT AUTH CERT
Utilization Reviews         Gastrointestinal Bleeding, Upper - Care Day 4 (10/11/2020) by Rodri Waters RN         Review Status  Review Entered    Completed  10/12/2020 15:00        Criteria Review       Care Day: 4 Care Date: 10/11/2020 Level of Care:    Guideline Day 3    Clinical Status    (X) * Hemodynamic stability    (X) * Stable Hgb/Hct    10/12/2020 3:00 PM EDT by Yunior Guevara      Hemoglobin 7.9 g/dL     Hematocrit 25.0 %    (X) * Bleeding absent    (X) * Surgical and other acute interventions not needed    (X) * Pain absent or managed    ( ) * Discharge plans and education understood    Activity    (X) * Ambulatory or acceptable for next level of care    Routes    (X) * Oral hydration, medications, and diet    10/12/2020 3:00 PM EDT by uYnior Guevara      dysphagia diet    Interventions    (X) Hgb/Hct    Medications    (X) Proton pump inhibitor    10/12/2020 3:00 PM EDT by Yunior Guevara      protonix iv    * Milestone    Additional Notes    10/11     99.4 (37.4)   18   82   108/66   -   Semi fowlers   -   -   98   None (Room air)         Scheduled Meds:    cholestyramine, 1 packet, Oral, BID    pantoprazole, 40 mg, Intravenous, BID      And    sodium chloride (PF), 10 mL, Intravenous, BID    vancomycin, 250 mg, Oral, 4 times per day    miconazole nitrate, , Topical, BID    sodium chloride flush, 10 mL, Intravenous, 2 times per day    baclofen, 10 mg, Oral, BID    clopidogrel, 75 mg, Oral, Daily    escitalopram, 20 mg, Oral, Daily    folic acid, 1 mg, Oral, Daily    latanoprost, 1 drop, Both Eyes, Nightly    levothyroxine, 25 mcg, Oral, QAM    potassium chloride, 20 mEq, Oral, Daily    risperiDONE, 1 mg, Oral, BID    atorvastatin, 10 mg, Oral, Daily    brimonidine, 1 drop, Both Eyes, BID      And    timolol, 1 drop, Both Eyes, BID       PRN Meds given: tylenol 650mg po x1       CBC Auto Differential [[id number]] (Abnormal) Collected: 10/11/20 0400      Specimen: Blood Updated: 10/11/20 0437      WBC 6.6 E9/L      RBC 2.64 E12/L      Hemoglobin 7.9 g/dL      Hematocrit 25.0 %      MCV 94.7 fL      MCH 29.9 pg      MCHC 31.6 %      RDW 15.9 fL      Platelets 169 U2/V      MPV 10.7 fL      Neutrophils % 67.6 %      Immature Granulocytes % 0.8 %      Lymphocytes % 22.0 %      Monocytes % 6.4 %      Eosinophils % 3.0 %      Basophils % 0.2 %      Neutrophils Absolute 4.46 E9/L      Immature Granulocytes # 0.05 E9/L      Lymphocytes Absolute 1.45 E9/L      Monocytes Absolute 0.42 E9/L      Eosinophils Absolute 0.20 E9/L      Basophils Absolute 0.01 E9/L       PROTIME-INR [[id number]] (Abnormal) Collected: 10/11/20 0400      Specimen: Blood Updated: 10/11/20 0520      Protime 15.2 sec      INR 1.3      Comprehensive metabolic panel [[id number]] (Abnormal) Collected: 10/11/20 0400      Specimen: Blood Updated: 10/11/20 0504      Sodium 131 mmol/L      Potassium 4.1 mmol/L      Chloride 106 mmol/L      CO2 18 mmol/L      Anion Gap 7 mmol/L      Glucose 90 mg/dL      BUN 14 mg/dL      CREATININE 0.8 mg/dL      GFR Non-African American >60 mL/min/1.73      GFR African American >60      Calcium 8.4 mg/dL      Total Protein 6.6 g/dL      Alb 2.0 g/dL      Total Bilirubin 0.5 mg/dL      Alkaline Phosphatase 78 U/L      ALT 6 U/L      AST 13 U/L       ID note    ASSESSMENT:         · Severe C. difficile colitis    · Acute blood loss secondary to GI bleed    · Schatzki's ring status post dilatation    · Alcoholic cirrhosis    · Severe protein calorie malnutrition         PLAN:         · Continue vancomycin 250 milligrams 4 times a day 14 days-reconciled    · After 14 days taper vancomycin slowly over 3 weeks    · Taper written in discharge instructions    · ID will sign off, please call if needed.     · Continue nutritional support         IM note    Assessment:    Principal Problem:      GI bleeding    Active Problems:      History of cirrhosis      Severe protein-calorie malnutrition ([facility] [address]      Acquired hypothyroidism   B12 deficiency anemia      C. difficile colitis      Acute post-hemorrhagic anemia      Chronic alcohol use    Resolved Problems:      * No resolved hospital problems. *              Plan:    1. Acute blood loss anemia- Hgb/Hct 7.9/25.0.  Down from yesterday Hgb/Hct 9.2/29.2. 10/9/2020 EGD Schatzki's ring dilated with 52-French Parul Hay.  No varices seen. Stomach showed gastritis, biopsy negative for H. pylori infection.  Duodenum biopsy pending to rule out sprue.  Bleeding scan today pending results.  PPI. Anette Pacini input appreciated. 2. C. difficile colitis- continue oral vancomycin for 14 days will taper over next 3 weeks per ID. Colonoscopy as outpatient once C. difficile colitis resolves. 3. Hx DVT- continue SCDs anticoagulation with stabilizing hemoglobin. 4. HTN- currently well controlled.  Continue current regimen.  Follow and adjust as needed. 5. Hypothyroidism- continue Levothyroxine    6. EtOH- continue to  cessation.         GI note    *known to dr Lamar Dewey, ?etoh cirrhosis/pancytopenia,  new drop hgb- gib, recently treated cdif       Plan:         ? Bleeding scan STAT, 1.5 gm drop in hgb; neg EGD    ? Dysphagia 1, Pureed solids with  honey consistency (moderately thick) liquids per speech therapy    ? Monitor CBC, CMP, INR daily    ? Protonix IV as ordered    ? Defer comorbidities to others    ? Vancomycin per ID    ? Will need colonoscopy once C-diff colitis resolved, to be done outpatient     ?  Continue to follow                Gastrointestinal Bleeding, Upper - Care Day 3 (10/10/2020) by Zaid Stiles RN         Review Status  Review Entered    Completed  10/12/2020 14:52        Criteria Review       Care Day: 3 Care Date: 10/10/2020 Level of Care:    Guideline Day 2    Level Of Care    (X) Floor    Clinical Status    (X) * Hypotension absent    (X) * Bleeding absent or reduced    Activity    (X) Activity as tolerated    10/12/2020 2:52 PM EDT by Dino alvarado with assist Routes    (X) * Oral hydration and diet tolerated    10/12/2020 2:52 PM EDT by Jose David Storm      NPO and dysphagia diet    Interventions    (X) * NG tube absent    (X) Hgb/Hct    10/12/2020 2:52 PM EDT by Jose David Storm      Hemoglobin 9.2 g/dL     Hematocrit 29.2 %    Medications    (X) Proton pump inhibitor    10/12/2020 2:52 PM EDT by Jose David Storm      protonix iv    * Milestone    Additional Notes    10/10    (37.6)   18   88   113/52Abnormal   -   -   -   -   98   None (Room air)         Scheduled Meds:    cholestyramine, 1 packet, Oral, BID    pantoprazole, 40 mg, Intravenous, BID      And    sodium chloride (PF), 10 mL, Intravenous, BID    vancomycin, 250 mg, Oral, 4 times per day    miconazole nitrate, , Topical, BID    sodium chloride flush, 10 mL, Intravenous, 2 times per day    baclofen, 10 mg, Oral, BID    clopidogrel, 75 mg, Oral, Daily    escitalopram, 20 mg, Oral, Daily    folic acid, 1 mg, Oral, Daily    latanoprost, 1 drop, Both Eyes, Nightly    levothyroxine, 25 mcg, Oral, QAM    potassium chloride, 20 mEq, Oral, Daily    risperiDONE, 1 mg, Oral, BID    atorvastatin, 10 mg, Oral, Daily    brimonidine, 1 drop, Both Eyes, BID      And    timolol, 1 drop, Both Eyes, BID       CBC Auto Differential [[id number]] (Abnormal) Collected: 10/10/20 0254      Specimen: Blood Updated: 10/10/20 0307      WBC 6.8 E9/L      RBC 3.05 E12/L      Hemoglobin 9.2 g/dL      Hematocrit 29.2 %      MCV 95.7 fL      MCH 30.2 pg      MCHC 31.5 %      RDW 16.5 fL      Platelets 136 Y9/Q      MPV 10.6 fL      Neutrophils % 62.9 %      Immature Granulocytes % 0.4 %      Lymphocytes % 27.5 %      Monocytes % 6.2 %      Eosinophils % 2.9 %      Basophils % 0.1 %      Neutrophils Absolute 4.27 E9/L      Immature Granulocytes # 0.03 E9/L      Lymphocytes Absolute 1.87 E9/L      Monocytes Absolute 0.42 E9/L      Eosinophils Absolute 0.20 E9/L      Basophils Absolute 0.01 E9/L       Comprehensive metabolic panel [[id number]] with 52-[place] Faby Martin.  No varices seen. Stomach showed gastritis, biopsy pending to rule out H. pylori infection.  Duodenum biopsy pending to rule out sprue.         Plan:         ? CT abdomen/pelvis results reviewed    ? Low residue diet as tolerated if swallow study allows    ? Swallow study    ? Monitor CBC, CMP, INR daily    ? Protonix IV as ordered    ? Defer comorbidities to others    ? Vancomycin per ID    ? Will need colonoscopy once C-diff colitis resolved, to be done outpatient    ? Continue to follow         IM note    Assessment:         Principal Problem:      GI bleeding    Active Problems:      History of cirrhosis      Severe protein-calorie malnutrition ([facility] [address]      Acquired hypothyroidism      B12 deficiency anemia      C. difficile colitis      Acute post-hemorrhagic anemia      Chronic alcohol use    Resolved Problems:      * No resolved hospital problems. *              Plan:    1. C. Diff colitis  Continue po vanc per ID    2. Cirrhosis due to alcohol [address] following    3. Gerd  S/p egd   ppi gi following    4. schatzki ring s/p dilation  Monitor    5. Gastritis s/p egd  ppi  Gi following    6. anemia with possible acute blood loss component  Possibly due to gi bleed.  Pt s/p EGD. [address] following  monitor hgb and transfuse prn    7. Hyperkalemia improved    8. Acidosis monitor    9. Hypothyroidism continue med    10. Hyperlipidemia continue med    11. Depression continue med    15. Dysphagia s/p speech evaluation  Diet per recommendations.  Monitor pt closely

## 2020-10-12 NOTE — PROGRESS NOTES
Physical Therapy    Facility/Department: [address] MED SURG/TELE  Initial Assessment    NAME: Rossana Jhaveri  : 1950  MRN: [id number]    Date of Service: 10/12/2020      Attending Provider:  Ranjeet St MD    Evaluating PT:  Savanna Loyd. Shantel Lawson, P.T. Room #:  1521/6516-T  Diagnosis:  GI bleed  Pertinent PMHx/PSHx:  ETOH abuse, malnutrition  Precautions:  FALLS, bed/chair alarm    SUBJECTIVE:    Pt came in from SNF. She reports staff uses sanna lift for transfers or assists her with transfers. She reports she has PT, but is not working on standing up for fear of falls. She has not walked for a couple months. OBJECTIVE:   Initial Evaluation  Date: 10/12/20 Treatment Short Term/ Long Term   Goals   Was pt agreeable to Eval/treatment? yes     Does pt have pain? Stomach and R hand pain     Bed Mobility  Rolling: MOD A  Supine to sit: MOD A  Sit to supine: MAX A  Scooting: MAX A  MIN A   Transfers Sit to stand: NA  Stand to sit: NA  Stand pivot: NA  MIN A   Ambulation   NA  25 feet with ww MIN A   Stair negotiation: ascended and descended NA  NA   AM-PAC 6 Clicks 88/56       BLE ROM is WFL. BLE strength is grossly 3-/5. Sensation:  Pt denies numbness and tingling to extremities  Edema:  None noted  Balance: sitting is MIN A  Endurance: fair-    Patient education  Pt educated on bed mobility. Patient response to education:   Pt verbalized understanding Pt demonstrated skill Pt requires further education in this area   yes Yes with VCs and assist yes     ASSESSMENT:    Comments:  Pt was found supine in bed and agreeable to PT. She sat EOB x 3 min and c/o fatigue and requested to lie back down. Pt has not stood or walked for about 2 months per her report. She has  strength and endurance limiting functional mobility. Pt was left supine in bed L 1/4 turn with wedges as found with call light left by patient. Chair/bed alarm: bed alarm was re-activated. Pt's/ family goals   1. To get stronger. Patient and or family understand(s) diagnosis, prognosis, and plan of care. PLAN OF CARE:    Current Treatment Recommendations      [x] Strengthening     [] ROM   [x] Balance Training   [x] Endurance Training   [x] Transfer Training   [x] Gait Training   [] Stair Training   [] Positioning   [x] Safety and Education Training   [x] Patient/Caregiver Education   [] HEP  [] Other     PT care will be provided in accordance with the objectives noted above. Exercises and functional mobility practice will be used as well as appropriate assistive devices or modalities to obtain goals. Patient and family education will also be administered as needed. Frequency of treatments: 2-5x/week x 1-2 weeks. Time in  08:50  Time out  09:05    Evaluation Time includes thorough review of current medical information, gathering information on past medical history/social history and prior level of function, completion of standardized testing/informal observation of tasks, assessment of data and education on plan of care and goals. CPT codes:  [x] Low Complexity PT evaluation 53500  [] Moderate Complexity PT evaluation 44572  [] High Complexity PT evaluation 57771  [] PT Re-evaluation 50371  [] Gait training 78439 ** minutes  [] Manual therapy 91333 ** minutes  [] Therapeutic activities 27313 ** minutes  [] Therapeutic exercises 62504 ** minutes  [] Neuromuscular reeducation 56096 ** minutes     Randy Castro, P.T.   License Number: [id number]

## 2020-10-12 NOTE — ANESTHESIA POSTPROCEDURE EVALUATION
Department of Anesthesiology  Postprocedure Note    Patient: Vicky Shoren  MRN: [id number]  YOB: 1950  Date of evaluation: 10/12/2020  Time:  9:17 AM     Procedure Summary     Date:  10/09/20 Room / Location:  [facility] 01 / [facility]    Anesthesia Start:  1335 Anesthesia Stop:  0370    Procedure:  EGD ESOPHAGOGASTRODUODENOSCOPY DILATATION (N/A ) Diagnosis:  (-)    Surgeon:  Mark Francis MD Responsible Provider:  Fernando Figueroa DO    Anesthesia Type:  general, MAC ASA Status:  3          Anesthesia Type: general, MAC    Grant Phase I:      Grant Phase II: Grant Score: 10    Last vitals: Reviewed and per EMR flowsheets.        Anesthesia Post Evaluation    Patient location during evaluation: PACU  Patient participation: complete - patient participated  Level of consciousness: awake and alert  Airway patency: patent  Nausea & Vomiting: no nausea and no vomiting  Complications: no  Cardiovascular status: hemodynamically stable  Respiratory status: acceptable  Hydration status: euvolemic

## 2020-10-13 LAB
ALBUMIN SERPL-MCNC: 2.2 G/DL (ref 3.5–5.2)
ALP BLD-CCNC: 88 U/L (ref 35–104)
ALT SERPL-CCNC: 6 U/L (ref 0–32)
ANION GAP SERPL CALCULATED.3IONS-SCNC: 4 MMOL/L (ref 7–16)
AST SERPL-CCNC: 13 U/L (ref 0–31)
BASOPHILS ABSOLUTE: 0 E9/L (ref 0–0.2)
BASOPHILS RELATIVE PERCENT: 0 % (ref 0–2)
BILIRUB SERPL-MCNC: 0.4 MG/DL (ref 0–1.2)
BUN BLDV-MCNC: 11 MG/DL (ref 8–23)
CALCIUM SERPL-MCNC: 8.9 MG/DL (ref 8.6–10.2)
CHLORIDE BLD-SCNC: 110 MMOL/L (ref 98–107)
CO2: 22 MMOL/L (ref 22–29)
CREAT SERPL-MCNC: 0.8 MG/DL (ref 0.5–1)
EOSINOPHILS ABSOLUTE: 0.18 E9/L (ref 0.05–0.5)
EOSINOPHILS RELATIVE PERCENT: 3.5 % (ref 0–6)
GFR AFRICAN AMERICAN: >60
GFR NON-AFRICAN AMERICAN: >60 ML/MIN/1.73
GLUCOSE BLD-MCNC: 88 MG/DL (ref 74–99)
HCT VFR BLD CALC: 28.7 % (ref 34–48)
HEMOGLOBIN: 8.8 G/DL (ref 11.5–15.5)
IMMATURE GRANULOCYTES #: 0.01 E9/L
IMMATURE GRANULOCYTES %: 0.2 % (ref 0–5)
INR BLD: 1.2
LYMPHOCYTES ABSOLUTE: 1.48 E9/L (ref 1.5–4)
LYMPHOCYTES RELATIVE PERCENT: 28.5 % (ref 20–42)
MCH RBC QN AUTO: 29.7 PG (ref 26–35)
MCHC RBC AUTO-ENTMCNC: 30.7 % (ref 32–34.5)
MCV RBC AUTO: 97 FL (ref 80–99.9)
MONOCYTES ABSOLUTE: 0.3 E9/L (ref 0.1–0.95)
MONOCYTES RELATIVE PERCENT: 5.8 % (ref 2–12)
NEUTROPHILS ABSOLUTE: 3.22 E9/L (ref 1.8–7.3)
NEUTROPHILS RELATIVE PERCENT: 62 % (ref 43–80)
PDW BLD-RTO: 15.6 FL (ref 11.5–15)
PLATELET # BLD: 126 E9/L (ref 130–450)
PMV BLD AUTO: 10.3 FL (ref 7–12)
POTASSIUM SERPL-SCNC: 4.6 MMOL/L (ref 3.5–5)
PROTHROMBIN TIME: 14.7 SEC (ref 9.3–12.4)
RBC # BLD: 2.96 E12/L (ref 3.5–5.5)
SODIUM BLD-SCNC: 136 MMOL/L (ref 132–146)
TOTAL PROTEIN: 7.5 G/DL (ref 6.4–8.3)
WBC # BLD: 5.2 E9/L (ref 4.5–11.5)

## 2020-10-13 PROCEDURE — 1200000000 HC SEMI PRIVATE

## 2020-10-13 PROCEDURE — C9113 INJ PANTOPRAZOLE SODIUM, VIA: HCPCS | Performed by: CLINICAL NURSE SPECIALIST

## 2020-10-13 PROCEDURE — 6370000000 HC RX 637 (ALT 250 FOR IP): Performed by: INTERNAL MEDICINE

## 2020-10-13 PROCEDURE — 2580000003 HC RX 258: Performed by: INTERNAL MEDICINE

## 2020-10-13 PROCEDURE — 85610 PROTHROMBIN TIME: CPT

## 2020-10-13 PROCEDURE — 2580000003 HC RX 258: Performed by: CLINICAL NURSE SPECIALIST

## 2020-10-13 PROCEDURE — 36415 COLL VENOUS BLD VENIPUNCTURE: CPT

## 2020-10-13 PROCEDURE — 6370000000 HC RX 637 (ALT 250 FOR IP): Performed by: CLINICAL NURSE SPECIALIST

## 2020-10-13 PROCEDURE — 99239 HOSP IP/OBS DSCHRG MGMT >30: CPT | Performed by: STUDENT IN AN ORGANIZED HEALTH CARE EDUCATION/TRAINING PROGRAM

## 2020-10-13 PROCEDURE — 85025 COMPLETE CBC W/AUTO DIFF WBC: CPT

## 2020-10-13 PROCEDURE — 6360000002 HC RX W HCPCS: Performed by: CLINICAL NURSE SPECIALIST

## 2020-10-13 PROCEDURE — 80053 COMPREHEN METABOLIC PANEL: CPT

## 2020-10-13 RX ORDER — CHOLESTYRAMINE 4 G/9G
1 POWDER, FOR SUSPENSION ORAL 2 TIMES DAILY
Qty: 90 PACKET | Refills: 0 | DISCHARGE
Start: 2020-10-13

## 2020-10-13 RX ORDER — FLUCONAZOLE 100 MG/1
100 TABLET ORAL DAILY
Qty: 7 TABLET | Refills: 0 | DISCHARGE
Start: 2020-10-13 | End: 2020-10-20

## 2020-10-13 RX ADMIN — TIMOLOL MALEATE 1 DROP: 5 SOLUTION OPHTHALMIC at 10:08

## 2020-10-13 RX ADMIN — BRIMONIDINE TARTRATE 1 DROP: 2 SOLUTION OPHTHALMIC at 20:23

## 2020-10-13 RX ADMIN — POTASSIUM CHLORIDE 20 MEQ: 20 TABLET, EXTENDED RELEASE ORAL at 10:07

## 2020-10-13 RX ADMIN — Medication 250 MG: at 23:56

## 2020-10-13 RX ADMIN — MICONAZOLE NITRATE: 2 OINTMENT TOPICAL at 10:08

## 2020-10-13 RX ADMIN — ESCITALOPRAM OXALATE 20 MG: 10 TABLET ORAL at 10:06

## 2020-10-13 RX ADMIN — BRIMONIDINE TARTRATE 1 DROP: 2 SOLUTION OPHTHALMIC at 10:08

## 2020-10-13 RX ADMIN — Medication 250 MG: at 18:45

## 2020-10-13 RX ADMIN — FLUCONAZOLE 100 MG: 100 TABLET ORAL at 10:06

## 2020-10-13 RX ADMIN — PANTOPRAZOLE SODIUM 40 MG: 40 INJECTION, POWDER, FOR SOLUTION INTRAVENOUS at 20:23

## 2020-10-13 RX ADMIN — PANTOPRAZOLE SODIUM 40 MG: 40 INJECTION, POWDER, FOR SOLUTION INTRAVENOUS at 10:07

## 2020-10-13 RX ADMIN — CHOLESTYRAMINE 4 G: 4 POWDER, FOR SUSPENSION ORAL at 10:05

## 2020-10-13 RX ADMIN — LEVOTHYROXINE SODIUM 25 MCG: 0.03 TABLET ORAL at 10:06

## 2020-10-13 RX ADMIN — SODIUM CHLORIDE, PRESERVATIVE FREE 10 ML: 5 INJECTION INTRAVENOUS at 10:07

## 2020-10-13 RX ADMIN — ATORVASTATIN CALCIUM 10 MG: 10 TABLET, FILM COATED ORAL at 10:04

## 2020-10-13 RX ADMIN — CLOPIDOGREL BISULFATE 75 MG: 75 TABLET ORAL at 10:05

## 2020-10-13 RX ADMIN — TIMOLOL MALEATE 1 DROP: 5 SOLUTION OPHTHALMIC at 20:23

## 2020-10-13 RX ADMIN — LATANOPROST 1 DROP: 50 SOLUTION OPHTHALMIC at 20:23

## 2020-10-13 RX ADMIN — Medication 250 MG: at 12:55

## 2020-10-13 RX ADMIN — Medication 250 MG: at 00:24

## 2020-10-13 RX ADMIN — Medication 10 ML: at 20:24

## 2020-10-13 RX ADMIN — BACLOFEN 10 MG: 10 TABLET ORAL at 10:05

## 2020-10-13 RX ADMIN — SODIUM CHLORIDE, PRESERVATIVE FREE 10 ML: 5 INJECTION INTRAVENOUS at 20:24

## 2020-10-13 RX ADMIN — RISPERIDONE 1 MG: 1 TABLET ORAL at 20:23

## 2020-10-13 RX ADMIN — BACLOFEN 10 MG: 10 TABLET ORAL at 20:23

## 2020-10-13 RX ADMIN — FOLIC ACID 1 MG: 1 TABLET ORAL at 10:05

## 2020-10-13 RX ADMIN — CHOLESTYRAMINE 4 G: 4 POWDER, FOR SUSPENSION ORAL at 20:23

## 2020-10-13 RX ADMIN — RISPERIDONE 1 MG: 1 TABLET ORAL at 10:06

## 2020-10-13 RX ADMIN — MICONAZOLE NITRATE: 2 OINTMENT TOPICAL at 20:23

## 2020-10-13 RX ADMIN — Medication 10 ML: at 10:07

## 2020-10-13 RX ADMIN — Medication 250 MG: at 05:32

## 2020-10-13 ASSESSMENT — PAIN SCALES - GENERAL
PAINLEVEL_OUTOF10: 0

## 2020-10-13 ASSESSMENT — PAIN SCALES - WONG BAKER: WONGBAKER_NUMERICALRESPONSE: 0

## 2020-10-13 NOTE — PLAN OF CARE
Problem: Skin Integrity:  Goal: Absence of new skin breakdown  Description: Absence of new skin breakdown  10/12/2020 1943 by Brenda Harris RN  Outcome: Met This Shift     Problem: Falls - Risk of:  Goal: Will remain free from falls  Description: Will remain free from falls  10/13/2020 0642 by Lyric Minus  Outcome: Met This Shift  10/12/2020 1943 by Brenda Harris RN  Outcome: Met This Shift  Goal: Absence of physical injury  Description: Absence of physical injury  10/13/2020 0642 by Lyric Minus  Outcome: Met This Shift  10/12/2020 1943 by Brenda Harris RN  Outcome: Met This Shift

## 2020-10-13 NOTE — CARE COORDINATION
Social Work / Discharge Planning : SW noted discharge order for patient. SW left VM to [facility] from [address] on status of pre-cert. AWait response. SW to follow.  Electronically signed by RUBÉN Cobb on 10/13/20 at 10:47 AM EDT

## 2020-10-13 NOTE — DISCHARGE SUMMARY
[facility] Physician Discharge Summary       [facility] [address]  [address], [address] [place] [address]  [phone]    In 1 week        Activity level: As tolerated     Dispo: Home with self care    Condition on discharge: Stable     Patient ID:  Olga Lidia Lacy  [id number]  77 y.o.  1950    Admit date: 10/8/2020    Discharge date and time:  10/13/2020  10:06 AM    Admission Diagnoses: Principal Problem:    GI bleeding  Active Problems:    History of cirrhosis    Severe protein-calorie malnutrition ([address]    Acquired hypothyroidism    B12 deficiency anemia    C. difficile colitis    Acute post-hemorrhagic anemia    Chronic alcohol use    Acidosis    Alcoholic cirrhosis ([facility] [address]    Gastritis  Resolved Problems:    * No resolved hospital problems. *      Discharge Diagnoses: Principal Problem:    GI bleeding  Active Problems:    History of cirrhosis    Severe protein-calorie malnutrition (HCC)    Acquired hypothyroidism    B12 deficiency anemia    C. difficile colitis    Acute post-hemorrhagic anemia    Chronic alcohol use    Acidosis    Alcoholic cirrhosis (HCC)    Gastritis  Resolved Problems:    * No resolved hospital problems. *      Consults:  IP CONSULT TO GI  IP CONSULT TO SOCIAL WORK  IP CONSULT TO INFECTIOUS DISEASES  IP CONSULT TO IV TEAM    Procedures: as per hosp course    Hospital Course:   Patient Olga Lidia Lacy is a 77 y.o. presented with GI bleeding [K92.2]  GI bleeding [K92.2]  Patient was admitted with low hb was managed for Macrocytic Hypochromic Anemia- Acute blood loss anemia likely d/t GI bleed-FOBT positive, 10/9/2020 EGD - Schatzki's ring dilated with 52-[place] Maryann Bell.  No varices seen. Stomach showed gastritis, biopsy negative for H. pylori infection.  Duodenum biopsy pending to rule out sprue.  Bleeding scan 10/12- neg for bleed. Was on PPI. Evita Hall on board.   For her recent C. difficile colitis - on po vancomycin per ID - Colonoscopy as outpatient once C. difficile colitis resolves. For her Dysphagia likely d/t Schatzki ring needs to be on dysphagia diet- Pureed solids with  honey consistency (moderately thick) liquids per speech therapy. Discharge Exam:    General Appearance: alert and oriented to person, place and time and in no acute distress  Skin: warm and dry  Head: normocephalic and atraumatic  Eyes: pupils equal, round, and reactive to light, extraocular eye movements intact, conjunctivae normal  Neck: neck supple and non tender without mass   Pulmonary/Chest: clear to auscultation bilaterally- no wheezes, rales or rhonchi, normal air movement, no respiratory distress  Cardiovascular: normal rate, normal S1 and S2 and no carotid bruits  Abdomen: soft, non-tender, non-distended, normal bowel sounds, no masses or organomegaly  Extremities: no cyanosis, no clubbing and no edema  Neurologic: no cranial nerve deficit and speech normal    I/O last 3 completed shifts: In: 600 [P.O.:600]  Out: -   No intake/output data recorded. LABS:  Recent Labs     10/11/20  0400 10/12/20  0433 10/13/20  0522   * 136 136   K 4.1 4.6 4.6    109* 110*   CO2 18* 18* 22   BUN 14 13 11   CREATININE 0.8 0.7 0.8   GLUCOSE 90 80 88   CALCIUM 8.4* 8.8 8.9       Recent Labs     10/11/20  0400 10/12/20  0610 10/13/20  0522   WBC 6.6 5.3 5.2   RBC 2.64* 2.91* 2.96*   HGB 7.9* 8.7* 8.8*   HCT 25.0* 27.9* 28.7*   MCV 94.7 95.9 97.0   MCH 29.9 29.9 29.7   MCHC 31.6* 31.2* 30.7*   RDW 15.9* 15.7* 15.6*    129* 126*   MPV 10.7 10.9 10.3       No results for input(s): POCGLU in the last 72 hours.     Imaging:  Ct Abdomen Pelvis W Iv Contrast Additional Contrast? None    Result Date: 10/9/2020  EXAMINATION: CT OF THE ABDOMEN AND PELVIS WITH CONTRAST 10/9/2020 9:40 am TECHNIQUE: CT of the abdomen and pelvis was performed with the administration of intravenous contrast. Multiplanar reformatted images are provided for review. Dose modulation, iterative reconstruction, and/or weight based adjustment of the mA/kV was utilized to reduce the radiation dose to as low as reasonably achievable. COMPARISON: September 19, 2020 HISTORY: ORDERING SYSTEM PROVIDED HISTORY: Abdominal pain and diarrhea with recent C. difficile pancolitis TECHNOLOGIST PROVIDED HISTORY: Reason for exam:->Abdominal pain and diarrhea with recent C. difficile pancolitis Additional Contrast?->None Initial exam. FINDINGS: Lower Chest:  Visualized portion of the lower chest demonstrates no acute abnormality. Organs: Liver demonstrates slight nodular contour. The spleen is prominent. The gallbladder demonstrates multiple stones. The pancreas and adrenal glands are normal.  The right kidney enhances normally. There is heterogeneous enhancement of the left kidney with loss of corticomedullary differentiation throughout the posterior aspect of the left renal mid and inferior pole extending to the upper pole as well. Findings concerning for underlying pyelonephritis or renal infarct. GI/Bowel: There is diffuse abnormal colonic wall thickening involving much of the distal transverse, left, and sigmoid colon correlating to patient's underlying clinical history of Clostridium difficile infection. No pneumatosis noted. Pelvis: The bladder and rectum are normal. Peritoneum/Retroperitoneum: Small perihepatic and perisplenic free fluid extending into the pelvis. No intraperitoneal free air. No new mesenteric or retroperitoneal lymphadenopathy. Small retroperitoneal lymph nodes are too small to further characterize. There is moderate atherosclerotic change of the aorta and major vascular structures. Bones/Soft Tissues: No suspicious lytic or blastic osseous lesion. Diffuse abnormal colonic wall thickening correlating patient's clinical history of C difficile colitis. Abnormal hypoattenuation left kidney.   Differential includes sequela of underlying pyelonephritis renal abscess versus sequela of renal infarct. Nodular contour of the liver. Correlate clinical evidence to suggest cirrhosis. Cholelithiasis. Stable indeterminate retroperitoneal lymph nodes potentially reactive in nature.        Patient Instructions:      Medication List      START taking these medications    cholestyramine 4 g packet  Commonly known as:  QUESTRAN  Take 1 packet by mouth 2 times daily     fluconazole 100 MG tablet  Commonly known as:  DIFLUCAN  Take 1 tablet by mouth daily for 7 days     vancomycin 50 mg/mL oral solution  Commonly known as:  VANCOCIN  Take 5 mLs by mouth 4 times daily for 10 days After 10 days begin taper as instructed        CONTINUE taking these medications    baclofen 10 MG tablet  Commonly known as:  LIORESAL     clopidogrel 75 MG tablet  Commonly known as:  Plavix  Take 1 tablet by mouth daily     Combigan 0.2-0.5 % ophthalmic solution  Generic drug:  brimonidine-timolol     escitalopram 20 MG tablet  Commonly known as:  LEXAPRO  Take 1 tablet by mouth daily     folic acid 1 MG tablet  Commonly known as:  FOLVITE  Take 1 tablet by mouth daily     Handicap Placard Misc     latanoprost 0.005 % ophthalmic solution  Commonly known as:  XALATAN     levothyroxine 25 MCG tablet  Commonly known as:  SYNTHROID  Take 1 tablet by mouth every morning     PRESERVISION AREDS PO     risperiDONE 1 MG tablet  Commonly known as:  RISPERDAL     simvastatin 5 MG tablet  Commonly known as:  ZOCOR  Take 1 tablet by mouth nightly        STOP taking these medications    acetaminophen 325 MG tablet  Commonly known as:  TYLENOL     potassium chloride 20 MEQ extended release tablet  Commonly known as:  KLOR-CON M           Where to Get Your Medications      Information about where to get these medications is not yet available    Ask your nurse or doctor about these medications  · cholestyramine 4 g packet  · fluconazole 100 MG tablet  · vancomycin 50 mg/mL oral solution           Note

## 2020-10-13 NOTE — PROGRESS NOTES
PROGRESS NOTE    Patient Presents with/Seen in Consultation For      *known to dr Theodore Goldsmith, ?etoh cirrhosis/pancytopenia,  new drop hgb- gib, recently treated cdif   CHIEF COMPLAINT: Anemia    Subjective:     Patient states she feels good and is going to rehab today. Patient with 1 softly formed stool last night, brown. Patient denies abdominal pain, nausea, or vomiting. States appetite is good. Review of Systems  Aside from what was mentioned in the PMH and HPI, essentially unremarkable, all others negative. Objective:     /60   Pulse 69   Temp 97.8 °F (36.6 °C) (Oral)   Resp 16   Ht 5' 8\" (1.727 m)   Wt 110 lb (49.9 kg)   SpO2 99%   BMI 16.73 kg/m²     General appearance: alert, awake, pale, cachectic, laying in bed, and cooperative  Eyes: conjunctiva pale, sclera anicteric. PERRL.   Lungs: Clear throughout to auscultation bilaterally  Heart: regular rate and rhythm, no murmur, 2+ pulses; no edema  Abdomen: soft, low abdomen tender to palpation without guarding or rebound; bowel sounds normal; no masses,  no organomegaly  Extremities: extremities without edema  Pulses: 2+ and symmetric  Skin: Skin color pale, texture, turgor normal.   Neurologic: Alert, oriented to person, place, president, and situation    fluconazole (DIFLUCAN) tablet 100 mg, Daily  barium sulfate 40 % reconsitutable suspension, ONCE PRN  cholestyramine (QUESTRAN) packet 4 g, BID  pantoprazole (PROTONIX) injection 40 mg, BID    And  sodium chloride (PF) 0.9 % injection 10 mL, BID  vancomycin (VANCOCIN) oral solution 250 mg, 4 times per day  miconazole nitrate 2 % ointment, BID  sodium chloride flush 0.9 % injection 10 mL, 2 times per day  sodium chloride flush 0.9 % injection 10 mL, PRN  acetaminophen (TYLENOL) tablet 650 mg, Q6H PRN  baclofen (LIORESAL) tablet 10 mg, BID  clopidogrel (PLAVIX) tablet 75 mg, Daily  escitalopram (LEXAPRO) tablet 20 mg, Daily  folic acid (FOLVITE) tablet 1 mg, Daily  latanoprost (XALATAN) 0.005 % ophthalmic solution 1 drop, Nightly  levothyroxine (SYNTHROID) tablet 25 mcg, QAM  potassium chloride (KLOR-CON M) extended release tablet 20 mEq, Daily  risperiDONE (RISPERDAL) tablet 1 mg, BID  atorvastatin (LIPITOR) tablet 10 mg, Daily  brimonidine (ALPHAGAN) 0.2 % ophthalmic solution 1 drop, BID    And  timolol (TIMOPTIC) 0.5 % ophthalmic solution 1 drop, BID         Data Review  CBC:   Lab Results   Component Value Date    WBC 5.2 10/13/2020    RBC 2.96 10/13/2020    HGB 8.8 10/13/2020    HCT 28.7 10/13/2020    MCV 97.0 10/13/2020    MCH 29.7 10/13/2020    MCHC 30.7 10/13/2020    RDW 15.6 10/13/2020     10/13/2020    MPV 10.3 10/13/2020     CMP:    Lab Results   Component Value Date     10/13/2020    K 4.6 10/13/2020    K 3.3 09/20/2020     10/13/2020    CO2 22 10/13/2020    BUN 11 10/13/2020    CREATININE 0.8 10/13/2020    GFRAA >60 10/13/2020    LABGLOM >60 10/13/2020    GLUCOSE 88 10/13/2020    PROT 7.5 10/13/2020    LABALBU 2.2 10/13/2020    CALCIUM 8.9 10/13/2020    BILITOT 0.4 10/13/2020    ALKPHOS 88 10/13/2020    AST 13 10/13/2020    ALT 6 10/13/2020     Hepatic Function Panel:    Lab Results   Component Value Date    ALKPHOS 88 10/13/2020    ALT 6 10/13/2020    AST 13 10/13/2020    PROT 7.5 10/13/2020    BILITOT 0.4 10/13/2020    BILIDIR 0.6 04/19/2019    IBILI 0.6 04/19/2019    LABALBU 2.2 10/13/2020     No components found for: CHLPLat  Lab Results   Component Value Date    TRIG 72 06/03/2020    TRIG 83 02/06/2020    TRIG 156 (H) 11/07/2019   e    LDLCALC 64 06/03/2020    LDLCALC 56 02/06/2020    LDLCALC 53 11/07/2019             Value Date    PROTIME 14.7 10/13/2020    PROTIME 19.5 09/05/2019    INR 1.2 10/13/2020     IRON:    Lab Results   Component Value Date    IRON 181 06/03/2020     Iron Saturation:  No components found for: PERCENTFE  FERRITIN:    Lab Results   Component Value Date    FERRITIN 1,250 04/18/2019         Assessment:     Principal Problem:    GI bleeding  Active Problems:  ? Cirrhosis of liver, alcoholic   ? Recent C. difficile colitis-completed treatment  ? Diarrhea  ? Dysphagia  ? Pancytopenia  ? Anemia, macrocytic, hyperchromic - FOBT positive  ? Alcohol abuse, in remission x1 year  ? MELD 8.47  ? Unintentional wt loss  ? Hx of DVT, on Plavix - held since [address]  ? COPD  ? EGD 1/14/2019-grade B LA classification GERD and no varices seen. Stomach showed gastritis, biopsy negative for H. Pylori infection. Duodenum biopsy negative for sprue  ? Elevated CEA @ 6.5  ? EGD 10/9/2020 - Schatzki's ring dilated with 52-[place] Juline Pump.  No varices seen. Stomach showed gastritis, biopsy negative for H. pylori infection.  Duodenum biopsy pending to rule out sprue. Plan:     ? Bleeding scan noted/negative  ? Repeat BS Swallow results noted  ? Dysphagia 1, Pureed solids with nectar consistency (mildly thick) per speech therapy  ? Continue Diflucan as ordered   ? Continue Protonix daily  ? Vancomycin per ID  ? Will need colonoscopy once C-diff colitis resolved, to be done outpatient   ? Discharge per PCP/others, okay from GI POV with outpatient follow-up. Note: This report was completed utilizing computer voice recognition software. Every effort has been made to ensure accuracy, however; inadvertent computerized transcription errors may be present.      Discussed with Dr. Supriya Buck per Dr. Driver Washington [id number], FNP-BC 10/13/2020 12:06 PM For Dr. Sarah Gil

## 2020-10-14 LAB
ALBUMIN SERPL-MCNC: 2.2 G/DL (ref 3.5–5.2)
ALP BLD-CCNC: 84 U/L (ref 35–104)
ALT SERPL-CCNC: 7 U/L (ref 0–32)
ANION GAP SERPL CALCULATED.3IONS-SCNC: 6 MMOL/L (ref 7–16)
AST SERPL-CCNC: 16 U/L (ref 0–31)
BASOPHILS ABSOLUTE: 0 E9/L (ref 0–0.2)
BASOPHILS RELATIVE PERCENT: 0 % (ref 0–2)
BILIRUB SERPL-MCNC: 0.4 MG/DL (ref 0–1.2)
BUN BLDV-MCNC: 13 MG/DL (ref 8–23)
CALCIUM SERPL-MCNC: 8.7 MG/DL (ref 8.6–10.2)
CHLORIDE BLD-SCNC: 107 MMOL/L (ref 98–107)
CO2: 21 MMOL/L (ref 22–29)
CREAT SERPL-MCNC: 0.7 MG/DL (ref 0.5–1)
EOSINOPHILS ABSOLUTE: 0.13 E9/L (ref 0.05–0.5)
EOSINOPHILS RELATIVE PERCENT: 2.8 % (ref 0–6)
GFR AFRICAN AMERICAN: >60
GFR NON-AFRICAN AMERICAN: >60 ML/MIN/1.73
GLUCOSE BLD-MCNC: 94 MG/DL (ref 74–99)
HCT VFR BLD CALC: 25.5 % (ref 34–48)
HEMOGLOBIN: 7.9 G/DL (ref 11.5–15.5)
IMMATURE GRANULOCYTES #: 0.02 E9/L
IMMATURE GRANULOCYTES %: 0.4 % (ref 0–5)
LYMPHOCYTES ABSOLUTE: 1.38 E9/L (ref 1.5–4)
LYMPHOCYTES RELATIVE PERCENT: 29.9 % (ref 20–42)
MCH RBC QN AUTO: 29.4 PG (ref 26–35)
MCHC RBC AUTO-ENTMCNC: 31 % (ref 32–34.5)
MCV RBC AUTO: 94.8 FL (ref 80–99.9)
MONOCYTES ABSOLUTE: 0.33 E9/L (ref 0.1–0.95)
MONOCYTES RELATIVE PERCENT: 7.1 % (ref 2–12)
NEUTROPHILS ABSOLUTE: 2.76 E9/L (ref 1.8–7.3)
NEUTROPHILS RELATIVE PERCENT: 59.8 % (ref 43–80)
PDW BLD-RTO: 15.5 FL (ref 11.5–15)
PLATELET # BLD: 108 E9/L (ref 130–450)
PMV BLD AUTO: 10.5 FL (ref 7–12)
POTASSIUM SERPL-SCNC: 4.5 MMOL/L (ref 3.5–5)
RBC # BLD: 2.69 E12/L (ref 3.5–5.5)
SODIUM BLD-SCNC: 134 MMOL/L (ref 132–146)
TOTAL PROTEIN: 7 G/DL (ref 6.4–8.3)
WBC # BLD: 4.6 E9/L (ref 4.5–11.5)

## 2020-10-14 PROCEDURE — 92526 ORAL FUNCTION THERAPY: CPT | Performed by: SPEECH-LANGUAGE PATHOLOGIST

## 2020-10-14 PROCEDURE — 85025 COMPLETE CBC W/AUTO DIFF WBC: CPT

## 2020-10-14 PROCEDURE — 6370000000 HC RX 637 (ALT 250 FOR IP): Performed by: CLINICAL NURSE SPECIALIST

## 2020-10-14 PROCEDURE — 6360000002 HC RX W HCPCS: Performed by: CLINICAL NURSE SPECIALIST

## 2020-10-14 PROCEDURE — 99232 SBSQ HOSP IP/OBS MODERATE 35: CPT | Performed by: INTERNAL MEDICINE

## 2020-10-14 PROCEDURE — 6370000000 HC RX 637 (ALT 250 FOR IP): Performed by: INTERNAL MEDICINE

## 2020-10-14 PROCEDURE — 97110 THERAPEUTIC EXERCISES: CPT | Performed by: SPEECH-LANGUAGE PATHOLOGIST

## 2020-10-14 PROCEDURE — 36415 COLL VENOUS BLD VENIPUNCTURE: CPT

## 2020-10-14 PROCEDURE — 2580000003 HC RX 258: Performed by: INTERNAL MEDICINE

## 2020-10-14 PROCEDURE — 80053 COMPREHEN METABOLIC PANEL: CPT

## 2020-10-14 PROCEDURE — 2580000003 HC RX 258: Performed by: CLINICAL NURSE SPECIALIST

## 2020-10-14 PROCEDURE — 97535 SELF CARE MNGMENT TRAINING: CPT

## 2020-10-14 PROCEDURE — 97530 THERAPEUTIC ACTIVITIES: CPT

## 2020-10-14 PROCEDURE — C9113 INJ PANTOPRAZOLE SODIUM, VIA: HCPCS | Performed by: CLINICAL NURSE SPECIALIST

## 2020-10-14 PROCEDURE — 1200000000 HC SEMI PRIVATE

## 2020-10-14 RX ADMIN — ACETAMINOPHEN 650 MG: 325 TABLET ORAL at 03:46

## 2020-10-14 RX ADMIN — SODIUM CHLORIDE, PRESERVATIVE FREE 10 ML: 5 INJECTION INTRAVENOUS at 21:24

## 2020-10-14 RX ADMIN — Medication 250 MG: at 05:37

## 2020-10-14 RX ADMIN — CLOPIDOGREL BISULFATE 75 MG: 75 TABLET ORAL at 09:33

## 2020-10-14 RX ADMIN — Medication 250 MG: at 18:33

## 2020-10-14 RX ADMIN — RISPERIDONE 1 MG: 1 TABLET ORAL at 21:24

## 2020-10-14 RX ADMIN — ATORVASTATIN CALCIUM 10 MG: 10 TABLET, FILM COATED ORAL at 09:33

## 2020-10-14 RX ADMIN — Medication 10 ML: at 09:45

## 2020-10-14 RX ADMIN — Medication 250 MG: at 11:49

## 2020-10-14 RX ADMIN — ESCITALOPRAM OXALATE 20 MG: 10 TABLET ORAL at 09:33

## 2020-10-14 RX ADMIN — RISPERIDONE 1 MG: 1 TABLET ORAL at 09:33

## 2020-10-14 RX ADMIN — LATANOPROST 1 DROP: 50 SOLUTION OPHTHALMIC at 21:24

## 2020-10-14 RX ADMIN — POTASSIUM CHLORIDE 20 MEQ: 20 TABLET, EXTENDED RELEASE ORAL at 09:33

## 2020-10-14 RX ADMIN — MICONAZOLE NITRATE: 2 OINTMENT TOPICAL at 21:24

## 2020-10-14 RX ADMIN — PANTOPRAZOLE SODIUM 40 MG: 40 INJECTION, POWDER, FOR SOLUTION INTRAVENOUS at 21:24

## 2020-10-14 RX ADMIN — BACLOFEN 10 MG: 10 TABLET ORAL at 21:24

## 2020-10-14 RX ADMIN — MICONAZOLE NITRATE: 2 OINTMENT TOPICAL at 09:45

## 2020-10-14 RX ADMIN — BRIMONIDINE TARTRATE 1 DROP: 2 SOLUTION OPHTHALMIC at 09:43

## 2020-10-14 RX ADMIN — SODIUM CHLORIDE, PRESERVATIVE FREE 10 ML: 5 INJECTION INTRAVENOUS at 09:45

## 2020-10-14 RX ADMIN — TIMOLOL MALEATE 1 DROP: 5 SOLUTION OPHTHALMIC at 21:24

## 2020-10-14 RX ADMIN — BACLOFEN 10 MG: 10 TABLET ORAL at 09:33

## 2020-10-14 RX ADMIN — FOLIC ACID 1 MG: 1 TABLET ORAL at 09:33

## 2020-10-14 RX ADMIN — CHOLESTYRAMINE 4 G: 4 POWDER, FOR SUSPENSION ORAL at 21:24

## 2020-10-14 RX ADMIN — LEVOTHYROXINE SODIUM 25 MCG: 0.03 TABLET ORAL at 09:33

## 2020-10-14 RX ADMIN — BRIMONIDINE TARTRATE 1 DROP: 2 SOLUTION OPHTHALMIC at 21:25

## 2020-10-14 RX ADMIN — Medication 10 ML: at 21:25

## 2020-10-14 RX ADMIN — CHOLESTYRAMINE 4 G: 4 POWDER, FOR SUSPENSION ORAL at 09:33

## 2020-10-14 RX ADMIN — TIMOLOL MALEATE 1 DROP: 5 SOLUTION OPHTHALMIC at 09:43

## 2020-10-14 RX ADMIN — PANTOPRAZOLE SODIUM 40 MG: 40 INJECTION, POWDER, FOR SOLUTION INTRAVENOUS at 09:45

## 2020-10-14 RX ADMIN — FLUCONAZOLE 100 MG: 100 TABLET ORAL at 09:33

## 2020-10-14 ASSESSMENT — PAIN DESCRIPTION - LOCATION: LOCATION: HEAD

## 2020-10-14 ASSESSMENT — PAIN SCALES - GENERAL
PAINLEVEL_OUTOF10: 0
PAINLEVEL_OUTOF10: 0
PAINLEVEL_OUTOF10: 3

## 2020-10-14 ASSESSMENT — PAIN DESCRIPTION - FREQUENCY: FREQUENCY: CONTINUOUS

## 2020-10-14 ASSESSMENT — PAIN DESCRIPTION - ORIENTATION: ORIENTATION: RIGHT;LEFT

## 2020-10-14 ASSESSMENT — PAIN SCALES - WONG BAKER
WONGBAKER_NUMERICALRESPONSE: 0
WONGBAKER_NUMERICALRESPONSE: 2

## 2020-10-14 ASSESSMENT — PAIN - FUNCTIONAL ASSESSMENT: PAIN_FUNCTIONAL_ASSESSMENT: PREVENTS OR INTERFERES SOME ACTIVE ACTIVITIES AND ADLS

## 2020-10-14 ASSESSMENT — PAIN DESCRIPTION - ONSET: ONSET: SUDDEN

## 2020-10-14 ASSESSMENT — PAIN DESCRIPTION - DESCRIPTORS: DESCRIPTORS: HEADACHE

## 2020-10-14 ASSESSMENT — PAIN DESCRIPTION - PROGRESSION: CLINICAL_PROGRESSION: GRADUALLY WORSENING

## 2020-10-14 ASSESSMENT — PAIN DESCRIPTION - PAIN TYPE: TYPE: ACUTE PAIN

## 2020-10-14 NOTE — CARE COORDINATION
Received call from Trish at [facility] medical review requesting additional clinical from PCP before approving return to [facility]. # [phone] opt #[phone]. Need to provide name/D.O.B.call by 9:30 am 10/15. spoke to ; she will call to provide additional information. Await determination. Brandi Dumont.

## 2020-10-14 NOTE — CARE COORDINATION
Received call back from PCP dr Isela Orosco; after P2P , insurance would not approve return to [facility] for skilled. [facility] Fowler [facility]; investigating alternative options. Elysia Scott.

## 2020-10-14 NOTE — PROGRESS NOTES
Dahiana Dhaliwal Hospitalist   Progress Note    Admitting Date and Time: 10/8/2020  5:00 PM  Admit Dx: GI bleeding [K92.2]  GI bleeding [K92.2]     For follow-up on multiple problems as listed below, she was discharged yesterday-she was supposed to return to [facility] Due to insurance issues she was not discharged. I did peer to peer discussion and insurance has denied.  looking into safe discharge planning. Subjective:  Denies CP ,sob ,n/v/d/abdominal pain. Uneventful night. D/w nursing. ROS: denies fever, chills, cp, sob, n/v, HA unless stated above.      fluconazole  100 mg Oral Daily    cholestyramine  1 packet Oral BID    pantoprazole  40 mg Intravenous BID    And    sodium chloride (PF)  10 mL Intravenous BID    vancomycin  250 mg Oral 4 times per day    miconazole nitrate   Topical BID    sodium chloride flush  10 mL Intravenous 2 times per day    baclofen  10 mg Oral BID    clopidogrel  75 mg Oral Daily    escitalopram  20 mg Oral Daily    folic acid  1 mg Oral Daily    latanoprost  1 drop Both Eyes Nightly    levothyroxine  25 mcg Oral QAM    potassium chloride  20 mEq Oral Daily    risperiDONE  1 mg Oral BID    atorvastatin  10 mg Oral Daily    brimonidine  1 drop Both Eyes BID    And    timolol  1 drop Both Eyes BID     barium sulfate, 70 g, ONCE PRN  sodium chloride flush, 10 mL, PRN  acetaminophen, 650 mg, Q6H PRN         Objective:    /60   Pulse 60   Temp 98.6 °F (37 °C) (Oral)   Resp 18   Ht 5' 8\" (1.727 m)   Wt 110 lb (49.9 kg)   SpO2 99%   BMI 16.73 kg/m²   General Appearance: alert and oriented to person, place and time, in no acute distress  Skin: warm and dry  Head: normocephalic and atraumatic  Eyes: pupils equal, round, and reactive to light, extraocular eye movements intact, conjunctivae normal  Neck: supple and non-tender without mass  Pulmonary/Chest: clear to auscultation bilaterally  Cardiovascular: normal rate, regular rhythm, normal S1 and S2  Abdomen: soft, non-tender, non-distended, normal bowel sounds, no masses or organomegaly  Extremities: no cyanosis, clubbing or edema  Musculoskeletal: normal range of motion  Neurologic:  no cranial nerve deficit,  speech normal      Recent Labs     10/12/20  0433 10/13/20  0522 10/14/20  0340    136 134   K 4.6 4.6 4.5   * 110* 107   CO2 18* 22 21*   BUN 13 11 13   CREATININE 0.7 0.8 0.7   GLUCOSE 80 88 94   CALCIUM 8.8 8.9 8.7       Recent Labs     10/12/20  0610 10/13/20  0522 10/14/20  0340   WBC 5.3 5.2 4.6   RBC 2.91* 2.96* 2.69*   HGB 8.7* 8.8* 7.9*   HCT 27.9* 28.7* 25.5*   MCV 95.9 97.0 94.8   MCH 29.9 29.7 29.4   MCHC 31.2* 30.7* 31.0*   RDW 15.7* 15.6* 15.5*   * 126* 108*   MPV 10.9 10.3 10.5       Labs and images reviewed}     Radiology:   FL MODIFIED BARIUM SWALLOW W VIDEO   Final Result   Aspiration with thin consistency barium trials. Please see separate speech pathology report for full discussion of findings   and recommendations. NM GI BLOOD LOSS   Final Result   No evidence of active GI bleeding during acquisition. RECOMMENDATIONS:   If the patient shows hemodynamic signs of an active bleed in the next 20   hours, additional images can be acquired. FL MODIFIED BARIUM SWALLOW W VIDEO   Final Result   Deep laryngeal penetration with nectar thick liquids. No aspiration seen. Please see separate speech pathology report for full discussion of findings   and recommendations. CT ABDOMEN PELVIS W IV CONTRAST Additional Contrast? None   Final Result   Diffuse abnormal colonic wall thickening correlating patient's clinical   history of C difficile colitis. Abnormal hypoattenuation left kidney. Differential includes sequela of   underlying pyelonephritis renal abscess versus sequela of renal infarct. Nodular contour of the liver. Correlate clinical evidence to suggest   cirrhosis. Cholelithiasis.       Stable indeterminate retroperitoneal lymph nodes potentially reactive in   nature. Assessment:    Principal Problem:    GI bleeding  Active Problems:    History of cirrhosis    Severe protein-calorie malnutrition (HCC)    Acquired hypothyroidism    B12 deficiency anemia    C. difficile colitis    Acute post-hemorrhagic anemia    Chronic alcohol use    Acidosis    Alcoholic cirrhosis ([address]    Gastritis  Resolved Problems:    * No resolved hospital problems. *      Plan:  Acute blood loss anemia likely due to GI bleed-Hemoccult was positive, EGD on 10/9 with Schatzki's ring , status post dilation, with gastritis, negative for H. Pylori, duodenal biopsies pending, bleeding scan on 10/12 negative. On PPI. GI okayed discharge. H&H relatively stable. Recent C. difficile colitis-on p.o. vancomycin as per ID recommendation, will have colonoscopy as outpatient once C. difficile resolves and is better. Hypertension-continue current treatment, monitor. Hypothyroidism-on levothyroxine    Alcoholic cirrhosis-outpatient follow-up with GI    Alcohol use-counseled for cessation    GERD-on PPI.     Dysphagia due to Schatzki's ring , on diflucan as per GI    Hyperlipidemia - on statins    Depression   COPD  H/o DVT         Electronically signed by Craven Runner, MD on 10/14/2020 at 3:29 PM

## 2020-10-14 NOTE — PROGRESS NOTES
PROGRESS NOTE    Patient Presents with/Seen in Consultation For      *known to dr Holli Smith, ?etoh cirrhosis/pancytopenia,  new drop hgb- gib, recently treated cdif   CHIEF COMPLAINT: Anemia    Subjective:     Patient laying in bed, stating she feels much better. Pt cleaned up of brown formed stool. Pt denies abdominal pain, nausea, vomiting, melena or hematochezia. Review of Systems  Aside from what was mentioned in the PMH and HPI, essentially unremarkable, all others negative. Objective:     /60   Pulse 60   Temp 98.6 °F (37 °C) (Oral)   Resp 18   Ht 5' 8\" (1.727 m)   Wt 110 lb (49.9 kg)   SpO2 99%   BMI 16.73 kg/m²     General appearance: alert, awake, pale, cachectic, laying in bed, and cooperative  Eyes: conjunctiva pale, sclera anicteric. PERRL.   Lungs: Clear throughout to auscultation bilaterally  Heart: regular rate and rhythm, no murmur, 2+ pulses; no edema  Abdomen: soft, low abdomen tender to palpation without guarding or rebound; bowel sounds normal; no masses,  no organomegaly  Extremities: extremities without edema  Pulses: 2+ and symmetric  Skin: Skin color pale, texture, turgor normal.   Neurologic: Alert, oriented to person, place, president, and situation    fluconazole (DIFLUCAN) tablet 100 mg, Daily  barium sulfate 40 % reconsitutable suspension, ONCE PRN  cholestyramine (QUESTRAN) packet 4 g, BID  pantoprazole (PROTONIX) injection 40 mg, BID    And  sodium chloride (PF) 0.9 % injection 10 mL, BID  vancomycin (VANCOCIN) oral solution 250 mg, 4 times per day  miconazole nitrate 2 % ointment, BID  sodium chloride flush 0.9 % injection 10 mL, 2 times per day  sodium chloride flush 0.9 % injection 10 mL, PRN  acetaminophen (TYLENOL) tablet 650 mg, Q6H PRN  baclofen (LIORESAL) tablet 10 mg, BID  clopidogrel (PLAVIX) tablet 75 mg, Daily  escitalopram (LEXAPRO) tablet 20 mg, Daily  folic acid (FOLVITE) tablet 1 mg, Daily  latanoprost (XALATAN) 0.005 % ophthalmic solution 1 drop, Nightly  levothyroxine (SYNTHROID) tablet 25 mcg, QAM  potassium chloride (KLOR-CON M) extended release tablet 20 mEq, Daily  risperiDONE (RISPERDAL) tablet 1 mg, BID  atorvastatin (LIPITOR) tablet 10 mg, Daily  brimonidine (ALPHAGAN) 0.2 % ophthalmic solution 1 drop, BID    And  timolol (TIMOPTIC) 0.5 % ophthalmic solution 1 drop, BID         Data Review  CBC:   Lab Results   Component Value Date    WBC 4.6 10/14/2020    RBC 2.69 10/14/2020    HGB 7.9 10/14/2020    HCT 25.5 10/14/2020    MCV 94.8 10/14/2020    MCH 29.4 10/14/2020    MCHC 31.0 10/14/2020    RDW 15.5 10/14/2020     10/14/2020    MPV 10.5 10/14/2020     CMP:    Lab Results   Component Value Date     10/14/2020    K 4.5 10/14/2020    K 3.3 09/20/2020     10/14/2020    CO2 21 10/14/2020    BUN 13 10/14/2020    CREATININE 0.7 10/14/2020    GFRAA >60 10/14/2020    LABGLOM >60 10/14/2020    GLUCOSE 94 10/14/2020    PROT 7.0 10/14/2020    LABALBU 2.2 10/14/2020    CALCIUM 8.7 10/14/2020    BILITOT 0.4 10/14/2020    ALKPHOS 84 10/14/2020    AST 16 10/14/2020    ALT 7 10/14/2020     Hepatic Function Panel:    Lab Results   Component Value Date    ALKPHOS 84 10/14/2020    ALT 7 10/14/2020    AST 16 10/14/2020    PROT 7.0 10/14/2020    BILITOT 0.4 10/14/2020    BILIDIR 0.6 04/19/2019    IBILI 0.6 04/19/2019    LABALBU 2.2 10/14/2020     No components found for: CHLPLat  Lab Results   Component Value Date    TRIG 72 06/03/2020    TRIG 83 02/06/2020    TRIG 156 (H) 11/07/2019   e    LDLCALC 64 06/03/2020    LDLCALC 56 02/06/2020    LDLCALC 53 11/07/2019             Value Date    PROTIME 14.7 10/13/2020    PROTIME 19.5 09/05/2019    INR 1.2 10/13/2020     IRON:    Lab Results   Component Value Date    IRON 181 06/03/2020     Iron Saturation:  No components found for: PERCENTFE  FERRITIN:    Lab Results   Component Value Date    FERRITIN 1,250 04/18/2019         Assessment:     Principal Problem:    GI bleeding  Active Problems:  ? Cirrhosis of liver, alcoholic   ? Recent C. difficile colitis-completed treatment  ? Diarrhea  ? Dysphagia  ? Pancytopenia  ? Anemia, macrocytic, hyperchromic - FOBT positive  ? Alcohol abuse, in remission x1 year  ? MELD 8.47  ? Unintentional wt loss  ? Hx of DVT, on Plavix - held since [address]  ? COPD  ? EGD 1/14/2019-grade B LA classification GERD and no varices seen. Stomach showed gastritis, biopsy negative for H. Pylori infection. Duodenum biopsy negative for sprue  ? Elevated CEA @ 6.5  ? EGD 10/9/2020 - Schatzki's ring dilated with 52-[place] Juline Pump.  No varices seen. Stomach showed gastritis, biopsy negative for H. pylori infection.  Duodenum biopsy pending to rule out sprue. Plan:     ? Dysphagia 1, Pureed solids with nectar consistency (mildly thick) per speech therapy  ? Continue Diflucan as ordered   ? Continue Protonix daily  ? Vancomycin per ID  ? Will need colonoscopy once C-diff colitis resolved, to be done outpatient   ? Discharge per PCP/others, okay from GI POV with outpatient follow-up. Nothing to add from GI POV at this point. Will sign off and see again if needed. Note: This report was completed utilizing computer voice recognition software. Every effort has been made to ensure accuracy, however; inadvertent computerized transcription errors may be present.      Discussed with Dr. Supriya Buck per Dr. Driver Cortes [id number], FNP-BC 10/14/2020 11:46 AM For Dr. Sarah Gil

## 2020-10-14 NOTE — PROGRESS NOTES
SPEECH LANGUAGE PATHOLOGY  DAILY PROGRESS NOTE        PATIENT NAME:  Kirill Box      :  1950          TODAY'S DATE:  10/14/2020 ROOM:  [address] [place] [address]        SWALLOWING    Pt in bed, alert and cooperative. Reports toleration of current diet. Pt with many questions on diet and POC. Pt re-educated on results of MBS and POC. DYSPHAGIA DIAGNOSIS:  Moderate oropharyngeal dysphagia-minimal improvement noted when compared to previous video swallow eval. Pt continues to be at high risk of aspiration of thin liquids due to silent deep penetration to the level of the vocal cords. DIET RECOMMENDATIONS:  Dysphagia 1, Pureed solids with nectar consistency (mildly thick) liquids as tolerated                 FEEDING RECOMMENDATIONS:                              Assistance level:  Full assistance is needed during all oral intake                          Compensatory strategies recommended: Small bites/sips        Intervention/Education- Pt educated on results and POC. Pt trained on compensatory strategies for safe swallow with good outcome. OM and laryngeal exercises initiated and completed with fair ability with mod verbal and visual cues. Questions answered. Will continue SP intervention as per previously established POC. Cathy GARCIA CCC/SLP [id number]  Speech Pathologist              CPT code(s) 50712  dysphagia tx, OM exercises  Total minutes :  30 minutes

## 2020-10-14 NOTE — PROGRESS NOTES
simplification techniques into ADL routines. B UE Strength B Shoulders: 3/5  B Biceps: 3/5 grossly  B Triceps: 3+/5 grossly   Patient will demonstrate 4-/5 B UE strength in order to maximize independence with ADLs, bed mobility, and functional transfers       Comments:  Pt demonstrates AROM to complete exercise however during ADL activity has difficulty to reach to comb hair or wash face. Pt completes all activity using L hand however states she is R hand dominant. Difficulty to motor plan activities using UE's. All activity completed at bed level. Education/treatment:  ADL retraining with facilitation of movement to increase self care skills. Pt education of benefits of movement and participation in therapy. · Pt has made limited  progress towards set goals.        Time In: 9:55  Time Out: 10:10      Min Units   Therapeutic Ex 30641 5    Therapeutic Activities 85378     ADL/Self Care 12253 10    Orthotic Management 55358     Neuro Re-Ed 19763     Non-Billable Time     TOTAL TIMED TREATMENT [facility] BERYL/L 55937

## 2020-10-14 NOTE — PROGRESS NOTES
Physical Therapy    Facility/Department: [address] MED SURG/TELE  Treatment Note  NAME: Naif Foreman  : 1950  MRN: [id number]    Date of Service: 10/14/2020      Attending Provider:  Brina Toledo MD    Evaluating PT:  Anthony Mcnamara P.T. Room #:  3567/1345-K  Diagnosis:  GI bleed  Pertinent PMHx/PSHx:  ETOH abuse, malnutrition  Precautions:  FALLS, bed/chair alarm    SUBJECTIVE:    Pt came in from SNF. She reports staff uses sanna lift for transfers or assists her with transfers. She reports she has PT, but is not working on standing up for fear of falls. She has not walked for a couple months. OBJECTIVE:   Initial Evaluation  Date: 10/12/20 Treatment Short Term/ Long Term   Goals   Was pt agreeable to Eval/treatment? yes yes    Does pt have pain? Stomach and R hand pain No c/o pain    Bed Mobility  Rolling: MOD A  Supine to sit: MOD A  Sit to supine: MAX A  Scooting: MAX A Rolling: MIN A  Supine to sit: MOD A  Sit to supine: MAX A  Scooting: MOD A MIN A   Transfers Sit to stand: NA  Stand to sit: NA  Stand pivot: NA NA MIN A   Ambulation   NA NA 25 feet with ww MIN A   Stair negotiation: ascended and descended NA NA NA   AM-PAC 6 Clicks 71/40 48/40      BLE ROM is WFL. BLE strength is grossly 3-/5. Balance: sitting EOB MIN A as she has frequent mild LOB any direction. Patient education  Pt educated on bed mobility    Patient response to education:   Pt verbalized understanding Pt demonstrated skill Pt requires further education in this area   yes Yes with VC and assist yes     ASSESSMENT:    Comments:  Pt was found in bed and agreeable to PT. She sat on EOB x 10 min working on sitting balance and core strength. She required MIN A for balance as she has tendency to have LOB any direction at this time due to decreased core strength and impaired balance.       Treatment:  Patient practiced and was instructed in the following treatment:     Bed mobility and sitting EOB to improve functional strength and endurance. Pt was left supine in bed with call light left by patient with RN present. Chair/bed alarm: bed alarm was activated. PLAN:    Pt is making fair progress toward established Physical Therapy goals. Continue with physical therapy current plan of care. Time in  09:35  Time out  09:50    Total Treatment Time  15 minutes     Evaluation Time includes thorough review of current medical information, gathering information on past medical history/social history and prior level of function, completion of standardized testing/informal observation of tasks, assessment of data and education on plan of care and goals. CPT codes:  [] Low Complexity PT evaluation 54069  [] Moderate Complexity PT evaluation 57983  [] High Complexity PT evaluation 45375  [] PT Re-evaluation 15001  [] Gait training 46945 ** minutes  [] Manual therapy 33342 ** minutes  [x] Therapeutic activities 32382 15 minutes  [] Therapeutic exercises 21552 ** minutes  [] Neuromuscular reeducation 09167 ** minutes     Randy Pedro., P.T.   License Number: [id number]

## 2020-10-14 NOTE — CARE COORDINATION
Social Work / Discharge Planning : Patient refusing therapy yesterday. AWait updated information and once received, Bharath Reed from [facility] will submit pre-cert. SW to follow.  Electronically signed by RUBÉN Castro on 10/14/20 at 8:36 AM EDT

## 2020-10-15 LAB
ALBUMIN SERPL-MCNC: 2.1 G/DL (ref 3.5–5.2)
ALP BLD-CCNC: 81 U/L (ref 35–104)
ALT SERPL-CCNC: 7 U/L (ref 0–32)
ANION GAP SERPL CALCULATED.3IONS-SCNC: 8 MMOL/L (ref 7–16)
AST SERPL-CCNC: 15 U/L (ref 0–31)
BASOPHILS ABSOLUTE: 0.01 E9/L (ref 0–0.2)
BASOPHILS RELATIVE PERCENT: 0.2 % (ref 0–2)
BILIRUB SERPL-MCNC: 0.3 MG/DL (ref 0–1.2)
BUN BLDV-MCNC: 13 MG/DL (ref 8–23)
CALCIUM SERPL-MCNC: 8.6 MG/DL (ref 8.6–10.2)
CHLORIDE BLD-SCNC: 108 MMOL/L (ref 98–107)
CO2: 22 MMOL/L (ref 22–29)
CREAT SERPL-MCNC: 0.7 MG/DL (ref 0.5–1)
EOSINOPHILS ABSOLUTE: 0.13 E9/L (ref 0.05–0.5)
EOSINOPHILS RELATIVE PERCENT: 3 % (ref 0–6)
GFR AFRICAN AMERICAN: >60
GFR NON-AFRICAN AMERICAN: >60 ML/MIN/1.73
GLUCOSE BLD-MCNC: 94 MG/DL (ref 74–99)
HCT VFR BLD CALC: 25.2 % (ref 34–48)
HEMOGLOBIN: 7.7 G/DL (ref 11.5–15.5)
IMMATURE GRANULOCYTES #: 0.01 E9/L
IMMATURE GRANULOCYTES %: 0.2 % (ref 0–5)
INR BLD: 1.2
LYMPHOCYTES ABSOLUTE: 1.46 E9/L (ref 1.5–4)
LYMPHOCYTES RELATIVE PERCENT: 33.4 % (ref 20–42)
MCH RBC QN AUTO: 29.1 PG (ref 26–35)
MCHC RBC AUTO-ENTMCNC: 30.6 % (ref 32–34.5)
MCV RBC AUTO: 95.1 FL (ref 80–99.9)
MONOCYTES ABSOLUTE: 0.3 E9/L (ref 0.1–0.95)
MONOCYTES RELATIVE PERCENT: 6.9 % (ref 2–12)
NEUTROPHILS ABSOLUTE: 2.46 E9/L (ref 1.8–7.3)
NEUTROPHILS RELATIVE PERCENT: 56.3 % (ref 43–80)
PDW BLD-RTO: 15.4 FL (ref 11.5–15)
PLATELET # BLD: 110 E9/L (ref 130–450)
PMV BLD AUTO: 10.5 FL (ref 7–12)
POTASSIUM SERPL-SCNC: 4.8 MMOL/L (ref 3.5–5)
PROTHROMBIN TIME: 13.8 SEC (ref 9.3–12.4)
RBC # BLD: 2.65 E12/L (ref 3.5–5.5)
SODIUM BLD-SCNC: 138 MMOL/L (ref 132–146)
TOTAL PROTEIN: 6.9 G/DL (ref 6.4–8.3)
WBC # BLD: 4.4 E9/L (ref 4.5–11.5)

## 2020-10-15 PROCEDURE — 85025 COMPLETE CBC W/AUTO DIFF WBC: CPT

## 2020-10-15 PROCEDURE — 92526 ORAL FUNCTION THERAPY: CPT | Performed by: SPEECH-LANGUAGE PATHOLOGIST

## 2020-10-15 PROCEDURE — 2580000003 HC RX 258: Performed by: CLINICAL NURSE SPECIALIST

## 2020-10-15 PROCEDURE — 1200000000 HC SEMI PRIVATE

## 2020-10-15 PROCEDURE — 97110 THERAPEUTIC EXERCISES: CPT | Performed by: SPEECH-LANGUAGE PATHOLOGIST

## 2020-10-15 PROCEDURE — 6360000002 HC RX W HCPCS: Performed by: CLINICAL NURSE SPECIALIST

## 2020-10-15 PROCEDURE — C9113 INJ PANTOPRAZOLE SODIUM, VIA: HCPCS | Performed by: CLINICAL NURSE SPECIALIST

## 2020-10-15 PROCEDURE — 6370000000 HC RX 637 (ALT 250 FOR IP): Performed by: CLINICAL NURSE SPECIALIST

## 2020-10-15 PROCEDURE — 85610 PROTHROMBIN TIME: CPT

## 2020-10-15 PROCEDURE — 99232 SBSQ HOSP IP/OBS MODERATE 35: CPT | Performed by: FAMILY MEDICINE

## 2020-10-15 PROCEDURE — 36415 COLL VENOUS BLD VENIPUNCTURE: CPT

## 2020-10-15 PROCEDURE — 80053 COMPREHEN METABOLIC PANEL: CPT

## 2020-10-15 PROCEDURE — 97530 THERAPEUTIC ACTIVITIES: CPT

## 2020-10-15 PROCEDURE — 2580000003 HC RX 258: Performed by: INTERNAL MEDICINE

## 2020-10-15 PROCEDURE — 6370000000 HC RX 637 (ALT 250 FOR IP): Performed by: INTERNAL MEDICINE

## 2020-10-15 RX ADMIN — TIMOLOL MALEATE 1 DROP: 5 SOLUTION OPHTHALMIC at 09:42

## 2020-10-15 RX ADMIN — BRIMONIDINE TARTRATE 1 DROP: 2 SOLUTION OPHTHALMIC at 09:42

## 2020-10-15 RX ADMIN — MICONAZOLE NITRATE: 2 OINTMENT TOPICAL at 20:43

## 2020-10-15 RX ADMIN — MICONAZOLE NITRATE: 2 OINTMENT TOPICAL at 09:42

## 2020-10-15 RX ADMIN — TIMOLOL MALEATE 1 DROP: 5 SOLUTION OPHTHALMIC at 20:41

## 2020-10-15 RX ADMIN — Medication 250 MG: at 00:00

## 2020-10-15 RX ADMIN — BACLOFEN 10 MG: 10 TABLET ORAL at 20:32

## 2020-10-15 RX ADMIN — SODIUM CHLORIDE, PRESERVATIVE FREE 10 ML: 5 INJECTION INTRAVENOUS at 09:42

## 2020-10-15 RX ADMIN — LEVOTHYROXINE SODIUM 25 MCG: 0.03 TABLET ORAL at 09:41

## 2020-10-15 RX ADMIN — Medication 10 ML: at 09:45

## 2020-10-15 RX ADMIN — RISPERIDONE 1 MG: 1 TABLET ORAL at 09:41

## 2020-10-15 RX ADMIN — FLUCONAZOLE 100 MG: 100 TABLET ORAL at 09:41

## 2020-10-15 RX ADMIN — ACETAMINOPHEN 650 MG: 325 TABLET ORAL at 13:26

## 2020-10-15 RX ADMIN — PANTOPRAZOLE SODIUM 40 MG: 40 INJECTION, POWDER, FOR SOLUTION INTRAVENOUS at 09:42

## 2020-10-15 RX ADMIN — POTASSIUM CHLORIDE 20 MEQ: 20 TABLET, EXTENDED RELEASE ORAL at 09:41

## 2020-10-15 RX ADMIN — PANTOPRAZOLE SODIUM 40 MG: 40 INJECTION, POWDER, FOR SOLUTION INTRAVENOUS at 20:32

## 2020-10-15 RX ADMIN — Medication 250 MG: at 06:59

## 2020-10-15 RX ADMIN — RISPERIDONE 1 MG: 1 TABLET ORAL at 20:33

## 2020-10-15 RX ADMIN — BACLOFEN 10 MG: 10 TABLET ORAL at 09:41

## 2020-10-15 RX ADMIN — CHOLESTYRAMINE 4 G: 4 POWDER, FOR SUSPENSION ORAL at 20:32

## 2020-10-15 RX ADMIN — ATORVASTATIN CALCIUM 10 MG: 10 TABLET, FILM COATED ORAL at 09:41

## 2020-10-15 RX ADMIN — FOLIC ACID 1 MG: 1 TABLET ORAL at 09:41

## 2020-10-15 RX ADMIN — BRIMONIDINE TARTRATE 1 DROP: 2 SOLUTION OPHTHALMIC at 20:41

## 2020-10-15 RX ADMIN — ESCITALOPRAM OXALATE 20 MG: 10 TABLET ORAL at 09:41

## 2020-10-15 RX ADMIN — SODIUM CHLORIDE, PRESERVATIVE FREE 10 ML: 5 INJECTION INTRAVENOUS at 20:43

## 2020-10-15 RX ADMIN — Medication 250 MG: at 18:06

## 2020-10-15 RX ADMIN — CHOLESTYRAMINE 4 G: 4 POWDER, FOR SUSPENSION ORAL at 09:41

## 2020-10-15 RX ADMIN — Medication 250 MG: at 12:50

## 2020-10-15 RX ADMIN — LATANOPROST 1 DROP: 50 SOLUTION OPHTHALMIC at 20:41

## 2020-10-15 RX ADMIN — Medication 10 ML: at 20:33

## 2020-10-15 RX ADMIN — CLOPIDOGREL BISULFATE 75 MG: 75 TABLET ORAL at 09:41

## 2020-10-15 ASSESSMENT — PAIN DESCRIPTION - DESCRIPTORS: DESCRIPTORS: STABBING

## 2020-10-15 ASSESSMENT — PAIN SCALES - GENERAL
PAINLEVEL_OUTOF10: 0
PAINLEVEL_OUTOF10: 10
PAINLEVEL_OUTOF10: 0

## 2020-10-15 ASSESSMENT — PAIN DESCRIPTION - PAIN TYPE: TYPE: ACUTE PAIN

## 2020-10-15 ASSESSMENT — PAIN DESCRIPTION - LOCATION: LOCATION: HAND

## 2020-10-15 ASSESSMENT — PAIN DESCRIPTION - ORIENTATION: ORIENTATION: RIGHT;LEFT

## 2020-10-15 NOTE — PROGRESS NOTES
[facility] Progress Note    Admitting Date and Time: 10/8/2020  5:00 PM  Admit Dx: GI bleeding [K92.2]  GI bleeding [K92.2]    Subjective:  Patient is being followed for GI bleeding [K92.2]  GI bleeding [K92.2]   Pt denies any new complaints. She has been followed by physical therapy occupational therapy and speech therapy. The patient was to be discharged to return to [facility] for further rehabilitation, however the insurance denied the request even after a peer to peer review. We will be requesting an expedited appeal for rehabilitation. Her AM PAC score was 11. PT feels that the patient is making good progress toward established goals. She also continues to show improvement with speech and Occupational Therapy. Per RN: No new concerns. ROS: denies fever, chills, cp, sob, n/v, HA unless stated above.       fluconazole  100 mg Oral Daily    cholestyramine  1 packet Oral BID    pantoprazole  40 mg Intravenous BID    And    sodium chloride (PF)  10 mL Intravenous BID    vancomycin  250 mg Oral 4 times per day    miconazole nitrate   Topical BID    sodium chloride flush  10 mL Intravenous 2 times per day    baclofen  10 mg Oral BID    clopidogrel  75 mg Oral Daily    escitalopram  20 mg Oral Daily    folic acid  1 mg Oral Daily    latanoprost  1 drop Both Eyes Nightly    levothyroxine  25 mcg Oral QAM    potassium chloride  20 mEq Oral Daily    risperiDONE  1 mg Oral BID    atorvastatin  10 mg Oral Daily    brimonidine  1 drop Both Eyes BID    And    timolol  1 drop Both Eyes BID     barium sulfate, 70 g, ONCE PRN  sodium chloride flush, 10 mL, PRN  acetaminophen, 650 mg, Q6H PRN         Objective:    BP (!) 123/58   Pulse 68   Temp 98.1 °F (36.7 °C) (Oral)   Resp 16   Ht 5' 8\" (1.727 m)   Wt 110 lb (49.9 kg)   SpO2 95%   BMI 16.73 kg/m²     General Appearance: alert and oriented to person, place and time and in no acute distress  Skin: warm and dry  Head: normocephalic and atraumatic  Eyes: pupils equal, round, and reactive to light, extraocular eye movements intact, conjunctivae normal  Neck: neck supple and non tender without mass   Pulmonary/Chest: clear to auscultation bilaterally- no wheezes, rales or rhonchi, normal air movement, no respiratory distress  Cardiovascular: normal rate, normal S1 and S2 and no carotid bruits  Abdomen: soft, non-tender, non-distended, normal bowel sounds, no masses or organomegaly  Extremities: no cyanosis, no clubbing and no edema  Neurologic: no cranial nerve deficit and speech normal        Recent Labs     10/13/20  0522 10/14/20  0340 10/15/20  0430    134 138   K 4.6 4.5 4.8   * 107 108*   CO2 22 21* 22   BUN 11 13 13   CREATININE 0.8 0.7 0.7   GLUCOSE 88 94 94   CALCIUM 8.9 8.7 8.6       Recent Labs     10/13/20  0522 10/14/20  0340 10/15/20  0430   WBC 5.2 4.6 4.4*   RBC 2.96* 2.69* 2.65*   HGB 8.8* 7.9* 7.7*   HCT 28.7* 25.5* 25.2*   MCV 97.0 94.8 95.1   MCH 29.7 29.4 29.1   MCHC 30.7* 31.0* 30.6*   RDW 15.6* 15.5* 15.4*   * 108* 110*   MPV 10.3 10.5 10.5           Assessment:    Principal Problem:    GI bleeding  Active Problems:    History of cirrhosis    Severe protein-calorie malnutrition (HCC)    Acquired hypothyroidism    B12 deficiency anemia    C. difficile colitis    Acute post-hemorrhagic anemia    Chronic alcohol use    Acidosis    Alcoholic cirrhosis (HCC)    Gastritis  Resolved Problems:    * No resolved hospital problems. *      Plan:  1.  GI bleed-hemoccult positive-GI following, EGD 10/9/20; bleeding scan on 10/12 negative. On PPI, GI followed and okay with discharge. H/H stable. 2.  Recent c diff colitis-  PO vancomycin as per ID recommendation, c-scope as outpatient once c diff resolved  3. Essential hypertension-controlled, continue current treatment  4. Acquired hypothyroidism-  On levothyroxine, stable, continue  5.   Alcoholic cirrhosis- stable; outpatient follow up with GI; Normal ammonia level  6. Hx of alcohol use-  Counseled on cessation  7. GERD-  Continue PPI  8. Dysphagia due to schatzki's ring, on diflucan per GI      Patient would benefit significantly from structured subacute rehab for PT, OT, speech therapy. NOTE: This report was transcribed using voice recognition software. Every effort was made to ensure accuracy; however, inadvertent computerized transcription errors may be present.   Electronically signed by Rima Diaz MD on 10/15/2020 at 5:42 PM

## 2020-10-15 NOTE — PROGRESS NOTES
Physical Therapy  Facility/Department: [address] MED SURG/TELE  Daily Treatment Note  NAME: Anil Davis  : 1950  MRN: [id number]    Date of Service: 10/15/2020    Patient Diagnosis(es): The primary encounter diagnosis was Anemia, unspecified type. Diagnoses of Clostridium difficile colitis, Lower GI bleed, and Hyperkalemia were also pertinent to this visit. has a past medical history of Alcoholism ([facility] [address] Anemia, Blue toe syndrome of left lower extremity ([address] C. difficile colitis, Cataract, Chronic deep vein thrombosis (DVT) of femoral vein of right lower extremity ([address] Critical lower limb ischemia, Depression, DVT (deep venous thrombosis) (HCC), Glaucoma, History of blood transfusion, Hyperlipidemia, Hypertension, Hypothyroidism, Liver disease, Macular degeneration, Peripheral edema, Sciatica, and Severe protein-calorie malnutrition ([address] has a past surgical history that includes Dental surgery (); [place] tooth extraction; Upper gastrointestinal endoscopy (N/A, 2019); and Upper gastrointestinal endoscopy (N/A, 10/9/2020). Attending Provider:  Cami Cote MD     Evaluating PT:  Yusef Conway. Sienna Perkins, P.T.     Room #:  0066/4172-R  Diagnosis:  GI bleed  Pertinent PMHx/PSHx:  ETOH abuse, malnutrition  Precautions:  FALLS, bed/chair alarm     SUBJECTIVE:     Pt came in from SNF. She reports staff uses sanna lift for transfers or assists her with transfers. She reports she has PT, but is not working on standing up for fear of falls. She has not walked for a couple months.       OBJECTIVE:    Initial Evaluation  Date: 10/12/20 Treatment  10/15 Short Term/ Long Term   Goals   Was pt agreeable to Eval/treatment? yes yes     Does pt have pain?  Stomach and R hand pain Right LE pain     Bed Mobility  Rolling: MOD A  Supine to sit: MOD A  Sit to supine: MAX A  Scooting: MAX A Rolling: MIN A  Supine to sit: MOD A  Sit to supine: MAX A  Scooting: MOD A MIN A   Transfers Sit to stand: NA  Stand to sit: NA  Stand pivot: NA NA MIN A   Ambulation   NA NA 25 feet with ww MIN A   Stair negotiation: ascended and descended NA NA NA   AM-PAC 6 Clicks 46/99 67/81          Therapeutic Exercises:  LAQs (AAROM) and hip flexion while sitting EOB. Min A required for balance sitting EOB due to posterior lean at times. Pt sat EOB 5 minutes. Standing tolerance limited due to fear. Patient education  Pt educated on PT objectives during treatment session, posture when sitting EOB. Patient response to education:   Pt verbalized understanding Pt demonstrated skill Pt requires further education in this area   Yes   yes yes     ASSESSMENT:    Comments:  Pt found and left in bed with call light in reach and bed alarm on. Treatment:  Patient practiced and was instructed in the following treatment:    Functional mobility and therapeutic exercises performed as documented above. No report of dizziness during functional mobility. Pt with posterior lean at times when sitting EOB. Sitting tolerance limited due to fear. Pt required assist with LAQs while sitting EOB due to decreased control during eccentric phase of exercise. PLAN:    Patient is making good progress towards established goals. Will continue with current POC.      Time in  1050  Time out  1106    Total Treatment Time  16 minutes     CPT codes:    [x] Therapeutic activities 28561 11 minutes  [] Therapeutic exercises 53765 5 minutes      Mario Alberto Wray, PT [id number]

## 2020-10-15 NOTE — PROGRESS NOTES
SPEECH LANGUAGE PATHOLOGY  DAILY PROGRESS NOTE        PATIENT NAME:  Trevor Gan      :  1950          TODAY'S DATE:  10/15/2020 ROOM:  [address] [place] [address]        SWALLOWING    Pt in bed, alert and cooperative. Reports toleration of current diet. DYSPHAGIA DIAGNOSIS:  Moderate oropharyngeal dysphagia-minimal improvement noted when compared to previous video swallow eval. Pt continues to be at high risk of aspiration of thin liquids due to silent deep penetration to the level of the vocal cords. DIET RECOMMENDATIONS:  Dysphagia 1, Pureed solids with nectar consistency (mildly thick) liquids as tolerated                 FEEDING RECOMMENDATIONS:                              Assistance level:  Full assistance is needed during all oral intake                          Compensatory strategies recommended: Small bites/sips        Intervention/Education- Pt educated on results and POC. Pt trained on compensatory strategies for safe swallow with good outcome. OM and laryngeal exercises initiated and completed with fair ability with mod verbal and visual cues. Questions answered. Will continue SP intervention as per previously established POC. Tamela GARCIA CCC/SLP [id number]  Speech Pathologist              CPT code(s) 58263  dysphagia tx, OM exercises  Total minutes :  30 minutes

## 2020-10-15 NOTE — CARE COORDINATION
Social Work / Discharge Planning : Patient was denied SNF at [facility]. Appeal was called and denied. CM updated for expedited appeal by Cornelio Ku at [facility]. Patient 12/24 for AM/PAC. If expedited appeal declined, then will have facility work with patient to admit private pay. CM spoke to attending and she will proceed with expedited appeal. AWait results. SHANNAN to follow. Electronically signed by RUBÉN Cobb on 10/15/20 at 8:18 AM EDT      Addendum : DR Gardner updated SW/CM. She completed the expedite appeal. SW did update [facility] from [employer] that they need to fax the initial denial, face sheet with demo and doctors progress note stating reason for SNF to insurance [phone] [employer]. Information left on [facility] VM. Await response. Once all information has been faxed , will take 24 hours for re-determination. SHANNAN to follow. Electronically signed by RUBÉN Cobb on 10/15/20 at 1:35 PM EDT    Addendum :Cornelio Ku from [employer] received message and will submit once all info completed. SHANNAN to follow. Electronically signed by RUBÉN Cobb on 10/15/20 at 1:47 PM EDT      Addendum : SHANNAN met with patient and provided an update with insurance. SHANNAN explained if patient insurance does NOT pay,  then the facility will work with her to get on medicaid. Patient stated she thought they actually have started the process. Patient in agreement to pursue medicaid if needed. SHANNAN to follow.  Electronically signed by RUBÉN Cobb on 10/15/20 at 2:05 PM EDT

## 2020-10-15 NOTE — PROGRESS NOTES
Comprehensive Nutrition Assessment    Type and Reason for Visit:  Reassess    Nutrition Recommendations/Plan: Continue Diet. Will Continue Boost High Calorie Pudding ONS BID. Nutrition Assessment:  Pt improving from a nutritional stand-point AEB pt consuming ~50-75% of most meals/supplements and w/ no noted nutritional complaints at this time. Pt remains at risk however d/t Cirrhosis/ETOH Abuse hx w/ GIB as well as noted Dysphagia per MBS/SLP notes. Will continue current ONS and monitor. Malnutrition Assessment:  Malnutrition Status:  Insufficient data    Context:  Chronic Illness     Findings of the 6 clinical characteristics of malnutrition:  Energy Intake:  7 - 75% or less estimated energy requirements for 1 month or longer  Weight Loss:  Unable to assess(2/2 poor EMR wt hx at this time)     Body Fat Loss:  Unable to assess     Muscle Mass Loss:  Unable to assess    Fluid Accumulation:  No significant fluid accumulation     Strength:  Not Performed    Estimated Daily Nutrient Needs:  Energy (kcal):  1817-4570(30-35 kcals/kg per CBW); Weight Used for Energy Requirements:  Current     Protein (g):  75-90(1.5-1.8 gm/kg per CBW); Weight Used for Protein Requirements:  Current        Fluid (ml/day):  2679-3413(1 ml/kcal); Weight Used for Fluid Requirements:  Current      Nutrition Related Findings:  A&O, edentulous, tender/distended Abd, +BS, No edema, +I/O's(EGD w/ Bx and Dilation 10/9; MBS on 10/10 and 10/12)      Wounds:  None       Current Nutrition Therapies:    DIET DYSPHAGIA PUREED; Mildly Thick (Nectar);  Low Fat  Dietary Nutrition Supplements: Other Oral Supplement (see comment)    Anthropometric Measures:  · Height: 5' 8\" (172.7 cm)  · Current Body Weight: 112 lb (50.8 kg)(bed 10/11, last actual wt taken)   · Admission Body Weight: 116 lb (52.6 kg)(bed 10/10, first actual)    · Usual Body Weight: 113 lb (51.3 kg)(bed 9/19/20 per EMR; noted subjective ~30# wt loss x ~1yr however unable to properly assess/confirm d/t poor EMR wt hx pta as well as since adm at this time)     · Ideal Body Weight: 140 lbs; % Ideal Body Weight 73.6 %   · BMI: 17  · Adjusted Body Weight:  ; No Adjustment   · Adjusted BMI:      · BMI Categories: Underweight (BMI less than 22) age over 72       Nutrition Diagnosis:   · Inadequate oral intake related to altered GI function(2/2 C-diff+ on adm w/ Cirrhosis/Etoh Abuse hx) as evidenced by intake 51-75%, poor intake prior to admission, weight loss, BMI, GI abnormality, diarrhea      Nutrition Interventions:   Food and/or Nutrient Delivery:  Continue Current Diet, Continue Oral Nutrition Supplement(Continue Diet. Will Continue Boost High Calorie Pudding ONS BID.)  Nutrition Education/Counseling:  No recommendation at this time   Coordination of Nutrition Care:  Continued Inpatient Monitoring    Goals:  PO intake >75% of meals/ONS.        Nutrition Monitoring and Evaluation:   Behavioral-Environmental Outcomes:  Readiness for Change   Food/Nutrient Intake Outcomes:  Diet Advancement/Tolerance, Food and Nutrient Intake, Supplement Intake  Physical Signs/Symptoms Outcomes:  Biochemical Data, Chewing or Swallowing, Diarrhea, GI Status, Nausea or Vomiting, Fluid Status or Edema, Nutrition Focused Physical Findings, Skin, Weight     Discharge Planning:    Continue current diet, Continue Oral Nutrition Supplement     Electronically signed by Steffan Holstein, RD, LD on 10/15/20 at 2:19 PM EDT    Contact: ext [phone]

## 2020-10-15 NOTE — CARE COORDINATION
covid neg 10/8; spoke to Dr Carmine Alexis re; expedited appeal to [facility] [place] [phone]. pt was denied return to [facility]. await outcome. [facility].

## 2020-10-16 VITALS
BODY MASS INDEX: 16.67 KG/M2 | OXYGEN SATURATION: 94 % | HEIGHT: 68 IN | HEART RATE: 67 BPM | WEIGHT: 110 LBS | DIASTOLIC BLOOD PRESSURE: 57 MMHG | SYSTOLIC BLOOD PRESSURE: 114 MMHG | TEMPERATURE: 97.7 F | RESPIRATION RATE: 16 BRPM

## 2020-10-16 LAB
ALBUMIN SERPL-MCNC: 2.2 G/DL (ref 3.5–5.2)
ALP BLD-CCNC: 82 U/L (ref 35–104)
ALT SERPL-CCNC: 6 U/L (ref 0–32)
ANION GAP SERPL CALCULATED.3IONS-SCNC: 6 MMOL/L (ref 7–16)
AST SERPL-CCNC: 12 U/L (ref 0–31)
BASOPHILS ABSOLUTE: 0 E9/L (ref 0–0.2)
BASOPHILS RELATIVE PERCENT: 0 % (ref 0–2)
BILIRUB SERPL-MCNC: 0.3 MG/DL (ref 0–1.2)
BUN BLDV-MCNC: 12 MG/DL (ref 8–23)
CALCIUM SERPL-MCNC: 8.6 MG/DL (ref 8.6–10.2)
CHLORIDE BLD-SCNC: 108 MMOL/L (ref 98–107)
CO2: 22 MMOL/L (ref 22–29)
CREAT SERPL-MCNC: 0.7 MG/DL (ref 0.5–1)
EOSINOPHILS ABSOLUTE: 0.14 E9/L (ref 0.05–0.5)
EOSINOPHILS RELATIVE PERCENT: 3.4 % (ref 0–6)
GFR AFRICAN AMERICAN: >60
GFR NON-AFRICAN AMERICAN: >60 ML/MIN/1.73
GLUCOSE BLD-MCNC: 81 MG/DL (ref 74–99)
HCT VFR BLD CALC: 24.3 % (ref 34–48)
HEMOGLOBIN: 7.3 G/DL (ref 11.5–15.5)
IMMATURE GRANULOCYTES #: 0.02 E9/L
IMMATURE GRANULOCYTES %: 0.5 % (ref 0–5)
INR BLD: 1.1
LYMPHOCYTES ABSOLUTE: 1.37 E9/L (ref 1.5–4)
LYMPHOCYTES RELATIVE PERCENT: 33.7 % (ref 20–42)
MCH RBC QN AUTO: 29.3 PG (ref 26–35)
MCHC RBC AUTO-ENTMCNC: 30 % (ref 32–34.5)
MCV RBC AUTO: 97.6 FL (ref 80–99.9)
MONOCYTES ABSOLUTE: 0.26 E9/L (ref 0.1–0.95)
MONOCYTES RELATIVE PERCENT: 6.4 % (ref 2–12)
NEUTROPHILS ABSOLUTE: 2.27 E9/L (ref 1.8–7.3)
NEUTROPHILS RELATIVE PERCENT: 56 % (ref 43–80)
PDW BLD-RTO: 15.6 FL (ref 11.5–15)
PLATELET # BLD: 110 E9/L (ref 130–450)
PMV BLD AUTO: 10.8 FL (ref 7–12)
POTASSIUM SERPL-SCNC: 4.5 MMOL/L (ref 3.5–5)
PROTHROMBIN TIME: 13 SEC (ref 9.3–12.4)
RBC # BLD: 2.49 E12/L (ref 3.5–5.5)
SODIUM BLD-SCNC: 136 MMOL/L (ref 132–146)
TOTAL PROTEIN: 7.1 G/DL (ref 6.4–8.3)
WBC # BLD: 4.1 E9/L (ref 4.5–11.5)

## 2020-10-16 PROCEDURE — 6370000000 HC RX 637 (ALT 250 FOR IP): Performed by: CLINICAL NURSE SPECIALIST

## 2020-10-16 PROCEDURE — 99239 HOSP IP/OBS DSCHRG MGMT >30: CPT | Performed by: FAMILY MEDICINE

## 2020-10-16 PROCEDURE — 85025 COMPLETE CBC W/AUTO DIFF WBC: CPT

## 2020-10-16 PROCEDURE — C9113 INJ PANTOPRAZOLE SODIUM, VIA: HCPCS | Performed by: CLINICAL NURSE SPECIALIST

## 2020-10-16 PROCEDURE — 97530 THERAPEUTIC ACTIVITIES: CPT

## 2020-10-16 PROCEDURE — 85610 PROTHROMBIN TIME: CPT

## 2020-10-16 PROCEDURE — 2580000003 HC RX 258: Performed by: CLINICAL NURSE SPECIALIST

## 2020-10-16 PROCEDURE — 97110 THERAPEUTIC EXERCISES: CPT

## 2020-10-16 PROCEDURE — 6370000000 HC RX 637 (ALT 250 FOR IP): Performed by: INTERNAL MEDICINE

## 2020-10-16 PROCEDURE — 2580000003 HC RX 258: Performed by: INTERNAL MEDICINE

## 2020-10-16 PROCEDURE — 80053 COMPREHEN METABOLIC PANEL: CPT

## 2020-10-16 PROCEDURE — 6360000002 HC RX W HCPCS: Performed by: CLINICAL NURSE SPECIALIST

## 2020-10-16 PROCEDURE — 36415 COLL VENOUS BLD VENIPUNCTURE: CPT

## 2020-10-16 RX ADMIN — Medication 10 ML: at 08:33

## 2020-10-16 RX ADMIN — MICONAZOLE NITRATE: 2 OINTMENT TOPICAL at 08:33

## 2020-10-16 RX ADMIN — ACETAMINOPHEN 650 MG: 325 TABLET ORAL at 02:09

## 2020-10-16 RX ADMIN — CHOLESTYRAMINE 4 G: 4 POWDER, FOR SUSPENSION ORAL at 08:32

## 2020-10-16 RX ADMIN — ESCITALOPRAM OXALATE 20 MG: 10 TABLET ORAL at 08:32

## 2020-10-16 RX ADMIN — TIMOLOL MALEATE 1 DROP: 5 SOLUTION OPHTHALMIC at 08:33

## 2020-10-16 RX ADMIN — Medication 250 MG: at 12:06

## 2020-10-16 RX ADMIN — SODIUM CHLORIDE, PRESERVATIVE FREE 10 ML: 5 INJECTION INTRAVENOUS at 08:33

## 2020-10-16 RX ADMIN — FOLIC ACID 1 MG: 1 TABLET ORAL at 08:32

## 2020-10-16 RX ADMIN — Medication 250 MG: at 02:09

## 2020-10-16 RX ADMIN — CLOPIDOGREL BISULFATE 75 MG: 75 TABLET ORAL at 08:32

## 2020-10-16 RX ADMIN — POTASSIUM CHLORIDE 20 MEQ: 20 TABLET, EXTENDED RELEASE ORAL at 08:32

## 2020-10-16 RX ADMIN — BACLOFEN 10 MG: 10 TABLET ORAL at 08:32

## 2020-10-16 RX ADMIN — FLUCONAZOLE 100 MG: 100 TABLET ORAL at 08:32

## 2020-10-16 RX ADMIN — Medication 250 MG: at 06:43

## 2020-10-16 RX ADMIN — ATORVASTATIN CALCIUM 10 MG: 10 TABLET, FILM COATED ORAL at 08:32

## 2020-10-16 RX ADMIN — RISPERIDONE 1 MG: 1 TABLET ORAL at 08:32

## 2020-10-16 RX ADMIN — PANTOPRAZOLE SODIUM 40 MG: 40 INJECTION, POWDER, FOR SOLUTION INTRAVENOUS at 08:32

## 2020-10-16 RX ADMIN — BRIMONIDINE TARTRATE 1 DROP: 2 SOLUTION OPHTHALMIC at 08:32

## 2020-10-16 RX ADMIN — LEVOTHYROXINE SODIUM 25 MCG: 0.03 TABLET ORAL at 08:32

## 2020-10-16 ASSESSMENT — PAIN - FUNCTIONAL ASSESSMENT: PAIN_FUNCTIONAL_ASSESSMENT: PREVENTS OR INTERFERES SOME ACTIVE ACTIVITIES AND ADLS

## 2020-10-16 ASSESSMENT — PAIN SCALES - GENERAL
PAINLEVEL_OUTOF10: 5
PAINLEVEL_OUTOF10: 0
PAINLEVEL_OUTOF10: 0

## 2020-10-16 ASSESSMENT — PAIN DESCRIPTION - ORIENTATION: ORIENTATION: RIGHT

## 2020-10-16 ASSESSMENT — PAIN DESCRIPTION - DESCRIPTORS: DESCRIPTORS: ACHING;DISCOMFORT;SORE

## 2020-10-16 ASSESSMENT — PAIN DESCRIPTION - PROGRESSION: CLINICAL_PROGRESSION: NOT CHANGED

## 2020-10-16 ASSESSMENT — PAIN DESCRIPTION - ONSET: ONSET: ON-GOING

## 2020-10-16 ASSESSMENT — PAIN DESCRIPTION - PAIN TYPE: TYPE: ACUTE PAIN

## 2020-10-16 ASSESSMENT — PAIN DESCRIPTION - FREQUENCY: FREQUENCY: INTERMITTENT

## 2020-10-16 NOTE — DISCHARGE SUMMARY
[facility] Physician Discharge Summary       [facility] [address]  [address], [address] [id number]  [phone]    In 1 week        Activity level: As tolerated     Dispo:to [facility]      Condition on discharge: Stable     Patient ID:  Yoseph Rose  [id number]  77 y.o.  1950    Admit date: 10/8/2020    Discharge date and time:  10/16/2020  2:33 PM    Admission Diagnoses: Principal Problem:    GI bleeding  Active Problems:    History of cirrhosis    Severe protein-calorie malnutrition ([address]    Acquired hypothyroidism    B12 deficiency anemia    C. difficile colitis    Acute post-hemorrhagic anemia    Chronic alcohol use    Acidosis    Alcoholic cirrhosis ([address]    Gastritis  Resolved Problems:    * No resolved hospital problems. *      Discharge Diagnoses: Principal Problem:    GI bleeding  Active Problems:    History of cirrhosis    Severe protein-calorie malnutrition (HCC)    Acquired hypothyroidism    B12 deficiency anemia    C. difficile colitis    Acute post-hemorrhagic anemia    Chronic alcohol use    Acidosis    Alcoholic cirrhosis (HCC)    Gastritis  Resolved Problems:    * No resolved hospital problems. *      Consults:  IP CONSULT TO GI  IP CONSULT TO SOCIAL WORK  IP CONSULT TO INFECTIOUS DISEASES  IP CONSULT TO IV TEAM  IP CONSULT TO IV TEAM      Hospital Course:   Patient Yoseph Rose is a 77 y.o. presented with GI bleeding [K92.2]  GI bleeding [K92.2]  1. GI bleed-hemoccult positive-GI following, EGD 10/9/20; bleeding scan on 10/12 negative. On PPI, GI followed and okay with discharge. H/H stable. 2.  Recent c diff colitis-  PO vancomycin as per ID recommendation, c-scope as outpatient once c diff resolved  3. Essential hypertension-controlled, continue current treatment  4. Acquired hypothyroidism-  On levothyroxine, stable, continue  5.   Alcoholic cirrhosis- stable; 72 hours. Imaging:  Ct Abdomen Pelvis W Iv Contrast Additional Contrast? None    Result Date: 10/9/2020  EXAMINATION: CT OF THE ABDOMEN AND PELVIS WITH CONTRAST 10/9/2020 9:40 am TECHNIQUE: CT of the abdomen and pelvis was performed with the administration of intravenous contrast. Multiplanar reformatted images are provided for review. Dose modulation, iterative reconstruction, and/or weight based adjustment of the mA/kV was utilized to reduce the radiation dose to as low as reasonably achievable. COMPARISON: September 19, 2020 HISTORY: ORDERING SYSTEM PROVIDED HISTORY: Abdominal pain and diarrhea with recent C. difficile pancolitis TECHNOLOGIST PROVIDED HISTORY: Reason for exam:->Abdominal pain and diarrhea with recent C. difficile pancolitis Additional Contrast?->None Initial exam. FINDINGS: Lower Chest:  Visualized portion of the lower chest demonstrates no acute abnormality. Organs: Liver demonstrates slight nodular contour. The spleen is prominent. The gallbladder demonstrates multiple stones. The pancreas and adrenal glands are normal.  The right kidney enhances normally. There is heterogeneous enhancement of the left kidney with loss of corticomedullary differentiation throughout the posterior aspect of the left renal mid and inferior pole extending to the upper pole as well. Findings concerning for underlying pyelonephritis or renal infarct. GI/Bowel: There is diffuse abnormal colonic wall thickening involving much of the distal transverse, left, and sigmoid colon correlating to patient's underlying clinical history of Clostridium difficile infection. No pneumatosis noted. Pelvis: The bladder and rectum are normal. Peritoneum/Retroperitoneum: Small perihepatic and perisplenic free fluid extending into the pelvis. No intraperitoneal free air. No new mesenteric or retroperitoneal lymphadenopathy. Small retroperitoneal lymph nodes are too small to further characterize.   There is moderate atherosclerotic change of the aorta and major vascular structures. Bones/Soft Tissues: No suspicious lytic or blastic osseous lesion. Diffuse abnormal colonic wall thickening correlating patient's clinical history of C difficile colitis. Abnormal hypoattenuation left kidney. Differential includes sequela of underlying pyelonephritis renal abscess versus sequela of renal infarct. Nodular contour of the liver. Correlate clinical evidence to suggest cirrhosis. Cholelithiasis. Stable indeterminate retroperitoneal lymph nodes potentially reactive in nature.        Patient Instructions:      Medication List      START taking these medications    cholestyramine 4 g packet  Commonly known as:  QUESTRAN  Take 1 packet by mouth 2 times daily     fluconazole 100 MG tablet  Commonly known as:  DIFLUCAN  Take 1 tablet by mouth daily for 7 days     vancomycin 50 mg/mL oral solution  Commonly known as:  VANCOCIN  Take 5 mLs by mouth 4 times daily for 10 days After 10 days begin taper as instructed        CONTINUE taking these medications    baclofen 10 MG tablet  Commonly known as:  LIORESAL     clopidogrel 75 MG tablet  Commonly known as:  Plavix  Take 1 tablet by mouth daily     Combigan 0.2-0.5 % ophthalmic solution  Generic drug:  brimonidine-timolol     escitalopram 20 MG tablet  Commonly known as:  LEXAPRO  Take 1 tablet by mouth daily     folic acid 1 MG tablet  Commonly known as:  FOLVITE  Take 1 tablet by mouth daily     Handicap Placard Misc     latanoprost 0.005 % ophthalmic solution  Commonly known as:  XALATAN     levothyroxine 25 MCG tablet  Commonly known as:  SYNTHROID  Take 1 tablet by mouth every morning     PRESERVISION AREDS PO     risperiDONE 1 MG tablet  Commonly known as:  RISPERDAL     simvastatin 5 MG tablet  Commonly known as:  ZOCOR  Take 1 tablet by mouth nightly        STOP taking these medications    acetaminophen 325 MG tablet  Commonly known as:  TYLENOL     potassium chloride 20 MEQ extended release tablet  Commonly known as:  KLOR-CON M           Where to Get Your Medications      Information about where to get these medications is not yet available    Ask your nurse or doctor about these medications  · cholestyramine 4 g packet  · fluconazole 100 MG tablet  · vancomycin 50 mg/mL oral solution           Note that more than 30 minutes was spent in preparing discharge papers, discussing discharge with patient, medication review, etc.    Signed:  Electronically signed by Angel Beltran MD on 10/16/2020 at 2:33 PM

## 2020-10-16 NOTE — CARE COORDINATION
awaiting determination of expedited appeal for return to [facility] [place]. If denial upheld; Florentin Parents from [facility] will accept back and proceed with [facility]ad for pt. Will follow. Jeremiah Conley.

## 2020-10-16 NOTE — CARE COORDINATION
Social Work / Discharge Planning : SW spoke to [facility] from [facility]. Patient insurance has denied stay. Patient again in agreement for medicaid. SW will assist patient to call business office once a number provided. SHANNAN did set up patient to return to [facility] at 5:00 [facility] [phone]. . Patient updated that insurance has not approved her stay and they will file medicaid on her behalf. Patient expressed an understanding. SHANNAN to follow. Electronically signed by RUBÉN Parra on 10/16/20 at 11:43 AM EDT      Addendum : SHANNAN assisted patient to talk with business office  Veronica Chintan [phone] at [facility]. Patient gave verbal permission for [facility] to apply for medicaid on her behalf as well as 4000.00 from debit. Patient aware she will be picked up at 5:00. Patient did update her neighbor and SW spoke to her brother in regards to discharge. SW to follow.  Electronically signed by RUBÉN Parra on 10/16/20 at 1:11 PM EDT

## 2020-10-16 NOTE — PLAN OF CARE
Problem: Falls - Risk of:  Goal: Will remain free from falls  Description: Will remain free from falls  Outcome: Met This Shift  Goal: Absence of physical injury  Description: Absence of physical injury  Outcome: Met This Shift     Problem: Inadequate oral food/beverage intake (NI-2.1)  Goal: Food and/or Nutrient Delivery  Description: Individualized approach for food/nutrient provision.   10/15/2020 1418 by Jacqueline Brown RD, LD  Outcome: Met This Shift

## 2020-10-16 NOTE — PROGRESS NOTES
Occupational Therapy  OT BEDSIDE TREATMENT NOTE      Date:10/16/2020  Patient Name: Yinka Leal  MRN: [id number]  : 1950  Room: [address] [place], [place] [address]     Referring Provider: Randy Mills DO   Evaluating OT: Rock Ortega, OTR/L - [id number]     AM-PAC Daily Activity Raw Score:       Recommended Adaptive Equipment: Continue to assess.      Diagnosis: GI bleeding [K92.2]  Pertinent Medical History: HTN, glaucoma, macular degneration, sciatica, alcoholism      Precautions: falls,  contact isolation     Home Living: Patient admitted from [facility].      Prior Level of Function (PLOF): Patient stated that she needs assistance with ADLs and reported h/o bowel incontinence. Patient reported that she has not been out of bed much recently; patient stated that a sanna lift was used once recently to facilitate a functional transfer out of bed.     Pain Level: No c/o pain  Cognition: Pt alert and conversing with cues for safety required.     Functional Assessment:    Initial Eval Status  Date: 10/9/2020 Treatment Status 10/16/20    Short Term Goals   Feeding SBA   Setup   Grooming Min A  SBA   UB Dressing Mod A Mod A to manage gown SBA   LB Dressing Dependent to adjust socks. Dep A to manage B socks  Mod A - with use of AE, as needed/appropriate   Bathing Max A   Mod A - with use of AE/DME, as needed/appropriate   Toileting Dependent   Mod A   Bed Mobility  Log Rolling: Max A  Patient declined to attempt supine-to-sit secondary to fear of falling. Supine to sit: Mod A  Sit to supine: Max A     Functional Transfers Not assessed. NT  Max A   Functional Mobility Not assessed.   N/A   Balance Not assessed.       Activity Tolerance Poor secondary to complaints of pain; nursing aware.  Poor+   Patient will demonstrate Good understanding and consistent implementation of energy conservation techniques and work simplification techniques into ADL routines.             B UE Strength B Shoulders: 3/5  B Biceps: 3/5 grossly  B Triceps: 3+/5 grossly  Vc required for incorporation of B UE during tasks as able. Patient will demonstrate 4-/5 B UE strength in order to maximize independence with ADLs, bed mobility, and functional transfers        Comments: Upon arrival pt was supine in bed. At end of session pt was transferred back to bed with washcloth placed in L hand to prevent skin breakdown due to flexion contracture, alarm on, all lines and tubes intact and call light within reach. Treatment: Therapist facilitated gentle PROM B wrist flexion/ extension and digit extension to decrease tone to prevent skin breakdown and maximize participation during ADLs. Therapist facilitate active assisted weight shifting exercises seated at EOB to correct posterior lean. Education: Benefits of EOB activity and techniques to improve sitting balance to maximize independence with ADLs. · Pt has made good progress towards set goals. Plan of Care:   OT treatment to be provided 1-3x/week for 5-7 days PRN to address the following:  [x]? ADL Re-Training/AE Recommendations  [x]? Energy Conservation Techniques/Strategies                                 [x]? Neuromuscular Re-Education     [x]? Functional Transfer Training                [x]? Functional Mobility Training                  []? Cognitive Re-Training               []? Splinting/Positioning Needs                              [x]? Therapeutic Activity   [x]? Therapeutic Exercise  []? Visual/Perceptual   []? Delirium Prevention/Treatment   [x]? Positioning to Improve Functional [place], Safety, and Skin Integrity   [x]? Patient and/or Family Education to Increase Safety and Functional [place]   []?  Other:     Treatment Charges: Mins Units   Ther Ex  [id number]     Manual Therapy [id number]     Thera Activities 85907 14 1   ADL/Home Mgt [id number]     Neuro Re-ed [id number]     Group Therapy      Orthotic manage/training  19955     Non-Billable Time       Time In: 9:00  Time Out: 9:14  Total Time: [address] [facility] GALLAGHER/L [id number]

## 2020-10-16 NOTE — PROGRESS NOTES
stand: NA  Stand to sit: NA  Stand pivot: NA NA MIN A   Ambulation   NA NA 25 feet with ww MIN A   Stair negotiation: ascended and descended NA NA NA   AM-PAC 6 Clicks 74/08 27/52            Pt is alert and able to follow instruction  Balance: poor sitting EOB    Pt performed therapeutic exercise of the following: seated B ankle pumps, LAQ's A/AAROM x 15 to 20 reps, supine B heel slides A/AAROM, hip ABd/ADd AAROM x 20. Patient education  Pt was educated on exercise promoting circulation and strengthening, UE usage to assist with sitting EOB    Patient response to education:   Pt verbalized understanding Pt demonstrated skill Pt requires further education in this area   yes With instruction yes     ASSESSMENT:   Comments: Nurse ok with rx. Pt found in bed, agreed to Rx after initial encouragement. Pt assisted to EOB, sat EOB Min/Mod A for balance due to trunk retroversion, trunk stabilization and seated LE exercise performed. Once fatigued, Pt assisted back to bed, LE exercise performed hips and knees. Pt was left in bed per request supine with call light in reach. Time in 0908   Time out 0935   Total Treatment Time 27 minutes   CPT codes:     Therapeutic activities 79401 15 minutes   Therapeutic exercises 32503 12 minutes       Pt is making fair progress toward established Physical Therapy goals as per EOB tolerance and exercise participation. Continue with physical therapy current plan of care.     Jazlyn Baxter PTA   License Number: [id number]

## 2020-10-20 NOTE — PROGRESS NOTES
Physician Progress Note      PATIENTDaricardo JOHNSON #:                  [id number]  :                       1950  ADMIT DATE:       10/8/2020 5:00 PM  [address] DATE:        10/16/2020 5:35 PM  RESPONDING  PROVIDER #:        Dustin Durant MD          QUERY TEXT:    Dear Attending,    Patient admitted with GI bleeding, noted to have \". ..severe C. difficile   colitis. Fareed Guerrier Founds \" per ID notes and \". ..gastritis. Fareed Guer[facility] Founds \" per EGD OP note. If possible,   please document in progress notes and discharge summary the cause of the GI   bleeding: The medical record reflects the following:  Risk Factors: severe C-diff colitis; gastritis  Clinical Indicators: per ID consult note, \". Fareed uGerrier Founds Anemia secondary to GI bleed   possibly related to severe C. difficile colitis. Fareed Guerrier Founds She continues to have   diarrhea and was sent to the emergency room because of a drop in her   hemoglobin to 6.5. She admits to persistent abdominal pain diarrhea and   generalized weakness. Fareed Guerrier Founds Anemia secondary to GI bleed possibly related to severe   C. difficile colitis. Fareed Guerrier Founds \";  per IM notes, \". Fareed Guerrier Founds Acute blood loss anemia likely   d/t GI bleed. .. Stomach showed gastritis. Fareed [place] Founds Recent C. difficile colitis -   completed treatment - Colonoscopy as outpatient once C. difficil  Treatment: PO Vanc QID; Questran, Protonix; Options provided:  -- GI bleeding due to gastritis  -- GI bleeding due to C-diff colitis  -- Other - I will add my own diagnosis  -- Disagree - Not applicable / Not valid  -- Disagree - Clinically unable to determine / Unknown  -- Refer to Clinical Documentation Reviewer    PROVIDER RESPONSE TEXT:    This patient has GI bleeding due to gastritis.     Query created by: Nando Treviño on 10/14/2020 9:55 AM      Electronically signed by:  Dustin Durant MD 10/19/2020 11:27 PM

## 2021-06-25 ENCOUNTER — TELEPHONE (OUTPATIENT)
Dept: VASCULAR SURGERY | Age: 71
End: 2021-06-25

## 2023-07-11 NOTE — PLAN OF CARE
Problem: Inadequate oral food/beverage intake (NI-2.1)  Goal: Food and/or Nutrient Delivery  Continue Diet. Will Continue Boost High Calorie Pudding ONS BID. Description: Individualized approach for food/nutrient provision.   Outcome: Met This Shift No

## (undated) DEVICE — GRADUATE TRIANG MEASURE 1000ML BLK PRNT

## (undated) DEVICE — BLOCK BITE 60FR RUBBER ADLT DENTAL

## (undated) DEVICE — REAGENT TEST UREASE RAPD CLOTEST F/

## (undated) DEVICE — FORCEPS BX OVL CUP FEN DISPOSABLE CAP L 160CM RAD JAW 4

## (undated) DEVICE — SPONGE GZ W4XL4IN RAYON POLY FILL CVR W/ NONWOVEN FAB